# Patient Record
Sex: MALE | Race: WHITE | Employment: OTHER | URBAN - METROPOLITAN AREA
[De-identification: names, ages, dates, MRNs, and addresses within clinical notes are randomized per-mention and may not be internally consistent; named-entity substitution may affect disease eponyms.]

---

## 2022-11-09 ENCOUNTER — HOSPITAL ENCOUNTER (EMERGENCY)
Age: 67
Discharge: HOME | End: 2022-11-09
Attending: EMERGENCY MEDICINE
Payer: MEDICARE

## 2022-11-09 VITALS
OXYGEN SATURATION: 95 % | TEMPERATURE: 97.2 F | HEART RATE: 87 BPM | DIASTOLIC BLOOD PRESSURE: 81 MMHG | RESPIRATION RATE: 16 BRPM | SYSTOLIC BLOOD PRESSURE: 149 MMHG

## 2022-11-09 DIAGNOSIS — L08.9 WOUND INFECTION: Primary | ICD-10-CM

## 2022-11-09 DIAGNOSIS — T14.8XXA WOUND INFECTION: Primary | ICD-10-CM

## 2022-11-09 PROCEDURE — 87186 SC STD MICRODIL/AGAR DIL: CPT

## 2022-11-09 PROCEDURE — 87077 CULTURE AEROBIC IDENTIFY: CPT

## 2022-11-09 PROCEDURE — 87205 SMEAR GRAM STAIN: CPT

## 2022-11-09 PROCEDURE — 99283 EMERGENCY DEPT VISIT LOW MDM: CPT

## 2022-11-09 RX ORDER — DOXYCYCLINE HYCLATE 100 MG
100 TABLET ORAL 2 TIMES DAILY
Qty: 20 TABLET | Refills: 0 | Status: SHIPPED | OUTPATIENT
Start: 2022-11-09 | End: 2022-11-19

## 2022-11-09 RX ORDER — MUPIROCIN 20 MG/G
OINTMENT TOPICAL 3 TIMES DAILY
Qty: 22 G | Refills: 0 | Status: ON HOLD | OUTPATIENT
Start: 2022-11-09

## 2022-11-09 NOTE — LETTER
11/15/2022      [address] [place] [address]      Dear Mr. Nolberto Conley were recently seen in the Emergency Department and had several tests performed as part of your evaluation. There are additional results, not available during your visit, that require your immediate attention. It is important that we discuss these results with you. Please call [phone] to speak with one of our providers as soon as possible. If you reach a voice mail, please leave your name, date of birth, phone number, and the best time to return your call.       Sincerely,    Alfa Hart MD  Chief Medical Officer  [address] [place] [address] [facility], [address]  [phone]

## 2022-11-09 NOTE — ED PROVIDER NOTES
26-year-old male with a past medical history significant for diabetes, hypertension, hypercholesterolemia, skin cancer and oropharyngeal cancer with s/p biopsy to the area with resulting wound infection about 3 months he was in [place] who presents to the ER for evaluation for persistent drainage with swelling and tenderness at the site of the incision. Patient denies any further discomfort at this time. Past Medical History:   Diagnosis Date    Diabetes ([address]     HTN (hypertension)     Hypercholesteremia     Skin cancer     L sided neck skin cancer cells       No past surgical history on file. No family history on file. Social History     Socioeconomic History    Marital status:      Spouse name: Not on file    Number of children: Not on file    Years of education: Not on file    Highest education level: Not on file   Occupational History    Not on file   Tobacco Use    Smoking status: Every Day     Packs/day: 0.50     Types: Cigarettes    Smokeless tobacco: Never   Substance and Sexual Activity    Alcohol use: Yes     Comment: 2 drinks/day    Drug use: Never    Sexual activity: Not on file   Other Topics Concern    Not on file   Social History Narrative    Not on file     Social Determinants of Health     Financial Resource Strain: Not on file   Food Insecurity: Not on file   Transportation Needs: Not on file   Physical Activity: Not on file   Stress: Not on file   Social Connections: Not on file   Intimate Partner Violence: Not on file   Housing Stability: Not on file         ALLERGIES: Patient has no known allergies. Review of Systems   All other systems reviewed and are negative. Vitals:    11/09/22 1347 11/09/22 1646 11/09/22 1646   BP: (!) 106/52 (!) 149/81    Pulse: 89 87    Resp: 16 16    Temp: 97.2 °F (36.2 °C)     SpO2: 100% 95% 95%            Physical Exam  Vitals and nursing note reviewed. Exam conducted with a chaperone present.       CONSTITUTIONAL: Well-appearing; well-nourished; in no apparent distress  HEAD: Normocephalic; atraumatic  EYES: PERRL; EOM intact; conjunctiva and sclera are clear bilaterally. ENT: No rhinorrhea; normal pharynx with no tonsillar hypertrophy; mucous membranes pink/moist, no erythema, no exudate. NECK: Supple; non-tender; no cervical lymphadenopathy  CARD: Normal S1, S2; no murmurs, rubs, or gallops. Regular rate and rhythm. RESP: Normal respiratory effort; breath sounds clear and equal bilaterally; no wheezes, rhonchi, or rales. ABD: Normal bowel sounds; non-distended; non-tender; no palpable organomegaly, no masses, no bruits. Back Exam: Normal inspection; no vertebral point tenderness, no CVA tenderness. Normal range of motion. EXT: Normal ROM in all four extremities; non-tender to palpation; no swelling or deformity; distal pulses are normal, no edema. SKIN: Warm; dry; 2 cm circumferential wound mildly edematous with purulent drainage but no abscess identified consistent with a wound infection. Sherryle Moder NEURO:Alert and oriented x 3, coherent, GRETCHEN-XII grossly intact, sensory and motor are non-focal.      MDM  Number of Diagnoses or Management Options  Wound infection  Diagnosis management comments: Assessment: Surgical wound infection    Plan: Wound care/education, reassurance, symptomatic treatment/discharge with outpatient referral as needed/ Monitor and Reevaluate.          Amount and/or Complexity of Data Reviewed  Clinical lab tests: ordered and reviewed  Tests in the radiology section of CPT®: ordered and reviewed  Tests in the medicine section of CPT®: reviewed and ordered  Discussion of test results with the performing providers: yes  Decide to obtain previous medical records or to obtain history from someone other than the patient: yes  Obtain history from someone other than the patient: yes  Review and summarize past medical records: yes  Discuss the patient with other providers: yes  Independent visualization of images, tracings, or specimens: yes    Risk of Complications, Morbidity, and/or Mortality  Presenting problems: low  Diagnostic procedures: low  Management options: low    Patient Progress  Patient progress: stable         Procedures      Progress Note:   Pt has been reexamined by Alfa Hart MD. Pt is feeling much better. Symptoms have improved. All available results have been reviewed with pt and any available family. Pt understands sx, dx, and tx in ED. Care plan has been outlined and questions have been answered. Pt is ready to go home. Will send home on wound infection instruction. Prescription of doxycycline and Bactroban ointment. Outpatient referral with PCP/ENT for reevaluation and further treatment as needed. Written by Alfa Hart MD,6:05 PM    .   .

## 2022-11-12 LAB
BACTERIA SPEC CULT: ABNORMAL
BACTERIA SPEC CULT: ABNORMAL
GRAM STN SPEC: ABNORMAL
GRAM STN SPEC: ABNORMAL
SERVICE CMNT-IMP: ABNORMAL

## 2022-11-14 NOTE — PROGRESS NOTES
Attempted to reach patient. 2nd Message left for call back concerning culture result and need for treatment change.

## 2022-11-16 RX ORDER — CIPROFLOXACIN 500 MG/1
500 TABLET ORAL 2 TIMES DAILY
Qty: 14 TABLET | Refills: 0 | Status: SHIPPED | OUTPATIENT
Start: 2022-11-16 | End: 2022-11-23

## 2022-11-16 NOTE — ED NOTES
I was able to speak with patient concerning wound culture. Pt reports improvement but still drainage.   [address] for cipro 500 mg bid x 7 d to [employer]

## 2022-11-16 NOTE — ED NOTES
Pt called and left message.   I returned call and left voicemail for call back concerning lab result

## 2022-11-30 ENCOUNTER — HOSPITAL ENCOUNTER (EMERGENCY)
Age: 67
Discharge: OTHER INSTITUTION | End: 2022-12-01
Attending: EMERGENCY MEDICINE
Payer: MEDICARE

## 2022-11-30 ENCOUNTER — APPOINTMENT (OUTPATIENT)
Dept: CT IMAGING | Age: 67
End: 2022-11-30
Attending: EMERGENCY MEDICINE
Payer: MEDICARE

## 2022-11-30 DIAGNOSIS — L02.11 NECK ABSCESS: ICD-10-CM

## 2022-11-30 DIAGNOSIS — R22.1 NECK MASS: Primary | ICD-10-CM

## 2022-11-30 DIAGNOSIS — R13.10 DYSPHAGIA, UNSPECIFIED TYPE: ICD-10-CM

## 2022-11-30 LAB
ALBUMIN SERPL-MCNC: 2.9 G/DL (ref 3.5–5)
ALBUMIN/GLOB SERPL: 0.5 {RATIO} (ref 1.1–2.2)
ALP SERPL-CCNC: 151 U/L (ref 45–117)
ALT SERPL-CCNC: 44 U/L (ref 12–78)
ANION GAP SERPL CALC-SCNC: 14 MMOL/L (ref 5–15)
AST SERPL-CCNC: 73 U/L (ref 15–37)
BASOPHILS # BLD: 0 K/UL (ref 0–0.1)
BASOPHILS NFR BLD: 1 % (ref 0–1)
BILIRUB SERPL-MCNC: 0.4 MG/DL (ref 0.2–1)
BUN SERPL-MCNC: 16 MG/DL (ref 6–20)
BUN/CREAT SERPL: 20 (ref 12–20)
CALCIUM SERPL-MCNC: 8.1 MG/DL (ref 8.5–10.1)
CHLORIDE SERPL-SCNC: 103 MMOL/L (ref 97–108)
CO2 SERPL-SCNC: 24 MMOL/L (ref 21–32)
CREAT SERPL-MCNC: 0.82 MG/DL (ref 0.7–1.3)
DIFFERENTIAL METHOD BLD: ABNORMAL
EOSINOPHIL # BLD: 0 K/UL (ref 0–0.4)
EOSINOPHIL NFR BLD: 0 % (ref 0–7)
ERYTHROCYTE [DISTWIDTH] IN BLOOD BY AUTOMATED COUNT: 13.4 % (ref 11.5–14.5)
GLOBULIN SER CALC-MCNC: 5.7 G/DL (ref 2–4)
GLUCOSE SERPL-MCNC: 85 MG/DL (ref 65–100)
HCT VFR BLD AUTO: 45.2 % (ref 36.6–50.3)
HGB BLD-MCNC: 15.1 G/DL (ref 12.1–17)
IMM GRANULOCYTES # BLD AUTO: 0 K/UL (ref 0–0.04)
IMM GRANULOCYTES NFR BLD AUTO: 0 % (ref 0–0.5)
LYMPHOCYTES # BLD: 2.2 K/UL (ref 0.8–3.5)
LYMPHOCYTES NFR BLD: 36 % (ref 12–49)
MAGNESIUM SERPL-MCNC: 1.9 MG/DL (ref 1.6–2.4)
MCH RBC QN AUTO: 34.3 PG (ref 26–34)
MCHC RBC AUTO-ENTMCNC: 33.4 G/DL (ref 30–36.5)
MCV RBC AUTO: 102.7 FL (ref 80–99)
MONOCYTES # BLD: 0.5 K/UL (ref 0–1)
MONOCYTES NFR BLD: 9 % (ref 5–13)
NEUTS SEG # BLD: 3.4 K/UL (ref 1.8–8)
NEUTS SEG NFR BLD: 54 % (ref 32–75)
NRBC # BLD: 0 K/UL (ref 0–0.01)
NRBC BLD-RTO: 0 PER 100 WBC
PLATELET # BLD AUTO: 246 K/UL (ref 150–400)
PMV BLD AUTO: 9.5 FL (ref 8.9–12.9)
POTASSIUM SERPL-SCNC: 4.1 MMOL/L (ref 3.5–5.1)
PROT SERPL-MCNC: 8.6 G/DL (ref 6.4–8.2)
RBC # BLD AUTO: 4.4 M/UL (ref 4.1–5.7)
SODIUM SERPL-SCNC: 141 MMOL/L (ref 136–145)
WBC # BLD AUTO: 6.2 K/UL (ref 4.1–11.1)

## 2022-11-30 PROCEDURE — 80053 COMPREHEN METABOLIC PANEL: CPT

## 2022-11-30 PROCEDURE — 96375 TX/PRO/DX INJ NEW DRUG ADDON: CPT

## 2022-11-30 PROCEDURE — 85025 COMPLETE CBC W/AUTO DIFF WBC: CPT

## 2022-11-30 PROCEDURE — 36415 COLL VENOUS BLD VENIPUNCTURE: CPT

## 2022-11-30 PROCEDURE — 74011000636 HC RX REV CODE- 636: Performed by: EMERGENCY MEDICINE

## 2022-11-30 PROCEDURE — 96365 THER/PROPH/DIAG IV INF INIT: CPT

## 2022-11-30 PROCEDURE — 74011250636 HC RX REV CODE- 250/636: Performed by: EMERGENCY MEDICINE

## 2022-11-30 PROCEDURE — 99285 EMERGENCY DEPT VISIT HI MDM: CPT

## 2022-11-30 PROCEDURE — 83735 ASSAY OF MAGNESIUM: CPT

## 2022-11-30 PROCEDURE — 70491 CT SOFT TISSUE NECK W/DYE: CPT

## 2022-11-30 RX ORDER — KETOROLAC TROMETHAMINE 30 MG/ML
15 INJECTION, SOLUTION INTRAMUSCULAR; INTRAVENOUS
Status: COMPLETED | OUTPATIENT
Start: 2022-11-30 | End: 2022-11-30

## 2022-11-30 RX ORDER — METOPROLOL TARTRATE 50 MG/1
TABLET ORAL 2 TIMES DAILY
COMMUNITY
End: 2022-12-11

## 2022-11-30 RX ORDER — TRAMADOL HYDROCHLORIDE 50 MG/1
50 TABLET ORAL
COMMUNITY
End: 2022-12-11

## 2022-11-30 RX ORDER — GABAPENTIN 400 MG/1
400 CAPSULE ORAL 3 TIMES DAILY
COMMUNITY

## 2022-11-30 RX ORDER — TAMSULOSIN HYDROCHLORIDE 0.4 MG/1
0.4 CAPSULE ORAL DAILY
COMMUNITY

## 2022-11-30 RX ORDER — MIRTAZAPINE 30 MG/1
TABLET, FILM COATED ORAL
COMMUNITY

## 2022-11-30 RX ORDER — PANTOPRAZOLE SODIUM 40 MG/1
40 TABLET, DELAYED RELEASE ORAL DAILY
COMMUNITY

## 2022-11-30 RX ORDER — TRAZODONE HYDROCHLORIDE 100 MG/1
100 TABLET ORAL
COMMUNITY

## 2022-11-30 RX ADMIN — VANCOMYCIN HYDROCHLORIDE 1000 MG: 1 INJECTION, POWDER, LYOPHILIZED, FOR SOLUTION INTRAVENOUS at 23:16

## 2022-11-30 RX ADMIN — VANCOMYCIN HYDROCHLORIDE 1000 MG: 1 INJECTION, POWDER, LYOPHILIZED, FOR SOLUTION INTRAVENOUS at 22:51

## 2022-11-30 RX ADMIN — KETOROLAC TROMETHAMINE 15 MG: 30 INJECTION, SOLUTION INTRAMUSCULAR at 23:26

## 2022-11-30 RX ADMIN — SODIUM CHLORIDE 1000 ML: 9 INJECTION, SOLUTION INTRAVENOUS at 20:07

## 2022-11-30 RX ADMIN — IOPAMIDOL 100 ML: 612 INJECTION, SOLUTION INTRAVENOUS at 20:33

## 2022-12-01 ENCOUNTER — APPOINTMENT (OUTPATIENT)
Dept: CT IMAGING | Age: 67
DRG: 982 | End: 2022-12-01
Attending: NURSE PRACTITIONER
Payer: MEDICARE

## 2022-12-01 ENCOUNTER — HOSPITAL ENCOUNTER (INPATIENT)
Age: 67
LOS: 10 days | Discharge: HOME | DRG: 982 | End: 2022-12-11
Attending: INTERNAL MEDICINE | Admitting: INTERNAL MEDICINE
Payer: MEDICARE

## 2022-12-01 VITALS
OXYGEN SATURATION: 91 % | SYSTOLIC BLOOD PRESSURE: 147 MMHG | BODY MASS INDEX: 32.67 KG/M2 | DIASTOLIC BLOOD PRESSURE: 67 MMHG | WEIGHT: 191.36 LBS | TEMPERATURE: 98.7 F | HEIGHT: 64 IN | HEART RATE: 82 BPM | RESPIRATION RATE: 20 BRPM

## 2022-12-01 DIAGNOSIS — R22.1 MASS OF LEFT SIDE OF NECK: ICD-10-CM

## 2022-12-01 DIAGNOSIS — C76.0 SQUAMOUS CELL CARCINOMA OF HEAD AND NECK (HCC): ICD-10-CM

## 2022-12-01 DIAGNOSIS — R22.1 NECK MASS: Primary | ICD-10-CM

## 2022-12-01 DIAGNOSIS — I44.30 AV BLOCK: ICD-10-CM

## 2022-12-01 DIAGNOSIS — I42.9 CARDIOMYOPATHY, UNSPECIFIED TYPE (HCC): ICD-10-CM

## 2022-12-01 PROBLEM — L03.221 CELLULITIS AND ABSCESS OF NECK: Status: ACTIVE | Noted: 2022-12-01

## 2022-12-01 PROBLEM — L02.11 CELLULITIS AND ABSCESS OF NECK: Status: ACTIVE | Noted: 2022-12-01

## 2022-12-01 PROBLEM — L02.11 CELLULITIS AND ABSCESS OF NECK: Status: ACTIVE | Noted: 2022-01-01

## 2022-12-01 LAB
ALBUMIN SERPL-MCNC: 2.5 G/DL (ref 3.5–5)
ALBUMIN/GLOB SERPL: 0.5 {RATIO} (ref 1.1–2.2)
ALP SERPL-CCNC: 125 U/L (ref 45–117)
ALT SERPL-CCNC: 35 U/L (ref 12–78)
AMPHET UR QL SCN: NEGATIVE
ANION GAP SERPL CALC-SCNC: 5 MMOL/L (ref 5–15)
APPEARANCE UR: CLEAR
AST SERPL-CCNC: 48 U/L (ref 15–37)
BACTERIA URNS QL MICRO: NEGATIVE /HPF
BARBITURATES UR QL SCN: NEGATIVE
BASOPHILS # BLD: 0 K/UL (ref 0–0.1)
BASOPHILS NFR BLD: 0 % (ref 0–1)
BENZODIAZ UR QL: NEGATIVE
BILIRUB SERPL-MCNC: 0.6 MG/DL (ref 0.2–1)
BILIRUB UR QL: NEGATIVE
BUN SERPL-MCNC: 13 MG/DL (ref 6–20)
BUN/CREAT SERPL: 20 (ref 12–20)
CALCIUM SERPL-MCNC: 7.9 MG/DL (ref 8.5–10.1)
CANNABINOIDS UR QL SCN: POSITIVE
CHLORIDE SERPL-SCNC: 109 MMOL/L (ref 97–108)
CO2 SERPL-SCNC: 26 MMOL/L (ref 21–32)
COCAINE UR QL SCN: NEGATIVE
COLOR UR: ABNORMAL
COMMENT, HOLDF: NORMAL
COMMENT, HOLDF: NORMAL
CREAT SERPL-MCNC: 0.66 MG/DL (ref 0.7–1.3)
DIFFERENTIAL METHOD BLD: ABNORMAL
DRUG SCRN COMMENT,DRGCM: ABNORMAL
EOSINOPHIL # BLD: 0 K/UL (ref 0–0.4)
EOSINOPHIL NFR BLD: 0 % (ref 0–7)
EPITH CASTS URNS QL MICRO: ABNORMAL /LPF
ERYTHROCYTE [DISTWIDTH] IN BLOOD BY AUTOMATED COUNT: 13.3 % (ref 11.5–14.5)
EST. AVERAGE GLUCOSE BLD GHB EST-MCNC: 143 MG/DL
GLOBULIN SER CALC-MCNC: 4.9 G/DL (ref 2–4)
GLUCOSE BLD STRIP.AUTO-MCNC: 75 MG/DL (ref 65–117)
GLUCOSE BLD STRIP.AUTO-MCNC: 75 MG/DL (ref 65–117)
GLUCOSE BLD STRIP.AUTO-MCNC: 76 MG/DL (ref 65–117)
GLUCOSE SERPL-MCNC: 78 MG/DL (ref 65–100)
GLUCOSE UR STRIP.AUTO-MCNC: NEGATIVE MG/DL
HBA1C MFR BLD: 6.6 % (ref 4–5.6)
HCT VFR BLD AUTO: 39.2 % (ref 36.6–50.3)
HGB BLD-MCNC: 13.2 G/DL (ref 12.1–17)
HGB UR QL STRIP: NEGATIVE
HYALINE CASTS URNS QL MICRO: ABNORMAL /LPF (ref 0–5)
IMM GRANULOCYTES # BLD AUTO: 0 K/UL
IMM GRANULOCYTES NFR BLD AUTO: 0 %
KETONES UR QL STRIP.AUTO: 15 MG/DL
LEUKOCYTE ESTERASE UR QL STRIP.AUTO: NEGATIVE
LYMPHOCYTES # BLD: 1.8 K/UL (ref 0.8–3.5)
LYMPHOCYTES NFR BLD: 40 % (ref 12–49)
MAGNESIUM SERPL-MCNC: 1.9 MG/DL (ref 1.6–2.4)
MCH RBC QN AUTO: 34.6 PG (ref 26–34)
MCHC RBC AUTO-ENTMCNC: 33.7 G/DL (ref 30–36.5)
MCV RBC AUTO: 102.9 FL (ref 80–99)
METHADONE UR QL: NEGATIVE
MONOCYTES # BLD: 0.4 K/UL (ref 0–1)
MONOCYTES NFR BLD: 8 % (ref 5–13)
NEUTS SEG # BLD: 2.3 K/UL (ref 1.8–8)
NEUTS SEG NFR BLD: 52 % (ref 32–75)
NITRITE UR QL STRIP.AUTO: NEGATIVE
NRBC # BLD: 0 K/UL (ref 0–0.01)
NRBC BLD-RTO: 0 PER 100 WBC
OPIATES UR QL: POSITIVE
PCP UR QL: NEGATIVE
PH UR STRIP: 5.5 [PH] (ref 5–8)
PHOSPHATE SERPL-MCNC: 2.4 MG/DL (ref 2.6–4.7)
PLATELET # BLD AUTO: 198 K/UL (ref 150–400)
PMV BLD AUTO: 9.7 FL (ref 8.9–12.9)
POTASSIUM SERPL-SCNC: 4.1 MMOL/L (ref 3.5–5.1)
PROT SERPL-MCNC: 7.4 G/DL (ref 6.4–8.2)
PROT UR STRIP-MCNC: ABNORMAL MG/DL
RBC # BLD AUTO: 3.81 M/UL (ref 4.1–5.7)
RBC #/AREA URNS HPF: ABNORMAL /HPF (ref 0–5)
RBC MORPH BLD: ABNORMAL
SAMPLES BEING HELD,HOLD: NORMAL
SAMPLES BEING HELD,HOLD: NORMAL
SERVICE CMNT-IMP: NORMAL
SODIUM SERPL-SCNC: 140 MMOL/L (ref 136–145)
SP GR UR REFRACTOMETRY: 1.02 (ref 1–1.03)
UROBILINOGEN UR QL STRIP.AUTO: 0.2 EU/DL (ref 0.2–1)
WBC # BLD AUTO: 4.5 K/UL (ref 4.1–11.1)
WBC URNS QL MICRO: ABNORMAL /HPF (ref 0–4)

## 2022-12-01 PROCEDURE — 96366 THER/PROPH/DIAG IV INF ADDON: CPT

## 2022-12-01 PROCEDURE — 84100 ASSAY OF PHOSPHORUS: CPT

## 2022-12-01 PROCEDURE — 83036 HEMOGLOBIN GLYCOSYLATED A1C: CPT

## 2022-12-01 PROCEDURE — 65270000046 HC RM TELEMETRY

## 2022-12-01 PROCEDURE — 36415 COLL VENOUS BLD VENIPUNCTURE: CPT

## 2022-12-01 PROCEDURE — 74011000636 HC RX REV CODE- 636: Performed by: RADIOLOGY

## 2022-12-01 PROCEDURE — 74011250637 HC RX REV CODE- 250/637: Performed by: INTERNAL MEDICINE

## 2022-12-01 PROCEDURE — 80307 DRUG TEST PRSMV CHEM ANLYZR: CPT

## 2022-12-01 PROCEDURE — 80053 COMPREHEN METABOLIC PANEL: CPT

## 2022-12-01 PROCEDURE — 74011250636 HC RX REV CODE- 250/636: Performed by: INTERNAL MEDICINE

## 2022-12-01 PROCEDURE — 74177 CT ABD & PELVIS W/CONTRAST: CPT

## 2022-12-01 PROCEDURE — 82962 GLUCOSE BLOOD TEST: CPT

## 2022-12-01 PROCEDURE — 99285 EMERGENCY DEPT VISIT HI MDM: CPT

## 2022-12-01 PROCEDURE — 85025 COMPLETE CBC W/AUTO DIFF WBC: CPT

## 2022-12-01 PROCEDURE — 74011000250 HC RX REV CODE- 250: Performed by: INTERNAL MEDICINE

## 2022-12-01 PROCEDURE — 71260 CT THORAX DX C+: CPT

## 2022-12-01 PROCEDURE — 74011000258 HC RX REV CODE- 258: Performed by: INTERNAL MEDICINE

## 2022-12-01 PROCEDURE — 81001 URINALYSIS AUTO W/SCOPE: CPT

## 2022-12-01 PROCEDURE — 83735 ASSAY OF MAGNESIUM: CPT

## 2022-12-01 RX ORDER — SODIUM CHLORIDE 0.9 % (FLUSH) 0.9 %
5-40 SYRINGE (ML) INJECTION AS NEEDED
Status: DISCONTINUED | OUTPATIENT
Start: 2022-12-01 | End: 2022-12-11 | Stop reason: HOSPADM

## 2022-12-01 RX ORDER — MAGNESIUM SULFATE 100 %
4 CRYSTALS MISCELLANEOUS AS NEEDED
Status: DISCONTINUED | OUTPATIENT
Start: 2022-12-01 | End: 2022-12-11 | Stop reason: HOSPADM

## 2022-12-01 RX ORDER — ONDANSETRON 4 MG/1
4 TABLET, ORALLY DISINTEGRATING ORAL
Status: DISCONTINUED | OUTPATIENT
Start: 2022-12-01 | End: 2022-12-11 | Stop reason: HOSPADM

## 2022-12-01 RX ORDER — ACETAMINOPHEN 325 MG/1
650 TABLET ORAL
Status: DISCONTINUED | OUTPATIENT
Start: 2022-12-01 | End: 2022-12-11 | Stop reason: HOSPADM

## 2022-12-01 RX ORDER — LISINOPRIL 20 MG/1
10 TABLET ORAL
Status: DISCONTINUED | OUTPATIENT
Start: 2022-12-02 | End: 2022-12-03

## 2022-12-01 RX ORDER — VANCOMYCIN/0.9 % SOD CHLORIDE 1.5G/250ML
1500 PLASTIC BAG, INJECTION (ML) INTRAVENOUS
Status: DISCONTINUED | OUTPATIENT
Start: 2022-12-01 | End: 2022-12-10

## 2022-12-01 RX ORDER — TRAZODONE HYDROCHLORIDE 100 MG/1
100 TABLET ORAL
Status: DISCONTINUED | OUTPATIENT
Start: 2022-12-01 | End: 2022-12-11 | Stop reason: HOSPADM

## 2022-12-01 RX ORDER — METOPROLOL TARTRATE 50 MG/1
100 TABLET ORAL 2 TIMES DAILY
Status: DISCONTINUED | OUTPATIENT
Start: 2022-12-01 | End: 2022-12-02

## 2022-12-01 RX ORDER — ONDANSETRON 2 MG/ML
4 INJECTION INTRAMUSCULAR; INTRAVENOUS
Status: DISCONTINUED | OUTPATIENT
Start: 2022-12-01 | End: 2022-12-11 | Stop reason: HOSPADM

## 2022-12-01 RX ORDER — POLYETHYLENE GLYCOL 3350 17 G/17G
17 POWDER, FOR SOLUTION ORAL DAILY PRN
Status: DISCONTINUED | OUTPATIENT
Start: 2022-12-01 | End: 2022-12-11 | Stop reason: HOSPADM

## 2022-12-01 RX ORDER — CYCLOBENZAPRINE HCL 10 MG
10 TABLET ORAL
COMMUNITY

## 2022-12-01 RX ORDER — MIRTAZAPINE 15 MG/1
30 TABLET, FILM COATED ORAL
Status: DISCONTINUED | OUTPATIENT
Start: 2022-12-01 | End: 2022-12-11 | Stop reason: HOSPADM

## 2022-12-01 RX ORDER — ACETAMINOPHEN 650 MG/1
650 SUPPOSITORY RECTAL
Status: DISCONTINUED | OUTPATIENT
Start: 2022-12-01 | End: 2022-12-11 | Stop reason: HOSPADM

## 2022-12-01 RX ORDER — MORPHINE SULFATE 2 MG/ML
2 INJECTION, SOLUTION INTRAMUSCULAR; INTRAVENOUS
Status: DISCONTINUED | OUTPATIENT
Start: 2022-12-01 | End: 2022-12-09

## 2022-12-01 RX ORDER — TAMSULOSIN HYDROCHLORIDE 0.4 MG/1
0.4 CAPSULE ORAL DAILY
Status: DISCONTINUED | OUTPATIENT
Start: 2022-12-01 | End: 2022-12-11 | Stop reason: HOSPADM

## 2022-12-01 RX ORDER — COLCHICINE 0.6 MG/1
0.6 TABLET ORAL AS NEEDED
Status: DISCONTINUED | OUTPATIENT
Start: 2022-12-01 | End: 2022-12-01

## 2022-12-01 RX ORDER — PANTOPRAZOLE SODIUM 40 MG/1
40 TABLET, DELAYED RELEASE ORAL DAILY
Status: DISCONTINUED | OUTPATIENT
Start: 2022-12-01 | End: 2022-12-11 | Stop reason: HOSPADM

## 2022-12-01 RX ORDER — COLCHICINE 0.6 MG/1
0.6 TABLET ORAL AS NEEDED
COMMUNITY

## 2022-12-01 RX ORDER — OXYCODONE AND ACETAMINOPHEN 5; 325 MG/1; MG/1
1 TABLET ORAL
Status: DISCONTINUED | OUTPATIENT
Start: 2022-12-01 | End: 2022-12-10

## 2022-12-01 RX ORDER — INSULIN LISPRO 100 [IU]/ML
INJECTION, SOLUTION INTRAVENOUS; SUBCUTANEOUS
Status: DISCONTINUED | OUTPATIENT
Start: 2022-12-01 | End: 2022-12-11 | Stop reason: HOSPADM

## 2022-12-01 RX ORDER — SODIUM CHLORIDE 0.9 % (FLUSH) 0.9 %
5-40 SYRINGE (ML) INJECTION EVERY 8 HOURS
Status: DISCONTINUED | OUTPATIENT
Start: 2022-12-01 | End: 2022-12-11 | Stop reason: HOSPADM

## 2022-12-01 RX ORDER — CYCLOBENZAPRINE HCL 10 MG
10 TABLET ORAL
Status: DISCONTINUED | OUTPATIENT
Start: 2022-12-01 | End: 2022-12-10

## 2022-12-01 RX ORDER — GABAPENTIN 400 MG/1
400 CAPSULE ORAL 3 TIMES DAILY
Status: DISCONTINUED | OUTPATIENT
Start: 2022-12-01 | End: 2022-12-11 | Stop reason: HOSPADM

## 2022-12-01 RX ADMIN — PANTOPRAZOLE SODIUM 40 MG: 40 TABLET, DELAYED RELEASE ORAL at 09:59

## 2022-12-01 RX ADMIN — MORPHINE SULFATE 2 MG: 2 INJECTION, SOLUTION INTRAMUSCULAR; INTRAVENOUS at 13:19

## 2022-12-01 RX ADMIN — OXYCODONE AND ACETAMINOPHEN 1 TABLET: 5; 325 TABLET ORAL at 22:07

## 2022-12-01 RX ADMIN — MORPHINE SULFATE 2 MG: 2 INJECTION, SOLUTION INTRAMUSCULAR; INTRAVENOUS at 08:05

## 2022-12-01 RX ADMIN — TAMSULOSIN HYDROCHLORIDE 0.4 MG: 0.4 CAPSULE ORAL at 09:59

## 2022-12-01 RX ADMIN — IOPAMIDOL 100 ML: 755 INJECTION, SOLUTION INTRAVENOUS at 15:48

## 2022-12-01 RX ADMIN — SODIUM CHLORIDE, PRESERVATIVE FREE 10 ML: 5 INJECTION INTRAVENOUS at 17:33

## 2022-12-01 RX ADMIN — METOPROLOL TARTRATE 100 MG: 50 TABLET ORAL at 17:12

## 2022-12-01 RX ADMIN — GABAPENTIN 400 MG: 400 CAPSULE ORAL at 17:12

## 2022-12-01 RX ADMIN — MIRTAZAPINE 30 MG: 15 TABLET, FILM COATED ORAL at 22:07

## 2022-12-01 RX ADMIN — GABAPENTIN 400 MG: 400 CAPSULE ORAL at 09:59

## 2022-12-01 RX ADMIN — PIPERACILLIN AND TAZOBACTAM 3.38 G: 3; .375 INJECTION, POWDER, LYOPHILIZED, FOR SOLUTION INTRAVENOUS at 22:07

## 2022-12-01 RX ADMIN — VANCOMYCIN HYDROCHLORIDE 1500 MG: 10 INJECTION, POWDER, LYOPHILIZED, FOR SOLUTION INTRAVENOUS at 17:14

## 2022-12-01 RX ADMIN — GABAPENTIN 400 MG: 400 CAPSULE ORAL at 22:07

## 2022-12-01 RX ADMIN — PIPERACILLIN AND TAZOBACTAM 3.38 G: 3; .375 INJECTION, POWDER, LYOPHILIZED, FOR SOLUTION INTRAVENOUS at 13:19

## 2022-12-01 RX ADMIN — PIPERACILLIN AND TAZOBACTAM 4.5 G: 4; .5 INJECTION, POWDER, FOR SOLUTION INTRAVENOUS at 08:06

## 2022-12-01 RX ADMIN — METOPROLOL TARTRATE 100 MG: 50 TABLET ORAL at 11:07

## 2022-12-01 RX ADMIN — SODIUM CHLORIDE, PRESERVATIVE FREE 10 ML: 5 INJECTION INTRAVENOUS at 22:07

## 2022-12-01 RX ADMIN — MORPHINE SULFATE 2 MG: 2 INJECTION, SOLUTION INTRAMUSCULAR; INTRAVENOUS at 17:29

## 2022-12-01 NOTE — PROGRESS NOTES
Pharmacist Note - Vancomycin Dosing    Consult provided for this 75 y.o. male for indication of non-healing wound from biopsy of neck mass ~1 month ago in [place]  Antibiotic regimen(s): Vancomycin and Zosyn  Patient on vancomycin PTA? NO     Recent Labs     22   WBC 6.2   CREA 0.82   BUN 16     Frequency of BMP: Daily  Height: 162.6 cm  Weight: 86.8 kg  Est CrCl: 85.6 ml/min; UO: -- ml/kg/hr  Temp (24hrs), Av.1 °F (36.7 °C), Min:97.6 °F (36.4 °C), Max:98.7 °F (37.1 °C)    Cultures:   Wound, neck - Heavy Enterobacter cloacae, heavy mixed miles - Final     MRSA Swab ordered (if applicable)? N/A    The plan below is expected to result in a target range of AUC/HAKAN 400-500    Therapy was initiated with a loading dose of 2000 mg IV x 1 to be followed by a maintenance dose of 1500 mg IV every 18 hours. Pharmacy to follow patient daily and order levels / make dose adjustments as appropriate. Prabha Jason, PharmD  Clinical Pharmacist, Orthopedics and [facility] ()      ------------  *Vancomycin has been dosed used Bayesian kinetics software to target an AUC/HAKAN of 400-600, which provides adequate exposure for an assumed infection due to MRSA with an HAKAN of 1 or less while reducing the risk of nephrotoxicity as seen with traditional trough based dosing goals.

## 2022-12-01 NOTE — PROGRESS NOTES
1219- called to facilitate consult for Otolaryngology, on call Rosa Elena is [facility] Otoloaryngology office. [place] spoke to Lennox who was speaking in the office with the coordinator Olivia Boggs RN who told the  they require all medical records  to be forwarded in order for the DR to see the patient. The  informed them that we do not have his medical records that we have requested them from the office in [place]. NP notified. 1304- NP aware.

## 2022-12-01 NOTE — ED TRIAGE NOTES
Patient arrives to ED via EMS with a CC of L neck mass that developed 3 months ago. Patient stated he has had multiple biopsies of the sight. After his last one, he was started on a topical antibiotic. CA cells were found in the mass. Patient stated he did not follow up d/t death of a loved one. Patient noticed bleeding and pain from the sight.

## 2022-12-01 NOTE — PROGRESS NOTES
Afternoon update: For full H & P from today, please see Dr. Ming Garcia note from this morning. This 77-year-old man with past medical history significant for hypertension and diabetes presented at the emergency room with left neck mass. This has been present for about 3 months. The patient recently relocated to [facility] from [place]. With the initial swelling, the patient was evaluated in [place] including biopsy of the mass. The patient was told that the mass is cancer after biopsy done by ENT Surgeon ( Dr. Anthony Gutierrez [facility]). He was referred to a cancer institute in [facility] but had not yet begun workup/treatment because the patient's wife passed away on October 4th. The patient came to [place] to live with his daughter. He was treated with oral antibiotics in the [place] ER in early November. The swelling is progressive and associated with pain and also with purulent drainage. Because of that, the patient came to [facility] emergency room. When the patient arrived at the emergency room, CT scan of the soft tissue of the neck was obtained. This shows complex fluid collection in the left lateral neck. The mass is also causing the patient difficulty with swallowing ( choking) and change in vocal quality. Patient was seen and examined today by myself. He has large purulent necrotic appearing mass to left neck. Vocal quality appears to be impacted. Pain with swallowing and choking noted. 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 Ruthie Jo , medical record release form filled out with patient and faxed to their office.  Number is [phone]   Called consult to Dr. Janette Amaya with [facility] , spoke to him over the phone, patient will likely need radiation/chemo for malignant neck mass, he will still come tonight to evaluate airway and make sure not compromised. Will likely need Heme/Onc consult non urgent , will place for tomorrow for Dr. Getachew Pritchard. Speech consult placed for dysphagia , continue NPO until this time   Wound care consult placed for heavy purulent drainage. Continue with Vanc + Zosyn   Pain control with Percocet and Morphine   BP continues to be elevated despite adjusted home dose of 100 mg Metoprolol daily. SBP consistently in the 160's-170's. Will start Lisinopril 10 mg daily. Continue to titrate    [employer]

## 2022-12-01 NOTE — H&P
[facility]  HISTORY AND PHYSICAL    Name:  Jovanni Almonte  MR#:  [id number]  :  1955  ACCOUNT #:  [de-identified]  ADMIT DATE:  2022      The patient was seen, evaluated, and admitted by me on 2022. PRIMARY CARE PHYSICIAN:  None. SOURCE OF INFORMATION:  The patient and review of ED electronic medical record. CHIEF COMPLAINT:  Left neck mass and swelling. HISTORY OF PRESENT ILLNESS:  This 23-year-old man with past medical history significant for hypertension and diabetes presented at the emergency room with left neck mass. This has been present for about 3 months. The patient recently relocated to [facility] from [place]. With the initial swelling, the patient was evaluated in [place] including biopsy of the mass. The patient was told that the mass is cancer and was referred to ENT surgeon. Because the patient's wife passed away, the patient has not been able to follow up with the ENT surgeon. The patient came to [place] to live with his daughter. The swelling is progressive and associated with pain and also with drainage. Because of that, the patient came to [facility] emergency room. When the patient arrived at the emergency room, CT scan of the soft tissue of the neck was obtained. This shows complex fluid collection in the left lateral neck. The patient was subsequently referred to the hospitalist service for admission. No record of prior admission to this hospital.  The patient denies fever, rigors and chills. PAST MEDICAL HISTORY:  Hypertension and type 2 diabetes. ALLERGIES:  NO KNOWN DRUG ALLERGIES. MEDICATIONS:  1. Colchicine 0.6 mg daily as needed for gout. 2.  Flexeril 10 mg three times daily. 3.  Neurontin 400 mg three times daily. 4.  Lopressor 50 mg twice daily. 5.  Remeron 30 mg daily at bedtime. 6.  Protonix 40 mg daily. 7.  Januvia 100 mg daily. 8.  Flomax 0.4 mg daily.   9.  Trazodone 100 mg daily at bedtime. FAMILY HISTORY:  This was reviewed. His father had hypertension. PAST SURGICAL HISTORY:  This is significant for biopsy of left neck mass. SOCIAL HISTORY:  The patient smokes half a pack of cigarettes daily. Denies alcohol abuse. REVIEW OF SYSTEMS:  HEAD, EYES, EARS, NOSE AND THROAT:  No headache, no dizziness, no blurring of vision, and no photophobia. RESPIRATORY SYSTEM:  No cough, no shortness of breath, and no hemoptysis. CARDIOVASCULAR SYSTEM:  No chest pain, no orthopnea, and no palpitation. GASTROINTESTINAL SYSTEM:  No nausea or vomiting. No diarrhea and no constipation. GENITOURINARY SYSTEM:  No dysuria, no urgency, and no frequency. All other systems are reviewed and they are negative. PHYSICAL EXAMINATION:  GENERAL APPEARANCE:  The patient appeared ill and in moderate distress. VITAL SIGNS:  On arrival at the emergency room; temperature 97.7, pulse 93, respiratory rate 16, blood pressure 139/94, and oxygen saturation 98%. HEAD:  Normocephalic, atraumatic. EYES:  Normal eye movement. No redness, no drainage, and no discharge. EARS:  Normal external ears with no obvious drainage. NOSE:  No deformity and no drainage. MOUTH AND THROAT:  No visible oral lesion. NECK:  Swelling, redness, and tenderness. Left neck mass noted. CHEST:  Clear breath sounds. No wheezing and no crackles. HEART:  Normal S1 and S2, regular. No clinically appreciable murmur. ABDOMEN:  Soft and nontender. Normal bowel sounds. CNS:  Alert and oriented x3. No gross focal neurological deficit. EXTREMITIES:  No edema. Pulses 2+ bilaterally. MUSCULOSKELETAL SYSTEM:  No obvious joint deformity and swelling. SKIN:  Swelling, tenderness, and redness on left side of the neck noted and present on admission. PSYCHIATRY:  Normal mood and affect. LYMPHATIC SYSTEM:  Bilateral cervical lymphadenopathy noted.     DIAGNOSTIC DATA:  The CT scan of the soft tissue of the neck shows large 4.8 x 3.1 x 4.2 cm rim enhancing complex collection in the left lateral neck. LABORATORY DATA:  Hematology; WBC 6.2, hemoglobin 15.3, hematocrit 45.2, and platelets 247. Magnesium level 1.9. Chemistry; sodium 141, potassium 4.1, chloride 103, CO2 is 24, glucose 85, BUN 16, creatinine 0.82, calcium 8.1, total bilirubin 0.4, ALT 44, AST 73, alkaline phosphatase 151, total protein 8.6, albumin level 2.9, and globulin 5.7. ASSESSMENT:  1. Left neck abscess and cellulitis. 2.  Hypertension. 3.  Type 2 diabetes. 4.  Tobacco abuse. PLAN:  1. Left neck abscess and cellulitis. We will admit the patient for further evaluation and treatment. We will start the patient on vancomycin and Zosyn. ENT consult will be requested to assist in further evaluation and treatment. The biopsy of the mass in the past revealed cancer according to the patient. We will obtain CTA of the chest as well as CT of the abdomen and pelvis to evaluate for metastatic disease. The patient will be n.p.o. in anticipation of intervention by the ENT surgeon. 2.  Hypertension. We will resume preadmission medication. We will monitor the patient's blood pressure closely. 3.  Type 2 diabetes. The patient will be placed on sliding scale with insulin coverage. We will hold oral agents till the time of discharge from the hospital.  We will check A1c level. 4.  Tobacco abuse. The patient advised to quit smoking. He does not want to be placed on Nicoderm patch at this time. 5.  Other issues. Code status, the patient is a full code. We will request SCD for DVT prophylaxis. The patient will require DVT prophylaxis with Lovenox or heparin after surgical intervention or if the patient is not going to undergo any surgical intervention. FUNCTIONAL STATUS PRIOR TO ADMISSION:  The patient came from home. The patient is ambulatory with assistive device. COVID PRECAUTION:  The patient was wearing a face mask.   I was wearing a face mask and gloves for this patient's encounter.       MD GEOFF [email or web address]  D:  12/01/2022 6:59  T:  12/01/2022 8:45  JOB #:  [id number]

## 2022-12-01 NOTE — PROGRESS NOTES
Transition of Care: hopefully home with followup with specialist/new pcp    Transport plan: likely in car    RUR: 13%    Main contact is Debi Blackman- [phone] and Emmanuel Lemus- [phone]    Discharge pending:  -pending Win Varela consult  -patient continues on IV antibiotics  -pending medical progress and care recommendations    1230: this CM met with pleasant patient at bedside; he is alert and oriented x 4; patient lives at stated address in [place]; his son and daughter both live nearby; he moved to the [facility] area from [facility] a couple of months ago after his wife passed away; patient uses a cane, has 2 hearing aids (both at [facility] now); a shower chair; does not use a cpap; he verified his insurance which he states has 12 roundtrip transportation rides as a benefit; he states he does not drive but his children help out with rides; he states he has a new pcp appt set up with Berenice Tavares NP at [facility] for 12/8/22    Reason for Admission:  mass of left side of neck                     RUR Score:       13%              Plan for utilizing home health:    likely none will be needed      PCP: First and Last name:  None-has appt set up with Shaunna Chapa NP     Name of Practice: [facility]   Are you a current patient: Yes/No:    Approximate date of last visit: has NEW PATIENT appt set for 12/8/22   Can you participate in a virtual visit with your PCP: unknown                    Current Advanced Directive/Advance Care Plan: Full Code      Healthcare Decision Maker:   Click here to complete [address] including selection of the Healthcare Decision Maker Relationship (ie \"Primary\")                             Transition of Care Plan:     likely home with followup with specialists/NEW pcp    Care Management Interventions  PCP Verified by CM:  Yes (patient has NEW pcp appt set up with Shaunna Chapa NP at [facility] for 12/8/22)  Mode of Transport at Discharge:  Other (see comment) (likely in car)  Discharge Durable Medical Equipment: No  Physical Therapy Consult: No  Occupational Therapy Consult: No  Speech Therapy Consult: No  Support Systems: Child(catarina)  Discharge Location  Patient Expects to be Discharged to[de-identified] Other: (TBD; likely home with followupw ith specialists/new pcp)     CM following  Antonio Winter RN

## 2022-12-01 NOTE — PROGRESS NOTES
Clinical Pharmacy Note - Extended Infusion Zosyn Dosing    This patient has been ordered Zosyn at 3.375 g IV every 8 hours. P&T protocol allows automatic substitution to extended-infusion Zosyn and dose adjustment based on indication and renal function. Indication: Head and Neck infection    CrCl: 85.9 mL/min    Per P&T protocol, the Zosyn will initiated with a 4.5 gram IV load dose (given over 30 minutes) which will be followed by a maintenance regimen of 3.375 gram(s) IV Q 8 hours (each dose will be infused over 4 hours). Pharmacy will continue to monitor this patient daily for changes in clinical status and renal function. Please call pharmacy with any questions.     Nicole ChildsD  Clinical Pharmacist, Orthopedics and Med/Surg  [id number] [employer] [facility] ()

## 2022-12-01 NOTE — ED NOTES
TRANSFER - OUT REPORT:    Verbal report given to Paige Randle RN(name) on Vivaty.  being transferred to [facility] Emergency Department(unit) for routine progression of care       Report consisted of patients Situation, Background, Assessment and   Recommendations(SBAR). Information from the following report(s) ED Summary was reviewed with the receiving nurse. Lines:   Peripheral IV 11/30/22 Right Antecubital (Active)       Peripheral IV 11/30/22 Left Antecubital (Active)        Opportunity for questions and clarification was provided.

## 2022-12-01 NOTE — PROGRESS NOTES
Comprehensive Nutrition Assessment    Type and Reason for Visit: Initial, Positive nutrition screen    Nutrition Recommendations/Plan:   Advance diet order to 4 Carb Choice as pt able to tolerate  Order chocolate Ensure High Protein TID when diet advances       Malnutrition Assessment:  Malnutrition Status: At risk for malnutrition (specify) (Poor PO 2/2 difficulty swallowing; no confirmed weight loss) (12/01/22 1122)         Nutrition Assessment:    76 yo male admitted for mass of left side of neck. Pmhx: DM, HTN, hypercholesterolemia, skin CA. MST received for weight loss and poor PO. Spoke with pt at bedside. He is currently NPO in anticipation of intervention by ENT surgeon. States the last meal he had was Thanksgiving day. Since then all he has had is broth. C/o difficulty swallowing because of this mass. He is able to get down water/liquids. Took PO meds this morning but states that was very difficult. Discussed ONS options once diet advances, agreeable to try chocolate Ensure High Protein. States his UBW in October (last time he weighed) was 82 kg. He is not sure if he has lost any weight. Current weight is charted as 86.8 kg indicating no weight loss, unsure of source of current weight. Will continue to monitor. Labs reviewed. Nutritionally Significant Medications:  Humalog, Remeron, protonix      Estimated Daily Nutrient Needs:  Energy Requirements Based On: Formula  Weight Used for Energy Requirements: Current  Energy (kcal/day): 2020 (MSJ x AF 1.3)  Weight Used for Protein Requirements: Current  Protein (g/day): 104 (1.1 gm/kg (20%))  Method Used for Fluid Requirements: 1 ml/kcal  Fluid (ml/day): 2020    Nutrition Related Findings:   Edema: none  Last BM: 11/26/22,      Wounds: None      Current Nutrition Therapies:  Diet: NPO  Supplements: none  Meal intake: No data found. Supplement intake: No data found.   Nutrition Support: none      Anthropometric Measures:  Height: 5' 4\" (162.6 cm)  Ideal Body Weight (IBW): 130 lbs (59 kg)     Current Body Wt:  86.8 kg (191 lb 5.8 oz), 147.2 % IBW. Not specified  Current BMI (kg/m2): 32.8        Weight Adjustment: No adjustment                 BMI Category: Obese class 1 (BMI 30.0-34. 9)    Wt Readings from Last 10 Encounters:   12/01/22 86.8 kg (191 lb 5.8 oz)   11/30/22 86.8 kg (191 lb 5.8 oz)           Nutrition Diagnosis:   Inadequate oral intake related to inadequate protein-energy intake, swallowing difficulty as evidenced by NPO or clear liquid status due to medical condition    Nutrition Interventions:   Food and/or Nutrient Delivery: Start oral diet, Start oral nutrition supplement  Nutrition Education/Counseling: No recommendations at this time  Coordination of Nutrition Care: Continue to monitor while inpatient       Goals:     Goals: other (specify)  Specify Other Goals: Advance PO diet order with intakes > 75% meals over next 5-7 days    Nutrition Monitoring and Evaluation:   Behavioral-Environmental Outcomes: None identified  Food/Nutrient Intake Outcomes: Food and nutrient intake, Diet advancement/tolerance, Supplement intake  Physical Signs/Symptoms Outcomes: Biochemical data, GI status, Weight    Discharge Planning:     Too soon to determine    Fabby Pappas RD  Available via [employer]

## 2022-12-01 NOTE — ED NOTES
Daughter in law left for the night.   Asked if nurse could call in the AM to give the family an update

## 2022-12-01 NOTE — ED NOTES
Has a final transfer review been performed?  Yes    Reason for Admission: Neck mass  Patient comes from: Home/transfer from [facility] ED  Mental Status: Alert and oriented  ADL:partial assistance  Ambulation:mild difficulty  Pertinent Info/Safety Concerns: Fall risk, uses cane to walk  COVID Status: Not Indicated  MEWS Score: 1  Sinus Rhythm  Vitals:    12/01/22 0324 12/01/22 0329 12/01/22 0330 12/01/22 0437   BP: (!) 139/94   (!) 160/74   Pulse: 93   87   Resp: 16   18   Temp:    97.7 °F (36.5 °C)   SpO2: 98% 98%  91%   Weight:   86.8 kg (191 lb 5.8 oz)    Height:   5' 4\" (1.626 m)      Lines:   Peripheral IV 11/30/22 Right Antecubital (Active)       Peripheral IV 11/30/22 Left Antecubital (Active)      Transport mode:ED stretcher    [facility].  being transferred to (unit) for routine progression of care     \"Notification of etransfer note given to (Name) Tristian Kearns (Title) RN

## 2022-12-01 NOTE — ED PROVIDER NOTES
54-year-old male presents from home accompanied by his daughter-in-law with a complaint of neck mass and difficulty swallowing. Patient is from [place]. He has had a neck mass developing in his left neck since September. He was seen by ENT and oncology in [place] and thought that this was a cancerous mass. His wife subsequently  after which he was brought to [place] by family. He has not had any follow-up with ENT or oncology since arriving in [place]. He reports that the size of the mass in the left side of his neck is getting larger. He has been unable to eat anything for a week because it will not go down. He reports coughing but no difficulty breathing. He states there is a wound to the left side of his neck that is not healing that was a result of the biopsy done a month ago in [place]. He has an appointment with a primary care doctor and ENT but not coming up for 2 months. Denies any fever, vomiting, diarrhea. Past medical history significant for diabetes and hypertension. Past Medical History:   Diagnosis Date    Diabetes ([address]     HTN (hypertension)     Hypercholesteremia     Skin cancer     L sided neck skin cancer cells       No past surgical history on file. No family history on file.     Social History     Socioeconomic History    Marital status:      Spouse name: Not on file    Number of children: Not on file    Years of education: Not on file    Highest education level: Not on file   Occupational History    Not on file   Tobacco Use    Smoking status: Every Day     Packs/day: 0.50     Types: Cigarettes    Smokeless tobacco: Never   Substance and Sexual Activity    Alcohol use: Yes     Comment: 2 drinks/day    Drug use: Never    Sexual activity: Not on file   Other Topics Concern    Not on file   Social History Narrative    Not on file     Social Determinants of Health     Financial Resource Strain: Not on file   Food Insecurity: Not on file   Transportation Needs: Not on file   Physical Activity: Not on file   Stress: Not on file   Social Connections: Not on file   Intimate Partner Violence: Not on file   Housing Stability: Not on file         ALLERGIES: Patient has no known allergies. Review of Systems   Constitutional:  Negative for diaphoresis and fever. HENT:  Negative for facial swelling. Eyes:  Negative for visual disturbance. Respiratory:  Negative for cough. Cardiovascular:  Negative for chest pain. Gastrointestinal:  Negative for abdominal pain. Genitourinary:  Negative for dysuria. Musculoskeletal:  Negative for joint swelling. Skin:  Negative for rash. Neurological:  Negative for headaches. Hematological:  Negative for adenopathy. Psychiatric/Behavioral:  Negative for suicidal ideas. Vitals:    11/30/22 1944   BP: 133/76   Pulse: (!) 104   Resp: 20   Temp: 98.7 °F (37.1 °C)   SpO2: 97%   Weight: 86.8 kg (191 lb 5.8 oz)   Height: 5' 4\" (1.626 m)            Physical Exam  Vitals and nursing note reviewed. Constitutional:       General: He is not in acute distress. Appearance: He is well-developed. He is not ill-appearing. HENT:      Head: Normocephalic and atraumatic. Eyes:      Pupils: Pupils are equal, round, and reactive to light. Neck:      Comments: Swelling and erythema to L neck with open wound. Cardiovascular:      Rate and Rhythm: Normal rate. Pulmonary:      Effort: Pulmonary effort is normal. No respiratory distress. Abdominal:      General: There is no distension. Musculoskeletal:         General: Normal range of motion. Cervical back: Normal range of motion and neck supple. Skin:     General: Skin is warm and dry. Neurological:      Mental Status: He is alert and oriented to person, place, and time.         MDM     Amount and/or Complexity of Data Reviewed  Clinical lab tests: reviewed  Tests in the radiology section of CPT®: reviewed         Perfect Serve Consult for Admission  9:54 PM    ED Room Number: APP54/83  Patient Name and age:  Jaz Blanco. 75 y.o.  male  Working Diagnosis:   1. Neck mass    2. Neck abscess    3. Dysphagia, unspecified type        COVID-19 Suspicion:  no  Sepsis present:  no  Reassessment needed: no  Code Status:  Full Code  Readmission: no  Isolation Requirements:  no  Recommended Level of Care:  med/surg  Department: [facility] ED - [phone]  Admitting Provider:     Other:  H/o L sided neck mass found when he lived in [place] in September. Was being worked up for cancer when wife  and he was lost to follow up. Now in [place] with family. Came in tonight because of difficulty swallowing. VS normal. Not in any distress. CT concerning for abscess vs neoplasm. I discussed with DR. Constantino Carson (ENT) via [facility] and he agreed with plan to admit to hospitalist.    Total critical care time spent exclusive of procedures:  0 minutes.     Procedures

## 2022-12-01 NOTE — ED TRIAGE NOTES
Patient ambulatory to treatment area with cane.  was dx with cancer cells in his throat several months ago in [facility]. Never followed up due to death of loved one. Seen [facility] 2 weeks ago given antibiotics  was not given follow up.

## 2022-12-02 LAB
ANION GAP SERPL CALC-SCNC: 8 MMOL/L (ref 5–15)
ATRIAL RATE: 68 BPM
BUN SERPL-MCNC: 8 MG/DL (ref 6–20)
BUN/CREAT SERPL: 11 (ref 12–20)
CALCIUM SERPL-MCNC: 8.1 MG/DL (ref 8.5–10.1)
CALCULATED P AXIS, ECG09: 26 DEGREES
CALCULATED R AXIS, ECG10: 23 DEGREES
CALCULATED T AXIS, ECG11: 34 DEGREES
CHLORIDE SERPL-SCNC: 105 MMOL/L (ref 97–108)
CO2 SERPL-SCNC: 23 MMOL/L (ref 21–32)
CREAT SERPL-MCNC: 0.7 MG/DL (ref 0.7–1.3)
DIAGNOSIS, 93000: NORMAL
GLUCOSE BLD STRIP.AUTO-MCNC: 119 MG/DL (ref 65–117)
GLUCOSE BLD STRIP.AUTO-MCNC: 138 MG/DL (ref 65–117)
GLUCOSE BLD STRIP.AUTO-MCNC: 73 MG/DL (ref 65–117)
GLUCOSE BLD STRIP.AUTO-MCNC: 88 MG/DL (ref 65–117)
GLUCOSE SERPL-MCNC: 73 MG/DL (ref 65–100)
P-R INTERVAL, ECG05: 172 MS
POTASSIUM SERPL-SCNC: 4 MMOL/L (ref 3.5–5.1)
Q-T INTERVAL, ECG07: 400 MS
QRS DURATION, ECG06: 66 MS
QTC CALCULATION (BEZET), ECG08: 425 MS
SERVICE CMNT-IMP: ABNORMAL
SERVICE CMNT-IMP: ABNORMAL
SERVICE CMNT-IMP: NORMAL
SERVICE CMNT-IMP: NORMAL
SODIUM SERPL-SCNC: 136 MMOL/L (ref 136–145)
VENTRICULAR RATE, ECG03: 68 BPM

## 2022-12-02 PROCEDURE — 74011000258 HC RX REV CODE- 258: Performed by: INTERNAL MEDICINE

## 2022-12-02 PROCEDURE — 65270000046 HC RM TELEMETRY

## 2022-12-02 PROCEDURE — 93005 ELECTROCARDIOGRAM TRACING: CPT

## 2022-12-02 PROCEDURE — 74011250636 HC RX REV CODE- 250/636: Performed by: INTERNAL MEDICINE

## 2022-12-02 PROCEDURE — 74011250637 HC RX REV CODE- 250/637: Performed by: INTERNAL MEDICINE

## 2022-12-02 PROCEDURE — 74011000250 HC RX REV CODE- 250: Performed by: INTERNAL MEDICINE

## 2022-12-02 PROCEDURE — 82962 GLUCOSE BLOOD TEST: CPT

## 2022-12-02 PROCEDURE — 74011250637 HC RX REV CODE- 250/637: Performed by: NURSE PRACTITIONER

## 2022-12-02 PROCEDURE — 80048 BASIC METABOLIC PNL TOTAL CA: CPT

## 2022-12-02 PROCEDURE — 92610 EVALUATE SWALLOWING FUNCTION: CPT

## 2022-12-02 PROCEDURE — 36415 COLL VENOUS BLD VENIPUNCTURE: CPT

## 2022-12-02 RX ORDER — AMLODIPINE BESYLATE 5 MG/1
10 TABLET ORAL DAILY
Status: DISCONTINUED | OUTPATIENT
Start: 2022-12-03 | End: 2022-12-11 | Stop reason: HOSPADM

## 2022-12-02 RX ADMIN — VANCOMYCIN HYDROCHLORIDE 1500 MG: 10 INJECTION, POWDER, LYOPHILIZED, FOR SOLUTION INTRAVENOUS at 12:15

## 2022-12-02 RX ADMIN — PIPERACILLIN AND TAZOBACTAM 3.38 G: 3; .375 INJECTION, POWDER, LYOPHILIZED, FOR SOLUTION INTRAVENOUS at 14:08

## 2022-12-02 RX ADMIN — LISINOPRIL 10 MG: 20 TABLET ORAL at 10:29

## 2022-12-02 RX ADMIN — SODIUM CHLORIDE, PRESERVATIVE FREE 10 ML: 5 INJECTION INTRAVENOUS at 06:38

## 2022-12-02 RX ADMIN — PANTOPRAZOLE SODIUM 40 MG: 40 TABLET, DELAYED RELEASE ORAL at 10:29

## 2022-12-02 RX ADMIN — TAMSULOSIN HYDROCHLORIDE 0.4 MG: 0.4 CAPSULE ORAL at 10:29

## 2022-12-02 RX ADMIN — GABAPENTIN 400 MG: 400 CAPSULE ORAL at 10:29

## 2022-12-02 RX ADMIN — PIPERACILLIN AND TAZOBACTAM 3.38 G: 3; .375 INJECTION, POWDER, LYOPHILIZED, FOR SOLUTION INTRAVENOUS at 06:37

## 2022-12-02 RX ADMIN — Medication 2 CAPSULE: at 17:18

## 2022-12-02 RX ADMIN — OXYCODONE AND ACETAMINOPHEN 1 TABLET: 5; 325 TABLET ORAL at 06:37

## 2022-12-02 RX ADMIN — MIRTAZAPINE 30 MG: 15 TABLET, FILM COATED ORAL at 21:19

## 2022-12-02 RX ADMIN — METOPROLOL TARTRATE 100 MG: 50 TABLET ORAL at 10:29

## 2022-12-02 RX ADMIN — OXYCODONE AND ACETAMINOPHEN 1 TABLET: 5; 325 TABLET ORAL at 20:32

## 2022-12-02 RX ADMIN — OXYCODONE AND ACETAMINOPHEN 1 TABLET: 5; 325 TABLET ORAL at 14:11

## 2022-12-02 RX ADMIN — LISINOPRIL 10 MG: 20 TABLET ORAL at 00:36

## 2022-12-02 RX ADMIN — GABAPENTIN 400 MG: 400 CAPSULE ORAL at 21:19

## 2022-12-02 RX ADMIN — GABAPENTIN 400 MG: 400 CAPSULE ORAL at 17:17

## 2022-12-02 RX ADMIN — SODIUM CHLORIDE, PRESERVATIVE FREE 10 ML: 5 INJECTION INTRAVENOUS at 21:20

## 2022-12-02 RX ADMIN — PIPERACILLIN AND TAZOBACTAM 3.38 G: 3; .375 INJECTION, POWDER, LYOPHILIZED, FOR SOLUTION INTRAVENOUS at 21:19

## 2022-12-02 NOTE — PROGRESS NOTES
1325 Patient had a couple of seconds pause per CMU- strip on chart, notified NP. Minor NP gave order for EKG, orders in TRBO. 1510 Patient had 3 second pause per CMU, strip on chart.  Will notify NP.

## 2022-12-02 NOTE — CONSULTS
[facility]                       Cardiology Care Note     [x]Initial Consult     []Progress note    Patient Name: Harjinder Mcnally GSU:[id number] - NDX:[id number]  Primary Cardiologist: [facility] [facility] Physicians: NONE  Consulting Cardiologist: [employer] [facility] Physicians: Kathy Sauceda MD     Reason for consult: heart block. HPI:       Maria Fernanda Storm is a 75 y.o. male with PMH significant for history of tobacco use, HTN, ETOH, and PE in 2017 with long term use of eliquis,  who was initially diagnosed with cancer of his neck back in 2022. He first noted a neck mass over the summer. He was seen at [facility], where he lived,at that time. He decribes what sounds like a fine needle aspiration biopsy which was positive for cancer. The patient does not recall the exact type of cancer. He was referred to a cancer treatment center, but did not follow through due to personal circumstances (his wife  and then he relocated to [place]). He has had no treatment for this cancer. He had increased swelling. He was seen at [facility] [address] several weeks ago, where he was treated with antibiotics and now he comes today complaining of increased swelling, dysphagia, odynophagia, and drainage from neck. There has been no fever or chills. SUBJECTIVE:      Maria Fernanda Storm denies chest pain/ shortness of breath, orthopnea, PND, LE edema, palpitations,  presyncope or fatigue. He reports syncope only \"self induced\" confessing to consumption of alcohol. Assessment and Plan     1. Transient high degree AV block- most likely carotid sinus mediated reflex bradycardia since his left neck mass is likely in close proximity to left carotid artery. He has normal MD and QRS complex duration on his resting EKG suggesting against underlying conduction disease.   For now reasonable to hold beta-blocker and start amlodipine for HTN. Continue to monitor tele over the weekend. No need for PPM at this time. 2. Left neck abscess and cellulitis. We will admit the patient for further evaluation and treatment. We will start the patient on vancomycin and Zosyn. ENT consult will be requested to assist in further evaluation and treatment. The biopsy of the mass in the past revealed cancer according to the patient. We will obtain CTA of the chest as well as CT of the abdomen and pelvis to evaluate for metastatic disease. The patient will be n.p.o. in anticipation of intervention by the ENT surgeon. 3.  Hypertension. stop bb due to conduction issue and start amlodipine 10mg. 4.   Type 2 diabetes. on sliding scale with insulin coverage. 5  Tobacco abuse. The patient advised to quit smoking. He does not want to be placed on Nicoderm patch at this time. ____________________________________________________________    Cardiac testing    NONE:     Other:  CT NECK  INDICATION: L neck mass     COMPARISON: None     TECHNIQUE:  Axial neck CT was performed after the uneventful administration of  IV contrast. Sagittal and coronal reconstructions were generated. CT dose reduction was achieved through use of a standardized protocol tailored  for this examination and automatic exposure control for dose modulation. FINDINGS:     There is a large, rim-enhancing complex fluid collection in the left lateral  neck at the level of the hyoid bone, abutting the lateral surface of the left  common carotid artery and extending to the skin surface which measures 4.8 x 3.1  x 4.2 cm. There is associated subcutaneous edema, soft tissue swelling and skin  thickening in the left lateral neck. The collection also is inseparable from the  ventral margin of the left sternocleidomastoid muscle, and displaces the left  submandibular gland ventrally.  The left internal jugular vein is patent below  the mass/collection, though inseparable from the posterior margin of the  collection where there may be thrombus. Above the collection the vein is  markedly small in size. Right internal jugular vein is patent. Several prominent  left-sided lymph nodes in the neck. The nasopharynx, oropharynx, hypopharynx,  and larynx are otherwise unremarkable lobe minimally displaced toward the right  at the level of the hyoid bone. The parotid glands, right submandibular gland,  and thyroid gland are unremarkable. Atherosclerotic vascular disease of the  aorta, common carotid and internal carotid arteries. Lung apices are clear. No  destructive bone lesion. Visualized paranasal sinuses and mastoid air cells are  clear. IMPRESSION  Large 4.8 x 3.1 x 4.2 cm rim-enhancing complex collection in the left lateral  neck, extending from the carotid space to the skin surface with associated skin  thickening, subcutaneous edema, and prominent left neck lymph nodes. Findings  most likely represent an abscess rather than necrotic/superinfected neoplasm  though correlation with clinical findings and fluid culture/aspiration  suggested. Follow-up to resolution recommended. Most recent HS troponins:  No results for input(s): TROPHS in the last 72 hours. No lab exists for component:  CKMB    ECG: normal EKG, normal sinus rhythm, normal sinus rhythm    Review of Systems:    [x]All other systems reviewed and all negative except as written in HPI    [] Patient unable to provide secondary to condition    Past Medical History:   Diagnosis Date    Diabetes ([facility] [address]     HTN (hypertension)     Hypercholesteremia     Skin cancer     L sided neck skin cancer cells     No past surgical history on file. Social Hx:  reports that he has been smoking cigarettes. He has been smoking an average of .5 packs per day. He has never used smokeless tobacco. He reports current alcohol use. He reports that he does not use drugs. Family Hx: family history is not on file.   No Known Allergies       OBJECTIVE:  Wt Readings from Last 3 Encounters:   12/01/22 191 lb 5.8 oz (86.8 kg)   11/30/22 191 lb 5.8 oz (86.8 kg)     No intake or output data in the 24 hours ending 12/03/22 0030    Physical Exam:    Vitals:   Vitals:    12/02/22 1800 12/02/22 2041 12/02/22 2115 12/02/22 2200   BP:   128/65    Pulse: 78 84 80 94   Resp:   20    Temp:   98.1 °F (36.7 °C)    SpO2:   92%    Weight:       Height:         Telemetry: normal sinus rhythm, heart blockthere is evidence of transient high degree AV block    /65   Pulse 94   Temp 98.1 °F (36.7 °C)   Resp 20   Ht 5' 4\" (1.626 m)   Wt 191 lb 5.8 oz (86.8 kg)   SpO2 92%   BMI 32.85 kg/m²   General:    Alert, cooperative, no distress. Large size left neck mass   Psychiatric:    Normal Mood and affect    Eye/ENT:      Pupils equal, No asymmetry, Conjunctival pink. Able to hear voice at normal amplitude   Lungs:      Visibly symmetric chest expansion, No palpable tenderness. Clear to auscultation bilaterally. Heart[de-identified]    Regular rate and rhythm, S1, S2 normal, no murmur, click, rub or gallop. No JVD, Normal palpable peripheral pulses. No cyanosis   Abdomen:     Soft, non-tender. Bowel sounds normal. No masses,  No      organomegaly. Extremities:   Extremities normal, atraumatic, no edema. Neurologic:   CN II-XII grossly intact. No gross focal deficits           Data Review:     Radiology:   XR Results (most recent):  No results found for this or any previous visit. Recent Labs     12/02/22  0045 12/01/22  1051    140   K 4.0 4.1    109*   CO2 23 26   BUN 8 13   CREA 0.70 0.66*   GLU 73 78   PHOS  --  2.4*   CA 8.1* 7.9*     Recent Labs     12/01/22  1051 11/30/22 2008   WBC 4.5 6.2   HGB 13.2 15.1   HCT 39.2 45.2    246     Recent Labs     12/01/22  1051 11/30/22 2008   * 151*     No results for input(s): CHOL, LDLC in the last 72 hours.     No lab exists for component: TGL, HDLC,  HBA1C      Current meds:    Current Facility-Administered Medications:     amLODIPine (NORVASC) tablet 10 mg, 10 mg, Oral, DAILY, Carolyn Rivera ANP    cyclobenzaprine (FLEXERIL) tablet 10 mg, 10 mg, Oral, TID PRN, Rachel Ortiz MD    gabapentin (NEURONTIN) capsule 400 mg, 400 mg, Oral, TID, Rachel Ortiz MD, 400 mg at 12/02/22 2119    mirtazapine (REMERON) tablet 30 mg, 30 mg, Oral, QHS, Rachel Ortiz MD, 30 mg at 12/02/22 2119    pantoprazole (PROTONIX) tablet 40 mg, 40 mg, Oral, DAILY, Rachel Ortiz MD, 40 mg at 12/02/22 1029    tamsulosin (FLOMAX) capsule 0.4 mg, 0.4 mg, Oral, DAILY, Rachel Ortiz MD, 0.4 mg at 12/02/22 1029    traZODone (DESYREL) tablet 100 mg, 100 mg, Oral, QHS PRN, Rachel Ortiz MD    sodium chloride (NS) flush 5-40 mL, 5-40 mL, IntraVENous, Q8H, Rachel Ortiz MD, 10 mL at 12/02/22 2120    sodium chloride (NS) flush 5-40 mL, 5-40 mL, IntraVENous, PRN, Rachel Ortiz MD    acetaminophen (TYLENOL) tablet 650 mg, 650 mg, Oral, Q6H PRN **OR** acetaminophen (TYLENOL) suppository 650 mg, 650 mg, Rectal, Q6H PRN, Rachel Ortiz MD    polyethylene glycol (MIRALAX) packet 17 g, 17 g, Oral, DAILY PRN, Rachel Ortiz MD    ondansetron (ZOFRAN ODT) tablet 4 mg, 4 mg, Oral, Q8H PRN **OR** ondansetron (ZOFRAN) injection 4 mg, 4 mg, IntraVENous, Q6H PRN, Rachel Ortiz MD    insulin lispro (HUMALOG) injection, , SubCUTAneous, AC&HS, Rachel Ortiz MD    glucose chewable tablet 16 g, 4 Tablet, Oral, PRN, Rachel rOtiz MD    glucagon (GLUCAGEN) injection 1 mg, 1 mg, IntraMUSCular, PRN, Rachel Ortiz MD    dextrose 10 % infusion 0-250 mL, 0-250 mL, IntraVENous, PRN, Rachel Ortiz MD    oxyCODONE-acetaminophen (PERCOCET) 5-325 mg per tablet 1 Tablet, 1 Tablet, Oral, Q4H PRN, Rachel Ortiz MD, 1 Tablet at 12/02/22 2032    morphine injection 2 mg, 2 mg, IntraVENous, Q4H PRN, Rachel Ortiz MD, 2 mg at 12/01/22 1729    Vancomycin - Pharmacy to Dose, , Other, Rx Dosing/Monitoring, Rachel Ortiz MD    [COMPLETED] piperacillin-tazobactam (ZOSYN) 4.5 g in 0.9% sodium chloride (MBP/ADV) 100 mL MBP, 4.5 g, IntraVENous, NOW, IV Completed at 12/02/22 1035 **FOLLOWED BY** piperacillin-tazobactam (ZOSYN) 3.375 g in 0.9% sodium chloride (MBP/ADV) 100 mL MBP, 3.375 g, IntraVENous, Q8H, Rachel Ortiz MD, Last Rate: 25 mL/hr at 12/02/22 2119, 3.375 g at 12/02/22 2119    vancomycin (VANCOCIN) 1500 mg in  ml infusion, 1,500 mg, IntraVENous, Q18H, Rachel Ortiz MD, Last Rate: 250 mL/hr at 12/02/22 1215, 1,500 mg at 12/02/22 1215    lipase-protease-amylase (ZENPEP 10,000) capsule 2 Capsule, 2 Capsule, Oral, TID WITH MEALS, Renay Cordova NP, 2 Capsule at 12/02/22 1716    lisinopriL (PRINIVIL, ZESTRIL) tablet 10 mg, 10 mg, Oral, DAILY AFTER BREAKFAST, Mansoor Gore NP, 10 mg at 12/02/22 [address] [place] “” Mullin, MD    [facility] Cardiology  Call center: W) [phone]      CC:None

## 2022-12-02 NOTE — PROGRESS NOTES
Problem: Dysphagia (Adult)  Goal: *Acute Goals and Plan of Care (Insert Text)  Description: Speech Therapy Goals:  Initiated 12/2/22  1. Patient will tolerate minced/moist diet with thin liquids without clinical s/s of aspiration within seven days. Outcome: Progressing Towards Goal     SPEECH LANGUAGE PATHOLOGY BEDSIDE SWALLOW EVALUATION  Patient: Byron Ballard (76 y.o. male)  Date: 12/2/2022  Primary Diagnosis: Mass of left side of neck [R22.1]       Precautions: Aspiration       ASSESSMENT :  Patient seen for initial bedside swallow evaluation. Patient endorsed voice and swallow changes from his baseline. Vocal quality moderately rough. Oral-motor evaluation revealed edentulism; patient has dentures but they are not presently at hospital and he does not always place them to eat. PO trials given with thin liquids, purees, and solids. Oral phase of swallow appeared grossly functional. Pharyngeal phase of swallow significant for patient endorsing need to double swallow and delayed cough following solids. No overt s/s of aspiration with thin liquids or purees. Discussed option of instrumental swallow study vs. starting modified PO diet and monitoring diet tolerance. Patient agreeable to either option. Recommend FEES to further assess swallow safety. Patient educated on importance of continuing to eat/drink as clinically appropriate in order to maintain swallow physiology prior to and during XRT. SLP will follow closely. ENT flexible fiberoptic nasopharyngoscopic exam on 12/1/22: \"The nasopharynx is normal. The oral cavity and oropharynx were normal. The larynx is well visualized. Vocal cords are mobile. There is no mucosal lesion present in the larynx or hypopharynx. There is no pooling of secretions noted.  There is mild mucosal edema on the left side of the hypopharynx extending to the left supraglottic larynx with prominance of the mucosa, however, there is no mucosal lesion and there is no airway obstruction. Palpation of the base of the tongue is soft. No tumors noted. \"    Patient will benefit from skilled intervention to address the above impairments. Patients rehabilitation potential is considered to be fair. PLAN :  Recommendations and Planned Interventions:  Complete FEES; NPO except ice chips/water/necessary medication pending results/recommendations  Oral care via standard toothbrush 2-3x/daily  Independent for feeding  SLP will follow for dysphagia management    Frequency/Duration: Patient will be followed by speech-language pathology 3 times a week to address goals. Discharge Recommendations: To Be Determined     SUBJECTIVE:   Patient stated I have had one chicken broth and one chicken soup since Thanksgiving.     OBJECTIVE:     Past Medical History:   Diagnosis Date    Diabetes ([address]     HTN (hypertension)     Hypercholesteremia     Skin cancer     L sided neck skin cancer cells   No past surgical history on file. Diet prior to admission: Regular with thin liquids, although appetite/intake have been reduced  Current Diet: NPO     Cognitive and Communication Status:  Neurologic State: Alert  Orientation Level: Oriented X4  Cognition: Appropriate for age attention/concentration, Appropriate decision making  Perception: Appears intact  Perseveration: No perseveration noted  Safety/Judgement: Awareness of environment, Insight into deficits    Oral Assessment:  Oral Assessment  Labial: No impairment  Dentition: Edentulous; Has dentures but are not present in hospital  Oral Hygiene: Moist oral mucosa  Lingual: No impairment  Velum: No impairment  Mandible: No impairment    P.O. Trials:  Patient Position: Upright in bed  Vocal quality prior to P.O.: Moderately rough; Changed from baseline per patient  Consistency Presented: Solid;Puree; Ice chips; Thin liquid  How Presented: Straw;Spoon;Cup/sip; Self-fed/presented; Successive swallows  Bolus Acceptance: No impairment  Bolus Formation/Control: No impairment  Propulsion: No impairment  Oral Residue: None  Initiation of Swallow: Palpable  Laryngeal Elevation: Present  Aspiration Signs/Symptoms: Delayed cough with solids, unclear if pharyngeal swallow related  Pharyngeal Phase Characteristics: Double swallow    NOMS:   The NOMS functional outcome measure was used to quantify this patient's level of swallowing impairment. Based on the NOMS, the patient was determined to be at level 5 for swallow function     NOMS Swallowing Levels:  Level 1 (CN): NPO  Level 2 (CM): NPO but takes consistency in therapy  Level 3 (CL): Takes less than 50% of nutrition p.o. and continues with nonoral feedings; and/or safe with mod cues; and/or max diet restriction  Level 4 (CK): Safe swallow but needs mod cues; and/or mod diet restriction; and/or still requires some nonoral feeding/supplements  Level 5 (CJ): Safe swallow with min diet restriction; and/or needs min cues  Level 6 (CI): Independent with p.o.; rare cues; usually self cues; may need to avoid some foods or needs extra time  Level 7 ([address] [place] [address]): Independent for all p.o.  LARISSA. (2003). National Outcomes Measurement System (NOMS): Adult Speech-Language Pathology User's Guide. After treatment:   Patient left in no apparent distress in bed, Call bell within reach, and Nursing notified    COMMUNICATION/EDUCATION:     The patient's plan of care including recommendations, planned interventions, and recommended diet were discussed with: Registered nurse and Patient. Patient/family have participated as able in goal setting and plan of care. Patient/family agree to work toward stated goals and plan of care.     Thank you for this referral.  WILLIS Zelaya  Time Calculation: 35 mins

## 2022-12-02 NOTE — PROGRESS NOTES
Bedside and Verbal shift change report given to Lula (oncoming nurse) by Brittney Felix  (offgoing nurse). Report included the following information SBAR.

## 2022-12-02 NOTE — PROGRESS NOTES
Occupational Therapy Screening:  Services are not indicated at this time. [facility] for [facility] therapy based on results obtained during the nursing admission assessment. The patients chart was reviewed and the patient is not appropriate for a skilled therapy evaluation at this time. Please consult occupational therapy if any therapy needs arise. Thank you.     Elena Coronel OT

## 2022-12-02 NOTE — PROGRESS NOTES
Transition of Care: hopefully home with followup with specialist/new pcp     Transport plan: likely in car     RUR: 12%     Main contact is Danitza Campos- [phone] and Kimberly Kirk- [phone]     Discharge pending:  -pending hemo/onc, Joao Rodriguez; speech, PT,OT final recommendations  -pending wound care  -patient continues on IV antibiotics  -pending medical progress and care recommendations    CM following  Wesley Taylor RN     NOTE:  pleasant patient at bedside; he is alert and oriented x 4; patient lives at stated address in [place]; his son and daughter both live nearby; he moved to the [facility] from [facility] a couple of months ago after his wife passed away; patient uses a cane, has 2 hearing aids (both at [facility] now); a shower chair; does not use a cpap; he verified his insurance which he states has 12 roundtrip transportation rides as a benefit; he states he does not drive but his children help out with rides; he states he has a new pcp appt set up with Albert Hinojosa NP at [facility] for 12/8/22

## 2022-12-02 NOTE — WOUND CARE
WOCN Note:     New consult for neck wound. Chart shows:  Admitted on 12/1/22. Admitted for cellulitis and abscess of neck - seen by Dr. Tennille Hatch, Otolaryngology. Waiting for results of previous fine needle aspiration if available. Assessment:   Appropriately conversational and reports no pain. Mobile and continent. Asking to shave and I assisted him. 1. 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  Cm; did not probe  Pink with some yellow   Clear, serous drainage  Induration noted circumferentially  Tx: cleaned away dried exudate with saline, applied Mepitel One and then ABD held in place with Spandage      Wound Recommendations:    Neck: change dry dressing as often as needed for drainage and comfort; may use Mepitel One as contact layer to gauze does not stick    Transition of Care: Plan to follow weekly and as needed while admitted to hospital.      MARIELOS MittalN, RN, [facility]  Certified Wound, Ostomy, Continence Nurse  office [phone]  Available via [address] [place] [address]

## 2022-12-02 NOTE — PROGRESS NOTES
[address] [facility] Adult  Hospitalist Group                                                                                          Hospitalist Progress Note  Mulu Chou NP  Answering service: [phone] from in house phone        Date of Service:  2022  NAME:  Spencer Hines. :  1955  MRN:  [id number]      Admission Summary: This 59-year-old man with PMHx significant HTN, DM, tobacco abuse and presented to the ED with left neck mass which has been present since 2022. The patient recently relocated to [place], [place] from [place]. With the initial swelling, the patient was evaluated in [place] including biopsy of the mass. The patient was told that the mass is cancer after biopsy done by ENT Surgeon ( Dr. Naif Mann [facility]). He was referred to a cancer institute in [facility] but had not yet begun workup/treatment because the patient's wife passed away on . The patient came to [place] to live with his daughter. He was treated with oral antibiotics in the [place] ER in early November. The swelling is progressive and associated with pain and also with purulent drainage. Because of that, the patient came to [facility] emergency room. When the patient arrived at the emergency room, CT scan of the soft tissue of the neck was obtained. This shows complex fluid collection in the left lateral neck. The mass is also causing the patient difficulty with swallowing (choking) and change in vocal quality.         Interval history / Subjective:      Spoke with Dr. Hartman Linear office, will refax biospy results and medical records  Pending heme/onc consult today  Drinking liquids only since Thanksgiving  Has hard time trying to cough up phlegm, that's the only time when he feels sob    ADDENDUM: 1530 notified by nursing staff of cardiac pauses, tele reviewed, longest pause about 3 seconds around 1230, consult cardiology Assessment & Plan:     Left neck abscess and cellulitis  - Left neck abscess and cellulitis  - per pt, previous bx consistent with cancer  - 12/1 CT chest/abdomen/pelvis w/contrast: no identified metastatic disease in thorax, abdomen or pelvis  - Attempting to obtain records including biopsy report from his ENT office, Dr. Berto Candelario , medical record release form filled out with patient and faxed to their office. Number is [phone]   - 12/1 Called consult to Dr. Sharene Soulier with [facility] , spoke to him over the phone, patient will likely need radiation/chemo for malignant neck mass, he will still come tonight to evaluate airway and make sure not compromised. - per ENT consult, not surgically resectable  - will need to consider PEG placement for feeding difficulties  - 12/2 Pending Heme/Onc consult   - speech consult, wound consult  - cw vanc, zosyn  - pain management    Hypertension  - cw home metoprolol 100 mg BID  - started on lisinopril 10 mg every day on 12/1, as BP was in the 170's    Type 2 diabetes  - A1c 6.6  - hold home oral agents, SSI    Tobacco abuse  - advised to quit smoking. He does not want to be placed on Nicoderm patch at this time. Code status: Full  Prophylaxis: SCD's  Care Plan discussed with: patient, nurse  Anticipated Disposition:      Hospital Problems  Date Reviewed: 12/1/2022            Codes Class Noted POA    Mass of left side of neck ICD-10-CM: R22.1  ICD-9-CM: 784.2  12/1/2022 Unknown        * (Principal) Cellulitis and abscess of neck ICD-10-CM: L03.221, L02.11  ICD-9-CM: 682.1  12/1/2022 Yes             Review of Systems:   Cardiac negative  Pulmonary only sob when coughing & has difficulty bringing up phlegm  GI some diarrhea previously, only drinking liquids, no solids  Neck + left sided mass w/ drainage  Throat painful to swallow intermittently      Vital Signs:    Last 24hrs VS reviewed since prior progress note.  Most recent are:  Visit Vitals  BP (!) 156/77   Pulse 79   Temp 98.8 °F (37.1 °C)   Resp 20   Ht 5' 4\" (1.626 m)   Wt 86.8 kg (191 lb 5.8 oz)   SpO2 90%   BMI 32.85 kg/m²       No intake or output data in the 24 hours ending 12/02/22 0741     Physical Examination:     I had a face to face encounter with this patient and independently examined them on 12/2/2022 as outlined below:          Constitutional:  No acute distress, cooperative, pleasant    ENT:  Oral mucosa moist, oropharynx benign. NECK: + Left sided swelling, redness, drainage and mass noted   Resp:  CTA bilaterally. No wheezing/rhonchi/rales. No accessory muscle use. CV:  Regular rhythm, normal rate, no murmurs, gallops, rubs    GI:  Soft, non distended, non tender. normoactive bowel sounds, no hepatosplenomegaly     Musculoskeletal:    No edema, warm, 2+ pulses throughout    Neurologic:  Moves all extremities. AAOx3, CN II-XII reviewed            Data Review:    Review and/or order of clinical lab test  Review and/or order of tests in the radiology section of CPT  Review and/or order of tests in the medicine section of CPT      Labs:     Recent Labs     12/01/22  1051 11/30/22 2008   WBC 4.5 6.2   HGB 13.2 15.1   HCT 39.2 45.2    246     Recent Labs     12/02/22  0045 12/01/22  1051 11/30/22 2008    140 141   K 4.0 4.1 4.1    109* 103   CO2 23 26 24   BUN 8 13 16   CREA 0.70 0.66* 0.82   GLU 73 78 85   CA 8.1* 7.9* 8.1*   MG  --  1.9 1.9   PHOS  --  2.4*  --      Recent Labs     12/01/22  1051 11/30/22 2008   ALT 35 44   * 151*   TBILI 0.6 0.4   TP 7.4 8.6*   ALB 2.5* 2.9*   GLOB 4.9* 5.7*     No results for input(s): INR, PTP, APTT, INREXT in the last 72 hours. No results for input(s): FE, TIBC, PSAT, FERR in the last 72 hours. No results found for: FOL, RBCF   No results for input(s): PH, PCO2, PO2 in the last 72 hours. No results for input(s): CPK, CKNDX, TROIQ in the last 72 hours.     No lab exists for component: CPKMB  No results found for: CHOL, CHOLX, CHLST, CHOLV, HDL, HDLP, LDL, LDLC, DLDLP, TGLX, TRIGL, TRIGP, CHHD, CHHDX  Lab Results   Component Value Date/Time    Glucose (POC) 73 12/02/2022 06:31 AM    Glucose (POC) 75 12/01/2022 10:00 PM    Glucose (POC) 76 12/01/2022 05:11 PM    Glucose (POC) 75 12/01/2022 11:14 AM     Lab Results   Component Value Date/Time    Color YELLOW/STRAW 12/01/2022 05:08 PM    Appearance CLEAR 12/01/2022 05:08 PM    Specific gravity 1.025 12/01/2022 05:08 PM    pH (UA) 5.5 12/01/2022 05:08 PM    Protein TRACE (A) 12/01/2022 05:08 PM    Glucose Negative 12/01/2022 05:08 PM    Ketone 15 (A) 12/01/2022 05:08 PM    Bilirubin Negative 12/01/2022 05:08 PM    Urobilinogen 0.2 12/01/2022 05:08 PM    Nitrites Negative 12/01/2022 05:08 PM    Leukocyte Esterase Negative 12/01/2022 05:08 PM    Epithelial cells FEW 12/01/2022 05:08 PM    Bacteria Negative 12/01/2022 05:08 PM    WBC 0-4 12/01/2022 05:08 PM    RBC 0-5 12/01/2022 05:08 PM         Medications Reviewed:     Current Facility-Administered Medications   Medication Dose Route Frequency    cyclobenzaprine (FLEXERIL) tablet 10 mg  10 mg Oral TID PRN    gabapentin (NEURONTIN) capsule 400 mg  400 mg Oral TID    metoprolol tartrate (LOPRESSOR) tablet 100 mg  100 mg Oral BID    mirtazapine (REMERON) tablet 30 mg  30 mg Oral QHS    pantoprazole (PROTONIX) tablet 40 mg  40 mg Oral DAILY    tamsulosin (FLOMAX) capsule 0.4 mg  0.4 mg Oral DAILY    traZODone (DESYREL) tablet 100 mg  100 mg Oral QHS PRN    sodium chloride (NS) flush 5-40 mL  5-40 mL IntraVENous Q8H    sodium chloride (NS) flush 5-40 mL  5-40 mL IntraVENous PRN    acetaminophen (TYLENOL) tablet 650 mg  650 mg Oral Q6H PRN    Or    acetaminophen (TYLENOL) suppository 650 mg  650 mg Rectal Q6H PRN    polyethylene glycol (MIRALAX) packet 17 g  17 g Oral DAILY PRN    ondansetron (ZOFRAN ODT) tablet 4 mg  4 mg Oral Q8H PRN    Or    ondansetron (ZOFRAN) injection 4 mg  4 mg IntraVENous Q6H PRN    insulin lispro (HUMALOG) injection   SubCUTAneous AC&HS    glucose chewable tablet 16 g  4 Tablet Oral PRN    glucagon (GLUCAGEN) injection 1 mg  1 mg IntraMUSCular PRN    dextrose 10 % infusion 0-250 mL  0-250 mL IntraVENous PRN    oxyCODONE-acetaminophen (PERCOCET) 5-325 mg per tablet 1 Tablet  1 Tablet Oral Q4H PRN    morphine injection 2 mg  2 mg IntraVENous Q4H PRN    Vancomycin - Pharmacy to Dose   Other Rx Dosing/Monitoring    piperacillin-tazobactam (ZOSYN) 3.375 g in 0.9% sodium chloride (MBP/ADV) 100 mL MBP  3.375 g IntraVENous Q8H    vancomycin (VANCOCIN) 1500 mg in  ml infusion  1,500 mg IntraVENous Q18H    lipase-protease-amylase (ZENPEP 10,000) capsule 2 Capsule  2 Capsule Oral TID WITH MEALS    lisinopriL (PRINIVIL, ZESTRIL) tablet 10 mg  10 mg Oral DAILY AFTER BREAKFAST     ______________________________________________________________________  EXPECTED LENGTH OF STAY: - - -  ACTUAL LENGTH OF STAY:          1                 Sheree Bowers V NP

## 2022-12-02 NOTE — PROGRESS NOTES
Problem: Dysphagia (Adult)  Goal: *Acute Goals and Plan of Care (Insert Text)  Description: Speech Therapy Goals:  Initiated 12/2/22    1. Patient will tolerate minced/moist diet with thin liquids without clinical s/s of aspiration within seven days. Outcome: Progressing Towards Goal   Speech Language Pathology  Flexible Endoscopic Evaluation of Swallowing-FEES  Patient: Sam Villasenor (76 y.o. male)  Date: 12/2/2022  Primary Diagnosis: Mass of left side of neck [R22.1]       Precautions: fall       ASSESSMENT :  Based on the objective data described below, the patient presents with subjectively functional oral swallow and mild pharyngeal dysphagia. He demonstrates adequate bolus extraction from spoon and straw, adequate manipulation of a solid. There is decreased pharyngeal squeeze and likely laryngeal closure resulting in penetration of thin liquids which did eventually clear with repeated swallows, as well as residue of solids on the posterior pharyngeal wall. The patient was sensate to this and able to clear independently with a liquid wash that he requested. At this juncture, it is reasonable to initiate a minced and moist diet with thin liquids. Given patients intact sensation and independent management of residue, he can be advanced clinically as tolerated. He will benefit from continued SLP treatment, especially in light of plans for chemotherapy and radiation in the future. Patient will benefit from skilled intervention to address the above impairments. Patient's rehabilitation potential is considered to be Good     Images taken from FEES:      Pre swallow      Residue of wilbur coyle       PLAN :  Recommendations and Planned Interventions:  Minced and moist diet with thin liquids  SLP following to advance as tolerated. Frequency/Duration: Patient will be followed by speech-language pathology 3 times a week to address goals. Discharge Recommendations:  To Be Determined     SUBJECTIVE: Patient stated Tian Tomas do you think I should do? . OBJECTIVE:     Past Medical History:   Diagnosis Date    Diabetes ([address]     HTN (hypertension)     Hypercholesteremia     Skin cancer     L sided neck skin cancer cells   No past surgical history on file.   Prior Level of Function/Home Situation: minced and moist with thin liquids  Home Situation  Support Systems: Child(catarina)  Patient Expects to be Discharged to[de-identified] Home  Diet prior to admission: regular with thins  Current Diet:  NPO     Cognitive and Communication Status:  Neurologic State: Alert  Orientation Level: Oriented X4  Cognition: Appropriate for age attention/concentration, Appropriate decision making  Perception: Appears intact  Perseveration: No perseveration noted  Safety/Judgement: Awareness of environment, Insight into deficits    History/indication for procedure: mass to neck  Lidocaine used: No  Nostril used: right  Scope Used: Ambu disposable scope  Feeding Tube Present in Nare: No  Adverse Reaction: No  Respiratory status: room air        Part I:  Anatomy:  Oral Assessment  Labial: No impairment  Dentition: Edentulous, Other (comment) (Has dentures but are not present in hospital)  Oral Hygiene: Moist oral mucosa  Lingual: No impairment  Velum: No impairment  Mandible: No impairment    General Comments: fullness noted to laryngeal tissues     Palate:   WFL   Base of tongue:   WFL   Valleculae:   Impaired: fullness to tissues   Epiglottis:   Impaired: full, curved asymmetrically   Arytenoids:   Impaired: fullness to tisses   False vocal folds:   Impaired: full   Vocal folds:   WFL Pyriform sinus:   WFL         Part II:  Laryngeal Function:    Adduction:   Full   Abduction:   Full   Arytenoid movement:   Symmetric Vocal quality:   WFL         Part III:  Secretions:    [place] Secretion Rating (0-7): 0     Location:  0 - Nil significant pooled secretions in pyriform fossae or laryngeal vestibule Amount:   0 - Nil significant pooled secretions in pyriform fossae (0-20%) Response*:  0 - Secretions in pyriform fossae or laryngeal vestibule effectively cleared   Comments (e.g., quality/texture/color): clear, moist tissues     *Normal airway responses in the pharynx or laryngeal vestibule may include spontaneous coughing, throat clearing, and/or swallowing        Part IV:  Swallow Trials:    Subjective comments regarding oral phase of swallow: adequate bolus extraction from spoon and straw, adequate manipulation of cracker with no oral residue    Comments regarding esophageal phase of swallow: no proximal escape from below UES      Consistency: Ice chips and Thin liquid  Position of Bolus Pre-Swallow: Over epiglottic rim  [place] Pharyngeal Residue Severity Rating Scale: Valleculae residue: 1 - none; 0%, no residue and Pyriform sinus residue: 3 - mild; 5-25%, up wall to quarter full  Spontaneously Cleared: Yes  Penetration: Yes  Aspiration: No  Response: swallow elicited  [place] Penetration-Aspiration Scale: 4 - Material enters the airway, contacts the vocal folds, and is ejected from the airway    Consistency: Puree  Position of Bolus Pre-Swallow: Valleculae  Irena Pharyngeal Residue Severity Rating Scale: Valleculae residue: 1 - none; 0%, no residue and Pyriform sinus residue: 1 - none; 0%, no residue  Spontaneously Cleared: n/a  Penetration: No  Aspiration: No  Response: n/a  Rosenbek Penetration-Aspiration Scale: 1 - Material does not enter the airway    Consistency: Solid  Position of Bolus Pre-Swallow: Presumed base of tongue  [place] Pharyngeal Residue Severity Rating Scale: Valleculae residue: 1 - none; 0%, no residue and Pyriform sinus residue: 1 - none; 0%, no residue residue present on posterior pharyngeal wall  Spontaneously Cleared: Yes  Penetration: No  Aspiration: No  Response: n/a        Dysphagia Severity Rating:   Oral phase: WFL  Pharyngeal phase: Mild                COMMUNICATION/EDUCATION:   Patient was educated regarding FEES results and diet modifications. He and his son verbalized understanding. The patient's plan of care including findings from FEES, recommendations, planned interventions, and recommended diet changes were discussed with: Registered nurse. Patient/family have participated as able in goal setting and plan of care.     Thank you for this referral.  Carol Elizabeth SLP  Time Calculation: 35 mins

## 2022-12-02 NOTE — PROGRESS NOTES
[facility] at [address]  W: [phone]  F: [phone]    Reason for Evaluation:   Hero Roberson is a 75 y.o. male who is seen in consultation at the request of Dr. Neda Ahumada for evaluation of suspected head and neck cancer. Hematology/Oncology Treatment History:     Presented June 2022 with enlarging next mass, failed to improve with antibiotics  Evaluated by ENT in [place], [place] in 7/2022  FNA 7/26/22 with atypical squamous cells, c/f malignancy  Core biopsy with skeletal muscle, fibrous tissue, and granulation with acute and chronic inflammation. Cytokeratin negative, p40 negative. History of Present Illness:   Hero Roberson is a pleasant 75 y.o. male with PMHx of HTN, DM, tobacco abuse who presents today for evaluation of suspected head and neck cancer. Patient recently relocated from [place] to [place], [place]. Patient initially presented in 6/2022 with 6 week history of enlarging neck mass, not improved with antibiotics. He had ultrasound that showed irrecgular enhancing mass involving left submandibuar gland, suspicious for cancer. He was seen by Dr. Radha White ( ENT in [place]) in July. Follow up CT neck demonstrated soft tissue mass, measuring 5 cm with small annular opening with drainage. Mass noted to be compressing left internal jugular vein. Cytology from FNA on 7/26/22 showed atypical squamous cells concerning for malignancy. He then underwent core biopsy at [facility] in [place], which showed, \"Skeletal muscle, fibrous tissue, and granulation tissue with acute and chronic inflammation. \"  Per pathology comments, the sections contain skeletal muscle with distortion by fibrosis and inflammation with scattered atrophic and degeneraing muscle fibers. Rare epitheliod cells are present; however, pancytokeratin immunostain is geative, and p40 immunostain negative.   The previous cytology specimen reviewed and showed isolated atypical squamous cells an clusters of atypical squamous cells. \"  He was told he had cancer and was referred to oncologist in [place] but never initiated workup/treatment because his wife passed away on Oct. 4.  He then relocated to [place] to live with his three children. He was treated with oral antibiotics in early November for neck cellulitis. Pain and swelling progressed, prompting evaluation to ED.      11/30/22 CT neck: large 4.8 x 3.1 x 4.2 cm rim-enhancing complex collection in left lateral neck, extending from carotid space to the skin surface with associated skin thickening, edema, and prominent left neck lymph nodes. Findings most likely represent an abscess rather than necrotic/superinfected neoplasm. 12/1/22 CT CAP: left neck mass partially visualized. 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 Patient is currently on vanc/zosyn. SLP has evaluated and notes functional oral swallow, mild pharyngeal dysphagia. He was evaluated by Dr. Chelsea Geller ED who recommended Med Onc and Rad Onc evaluation. Social History:  Recently moved from [place] to [place] after the passing of his late wife. Living with each of his three children. Review of systems was obtained and pertinent findings reviewed above. Past medical history, social history, family history, medications, and allergies are located in the electronic medical record.     Physical Exam:   Visit Vitals  /64   Pulse 73   Temp 98.9 °F (37.2 °C)   Resp 20   Ht 5' 4\" (1.626 m)   Wt 191 lb 5.8 oz (86.8 kg)   SpO2 94%   BMI 32.85 kg/m²     ECOG PS: 2  General: no distress  Eyes: anicteric sclerae  HENT: oropharynx clear  Neck: large, ~5 cm firm mass in left submandibular region with ~ 1 cm area of purulent drainage and mild surrounding erythema  Lymphatic: no cervical, supraclavicular, or inguinal adenopathy  Respiratory: normal respiratory effort  CV: no peripheral edema  Ext: warm, well perfused, no edema  GI: soft, nontender, nondistended, no masses  Skin: no rashes, no ecchymoses, no petechiae  Neuro: grossly intact    Results:     Lab Results   Component Value Date/Time    WBC 4.5 12/01/2022 10:51 AM    HGB 13.2 12/01/2022 10:51 AM    HCT 39.2 12/01/2022 10:51 AM    PLATELET 532 66/32/3294 10:51 AM    .9 (H) 12/01/2022 10:51 AM    ABS. NEUTROPHILS 2.3 12/01/2022 10:51 AM     Lab Results   Component Value Date/Time    Sodium 136 12/02/2022 12:45 AM    Potassium 4.0 12/02/2022 12:45 AM    Chloride 105 12/02/2022 12:45 AM    CO2 23 12/02/2022 12:45 AM    Glucose 73 12/02/2022 12:45 AM    BUN 8 12/02/2022 12:45 AM    Creatinine 0.70 12/02/2022 12:45 AM    Calcium 8.1 (L) 12/02/2022 12:45 AM    Glucose (POC) 88 12/02/2022 01:33 PM     Lab Results   Component Value Date/Time    Bilirubin, total 0.6 12/01/2022 10:51 AM    ALT (SGPT) 35 12/01/2022 10:51 AM    Alk. phosphatase 125 (H) 12/01/2022 10:51 AM    Protein, total 7.4 12/01/2022 10:51 AM    Albumin 2.5 (L) 12/01/2022 10:51 AM    Globulin 4.9 (H) 12/01/2022 10:51 AM     Records from [facility] and  [facility] and [facility] summarized above. Test results above have been reviewed. 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 Pathology records reviewed. Cytology from FNA 7/26/22 with \"atypical squamous cells. \"  Follow-up core needle biopsy with \"rare epitheliod cells are present; however, pancytokeratin immunostain is negative, and p40 immunostain negative. \"   Although core needle biopsy not definitive, clinical picture (i.e. atypical squamous cells on FNA, enlarging neck mass with failure to improve to antibiotics in a chronic smoker) most consistent with squamous cell carcinoma of head and neck  Unclear primary - unclear if patient had pan-endoscopy in [place] prior to transfer to [address] [place] [address] negative for distant metastatic disease     #) Left neck abscess and cellulitis: management per medicine     #) Heat block: Cardiology following, likely due to reflex from mass sitting over his right carotid artery.   Monitoring on telemetry    #) Tobacco abuse:     #) HTN: management per medicine    #) Type 2 DM: management per medicine     #) Steatosis/Cirrhosis: incidentally noted on CT A/P    #) Macrocytosis: check B12 level      Plan:     Patient would benefit from definitive chemoradiation  Please consult Radiation Oncology non-urgently for treatment planning  Recommend dietitian consult   Oncology will continue to follow along    Signed By: Valentin Lara MD

## 2022-12-02 NOTE — CONSULTS
[address]    Name:  Chris Pichardo  MR#:  [id number]  :  1955  ACCOUNT #:  [de-identified]  DATE OF SERVICE:  2022    HOSPITAL CONSULTATION    REASON FOR CONSULTATION:  Neck swelling and drainage. HISTORY OF PRESENT ILLNESS:  The patient is a 25-year-old gentleman with history of tobacco use who was initially diagnosed with cancer of his neck back in 2022. He first noted a neck mass over the summer. He was seen at [facility], where he lived,at that time. He decribes what sounds like a fine needle aspiration biopsy which was positive for cancer. The patient does not recall the exact type of cancer. He was referred to a cancer treatment center, but did not follow through due to personal circumstances (his wife  and then he relocated to [facility]). He has had no treatment for this cancer. He had increased swelling. He was seen at [facility] [address] several weeks ago, where he was treated with antibiotics and now he comes today complaining of increased swelling, dysphagia, odynophagia, and drainage from neck. There has been no fever or chills. He denies any stridor or dyspnea. PAST MEDICAL HISTORY:  Significant for hypertension and diabetes. MEDICATIONS:  1. Colchicine. 2.  Flexeril. 3.  Neurontin. 4.  Lopressor. 5.  Remeron  6. Protonix. 7.  Januvia. 8.  Flomax. 9.  Trazodone. SURGICAL HISTORY:  Other than his fine needle aspiration of his neck, he does not report any surgeries. SOCIAL HISTORY:  He has been smoking for approximately 30 years. PHYSICAL EXAMINATION:  GENERAL:  Examination today demonstrates a gentleman who is awake and alert. He answers questions appropriately. He breathing comfortably on room air. His voice is normal.  There is no stridor or dyspnea. There is no drooling. VITAL SIGNS:  Demonstrates that he is afebrile.   HEENT:  Oral cavity and oropharynx demonstrates that he is edentulous with no mucosal lesions. Tongue moves normally. Floor of the mouth is normal.  Pharynx and tonsillar fossa are normal.  NECK:  Palpation of the neck demonstrates a 4 to 5 cm fixed mass on the left side of the neck, which is adherent to the skin with erythema . The skin is open and  draining  necrotic tumor material through the opening. Larynx is midline. The nose iwas topically decongested and anesthetized, and a flexible fiberoptic nasopharyngoscope is passed. The nasopharynx is normal.  The oral cavity and oropharynx were normal.  The larynx is well visualized. Vocal cords are mobile. There is no mucosal lesion present in the larynx or hypopharynx. There is no pooling of secretions noted. There is mild mucosal edema on the left side of the hypopharynx extending to the left supraglottic larynx with prominance of the mucosa, however, there is no mucosal lesion and there is no airway obstruction. Palpation of the base of the tongue is soft. No tumors noted. DIAGNOSTIC DATA:  I reviewed the patient's CT scan of neck. This demonstrates a large necrotic mass extending to the skin. There is no obvious primary tumor noted. LABORATORY DATA:  His hemoglobin is 15.3 and his white count is 6.2. 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 I spoke with the covering physician earlier and they are attempting to obtain records from the facility where he was initially diagnosed in [address] [place] [address]. Assuming it can be confirmed that this is a squamous cell carcinoma, then I will get Radiation Oncology and Medical Oncology consulted to start a treatment plan for this gentleman. This is fixed to the neck and it is not surgically resectable at this time. If you are unable to obtain his records in a timely fashion, then certainly final needle aspiration of one of the enlarged under ultrasound guidance  will be a way to confirm the diagnosis.   One of the considerations besides the antibiotics the patient is on, we may consider giving him some steroids. The inflammation due to the tumor and the compression of the jugular vein may be contributing to his pharyngeal edema and difficulty swallowing and steroids  this may help him swallow somewhat. There is no indication for surgery at this point as this does not appear to be an infectious process. I would recommend putting ABD  dressing over the neck to catch drainage. If not able to swallow, then certainly consideration should be given to a PEG tube since this may become necessary during the course of his treatment anyway.         MD MADELEINE [email or web address]  D:  12/01/2022 20:42  T:  12/02/2022 2:16  JOB #:  [id number]

## 2022-12-03 LAB
ANION GAP SERPL CALC-SCNC: 3 MMOL/L (ref 5–15)
BASOPHILS # BLD: 0.1 K/UL (ref 0–0.1)
BASOPHILS NFR BLD: 2 % (ref 0–1)
BUN SERPL-MCNC: 8 MG/DL (ref 6–20)
BUN/CREAT SERPL: 11 (ref 12–20)
CALCIUM SERPL-MCNC: 8.3 MG/DL (ref 8.5–10.1)
CHLORIDE SERPL-SCNC: 106 MMOL/L (ref 97–108)
CO2 SERPL-SCNC: 26 MMOL/L (ref 21–32)
CREAT SERPL-MCNC: 0.76 MG/DL (ref 0.7–1.3)
DIFFERENTIAL METHOD BLD: ABNORMAL
EOSINOPHIL # BLD: 0.1 K/UL (ref 0–0.4)
EOSINOPHIL NFR BLD: 2 % (ref 0–7)
ERYTHROCYTE [DISTWIDTH] IN BLOOD BY AUTOMATED COUNT: 13.9 % (ref 11.5–14.5)
GLUCOSE BLD STRIP.AUTO-MCNC: 100 MG/DL (ref 65–117)
GLUCOSE BLD STRIP.AUTO-MCNC: 107 MG/DL (ref 65–117)
GLUCOSE BLD STRIP.AUTO-MCNC: 127 MG/DL (ref 65–117)
GLUCOSE BLD STRIP.AUTO-MCNC: 140 MG/DL (ref 65–117)
GLUCOSE SERPL-MCNC: 115 MG/DL (ref 65–100)
HCT VFR BLD AUTO: 44.7 % (ref 36.6–50.3)
HGB BLD-MCNC: 14.2 G/DL (ref 12.1–17)
IMM GRANULOCYTES # BLD AUTO: 0 K/UL (ref 0–0.04)
IMM GRANULOCYTES NFR BLD AUTO: 1 % (ref 0–0.5)
LYMPHOCYTES # BLD: 1.7 K/UL (ref 0.8–3.5)
LYMPHOCYTES NFR BLD: 36 % (ref 12–49)
MCH RBC QN AUTO: 35.1 PG (ref 26–34)
MCHC RBC AUTO-ENTMCNC: 31.8 G/DL (ref 30–36.5)
MCV RBC AUTO: 110.4 FL (ref 80–99)
MONOCYTES # BLD: 0.4 K/UL (ref 0–1)
MONOCYTES NFR BLD: 9 % (ref 5–13)
NEUTS SEG # BLD: 2.5 K/UL (ref 1.8–8)
NEUTS SEG NFR BLD: 50 % (ref 32–75)
NRBC # BLD: 0 K/UL (ref 0–0.01)
NRBC BLD-RTO: 0 PER 100 WBC
PLATELET # BLD AUTO: 174 K/UL (ref 150–400)
PMV BLD AUTO: 9.6 FL (ref 8.9–12.9)
POTASSIUM SERPL-SCNC: 3.7 MMOL/L (ref 3.5–5.1)
RBC # BLD AUTO: 4.05 M/UL (ref 4.1–5.7)
RBC MORPH BLD: ABNORMAL
SERVICE CMNT-IMP: ABNORMAL
SERVICE CMNT-IMP: ABNORMAL
SERVICE CMNT-IMP: NORMAL
SERVICE CMNT-IMP: NORMAL
SODIUM SERPL-SCNC: 135 MMOL/L (ref 136–145)
WBC # BLD AUTO: 4.8 K/UL (ref 4.1–11.1)

## 2022-12-03 PROCEDURE — 74011250637 HC RX REV CODE- 250/637: Performed by: NURSE PRACTITIONER

## 2022-12-03 PROCEDURE — 36415 COLL VENOUS BLD VENIPUNCTURE: CPT

## 2022-12-03 PROCEDURE — 99233 SBSQ HOSP IP/OBS HIGH 50: CPT | Performed by: INTERNAL MEDICINE

## 2022-12-03 PROCEDURE — 74011000250 HC RX REV CODE- 250: Performed by: INTERNAL MEDICINE

## 2022-12-03 PROCEDURE — 85025 COMPLETE CBC W/AUTO DIFF WBC: CPT

## 2022-12-03 PROCEDURE — 74011250636 HC RX REV CODE- 250/636: Performed by: INTERNAL MEDICINE

## 2022-12-03 PROCEDURE — 74011000258 HC RX REV CODE- 258: Performed by: INTERNAL MEDICINE

## 2022-12-03 PROCEDURE — 74011250637 HC RX REV CODE- 250/637: Performed by: INTERNAL MEDICINE

## 2022-12-03 PROCEDURE — 65270000046 HC RM TELEMETRY

## 2022-12-03 PROCEDURE — 99223 1ST HOSP IP/OBS HIGH 75: CPT | Performed by: INTERNAL MEDICINE

## 2022-12-03 PROCEDURE — 82962 GLUCOSE BLOOD TEST: CPT

## 2022-12-03 PROCEDURE — 80048 BASIC METABOLIC PNL TOTAL CA: CPT

## 2022-12-03 RX ORDER — LISINOPRIL 20 MG/1
10 TABLET ORAL ONCE
Status: COMPLETED | OUTPATIENT
Start: 2022-12-03 | End: 2022-12-03

## 2022-12-03 RX ORDER — LISINOPRIL 20 MG/1
20 TABLET ORAL
Status: DISCONTINUED | OUTPATIENT
Start: 2022-12-04 | End: 2022-12-11 | Stop reason: HOSPADM

## 2022-12-03 RX ADMIN — SODIUM CHLORIDE, PRESERVATIVE FREE 10 ML: 5 INJECTION INTRAVENOUS at 21:25

## 2022-12-03 RX ADMIN — Medication 2 CAPSULE: at 12:09

## 2022-12-03 RX ADMIN — OXYCODONE AND ACETAMINOPHEN 1 TABLET: 5; 325 TABLET ORAL at 09:30

## 2022-12-03 RX ADMIN — Medication 2 CAPSULE: at 09:29

## 2022-12-03 RX ADMIN — Medication 2 CAPSULE: at 17:40

## 2022-12-03 RX ADMIN — OXYCODONE AND ACETAMINOPHEN 1 TABLET: 5; 325 TABLET ORAL at 13:48

## 2022-12-03 RX ADMIN — VANCOMYCIN HYDROCHLORIDE 1500 MG: 10 INJECTION, POWDER, LYOPHILIZED, FOR SOLUTION INTRAVENOUS at 06:52

## 2022-12-03 RX ADMIN — OXYCODONE AND ACETAMINOPHEN 1 TABLET: 5; 325 TABLET ORAL at 21:25

## 2022-12-03 RX ADMIN — PIPERACILLIN AND TAZOBACTAM 3.38 G: 3; .375 INJECTION, POWDER, LYOPHILIZED, FOR SOLUTION INTRAVENOUS at 06:53

## 2022-12-03 RX ADMIN — GABAPENTIN 400 MG: 400 CAPSULE ORAL at 09:30

## 2022-12-03 RX ADMIN — SODIUM CHLORIDE, PRESERVATIVE FREE 10 ML: 5 INJECTION INTRAVENOUS at 13:48

## 2022-12-03 RX ADMIN — AMLODIPINE BESYLATE 10 MG: 5 TABLET ORAL at 09:29

## 2022-12-03 RX ADMIN — PIPERACILLIN AND TAZOBACTAM 3.38 G: 3; .375 INJECTION, POWDER, LYOPHILIZED, FOR SOLUTION INTRAVENOUS at 21:25

## 2022-12-03 RX ADMIN — OXYCODONE AND ACETAMINOPHEN 1 TABLET: 5; 325 TABLET ORAL at 03:52

## 2022-12-03 RX ADMIN — TAMSULOSIN HYDROCHLORIDE 0.4 MG: 0.4 CAPSULE ORAL at 09:30

## 2022-12-03 RX ADMIN — LISINOPRIL 10 MG: 20 TABLET ORAL at 17:41

## 2022-12-03 RX ADMIN — GABAPENTIN 400 MG: 400 CAPSULE ORAL at 21:25

## 2022-12-03 RX ADMIN — ACETAMINOPHEN 650 MG: 325 TABLET ORAL at 23:57

## 2022-12-03 RX ADMIN — ONDANSETRON 4 MG: 2 INJECTION INTRAMUSCULAR; INTRAVENOUS at 13:47

## 2022-12-03 RX ADMIN — PANTOPRAZOLE SODIUM 40 MG: 40 TABLET, DELAYED RELEASE ORAL at 09:30

## 2022-12-03 RX ADMIN — PIPERACILLIN AND TAZOBACTAM 3.38 G: 3; .375 INJECTION, POWDER, LYOPHILIZED, FOR SOLUTION INTRAVENOUS at 13:48

## 2022-12-03 RX ADMIN — MIRTAZAPINE 30 MG: 15 TABLET, FILM COATED ORAL at 21:25

## 2022-12-03 RX ADMIN — GABAPENTIN 400 MG: 400 CAPSULE ORAL at 17:39

## 2022-12-03 RX ADMIN — LISINOPRIL 10 MG: 20 TABLET ORAL at 09:30

## 2022-12-03 NOTE — PROGRESS NOTES
[facility] [facility]  Hospitalist Group                                                                                          Hospitalist Progress Note  Elizabeth Champion NP  Answering service: [phone] from in house phone        Date of Service:  12/3/2022  NAME:  Jeremiah Ayon. :  1955  MRN:  [id number]      Admission Summary: This 59-year-old man with PMHx significant HTN, DM, tobacco abuse and presented to the ED with left neck mass which has been present since 2022. The patient recently relocated to [place], [place] from [place]. With the initial swelling, the patient was evaluated in [place] including biopsy of the mass. The patient was told that the mass is cancer after biopsy done by ENT Surgeon ( Dr. Dasha Moss with [facility]). He was referred to a cancer institute in [facility] but had not yet begun workup/treatment because the patient's wife passed away on . The patient came to [place] to live with his daughter. He was treated with oral antibiotics in the [facility] [place] ER in early November. The swelling is progressive and associated with pain and also with purulent drainage. Because of that, the patient came to [facility] emergency room. When the patient arrived at the emergency room, CT scan of the soft tissue of the neck was obtained. This shows complex fluid collection in the left lateral neck. The mass is also causing the patient difficulty with swallowing (choking) and change in vocal quality. Interval history / Subjective: Attempted to eat lunch, chocked and vomited. Diet changed to full liquids. SOB only when coughing. Metoprolol stopped 2/2 AVB. Evaluated by cardiology. Started on Norvasc, BP uncontrolled. Will increase lisinopril. Radiation oncology has been consulted. Poor nutrition since  might need TPN vs TF via PEG soon.   ADDENDUM [id number] attempted to call pamela Cadena [phone] twice    Assessment & Plan:     Left neck abscess and cellulitis  - Left neck abscess and cellulitis  - per pt, previous bx consistent with cancer  - 12/1 CT chest/abdomen/pelvis w/contrast: no identified metastatic disease in thorax, abdomen or pelvis  - Attempting to obtain records including biopsy report from his ENT office, Dr. Aminta Myers , medical record release form filled out with patient and faxed to their office. Number is [phone]   - 12/1 Called consult to Dr. Cesar Esquivel with [facility] , spoke to him over the phone, patient will likely need radiation/chemo for malignant neck mass, he will still come tonight to evaluate airway and make sure not compromised. - per ENT consult, not surgically resectable  - will need to consider PEG placement for feeding difficulties  - 12/2 Pending Heme/Onc consult   - speech consult, wound consult  - cw vanc, zosyn  - pain management    Transient AVB  - evaluated by cardiology  - no BB  - if this persists, PM in near future      Hypertension  -  home metoprolol 100 mg BID, d/damon on 12/2 d/t AVB  - started on lisinopril 10 mg every day on 12/1, as BP was in the 170's  - 12/3 increase lisinopril to 20 mg QD      Type 2 diabetes  - A1c 6.6  - hold home oral agents, SSI    Tobacco abuse  - advised to quit smoking. He does not want to be placed on Nicoderm patch at this time.          Code status: Full  Prophylaxis: SCD's  Care Plan discussed with: patient, nurse  Anticipated Disposition:      Hospital Problems  Date Reviewed: 12/1/2022            Codes Class Noted POA    Mass of left side of neck ICD-10-CM: R22.1  ICD-9-CM: 784.2  12/1/2022 Unknown        * (Principal) Cellulitis and abscess of neck ICD-10-CM: L03.221, L02.11  ICD-9-CM: 682.1  12/1/2022 Yes           Review of Systems:   Cardiac negative  Pulmonary only sob when coughing & has difficulty bringing up phlegm  GI some diarrhea previously, only drinking liquids, no solids  Neck + left sided mass w/ drainage  Throat painful to swallow intermittently      Vital Signs:    Last 24hrs VS reviewed since prior progress note. Most recent are:  Visit Vitals  BP (!) 174/77   Pulse 91   Temp 99.1 °F (37.3 °C)   Resp 18   Ht 5' 4\" (1.626 m)   Wt 86.8 kg (191 lb 5.8 oz)   SpO2 94%   BMI 32.85 kg/m²       No intake or output data in the 24 hours ending 12/03/22 1403     Physical Examination:     I had a face to face encounter with this patient and independently examined them on 12/3/2022 as outlined below:          Constitutional:  No acute distress, cooperative, pleasant    ENT:  Oral mucosa moist, oropharynx benign. NECK: + Left sided swelling, redness, drainage and mass noted   Resp:  CTA bilaterally. No wheezing/rhonchi/rales. No accessory muscle use. CV:  Regular rhythm, normal rate, no murmurs, gallops, rubs    GI:  Soft, non distended, non tender. normoactive bowel sounds, no hepatosplenomegaly     Musculoskeletal:    No edema, warm, 2+ pulses throughout    Neurologic:  Moves all extremities. AAOx3, CN II-XII reviewed            Data Review:    Review and/or order of clinical lab test  Review and/or order of tests in the radiology section of CPT  Review and/or order of tests in the medicine section of CPT      Labs:     Recent Labs     12/03/22  0347 12/01/22  1051   WBC 4.8 4.5   HGB 14.2 13.2   HCT 44.7 39.2    198       Recent Labs     12/03/22  0347 12/02/22  0045 12/01/22  1051 11/30/22 2008   * 136 140 141   K 3.7 4.0 4.1 4.1    105 109* 103   CO2 26 23 26 24   BUN 8 8 13 16   CREA 0.76 0.70 0.66* 0.82   * 73 78 85   CA 8.3* 8.1* 7.9* 8.1*   MG  --   --  1.9 1.9   PHOS  --   --  2.4*  --        Recent Labs     12/01/22  1051 11/30/22 2008   ALT 35 44   * 151*   TBILI 0.6 0.4   TP 7.4 8.6*   ALB 2.5* 2.9*   GLOB 4.9* 5.7*       No results for input(s): INR, PTP, APTT, INREXT, INREXT in the last 72 hours.    No results for input(s): FE, TIBC, PSAT, FERR in the last 72 hours. No results found for: FOL, RBCF   No results for input(s): PH, PCO2, PO2 in the last 72 hours. No results for input(s): CPK, CKNDX, TROIQ in the last 72 hours.     No lab exists for component: CPKMB  No results found for: CHOL, CHOLX, CHLST, CHOLV, HDL, HDLP, LDL, LDLC, DLDLP, TGLX, TRIGL, TRIGP, CHHD, CHHDX  Lab Results   Component Value Date/Time    Glucose (POC) 127 (H) 12/03/2022 11:51 AM    Glucose (POC) 107 12/03/2022 06:58 AM    Glucose (POC) 138 (H) 12/02/2022 09:18 PM    Glucose (POC) 119 (H) 12/02/2022 05:23 PM    Glucose (POC) 88 12/02/2022 01:33 PM     Lab Results   Component Value Date/Time    Color YELLOW/STRAW 12/01/2022 05:08 PM    Appearance CLEAR 12/01/2022 05:08 PM    Specific gravity 1.025 12/01/2022 05:08 PM    pH (UA) 5.5 12/01/2022 05:08 PM    Protein TRACE (A) 12/01/2022 05:08 PM    Glucose Negative 12/01/2022 05:08 PM    Ketone 15 (A) 12/01/2022 05:08 PM    Bilirubin Negative 12/01/2022 05:08 PM    Urobilinogen 0.2 12/01/2022 05:08 PM    Nitrites Negative 12/01/2022 05:08 PM    Leukocyte Esterase Negative 12/01/2022 05:08 PM    Epithelial cells FEW 12/01/2022 05:08 PM    Bacteria Negative 12/01/2022 05:08 PM    WBC 0-4 12/01/2022 05:08 PM    RBC 0-5 12/01/2022 05:08 PM         Medications Reviewed:     Current Facility-Administered Medications   Medication Dose Route Frequency    amLODIPine (NORVASC) tablet 10 mg  10 mg Oral DAILY    cyclobenzaprine (FLEXERIL) tablet 10 mg  10 mg Oral TID PRN    gabapentin (NEURONTIN) capsule 400 mg  400 mg Oral TID    mirtazapine (REMERON) tablet 30 mg  30 mg Oral QHS    pantoprazole (PROTONIX) tablet 40 mg  40 mg Oral DAILY    tamsulosin (FLOMAX) capsule 0.4 mg  0.4 mg Oral DAILY    traZODone (DESYREL) tablet 100 mg  100 mg Oral QHS PRN    sodium chloride (NS) flush 5-40 mL  5-40 mL IntraVENous Q8H    sodium chloride (NS) flush 5-40 mL  5-40 mL IntraVENous PRN    acetaminophen (TYLENOL) tablet 650 mg  650 mg Oral Q6H PRN    Or acetaminophen (TYLENOL) suppository 650 mg  650 mg Rectal Q6H PRN    polyethylene glycol (MIRALAX) packet 17 g  17 g Oral DAILY PRN    ondansetron (ZOFRAN ODT) tablet 4 mg  4 mg Oral Q8H PRN    Or    ondansetron (ZOFRAN) injection 4 mg  4 mg IntraVENous Q6H PRN    insulin lispro (HUMALOG) injection   SubCUTAneous AC&HS    glucose chewable tablet 16 g  4 Tablet Oral PRN    glucagon (GLUCAGEN) injection 1 mg  1 mg IntraMUSCular PRN    dextrose 10 % infusion 0-250 mL  0-250 mL IntraVENous PRN    oxyCODONE-acetaminophen (PERCOCET) 5-325 mg per tablet 1 Tablet  1 Tablet Oral Q4H PRN    morphine injection 2 mg  2 mg IntraVENous Q4H PRN    Vancomycin - Pharmacy to Dose   Other Rx Dosing/Monitoring    piperacillin-tazobactam (ZOSYN) 3.375 g in 0.9% sodium chloride (MBP/ADV) 100 mL MBP  3.375 g IntraVENous Q8H    vancomycin (VANCOCIN) 1500 mg in  ml infusion  1,500 mg IntraVENous Q18H    lipase-protease-amylase (ZENPEP 10,000) capsule 2 Capsule  2 Capsule Oral TID WITH MEALS    lisinopriL (PRINIVIL, ZESTRIL) tablet 10 mg  10 mg Oral DAILY AFTER BREAKFAST     ______________________________________________________________________  EXPECTED LENGTH OF STAY: 3d 9h  ACTUAL LENGTH OF STAY:          2                 Sheree Bowers V, NP

## 2022-12-03 NOTE — PROGRESS NOTES
[facility]                       Cardiology Care Note     []Initial Consult     [x]Progress note    Patient Name: Elif Archibald GBP:[id number] - DPE:[id number]  Primary Cardiologist: [facility] Physicians: NONE  Consulting Cardiologist: [facility] Physicians: Norma Paul MD     Reason for consult: av heart block. HPI:       Lucas Shell is a 75 y.o. male with PMH significant for history of tobacco use, HTN, ETOH, and PE in 2017 with long term use of eliquis,  who was initially diagnosed with cancer of his neck back in 2022 in [place]. He first noted a neck mass over the summer. He was seen at [facility] [place], where he lived,at that time. He decribes what sounds like a fine needle aspiration biopsy which was positive for cancer. The patient does not recall the exact type of cancer but Dr Nhan Mejía here confirmed squamous cell cancer. He was referred to a cancer treatment center, but did not follow through due to personal circumstances (his wife  and then he relocated to [place]). He has had no treatment for this cancer. He had increased swelling. He was seen at [facility] [address] several weeks ago, where he was treated with antibiotics and now he comes today complaining of increased swelling, dysphagia, odynophagia, and drainage from neck. There has been no fever or chills. SUBJECTIVE:      Lucas Shell denies chest pain/ shortness of breath, orthopnea, PND, LE edema, palpitations,  + presyncope when moving his head. Assessment and Plan     1.   Transient  AV block with multiple non conducted P waves for 3-6 seconds on 2022 at 2 40 pm  He was not sleeping  most likely carotid sinus pressure from his left neck squamous cell cancer  Metoprolol has been stopped  I have not seen long pause again but he said if he moves his neck he felt pre syncopal  If this is persistent into next week, will discuss and recommend leadless av pacemaker   His left neck squamous cell cancer is 4 to 5 cm fixed mass on the left side of the neck, which is adherent to the skin with erythema. The skin is open and draining necrotic tumor material through the opening. He will need radiation and chemotherapy per Dr Yamila Wheeler of ENT so transvenous pacemaker is not a choice    2. Left neck cancer- oncologist and ENT have seen him. 3.  Hypertension. stop metoprolol due to above problem with pause from av block and start amlodipine 10mg. On lisinopril, may increase in dose   4. Type 2 diabetes. on sliding scale with insulin coverage. 5  Tobacco abuse. The patient advised to quit smoking, jeremi with neck cancer            ____________________________________________________________    Cardiac testing    NONE:     Other:  CT NECK  INDICATION: L neck mass     COMPARISON: None     TECHNIQUE:  Axial neck CT was performed after the uneventful administration of  IV contrast. Sagittal and coronal reconstructions were generated. CT dose reduction was achieved through use of a standardized protocol tailored  for this examination and automatic exposure control for dose modulation. FINDINGS:     There is a large, rim-enhancing complex fluid collection in the left lateral  neck at the level of the hyoid bone, abutting the lateral surface of the left  common carotid artery and extending to the skin surface which measures 4.8 x 3.1  x 4.2 cm. There is associated subcutaneous edema, soft tissue swelling and skin  thickening in the left lateral neck. The collection also is inseparable from the  ventral margin of the left sternocleidomastoid muscle, and displaces the left  submandibular gland ventrally. The left internal jugular vein is patent below  the mass/collection, though inseparable from the posterior margin of the  collection where there may be thrombus.  Above the collection the vein is  markedly small in size. Right internal jugular vein is patent. Several prominent  left-sided lymph nodes in the neck. The nasopharynx, oropharynx, hypopharynx,  and larynx are otherwise unremarkable lobe minimally displaced toward the right  at the level of the hyoid bone. The parotid glands, right submandibular gland,  and thyroid gland are unremarkable. Atherosclerotic vascular disease of the  aorta, common carotid and internal carotid arteries. Lung apices are clear. No  destructive bone lesion. Visualized paranasal sinuses and mastoid air cells are  clear. IMPRESSION  Large 4.8 x 3.1 x 4.2 cm rim-enhancing complex collection in the left lateral  neck, extending from the carotid space to the skin surface with associated skin  thickening, subcutaneous edema, and prominent left neck lymph nodes. Findings  most likely represent an abscess rather than necrotic/superinfected neoplasm  though correlation with clinical findings and fluid culture/aspiration  suggested. Follow-up to resolution recommended. ECG: normal EKG, normal sinus rhythm, normal sinus rhythm    Review of Systems:    [x]All other systems reviewed and all negative except as written in HPI    [] Patient unable to provide secondary to condition    Past Medical History:   Diagnosis Date    Diabetes ([facility] [address]     HTN (hypertension)     Hypercholesteremia     Skin cancer     L sided neck skin cancer cells     No past surgical history on file. Social Hx:  reports that he has been smoking cigarettes. He has been smoking an average of .5 packs per day. He has never used smokeless tobacco. He reports current alcohol use. He reports that he does not use drugs. Family Hx: family history is not on file.   No Known Allergies       OBJECTIVE:  Wt Readings from Last 3 Encounters:   12/01/22 191 lb 5.8 oz (86.8 kg)   11/30/22 191 lb 5.8 oz (86.8 kg)     No intake or output data in the 24 hours ending 12/03/22 1331    Physical Exam:    Vitals:   Vitals:    12/03/22 8534 12/03/22 1000 12/03/22 1200 12/03/22 1309   BP: (!) 188/87   (!) 174/77   Pulse: 76 88 82 91   Resp: 18   18   Temp: 97.9 °F (36.6 °C)   99.1 °F (37.3 °C)   SpO2: 92%   94%   Weight:       Height:         Telemetry: normal sinus rhythm, heart blockthere is evidence of transient high degree AV block    BP (!) 174/77   Pulse 91   Temp 99.1 °F (37.3 °C)   Resp 18   Ht 5' 4\" (1.626 m)   Wt 191 lb 5.8 oz (86.8 kg)   SpO2 94%   BMI 32.85 kg/m²   General:    Alert, cooperative, no distress. Large size left neck mass, dressing on   Psychiatric:    Normal Mood and affect    Eye/ENT:      Pupils equal    Lungs:     Clear to auscultation bilaterally. Heart[de-identified]    Regular rhythm, no murmur, click, rub or gallop     Abdomen:     Soft, non-tender. Extremities:    no edema. Neurologic:    No gross focal deficits           Data Review:     Radiology:   XR Results (most recent):  No results found for this or any previous visit.       Recent Labs     12/03/22  0347 12/02/22  0045 12/01/22  1051   * 136 140   K 3.7 4.0 4.1    105 109*   CO2 26 23 26   BUN 8 8 13   CREA 0.76 0.70 0.66*   * 73 78   PHOS  --   --  2.4*   CA 8.3* 8.1* 7.9*       Recent Labs     12/03/22  0347 12/01/22  1051   WBC 4.8 4.5   HGB 14.2 13.2   HCT 44.7 39.2    198       Recent Labs     12/01/22  1051 11/30/22 2008   * 151*          Current meds:    Current Facility-Administered Medications:     amLODIPine (NORVASC) tablet 10 mg, 10 mg, Oral, DAILY, Carolyn Rivera ANP, 10 mg at 12/03/22 0608    cyclobenzaprine (FLEXERIL) tablet 10 mg, 10 mg, Oral, TID PRN, Rachel Ortiz MD    gabapentin (NEURONTIN) capsule 400 mg, 400 mg, Oral, TID, Rachel Ortiz MD, 400 mg at 12/03/22 0930    mirtazapine (REMERON) tablet 30 mg, 30 mg, Oral, QHS, Rachel Ortiz MD, 30 mg at 12/02/22 2119    pantoprazole (PROTONIX) tablet 40 mg, 40 mg, Oral, DAILY, Rachel Ortiz MD, 40 mg at 12/03/22 0930    tamsulosin (FLOMAX) capsule 0.4 mg, 0.4 mg, Oral, DAILY, Rachel Ortiz MD, 0.4 mg at 12/03/22 0930    traZODone (DESYREL) tablet 100 mg, 100 mg, Oral, QHS PRN, Rachel Ortiz MD    sodium chloride (NS) flush 5-40 mL, 5-40 mL, IntraVENous, Q8H, Rachel Ortiz MD, 10 mL at 12/02/22 2120    sodium chloride (NS) flush 5-40 mL, 5-40 mL, IntraVENous, PRN, Rachel Ortiz MD    acetaminophen (TYLENOL) tablet 650 mg, 650 mg, Oral, Q6H PRN **OR** acetaminophen (TYLENOL) suppository 650 mg, 650 mg, Rectal, Q6H PRN, Rachel Ortiz MD    polyethylene glycol (MIRALAX) packet 17 g, 17 g, Oral, DAILY PRN, Rachel Ortiz MD    ondansetron (ZOFRAN ODT) tablet 4 mg, 4 mg, Oral, Q8H PRN **OR** ondansetron (ZOFRAN) injection 4 mg, 4 mg, IntraVENous, Q6H PRN, Rachel Ortiz MD    insulin lispro (HUMALOG) injection, , SubCUTAneous, AC&HS, Rachel Ortiz MD    glucose chewable tablet 16 g, 4 Tablet, Oral, PRN, Rachel Ortiz MD    glucagon (GLUCAGEN) injection 1 mg, 1 mg, IntraMUSCular, PRN, Rachel Ortiz MD    dextrose 10 % infusion 0-250 mL, 0-250 mL, IntraVENous, PRN, Rachel Ortiz MD    oxyCODONE-acetaminophen (PERCOCET) 5-325 mg per tablet 1 Tablet, 1 Tablet, Oral, Q4H PRN, Rachel Ortiz MD, 1 Tablet at 12/03/22 0930    morphine injection 2 mg, 2 mg, IntraVENous, Q4H PRN, Rachel Ortiz MD, 2 mg at 12/01/22 1729    Vancomycin - Pharmacy to Dose, , Other, Rx Dosing/Monitoring, Rachel Ortiz MD    [COMPLETED] piperacillin-tazobactam (ZOSYN) 4.5 g in 0.9% sodium chloride (MBP/ADV) 100 mL MBP, 4.5 g, IntraVENous, NOW, IV Completed at 12/02/22 1035 **FOLLOWED BY** piperacillin-tazobactam (ZOSYN) 3.375 g in 0.9% sodium chloride (MBP/ADV) 100 mL MBP, 3.375 g, IntraVENous, Q8H, Rachel Ortiz MD, Last Rate: 25 mL/hr at 12/03/22 0653, 3.375 g at 12/03/22 0653    vancomycin (VANCOCIN) 1500 mg in  ml infusion, 1,500 mg, IntraVENous, Q18H, Rachel Ortiz MD, Last Rate: 250 mL/hr at 12/03/22 0652, 1,500 mg at 12/03/22 0652    lipase-protease-amylase (ZENPEP 10,000) capsule 2 Capsule, 2 Capsule, Oral, TID WITH MEALS, Renay Cordova NP, 2 Capsule at 12/03/22 1209    lisinopriL (PRINIVIL, ZESTRIL) tablet 10 mg, 10 mg, Oral, DAILY AFTER BREAKFAST, Luis Guzman NP, 10 mg at 12/03/22 0930    Roman Lott MD    [facility]  Call center: [phone]      CC:None

## 2022-12-03 NOTE — PROGRESS NOTES
Day #3 of Vancomycin  Indication:  Left neck abscess and cellulitis  Current regimen:  1500 mg IV every 18 hours  Abx regimen:  Vancomycin + piperacillin-tazobactam  ID Following ?: NO  Concomitant nephrotoxic drugs (requires more frequent monitoring): None  Frequency of BMP?: Daily (through )    Recent Labs     22  0347 22  0045 22  1051 22   WBC 4.8  --  4.5 6.2   CREA 0.76 0.70 0.66* 0.82   BUN 8 8 13 16     Est CrCl: 92.4 mL/min; UO: N/A  Temp (24hrs), Av.1 °F (36.7 °C), Min:97.6 °F (36.4 °C), Max:99.1 °F (37.3 °C)    Cultures:    Wound - Heavy Enterobacter cloacae complex (resistant to cefazolin and cefoxitin), heavy mixed skin miles - final    MRSA Swab ordered (if applicable)? N/A    Goal target range AUC/HAKAN 400-500    Recent level history:  Date/Time Dose & Interval Measured Level (mcg/mL) Associated AUC/HAKAN Dose Adjustment                                                   Plan: Continue current regimen. Obtain a random level tomorrow morning with daily labs.

## 2022-12-04 LAB
ALBUMIN SERPL-MCNC: 2.3 G/DL (ref 3.5–5)
ALBUMIN/GLOB SERPL: 0.5 {RATIO} (ref 1.1–2.2)
ALP SERPL-CCNC: 127 U/L (ref 45–117)
ALT SERPL-CCNC: 26 U/L (ref 12–78)
ANION GAP SERPL CALC-SCNC: 3 MMOL/L (ref 5–15)
AST SERPL-CCNC: 32 U/L (ref 15–37)
BILIRUB SERPL-MCNC: 0.5 MG/DL (ref 0.2–1)
BUN SERPL-MCNC: 5 MG/DL (ref 6–20)
BUN/CREAT SERPL: 7 (ref 12–20)
CALCIUM SERPL-MCNC: 8 MG/DL (ref 8.5–10.1)
CHLORIDE SERPL-SCNC: 108 MMOL/L (ref 97–108)
CO2 SERPL-SCNC: 29 MMOL/L (ref 21–32)
COMMENT, HOLDF: NORMAL
CREAT SERPL-MCNC: 0.76 MG/DL (ref 0.7–1.3)
GLOBULIN SER CALC-MCNC: 4.9 G/DL (ref 2–4)
GLUCOSE BLD STRIP.AUTO-MCNC: 105 MG/DL (ref 65–117)
GLUCOSE BLD STRIP.AUTO-MCNC: 122 MG/DL (ref 65–117)
GLUCOSE BLD STRIP.AUTO-MCNC: 190 MG/DL (ref 65–117)
GLUCOSE BLD STRIP.AUTO-MCNC: 97 MG/DL (ref 65–117)
GLUCOSE SERPL-MCNC: 94 MG/DL (ref 65–100)
POTASSIUM SERPL-SCNC: 3.9 MMOL/L (ref 3.5–5.1)
PROT SERPL-MCNC: 7.2 G/DL (ref 6.4–8.2)
SAMPLES BEING HELD,HOLD: NORMAL
SERVICE CMNT-IMP: ABNORMAL
SERVICE CMNT-IMP: ABNORMAL
SERVICE CMNT-IMP: NORMAL
SERVICE CMNT-IMP: NORMAL
SODIUM SERPL-SCNC: 140 MMOL/L (ref 136–145)
VANCOMYCIN SERPL-MCNC: 27.2 UG/ML
VIT B12 SERPL-MCNC: >2000 PG/ML (ref 193–986)

## 2022-12-04 PROCEDURE — 80053 COMPREHEN METABOLIC PANEL: CPT

## 2022-12-04 PROCEDURE — 74011000250 HC RX REV CODE- 250: Performed by: INTERNAL MEDICINE

## 2022-12-04 PROCEDURE — 82962 GLUCOSE BLOOD TEST: CPT

## 2022-12-04 PROCEDURE — 99233 SBSQ HOSP IP/OBS HIGH 50: CPT | Performed by: INTERNAL MEDICINE

## 2022-12-04 PROCEDURE — 74011250636 HC RX REV CODE- 250/636: Performed by: INTERNAL MEDICINE

## 2022-12-04 PROCEDURE — 80202 ASSAY OF VANCOMYCIN: CPT

## 2022-12-04 PROCEDURE — 94760 N-INVAS EAR/PLS OXIMETRY 1: CPT

## 2022-12-04 PROCEDURE — 74011000258 HC RX REV CODE- 258: Performed by: INTERNAL MEDICINE

## 2022-12-04 PROCEDURE — 74011250637 HC RX REV CODE- 250/637: Performed by: INTERNAL MEDICINE

## 2022-12-04 PROCEDURE — 36415 COLL VENOUS BLD VENIPUNCTURE: CPT

## 2022-12-04 PROCEDURE — 74011250637 HC RX REV CODE- 250/637: Performed by: NURSE PRACTITIONER

## 2022-12-04 PROCEDURE — 82607 VITAMIN B-12: CPT

## 2022-12-04 PROCEDURE — 74011636637 HC RX REV CODE- 636/637: Performed by: INTERNAL MEDICINE

## 2022-12-04 PROCEDURE — 65270000046 HC RM TELEMETRY

## 2022-12-04 RX ADMIN — OXYCODONE AND ACETAMINOPHEN 1 TABLET: 5; 325 TABLET ORAL at 20:33

## 2022-12-04 RX ADMIN — GABAPENTIN 400 MG: 400 CAPSULE ORAL at 21:13

## 2022-12-04 RX ADMIN — AMLODIPINE BESYLATE 10 MG: 5 TABLET ORAL at 09:52

## 2022-12-04 RX ADMIN — VANCOMYCIN HYDROCHLORIDE 1500 MG: 10 INJECTION, POWDER, LYOPHILIZED, FOR SOLUTION INTRAVENOUS at 21:13

## 2022-12-04 RX ADMIN — GABAPENTIN 400 MG: 400 CAPSULE ORAL at 09:52

## 2022-12-04 RX ADMIN — TAMSULOSIN HYDROCHLORIDE 0.4 MG: 0.4 CAPSULE ORAL at 09:52

## 2022-12-04 RX ADMIN — Medication 2 UNITS: at 11:56

## 2022-12-04 RX ADMIN — Medication 2 CAPSULE: at 07:09

## 2022-12-04 RX ADMIN — VANCOMYCIN HYDROCHLORIDE 1500 MG: 10 INJECTION, POWDER, LYOPHILIZED, FOR SOLUTION INTRAVENOUS at 01:36

## 2022-12-04 RX ADMIN — PIPERACILLIN AND TAZOBACTAM 3.38 G: 3; .375 INJECTION, POWDER, LYOPHILIZED, FOR SOLUTION INTRAVENOUS at 07:09

## 2022-12-04 RX ADMIN — ACETAMINOPHEN 650 MG: 325 TABLET ORAL at 21:19

## 2022-12-04 RX ADMIN — Medication 2 CAPSULE: at 11:56

## 2022-12-04 RX ADMIN — MIRTAZAPINE 30 MG: 15 TABLET, FILM COATED ORAL at 21:13

## 2022-12-04 RX ADMIN — LISINOPRIL 20 MG: 20 TABLET ORAL at 09:52

## 2022-12-04 RX ADMIN — Medication 2 CAPSULE: at 16:00

## 2022-12-04 RX ADMIN — PANTOPRAZOLE SODIUM 40 MG: 40 TABLET, DELAYED RELEASE ORAL at 09:52

## 2022-12-04 RX ADMIN — PIPERACILLIN AND TAZOBACTAM 3.38 G: 3; .375 INJECTION, POWDER, LYOPHILIZED, FOR SOLUTION INTRAVENOUS at 21:13

## 2022-12-04 RX ADMIN — SODIUM CHLORIDE, PRESERVATIVE FREE 10 ML: 5 INJECTION INTRAVENOUS at 07:09

## 2022-12-04 RX ADMIN — ACETAMINOPHEN 650 MG: 325 TABLET ORAL at 09:55

## 2022-12-04 RX ADMIN — PIPERACILLIN AND TAZOBACTAM 3.38 G: 3; .375 INJECTION, POWDER, LYOPHILIZED, FOR SOLUTION INTRAVENOUS at 14:26

## 2022-12-04 RX ADMIN — OXYCODONE AND ACETAMINOPHEN 1 TABLET: 5; 325 TABLET ORAL at 15:58

## 2022-12-04 RX ADMIN — GABAPENTIN 400 MG: 400 CAPSULE ORAL at 15:58

## 2022-12-04 RX ADMIN — OXYCODONE AND ACETAMINOPHEN 1 TABLET: 5; 325 TABLET ORAL at 07:08

## 2022-12-04 NOTE — PROGRESS NOTES
Pharmacist Note - Vancomycin Dosing  Therapy day 4  Indication: Left neck abscess and cellulitis  Current regimen: 1500 mg IV every 18 hours    Recent Labs     12/04/22  0436 12/03/22  0347 12/02/22  0045   WBC  --  4.8  --    CREA 0.76 0.76 0.70   BUN 5* 8 8       A random vancomycin level of 27.2 mcg/mL was obtained and from this level, the patient's AUC24 is calculated to be 467 with the current regimen. Goal target range AUC/HAKAN 400-500      Plan: Continue current regimen. Pharmacy will continue to monitor this patient daily for changes in clinical status and renal function. *Random vancomycin levels are used to calculate AUC/HAKAN, this level should not be interpreted as a trough. Vancomycin has been dosed using Bayesian kinetics software to target an AUC24:HAKAN of 400-600, which provides adequate exposure for as assumed infection due to MRSA with an HAKAN of 1 or less while reducing the risk of nephrotoxicity as seen with traditional trough based dosing goals.

## 2022-12-04 NOTE — PROGRESS NOTES
[facility] Adult  Hospitalist Group                                                                                          Hospitalist Progress Note  Carlos Gallegos  Answering service: [phone] from in house phone        Date of Service:  2022  NAME:  Clementine Bergman :  1955  MRN:  [id number]      Admission Summary: This 42-year-old man with PMHx significant HTN, DM, tobacco abuse and presented to the ED with left neck mass which has been present since 2022. The patient recently relocated to [address] from [place]. With the initial swelling, the patient was evaluated in [place] including biopsy of the mass. The patient was told that the mass is cancer after biopsy done by ENT Surgeon ( Dr. Sally Parsons with [facility] service). He was referred to a cancer institute in [facility] but had not yet begun workup/treatment because the patient's wife passed away on . The patient came to [place] to live with his daughter. He was treated with oral antibiotics in the [place] ER in early November. The swelling is progressive and associated with pain and also with purulent drainage. Because of that, the patient came to [facility] emergency room. When the patient arrived at the emergency room, CT scan of the soft tissue of the neck was obtained. This shows complex fluid collection in the left lateral neck. The mass is also causing the patient difficulty with swallowing (choking) and change in vocal quality. Interval history / Subjective:      Saw the patient on rounds, he was resting comfortably in the bed. He denies any nausea or vomiting and states he has been tolerating his liquid diet. I discussed with him the possible need for a PEG for feeding, but he would like to avoid that if at all possible.      Discussed the plan with him including continuing empiric ABX as well as being seen by rad onc, who will likely commence therapy as an outpatient. Assessment & Plan:     Left neck abscess and cellulitis  - Left neck abscess and cellulitis  - per pt, previous bx consistent with cancer  - 12/1 CT chest/abdomen/pelvis w/contrast: no identified metastatic disease in thorax, abdomen or pelvis  - Attempting to obtain records including biopsy report from his ENT office, Dr. Latasha Felix , medical record release form filled out with patient and faxed to their office. Number is [phone]   - 12/1 Called consult to Dr. Saskia Crowe with [facility] ENT , spoke to him over the phone, patient will likely need radiation/chemo for malignant neck mass, he will still come tonight to evaluate airway and make sure not compromised. - per ENT consult, not surgically resectable  - will need to consider PEG placement for feeding difficulties  - 12/2 Pending Heme/Onc consult   - 12/4: Heme/ onc saw patient, reccomending consult to [facility], they were consulted 12/3  - Awaiting rad onc recs, will likely see him as an outpatient  - speech consult, wound consult  - cw vanc, zosyn  - pain management    Transient AVB  - evaluated by cardiology  - no BB  - if this persists, PM in near future    Hypertension  - cw home metoprolol 100 mg BID, d/damon on 12/2 d/t AVB  - started on lisinopril 10 mg every day on 12/1, as BP was in the 170's  - 12/3 increase lisinopril to 20 mg QD    Type 2 diabetes  - A1c 6.6  - hold home oral agents  - SSI, accuchecks    Tobacco abuse  - advised to quit smoking. He does not want to be placed on Nicoderm patch at this time.     Code status: Full  Prophylaxis: SCD's  Care Plan discussed with: patient, nurse  Anticipated Disposition: TBD, likely home     Hospital Problems  Date Reviewed: 12/1/2022            Codes Class Noted POA    Mass of left side of neck ICD-10-CM: R22.1  ICD-9-CM: 784.2  12/1/2022 Unknown        * (Principal) Cellulitis and abscess of neck ICD-10-CM: L03.221, L02.11  ICD-9-CM: 682.1 12/1/2022 Yes         Review of Systems:   Negative except as noted in the HPI    Vital Signs:    Last 24hrs VS reviewed since prior progress note. Most recent are:  Visit Vitals  BP (!) 175/94   Pulse 88   Temp 98 °F (36.7 °C)   Resp 18   Ht 5' 4\" (1.626 m)   Wt 86.8 kg (191 lb 5.8 oz)   SpO2 93%   BMI 32.85 kg/m²       No intake or output data in the 24 hours ending 12/04/22 1230     Physical Examination:     I had a face to face encounter with this patient and independently examined them on 12/4/2022 as outlined below:          Constitutional:  No acute distress, cooperative, pleasant    ENT:  Oral mucosa moist, oropharynx benign. NECK: + Left sided swelling, redness, drainage and mass noted, bandage in place, C/D/I   Resp:  CTA bilaterally. No wheezing/rhonchi/rales. No accessory muscle use. CV:  Regular rhythm, normal rate, no murmurs, gallops, rubs    GI:  Soft, non distended, non tender. bowel sounds present    Musculoskeletal:  No edema, warm, 2+ pulses throughout    Neurologic:  Moves all extremities. AAOx3, CN II-XII reviewed            Data Review:    Review and/or order of clinical lab test  Review and/or order of tests in the radiology section of CPT  Review and/or order of tests in the medicine section of CPT      Labs:     Recent Labs     12/03/22  0347   WBC 4.8   HGB 14.2   HCT 44.7          Recent Labs     12/04/22  0436 12/03/22  0347 12/02/22  0045    135* 136   K 3.9 3.7 4.0    106 105   CO2 29 26 23   BUN 5* 8 8   CREA 0.76 0.76 0.70   GLU 94 115* 73   CA 8.0* 8.3* 8.1*       Recent Labs     12/04/22  0436   ALT 26   *   TBILI 0.5   TP 7.2   ALB 2.3*   GLOB 4.9*       No results for input(s): INR, PTP, APTT, INREXT, INREXT in the last 72 hours. No results for input(s): FE, TIBC, PSAT, FERR in the last 72 hours. No results found for: FOL, RBCF   No results for input(s): PH, PCO2, PO2 in the last 72 hours.   No results for input(s): CPK, CKNDX, TROIQ in the last 72 hours.     No lab exists for component: CPKMB  No results found for: CHOL, CHOLX, CHLST, CHOLV, HDL, HDLP, LDL, LDLC, DLDLP, TGLX, TRIGL, TRIGP, CHHD, CHHDX  Lab Results   Component Value Date/Time    Glucose (POC) 190 (H) 12/04/2022 11:35 AM    Glucose (POC) 122 (H) 12/04/2022 07:07 AM    Glucose (POC) 140 (H) 12/03/2022 09:16 PM    Glucose (POC) 100 12/03/2022 04:42 PM    Glucose (POC) 127 (H) 12/03/2022 11:51 AM     Lab Results   Component Value Date/Time    Color YELLOW/STRAW 12/01/2022 05:08 PM    Appearance CLEAR 12/01/2022 05:08 PM    Specific gravity 1.025 12/01/2022 05:08 PM    pH (UA) 5.5 12/01/2022 05:08 PM    Protein TRACE (A) 12/01/2022 05:08 PM    Glucose Negative 12/01/2022 05:08 PM    Ketone 15 (A) 12/01/2022 05:08 PM    Bilirubin Negative 12/01/2022 05:08 PM    Urobilinogen 0.2 12/01/2022 05:08 PM    Nitrites Negative 12/01/2022 05:08 PM    Leukocyte Esterase Negative 12/01/2022 05:08 PM    Epithelial cells FEW 12/01/2022 05:08 PM    Bacteria Negative 12/01/2022 05:08 PM    WBC 0-4 12/01/2022 05:08 PM    RBC 0-5 12/01/2022 05:08 PM         Medications Reviewed:     Current Facility-Administered Medications   Medication Dose Route Frequency    lisinopriL (PRINIVIL, ZESTRIL) tablet 20 mg  20 mg Oral DAILY AFTER BREAKFAST    amLODIPine (NORVASC) tablet 10 mg  10 mg Oral DAILY    cyclobenzaprine (FLEXERIL) tablet 10 mg  10 mg Oral TID PRN    gabapentin (NEURONTIN) capsule 400 mg  400 mg Oral TID    mirtazapine (REMERON) tablet 30 mg  30 mg Oral QHS    pantoprazole (PROTONIX) tablet 40 mg  40 mg Oral DAILY    tamsulosin (FLOMAX) capsule 0.4 mg  0.4 mg Oral DAILY    traZODone (DESYREL) tablet 100 mg  100 mg Oral QHS PRN    sodium chloride (NS) flush 5-40 mL  5-40 mL IntraVENous Q8H    sodium chloride (NS) flush 5-40 mL  5-40 mL IntraVENous PRN    acetaminophen (TYLENOL) tablet 650 mg  650 mg Oral Q6H PRN    Or    acetaminophen (TYLENOL) suppository 650 mg  650 mg Rectal Q6H PRN    polyethylene glycol (MIRALAX) packet 17 g  17 g Oral DAILY PRN    ondansetron (ZOFRAN ODT) tablet 4 mg  4 mg Oral Q8H PRN    Or    ondansetron (ZOFRAN) injection 4 mg  4 mg IntraVENous Q6H PRN    insulin lispro (HUMALOG) injection   SubCUTAneous AC&HS    glucose chewable tablet 16 g  4 Tablet Oral PRN    glucagon (GLUCAGEN) injection 1 mg  1 mg IntraMUSCular PRN    dextrose 10 % infusion 0-250 mL  0-250 mL IntraVENous PRN    oxyCODONE-acetaminophen (PERCOCET) 5-325 mg per tablet 1 Tablet  1 Tablet Oral Q4H PRN    morphine injection 2 mg  2 mg IntraVENous Q4H PRN    Vancomycin - Pharmacy to Dose   Other Rx Dosing/Monitoring    piperacillin-tazobactam (ZOSYN) 3.375 g in 0.9% sodium chloride (MBP/ADV) 100 mL MBP  3.375 g IntraVENous Q8H    vancomycin (VANCOCIN) 1500 mg in  ml infusion  1,500 mg IntraVENous Q18H    lipase-protease-amylase (ZENPEP 10,000) capsule 2 Capsule  2 Capsule Oral TID WITH MEALS     ______________________________________________________________________  EXPECTED LENGTH OF STAY: 3d 9h  ACTUAL LENGTH OF STAY:          3                 Farhat OBRIEN MilligramSIMTA

## 2022-12-04 NOTE — PROGRESS NOTES
[facility]                       Cardiology Care Note     []Initial Consult     [x]Progress note    Patient Name: Tawanna Keating SON:[id number] - LTL:[id number]  Primary Cardiologist: [facility] Cardiology Physicians: NONE  Consulting Cardiologist: [facility] Cardiology Physicians: Dav Shah MD     Reason for consult: av block. HPI:       Yeni Khanna is a 75 y.o. male with PMH significant for history of tobacco use, HTN, ETOH, and PE in 2017 with long term use of eliquis,  who was initially diagnosed with cancer of his neck back in 2022 in [place]. He first noted a neck mass over the summer. He was seen at [facility], where he lived,at that time. He decribes what sounds like a fine needle aspiration biopsy which was positive for cancer. The patient does not recall the exact type of cancer but Dr Curtistine Nissen here confirmed squamous cell cancer. He was referred to a cancer treatment center, but did not follow through due to personal circumstances (his wife  and then he relocated to [facility]). He has had no treatment for this cancer. He had increased swelling. He was seen [facility] [address] several weeks ago, where he was treated with antibiotics and now he comes today complaining of increased swelling, dysphagia, odynophagia, and drainage from neck. There has been no fever or chills. SUBJECTIVE:      Yeni Khanna denies chest pain/ shortness of breath, orthopnea, PND, LE edema, palpitations,  + presyncope when moving his head, jeremi stretching the left neck. Assessment and Plan     1.   Transient  AV block with multiple non conducted P waves for 3-6 seconds on 2022 at 2 40 pm  He was not sleeping  most likely carotid sinus pressure from his left neck squamous cell cancer  Metoprolol has been stopped  I have not seen long pause again but he said if he moves his neck he felt pre syncopal  If this is persistent and confirmed with recurrent av block > 3 second pause (I have not seen this on weekend round), I will discuss and recommend leadless av pacemaker   His left neck squamous cell cancer is 4 to 5 cm fixed mass on the left side of the neck, which is adherent to the skin with erythema. The skin is open and draining necrotic tumor material through the opening. He will need radiation and chemotherapy per Dr Kar Hernández of ENT so transvenous pacemaker is not a choice    2. Left neck cancer- oncologist and ENT have seen him. Should have radiation and chemotherapy and need PEG    Need echo for baseline LVEF    3. Hypertension. stopped metoprolol due to above problem with pause from av block and start amlodipine 10mg. On lisinopril, may increase in dose to 40 mg qd    4. Type 2 diabetes. on sliding scale with insulin coverage. 5  Tobacco abuse. The patient advised to quit smoking, jeremi with neck cancer            ____________________________________________________________    Cardiac testing    NONE:     Other:  CT NECK  INDICATION: L neck mass     COMPARISON: None     TECHNIQUE:  Axial neck CT was performed after the uneventful administration of  IV contrast. Sagittal and coronal reconstructions were generated. CT dose reduction was achieved through use of a standardized protocol tailored  for this examination and automatic exposure control for dose modulation. FINDINGS:     There is a large, rim-enhancing complex fluid collection in the left lateral  neck at the level of the hyoid bone, abutting the lateral surface of the left  common carotid artery and extending to the skin surface which measures 4.8 x 3.1  x 4.2 cm. There is associated subcutaneous edema, soft tissue swelling and skin  thickening in the left lateral neck. The collection also is inseparable from the  ventral margin of the left sternocleidomastoid muscle, and displaces the left  submandibular gland ventrally. The left internal jugular vein is patent below  the mass/collection, though inseparable from the posterior margin of the  collection where there may be thrombus. Above the collection the vein is  markedly small in size. Right internal jugular vein is patent. Several prominent  left-sided lymph nodes in the neck. The nasopharynx, oropharynx, hypopharynx,  and larynx are otherwise unremarkable lobe minimally displaced toward the right  at the level of the hyoid bone. The parotid glands, right submandibular gland,  and thyroid gland are unremarkable. Atherosclerotic vascular disease of the  aorta, common carotid and internal carotid arteries. Lung apices are clear. No  destructive bone lesion. Visualized paranasal sinuses and mastoid air cells are  clear. IMPRESSION  Large 4.8 x 3.1 x 4.2 cm rim-enhancing complex collection in the left lateral  neck, extending from the carotid space to the skin surface with associated skin  thickening, subcutaneous edema, and prominent left neck lymph nodes. Findings  most likely represent an abscess rather than necrotic/superinfected neoplasm  though correlation with clinical findings and fluid culture/aspiration  suggested. Follow-up to resolution recommended. ECG: normal EKG, normal sinus rhythm, normal sinus rhythm    Review of Systems:    [x]All other systems reviewed and all negative except as written in HPI    [] Patient unable to provide secondary to condition    Past Medical History:   Diagnosis Date    Diabetes ([facility] [address]     HTN (hypertension)     Hypercholesteremia     Skin cancer     L sided neck skin cancer cells     No past surgical history on file. Social Hx:  reports that he has been smoking cigarettes. He has been smoking an average of .5 packs per day. He has never used smokeless tobacco. He reports current alcohol use. He reports that he does not use drugs. Family Hx: family history is not on file.   No Known Allergies       OBJECTIVE:  Wt Readings from Last 3 Encounters:   12/01/22 191 lb 5.8 oz (86.8 kg)   11/30/22 191 lb 5.8 oz (86.8 kg)     No intake or output data in the 24 hours ending 12/04/22 1501    Physical Exam:    Vitals:   Vitals:    12/04/22 0851 12/04/22 1000 12/04/22 1200 12/04/22 1253   BP: (!) 175/94   (!) 150/79   Pulse: 87 92 88 87   Resp: 18   16   Temp: 98 °F (36.7 °C)   97.7 °F (36.5 °C)   SpO2: 93%   92%   Weight:       Height:          Telemetry NSR sinus bradycardia but no more av block    BP (!) 150/79   Pulse 87   Temp 97.7 °F (36.5 °C)   Resp 16   Ht 5' 4\" (1.626 m)   Wt 191 lb 5.8 oz (86.8 kg)   SpO2 92%   BMI 32.85 kg/m²   General:    Alert, cooperative, no distress. Large size left neck mass, dressing on   Psychiatric:    Normal Mood and affect    Eye/ENT:      Pupils equal    Lungs:     Clear to auscultation bilaterally. Heart[de-identified]    Regular rhythm, no murmur, click, rub or gallop     Abdomen:     Soft, non-tender. Extremities:    no edema. Neurologic:    No gross focal deficits           Data Review:     Radiology:   XR Results (most recent):  No results found for this or any previous visit.       Recent Labs     12/04/22  0436 12/03/22  0347    135*   K 3.9 3.7    106   CO2 29 26   BUN 5* 8   CREA 0.76 0.76   GLU 94 115*   CA 8.0* 8.3*       Recent Labs     12/03/22  0347   WBC 4.8   HGB 14.2   HCT 44.7          Recent Labs     12/04/22  0436   *          Current meds:    Current Facility-Administered Medications:     lisinopriL (PRINIVIL, ZESTRIL) tablet 20 mg, 20 mg, Oral, DAILY AFTER BREAKFAST, Minor, Sheree V, NP, 20 mg at 12/04/22 0952    amLODIPine (NORVASC) tablet 10 mg, 10 mg, Oral, DAILY, Carolyn Rivera, ANP, 10 mg at 12/04/22 9702    cyclobenzaprine (FLEXERIL) tablet 10 mg, 10 mg, Oral, TID PRN, Rachel Ortiz MD    gabapentin (NEURONTIN) capsule 400 mg, 400 mg, Oral, TID, Rachel Ortiz MD, 400 mg at 12/04/22 0952    mirtazapine (REMERON) tablet 30 mg, 30 mg, Oral, QHS, Rachel Ortiz MD MANDY, 30 mg at 12/03/22 2125    pantoprazole (PROTONIX) tablet 40 mg, 40 mg, Oral, DAILY, Rachel Ortiz MD, 40 mg at 12/04/22 0952    tamsulosin (FLOMAX) capsule 0.4 mg, 0.4 mg, Oral, DAILY, Rachel Ortiz MD, 0.4 mg at 12/04/22 0952    traZODone (DESYREL) tablet 100 mg, 100 mg, Oral, QHS PRN, Rachel Ortiz MD    sodium chloride (NS) flush 5-40 mL, 5-40 mL, IntraVENous, Q8H, Rachel Ortiz MD, 10 mL at 12/04/22 0709    sodium chloride (NS) flush 5-40 mL, 5-40 mL, IntraVENous, PRN, Rachel Ortiz MD    acetaminophen (TYLENOL) tablet 650 mg, 650 mg, Oral, Q6H PRN, 650 mg at 12/04/22 0955 **OR** acetaminophen (TYLENOL) suppository 650 mg, 650 mg, Rectal, Q6H PRN, Rachel Ortiz MD    polyethylene glycol (MIRALAX) packet 17 g, 17 g, Oral, DAILY PRN, Rachel Ortiz MD    ondansetron (ZOFRAN ODT) tablet 4 mg, 4 mg, Oral, Q8H PRN **OR** ondansetron (ZOFRAN) injection 4 mg, 4 mg, IntraVENous, Q6H PRN, Rachel Ortiz MD, 4 mg at 12/03/22 1347    insulin lispro (HUMALOG) injection, , SubCUTAneous, AC&HS, Rachel Ortiz MD, 2 Units at 12/04/22 1156    glucose chewable tablet 16 g, 4 Tablet, Oral, PRN, Rachel Ortiz MD    glucagon (GLUCAGEN) injection 1 mg, 1 mg, IntraMUSCular, PRN, Rachel Ortiz MD    dextrose 10 % infusion 0-250 mL, 0-250 mL, IntraVENous, PRN, Rachel Ortiz MD    oxyCODONE-acetaminophen (PERCOCET) 5-325 mg per tablet 1 Tablet, 1 Tablet, Oral, Q4H PRN, Rachel Ortiz MD, 1 Tablet at 12/04/22 0708    morphine injection 2 mg, 2 mg, IntraVENous, Q4H PRN, Rachel Ortiz MD, 2 mg at 12/01/22 1729    Vancomycin - Pharmacy to Dose, , Other, Rx Dosing/Monitoring, Rachel Ortiz MD    [COMPLETED] piperacillin-tazobactam (ZOSYN) 4.5 g in 0.9% sodium chloride (MBP/ADV) 100 mL MBP, 4.5 g, IntraVENous, NOW, IV Completed at 12/02/22 1035 **FOLLOWED BY** piperacillin-tazobactam (ZOSYN) 3.375 g in 0.9% sodium chloride (MBP/ADV) 100 mL MBP, 3.375 g, IntraVENous, Q8H, Diana Rachel GALVAN MD, Last Rate: 25 mL/hr at 12/04/22 1426, 3.375 g at 12/04/22 1426    vancomycin (VANCOCIN) 1500 mg in  ml infusion, 1,500 mg, IntraVENous, Q18H, Rachel Ortiz MD, Last Rate: 250 mL/hr at 12/04/22 0136, 1,500 mg at 12/04/22 0136    lipase-protease-amylase (ZENPEP 10,000) capsule 2 Capsule, 2 Capsule, Oral, TID WITH MEALS, Sofia Cordova NP, 2 Capsule at 12/04/22 Melany Zarate MD    [facility] Cardiology  Call center: (P) [phone]  (S) [phone]      CC:None

## 2022-12-05 ENCOUNTER — HOSPITAL ENCOUNTER (INPATIENT)
Dept: RADIATION THERAPY | Age: 67
Discharge: HOME | DRG: 982 | End: 2022-12-05
Payer: MEDICARE

## 2022-12-05 ENCOUNTER — APPOINTMENT (OUTPATIENT)
Dept: NON INVASIVE DIAGNOSTICS | Age: 67
DRG: 982 | End: 2022-12-05
Attending: INTERNAL MEDICINE
Payer: MEDICARE

## 2022-12-05 LAB
ANION GAP SERPL CALC-SCNC: 1 MMOL/L (ref 5–15)
BUN SERPL-MCNC: 3 MG/DL (ref 6–20)
BUN/CREAT SERPL: 4 (ref 12–20)
CALCIUM SERPL-MCNC: 8.6 MG/DL (ref 8.5–10.1)
CHLORIDE SERPL-SCNC: 108 MMOL/L (ref 97–108)
CO2 SERPL-SCNC: 34 MMOL/L (ref 21–32)
CREAT SERPL-MCNC: 0.7 MG/DL (ref 0.7–1.3)
ECHO AO ROOT DIAM: 3.4 CM
ECHO AO ROOT INDEX: 1.77 CM/M2
ECHO AV AREA PEAK VELOCITY: 1.8 CM2
ECHO AV AREA/BSA PEAK VELOCITY: 0.9 CM2/M2
ECHO AV PEAK GRADIENT: 6 MMHG
ECHO AV PEAK VELOCITY: 1.3 M/S
ECHO AV VELOCITY RATIO: 0.62
ECHO LA DIAMETER INDEX: 2.34 CM/M2
ECHO LA DIAMETER: 4.5 CM
ECHO LA TO AORTIC ROOT RATIO: 1.32
ECHO LV E' LATERAL VELOCITY: 7 CM/S
ECHO LV E' SEPTAL VELOCITY: 7 CM/S
ECHO LV FRACTIONAL SHORTENING: 35 % (ref 28–44)
ECHO LV INTERNAL DIMENSION DIASTOLE INDEX: 2.66 CM/M2
ECHO LV INTERNAL DIMENSION DIASTOLIC: 5.1 CM (ref 4.2–5.9)
ECHO LV INTERNAL DIMENSION SYSTOLIC INDEX: 1.72 CM/M2
ECHO LV INTERNAL DIMENSION SYSTOLIC: 3.3 CM
ECHO LV IVSD: 0.9 CM (ref 0.6–1)
ECHO LV MASS 2D: 175.6 G (ref 88–224)
ECHO LV MASS INDEX 2D: 91.5 G/M2 (ref 49–115)
ECHO LV POSTERIOR WALL DIASTOLIC: 1 CM (ref 0.6–1)
ECHO LV RELATIVE WALL THICKNESS RATIO: 0.39
ECHO LVOT AREA: 2.8 CM2
ECHO LVOT DIAM: 1.9 CM
ECHO LVOT PEAK GRADIENT: 3 MMHG
ECHO LVOT PEAK VELOCITY: 0.8 M/S
ECHO MV A VELOCITY: 0.81 M/S
ECHO MV E DECELERATION TIME (DT): 219.4 MS
ECHO MV E VELOCITY: 0.77 M/S
ECHO MV E/A RATIO: 0.95
ECHO MV E/E' LATERAL: 11
ECHO MV E/E' RATIO (AVERAGED): 11
ECHO MV E/E' SEPTAL: 11
ECHO PV MAX VELOCITY: 0.6 M/S
ECHO PV PEAK GRADIENT: 2 MMHG
ECHO RV FREE WALL PEAK S': 9 CM/S
ECHO RV TAPSE: 2.2 CM (ref 1.7–?)
GLUCOSE BLD STRIP.AUTO-MCNC: 107 MG/DL (ref 65–117)
GLUCOSE BLD STRIP.AUTO-MCNC: 160 MG/DL (ref 65–117)
GLUCOSE BLD STRIP.AUTO-MCNC: 76 MG/DL (ref 65–117)
GLUCOSE BLD STRIP.AUTO-MCNC: 98 MG/DL (ref 65–117)
GLUCOSE SERPL-MCNC: 141 MG/DL (ref 65–100)
MAGNESIUM SERPL-MCNC: 1.7 MG/DL (ref 1.6–2.4)
POTASSIUM SERPL-SCNC: 3.6 MMOL/L (ref 3.5–5.1)
POTASSIUM SERPL-SCNC: 3.8 MMOL/L (ref 3.5–5.1)
SERVICE CMNT-IMP: ABNORMAL
SERVICE CMNT-IMP: NORMAL
SODIUM SERPL-SCNC: 143 MMOL/L (ref 136–145)

## 2022-12-05 PROCEDURE — 74011000258 HC RX REV CODE- 258: Performed by: INTERNAL MEDICINE

## 2022-12-05 PROCEDURE — 65270000046 HC RM TELEMETRY

## 2022-12-05 PROCEDURE — 74011250637 HC RX REV CODE- 250/637: Performed by: INTERNAL MEDICINE

## 2022-12-05 PROCEDURE — 74011000250 HC RX REV CODE- 250: Performed by: FAMILY MEDICINE

## 2022-12-05 PROCEDURE — 74011250637 HC RX REV CODE- 250/637: Performed by: NURSE PRACTITIONER

## 2022-12-05 PROCEDURE — 93005 ELECTROCARDIOGRAM TRACING: CPT

## 2022-12-05 PROCEDURE — 80048 BASIC METABOLIC PNL TOTAL CA: CPT

## 2022-12-05 PROCEDURE — 74011636637 HC RX REV CODE- 636/637: Performed by: INTERNAL MEDICINE

## 2022-12-05 PROCEDURE — 74011000250 HC RX REV CODE- 250: Performed by: INTERNAL MEDICINE

## 2022-12-05 PROCEDURE — 36415 COLL VENOUS BLD VENIPUNCTURE: CPT

## 2022-12-05 PROCEDURE — 74011250636 HC RX REV CODE- 250/636: Performed by: FAMILY MEDICINE

## 2022-12-05 PROCEDURE — 99233 SBSQ HOSP IP/OBS HIGH 50: CPT | Performed by: INTERNAL MEDICINE

## 2022-12-05 PROCEDURE — 92526 ORAL FUNCTION THERAPY: CPT

## 2022-12-05 PROCEDURE — C8929 TTE W OR WO FOL WCON,DOPPLER: HCPCS

## 2022-12-05 PROCEDURE — 74011250636 HC RX REV CODE- 250/636: Performed by: INTERNAL MEDICINE

## 2022-12-05 PROCEDURE — 93306 TTE W/DOPPLER COMPLETE: CPT | Performed by: INTERNAL MEDICINE

## 2022-12-05 PROCEDURE — 84132 ASSAY OF SERUM POTASSIUM: CPT

## 2022-12-05 PROCEDURE — 83735 ASSAY OF MAGNESIUM: CPT

## 2022-12-05 PROCEDURE — 82962 GLUCOSE BLOOD TEST: CPT

## 2022-12-05 RX ORDER — ALPRAZOLAM 0.25 MG/1
0.5 TABLET ORAL
Status: DISCONTINUED | OUTPATIENT
Start: 2022-12-05 | End: 2022-12-11 | Stop reason: HOSPADM

## 2022-12-05 RX ADMIN — OXYCODONE AND ACETAMINOPHEN 1 TABLET: 5; 325 TABLET ORAL at 17:43

## 2022-12-05 RX ADMIN — MIRTAZAPINE 30 MG: 15 TABLET, FILM COATED ORAL at 22:18

## 2022-12-05 RX ADMIN — Medication 2 CAPSULE: at 17:43

## 2022-12-05 RX ADMIN — VANCOMYCIN HYDROCHLORIDE 1500 MG: 10 INJECTION, POWDER, LYOPHILIZED, FOR SOLUTION INTRAVENOUS at 14:08

## 2022-12-05 RX ADMIN — LISINOPRIL 20 MG: 20 TABLET ORAL at 09:35

## 2022-12-05 RX ADMIN — Medication 2 UNITS: at 11:38

## 2022-12-05 RX ADMIN — GABAPENTIN 400 MG: 400 CAPSULE ORAL at 17:43

## 2022-12-05 RX ADMIN — PIPERACILLIN AND TAZOBACTAM 3.38 G: 3; .375 INJECTION, POWDER, LYOPHILIZED, FOR SOLUTION INTRAVENOUS at 22:18

## 2022-12-05 RX ADMIN — ACETAMINOPHEN 650 MG: 325 TABLET ORAL at 07:05

## 2022-12-05 RX ADMIN — Medication 2 CAPSULE: at 07:07

## 2022-12-05 RX ADMIN — Medication 2 CAPSULE: at 14:09

## 2022-12-05 RX ADMIN — PANTOPRAZOLE SODIUM 40 MG: 40 TABLET, DELAYED RELEASE ORAL at 09:35

## 2022-12-05 RX ADMIN — PERFLUTREN 1.5 ML: 6.52 INJECTION, SUSPENSION INTRAVENOUS at 11:20

## 2022-12-05 RX ADMIN — GABAPENTIN 400 MG: 400 CAPSULE ORAL at 09:35

## 2022-12-05 RX ADMIN — TAMSULOSIN HYDROCHLORIDE 0.4 MG: 0.4 CAPSULE ORAL at 09:35

## 2022-12-05 RX ADMIN — PIPERACILLIN AND TAZOBACTAM 3.38 G: 3; .375 INJECTION, POWDER, LYOPHILIZED, FOR SOLUTION INTRAVENOUS at 14:08

## 2022-12-05 RX ADMIN — OXYCODONE AND ACETAMINOPHEN 1 TABLET: 5; 325 TABLET ORAL at 00:39

## 2022-12-05 RX ADMIN — PIPERACILLIN AND TAZOBACTAM 3.38 G: 3; .375 INJECTION, POWDER, LYOPHILIZED, FOR SOLUTION INTRAVENOUS at 07:05

## 2022-12-05 RX ADMIN — SODIUM CHLORIDE, PRESERVATIVE FREE 10 ML: 5 INJECTION INTRAVENOUS at 07:05

## 2022-12-05 RX ADMIN — AMLODIPINE BESYLATE 10 MG: 5 TABLET ORAL at 09:35

## 2022-12-05 RX ADMIN — OXYCODONE AND ACETAMINOPHEN 1 TABLET: 5; 325 TABLET ORAL at 07:05

## 2022-12-05 RX ADMIN — OXYCODONE AND ACETAMINOPHEN 1 TABLET: 5; 325 TABLET ORAL at 11:51

## 2022-12-05 RX ADMIN — GABAPENTIN 400 MG: 400 CAPSULE ORAL at 22:17

## 2022-12-05 RX ADMIN — OXYCODONE AND ACETAMINOPHEN 1 TABLET: 5; 325 TABLET ORAL at 22:25

## 2022-12-05 NOTE — CONSULTS
Comprehensive Nutrition Assessment    Type and Reason for Visit: Initial, Positive nutrition screen    Nutrition Recommendations/Plan:     Advance diet per pt preference    Will order Glucerna TID, Magic Cup TID, and Ensure Pudding TID  (If pt consumed all of these ONS, it will provide 2250 kcals and 93 gm protein/day which is 100% kcal needs and 89% protein needs)         Malnutrition Assessment:  Malnutrition Status: At risk for malnutrition (specify) (Poor PO 2/2 difficulty swallowing; no confirmed weight loss) (12/01/22 1122)         Nutrition Assessment:    74 yo male admitted for mass of left side of neck. Pmhx: DM, HTN, hypercholesterolemia, skin CA.     12/5:  MD consult received for ONS. Diet has been upgraded to Full liquids. SLP evaluated pt today and recommended pt remain on full liquids per his preference. Spoke with pt regarding PO intakes. He says he is drinking 100% of meals but they consist of soup (which is likely broth), tea, juice, and pudding. This is providing ~ 150 kcals and 2 gm protein. He will not be able to meet his kcal and protein needs on this. Discussed this with pt and he agrees, is open to trying multiple ONS to provide additional kcals. Ensure High Protein was ordered today but he received vanilla and he would like chocolate flavor so will change order to Glucerna TID (due to no chocolate ensure high protein in house), and add Magic Cup and Ensure Pudding TID. Combined, this should provide a total of 2250 kcals (100% kcal needs) and 93 gm protein (89% protein needs) each day from ONS alone. Discussed possible need for PEG placement until the swallow issue resolves, he states MD's have mentioned that already so he is thinking about it. Will see how he does with PO ONS first.      12/1: MST received for weight loss and poor PO. Spoke with pt at bedside. He is currently NPO in anticipation of intervention by ENT surgeon. States the last meal he had was Thanksgiving day. Since then all he has had is broth. C/o difficulty swallowing because of this mass. He is able to get down water/liquids. Took PO meds this morning but states that was very difficult. Discussed ONS options once diet advances, agreeable to try chocolate Ensure High Protein. States his UBW in October (last time he weighed) was 82 kg. He is not sure if he has lost any weight. Current weight is charted as 86.8 kg indicating no weight loss, unsure of source of current weight. Will continue to monitor. Labs reviewed. Nutritionally Significant Medications:  Humalog, Remeron, protonix      Estimated Daily Nutrient Needs:  Energy Requirements Based On: Formula  Weight Used for Energy Requirements: Current  Energy (kcal/day): 2020 (MSJ x AF 1.3)  Weight Used for Protein Requirements: Current  Protein (g/day): 104 (1.1 gm/kg (20%))  Method Used for Fluid Requirements: 1 ml/kcal  Fluid (ml/day): 2020    Nutrition Related Findings:   Edema: none  Last BM: 12/02/22,      Wounds: None      Current Nutrition Therapies:  Diet: NPO  Supplements: none  Meal intake: No data found. Supplement intake: No data found. Nutrition Support: none      Anthropometric Measures:  Height: 5' 4\" (162.6 cm)  Ideal Body Weight (IBW): 130 lbs (59 kg)     Current Body Wt:  86.8 kg (191 lb 5.8 oz), 147.2 % IBW. Not specified  Current BMI (kg/m2): 32.8        Weight Adjustment: No adjustment                 BMI Category: Obese class 1 (BMI 30.0-34. 9)    Wt Readings from Last 10 Encounters:   12/05/22 86.6 kg (191 lb)   11/30/22 86.8 kg (191 lb 5.8 oz)           Nutrition Diagnosis:   Inadequate oral intake related to inadequate protein-energy intake, swallowing difficulty as evidenced by NPO or clear liquid status due to medical condition    Nutrition Interventions:   Food and/or Nutrient Delivery: Start oral diet, Start oral nutrition supplement  Nutrition Education/Counseling: No recommendations at this time  Coordination of Nutrition Care: Continue to monitor while inpatient       Goals:     Goals: other (specify)  Specify Other Goals: Advance PO diet order with intakes > 75% meals over next 5-7 days    Nutrition Monitoring and Evaluation:   Behavioral-Environmental Outcomes: None identified  Food/Nutrient Intake Outcomes: Food and nutrient intake, Diet advancement/tolerance, Supplement intake  Physical Signs/Symptoms Outcomes: Biochemical data, GI status, Weight    Discharge Planning:     Too soon to determine    Cindy Quintanilla RD  Available via FAST FELT

## 2022-12-05 NOTE — PROGRESS NOTES
[address] [facility] at [address]  W: [phone]  F: [phone]    Reason for Evaluation:   Tyler Bowers is a 75 y.o. male who is seen in consultation at the request of Dr. Malathi Kang for evaluation of suspected head and neck cancer. Hematology/Oncology Treatment History:     Presented June 2022 with enlarging next mass, failed to improve with antibiotics  Evaluated by ENT in [place], [place] in 7/2022  FNA 7/26/22 with atypical squamous cells, c/f malignancy  Core biopsy with skeletal muscle, fibrous tissue, and granulation with acute and chronic inflammation. Cytokeratin negative, p40 negative. History of Present Illness:   Tyler Bowers is a pleasant 75 y.o. male with PMHx of HTN, DM, tobacco abuse who presents today for evaluation of suspected head and neck cancer. Patient recently relocated from [place] to [place], [place]. Patient initially presented in 6/2022 with 6 week history of enlarging neck mass, not improved with antibiotics. He had ultrasound that showed irrecgular enhancing mass involving left submandibuar gland, suspicious for cancer. He was seen by Dr. Clement Fletcher ( ENT in [place]) in July. Follow up CT neck demonstrated soft tissue mass, measuring 5 cm with small annular opening with drainage. Mass noted to be compressing left internal jugular vein. Cytology from FNA on 7/26/22 showed atypical squamous cells concerning for malignancy. He then underwent core biopsy at [facility] in [place], which showed, \"Skeletal muscle, fibrous tissue, and granulation tissue with acute and chronic inflammation. \"  Per pathology comments, the sections contain skeletal muscle with distortion by fibrosis and inflammation with scattered atrophic and degeneraing muscle fibers. Rare epitheliod cells are present; however, pancytokeratin immunostain is geative, and p40 immunostain negative.   The previous cytology specimen reviewed and showed isolated atypical squamous cells an clusters of atypical squamous cells. \"  He was told he had cancer and was referred to oncologist in [place] but never initiated workup/treatment because his wife passed away on Oct. 4.  He then relocated to [place] to live with his three children. He was treated with oral antibiotics in early November for neck cellulitis. Pain and swelling progressed, prompting evaluation to ED.      11/30/22 CT neck: large 4.8 x 3.1 x 4.2 cm rim-enhancing complex collection in left lateral neck, extending from carotid space to the skin surface with associated skin thickening, edema, and prominent left neck lymph nodes. Findings most likely represent an abscess rather than necrotic/superinfected neoplasm. 12/1/22 CT CAP: left neck mass partially visualized. 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 Patient is currently on vanc/zosyn. SLP has evaluated and notes functional oral swallow, mild pharyngeal dysphagia. He was evaluated by Dr. Lidia Shin ED who recommended Med Onc and Rad Onc evaluation. Social History:  Recently moved from [place] to [place] after the passing of his late wife. Living with each of his three children. Review of systems was obtained and pertinent findings reviewed above. Past medical history, social history, family history, medications, and allergies are located in the electronic medical record.     Physical Exam:   Visit Vitals  BP (!) 172/87   Pulse 89   Temp 97.8 °F (36.6 °C)   Resp 16   Ht 5' 4\" (1.626 m)   Wt 191 lb (86.6 kg)   SpO2 93%   BMI 32.79 kg/m²     ECOG PS: 2  General: no distress  Eyes: anicteric sclerae  HENT: oropharynx clear  Neck: large, ~5 cm firm mass in left submandibular region with ~ 1 cm area of purulent drainage and mild surrounding erythema  Lymphatic: no cervical, supraclavicular, or inguinal adenopathy  Respiratory: normal respiratory effort  CV: no peripheral edema  Ext: warm, well perfused, no edema  GI: soft, nontender, nondistended, no masses  Skin: no rashes, no ecchymoses, no petechiae  Neuro: grossly intact    Results:     Lab Results   Component Value Date/Time    WBC 4.8 12/03/2022 03:47 AM    HGB 14.2 12/03/2022 03:47 AM    HCT 44.7 12/03/2022 03:47 AM    PLATELET 634 64/46/5896 03:47 AM    .4 (H) 12/03/2022 03:47 AM    ABS. NEUTROPHILS 2.5 12/03/2022 03:47 AM     Lab Results   Component Value Date/Time    Sodium 143 12/05/2022 12:39 AM    Potassium 3.6 12/05/2022 12:39 AM    Chloride 108 12/05/2022 12:39 AM    CO2 34 (H) 12/05/2022 12:39 AM    Glucose 141 (H) 12/05/2022 12:39 AM    BUN 3 (L) 12/05/2022 12:39 AM    Creatinine 0.70 12/05/2022 12:39 AM    Calcium 8.6 12/05/2022 12:39 AM    Glucose (POC) 76 12/05/2022 04:57 PM     Lab Results   Component Value Date/Time    Bilirubin, total 0.5 12/04/2022 04:36 AM    ALT (SGPT) 26 12/04/2022 04:36 AM    Alk. phosphatase 127 (H) 12/04/2022 04:36 AM    Protein, total 7.2 12/04/2022 04:36 AM    Albumin 2.3 (L) 12/04/2022 04:36 AM    Globulin 4.9 (H) 12/04/2022 04:36 AM     Records from [facility] and  [facility] summarized above. Test results above have been reviewed. 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 Pathology records reviewed. Cytology from FNA 7/26/22 with \"atypical squamous cells. \"  Follow-up core needle biopsy with \"rare epitheliod cells are present; however, pancytokeratin immunostain is negative, and p40 immunostain negative. \"   Although core needle biopsy not definitive, clinical picture (i.e. atypical squamous cells on FNA, enlarging neck mass with failure to improve to antibiotics in a chronic smoker) most consistent with squamous cell carcinoma of head and neck  Unclear primary - unclear if patient had pan-endoscopy in [place] prior to transfer to [address] [place] [address] negative for distant metastatic disease     Case discussed with Dr. Joy Yousif. As patient is having ongoing complications from locally advanced head and neck cancer, would not recommend delaying treatment to allow for outpatient PET-CT or pan-endoscopy. Therefore, will plan for concurrent chemoradiation with weekly cisplatin. Discussed risks/benefits of chemotherapy, including but not limited to cytopenias, fatigue, increased risk of infection, ototoxicity, renal toxicity, neuropathy, among others. We discussed that chemotherapy works as a radio-sensitizer to enhance effects of radiation and that many side effects of treatment are local toxicities, including odynophagia, esophagitis, erosion, among others. Patient has consented to chemotherapy. #) Left neck abscess and cellulitis: management per medicine     #) Heat block: Cardiology following, likely due to reflex from mass sitting over his right carotid artery. Monitoring on telemetry    #) Tobacco abuse:     #) HTN: management per medicine    #) Type 2 DM: management per medicine     #) Steatosis/Cirrhosis: incidentally noted on CT A/P    #) Macrocytosis: check B12 level      Plan:     Recommend concurrent chemoradiation with weekly cisplatin  Case discussed with Dr. Joy Yousif - plan is for CT simulation tomorrow.   Once planning complete, will proceed with initiation of chemoRT in-house  Oncology will continue to follow along    Signed By: Vimal Mcgarry MD

## 2022-12-05 NOTE — PROGRESS NOTES
Problem: Dysphagia (Adult)  Goal: *Acute Goals and Plan of Care (Insert Text)  Description: Speech Therapy Goals:  Initiated 12/2/22  1. Patient will tolerate minced/moist diet with thin liquids without clinical s/s of aspiration within seven days. Modify to full liquid diet  Outcome: Progressing Towards Goal     SPEECH LANGUAGE PATHOLOGY DYSPHAGIA TREATMENT  Patient: Renata Raza (76 y.o. male)  Date: 12/5/2022  Diagnosis: Mass of left side of neck [R22.1] Cellulitis and abscess of neck      Precautions:       ASSESSMENT:  Note patient choked with minced and moist solid over the weekend, and diet was changed to full liquid. Offered bite of wilbur cracker, however patient with effortful swallow with extraneous head movement and reported that it was difficulty to swallow. Upon questioning, patient reported that he prefers the full liquid diet as solids are too difficult to chew. Provided education that swallow function is unlikely to improve until medical treatment for the mass begins, and swallow could worsen with treatment with xerostomia, mucositis, and increased dysphagia. Encouraged patient to follow up with OP SLP during treatment. Patient verbalized understanding to education provided, however suspect reinforcement will be needed. PLAN:  Recommendations and Planned Interventions:  -Full liquid diet per patient preference  -SLP to decrease frequency to 2x/week as diet unlikely to be liberalized until medical treatment of mass  -OP SLP upon discharge  Patient continues to benefit from skilled intervention to address the above impairments. Continue treatment per established plan of care. Discharge Recommendations:  Outpatient     SUBJECTIVE:   Patient stated That makes sense after education.     OBJECTIVE:   Cognitive and Communication Status:  Neurologic State: Alert  Orientation Level: Oriented X4  Cognition: Follows commands  Perception: Appears intact  Perseveration: No perseveration noted  Safety/Judgement: Awareness of environment, Insight into deficits  Dysphagia Treatment:     P.O. Trials:  Patient Position: up in bed  Vocal quality prior to P.O.: No impairment  Consistency Presented: Thin liquid; Solid  How Presented: Self-fed/presented;Straw;Successive swallows     Bolus Acceptance: No impairment  Bolus Formation/Control: No impairment     Propulsion: No impairment  Oral Residue: None        Aspiration Signs/Symptoms: None          Pain:  Pain Scale 1: Numeric (0 - 10)  Pain Intensity 1: 3  Pain Location 1: Neck    After treatment:   Patient left in no apparent distress in bed, Call bell within reach, and Nursing notified    COMMUNICATION/EDUCATION:   Patient was educated regarding his deficit(s) of dysphagia as this relates to his diagnosis. He demonstrated Good understanding as evidenced by verbalizing understanding. The patient's plan of care including recommendations, planned interventions, and recommended diet changes were discussed with: Registered nurse.      Charo Franklin SLP  Time Calculation: 10 mins

## 2022-12-05 NOTE — PROGRESS NOTES
[facility]                       Cardiology Care Note     []Initial Consult     [x]Progress note    Patient Name: Walter Cheung [facility]:5363 - WYR:[id number]  Primary Cardiologist: [facility] [employer] [facility] Physicians: NONE  Consulting Cardiologist: [facility] Phys[facility]: Hermila Archuleta MD     Reason for consult: transient av block. HPI:       Opal Turcios is a 75 y.o. male with PMH significant for history of tobacco use, HTN, ETOH, and PE in 2017 with long term use of eliquis,  who was initially diagnosed with cancer of his neck back in 2022 in [place]. He first noted a neck mass over the summer. He was seen at [facility], where he lived,at that time. He decribes what sounds like a fine needle aspiration biopsy which was positive for cancer. The patient does not recall the exact type of cancer but Dr Nikita Velasquez here confirmed squamous cell cancer. He was referred to a cancer treatment center, but did not follow through due to personal circumstances (his wife  and then he relocated to [place]). He has had no treatment for this cancer. He had increased swelling. He was seen at [facility] [address] several weeks ago, where he was treated with antibiotics and now he comes today complaining of increased swelling, dysphagia, odynophagia, and drainage from neck. There has been no fever or chills. SUBJECTIVE:      Opal Waite. denies chest pain/ shortness of breath, orthopnea, PND, LE edema, palpitations,  + presyncope when moving his head, jeremi stretching the left neck. Many of his episodes of dizziness on neck movement are not associated with any bradycardia. Plan is to initiate him on radiation therapy along with chemotherapy for his neck mass. Assessment and Plan     1.   Transient  AV block with multiple non conducted P waves for 3-6 seconds on 2022 at 12:52pm  He was not sleeping  Cause most likely carotid sinus pressure from his left neck squamous cell cancer  Metoprolol has been stopped  It appears to me that his response from carotid sinus stimulation is predominantly vasodepressive rather than an appropriate suppressive. I am uncertain if he would definitively benefit from pacemaker placement at this point of time in the absence of previously documented syncope. However we will closely monitor for any pauses greater than initiation of chemotherapy and radiation. 2. Left neck cancer- oncologist and ENT have seen him. Should have radiation and chemotherapy and need PEG    Need echo for baseline LVEF    3. Hypertension. stopped metoprolol due to above problem with pause from av block and start amlodipine 10mg. On lisinopril, may increase in dose to 40 mg qd    4. Type 2 diabetes. on sliding scale with insulin coverage. 5  Tobacco abuse. The patient advised to quit smoking, jeremi with neck cancer         Echo was reviewed and shows normal LV function and no other structural abnormalities. We will follow from distance. Pls call if pt has 5 sec or > pauses going forward.        ____________________________________________________________    Cardiac testing    Echo PENDING. Other:  CT NECK  INDICATION: L neck mass     COMPARISON: None     TECHNIQUE:  Axial neck CT was performed after the uneventful administration of  IV contrast. Sagittal and coronal reconstructions were generated. CT dose reduction was achieved through use of a standardized protocol tailored  for this examination and automatic exposure control for dose modulation. FINDINGS:     There is a large, rim-enhancing complex fluid collection in the left lateral  neck at the level of the hyoid bone, abutting the lateral surface of the left  common carotid artery and extending to the skin surface which measures 4.8 x 3.1  x 4.2 cm.  There is associated subcutaneous edema, soft tissue swelling and skin  thickening in the left lateral neck. The collection also is inseparable from the  ventral margin of the left sternocleidomastoid muscle, and displaces the left  submandibular gland ventrally. The left internal jugular vein is patent below  the mass/collection, though inseparable from the posterior margin of the  collection where there may be thrombus. Above the collection the vein is  markedly small in size. Right internal jugular vein is patent. Several prominent  left-sided lymph nodes in the neck. The nasopharynx, oropharynx, hypopharynx,  and larynx are otherwise unremarkable lobe minimally displaced toward the right  at the level of the hyoid bone. The parotid glands, right submandibular gland,  and thyroid gland are unremarkable. Atherosclerotic vascular disease of the  aorta, common carotid and internal carotid arteries. Lung apices are clear. No  destructive bone lesion. Visualized paranasal sinuses and mastoid air cells are  clear. IMPRESSION  Large 4.8 x 3.1 x 4.2 cm rim-enhancing complex collection in the left lateral  neck, extending from the carotid space to the skin surface with associated skin  thickening, subcutaneous edema, and prominent left neck lymph nodes. Findings  most likely represent an abscess rather than necrotic/superinfected neoplasm  though correlation with clinical findings and fluid culture/aspiration  suggested. Follow-up to resolution recommended. ECG: normal EKG, normal sinus rhythm, normal sinus rhythm    Review of Systems:    [x]All other systems reviewed and all negative except as written in HPI    [] Patient unable to provide secondary to condition    Past Medical History:   Diagnosis Date    Diabetes ([facility] [address]     HTN (hypertension)     Hypercholesteremia     Skin cancer     L sided neck skin cancer cells     No past surgical history on file. Social Hx:  reports that he has been smoking cigarettes. He has been smoking an average of .5 packs per day.  He has never used smokeless tobacco. He reports current alcohol use. He reports that he does not use drugs. Family Hx: family history is not on file. No Known Allergies       OBJECTIVE:  Wt Readings from Last 3 Encounters:   12/05/22 191 lb (86.6 kg)   11/30/22 191 lb 5.8 oz (86.8 kg)       Intake/Output Summary (Last 24 hours) at 12/5/2022 1426  Last data filed at 12/5/2022 1134  Gross per 24 hour   Intake --   Output 1550 ml   Net -1550 ml       Physical Exam:    Vitals:   Vitals:    12/05/22 1000 12/05/22 1102 12/05/22 1135 12/05/22 1200   BP:  (!) 169/91 (!) 152/78    Pulse: 74  82 94   Resp:   16    Temp:   97.4 °F (36.3 °C)    SpO2:   93%    Weight:  191 lb (86.6 kg)     Height:  5' 4\" (1.626 m)        Telemetry NSR sinus bradycardia but no more av block    BP (!) 152/78   Pulse 94   Temp 97.4 °F (36.3 °C)   Resp 16   Ht 5' 4\" (1.626 m)   Wt 191 lb (86.6 kg)   SpO2 93%   BMI 32.79 kg/m²   General:    Alert, cooperative, no distress. Left neck mass'   Psychiatric:    Normal Mood and affect    Eye/ENT:      Pupils equal, No asymmetry, Conjunctival pink. Able to hear voice at normal amplitude   Lungs:      Visibly symmetric chest expansion, No palpable tenderness. Clear to auscultation bilaterally. Heart[de-identified]    Regular rate and rhythm, S1, S2 normal, no murmur, click, rub or gallop. No JVD, Normal palpable peripheral pulses. No cyanosis   Abdomen:     Soft, non-tender. Bowel sounds normal. No masses,  No      organomegaly. Extremities:   Extremities normal, atraumatic, no edema. Neurologic:   CN II-XII grossly intact. No gross focal deficits               Data Review:     Radiology:   XR Results (most recent):  No results found for this or any previous visit.       Recent Labs     12/05/22  0039 12/04/22  0436    140   K 3.6 3.9    108   CO2 34* 29   BUN 3* 5*   CREA 0.70 0.76   * 94   CA 8.6 8.0*     Recent Labs     12/03/22  0347   WBC 4.8   HGB 14.2   HCT 44.7        Recent Labs 12/04/22  0436   *        Current meds:    Current Facility-Administered Medications:     lisinopriL (PRINIVIL, ZESTRIL) tablet 20 mg, 20 mg, Oral, DAILY AFTER BREAKFAST, Minor, Tacos Ortiz, MALIK, 20 mg at 12/05/22 0935    amLODIPine (NORVASC) tablet 10 mg, 10 mg, Oral, DAILY, Carolyn Rivera, ANP, 10 mg at 12/05/22 0935    cyclobenzaprine (FLEXERIL) tablet 10 mg, 10 mg, Oral, TID PRN, Rachel Ortiz MD    gabapentin (NEURONTIN) capsule 400 mg, 400 mg, Oral, TID, Rachel Ortiz MD, 400 mg at 12/05/22 0935    mirtazapine (REMERON) tablet 30 mg, 30 mg, Oral, QHS, Rachel Ortiz MD, 30 mg at 12/04/22 2113    pantoprazole (PROTONIX) tablet 40 mg, 40 mg, Oral, DAILY, Rachel Ortiz MD, 40 mg at 12/05/22 0935    tamsulosin (FLOMAX) capsule 0.4 mg, 0.4 mg, Oral, DAILY, Rachel Ortiz MD, 0.4 mg at 12/05/22 0935    traZODone (DESYREL) tablet 100 mg, 100 mg, Oral, QHS PRN, Rachel Ortiz MD    sodium chloride (NS) flush 5-40 mL, 5-40 mL, IntraVENous, Q8H, Rachel Ortiz MD, 10 mL at 12/05/22 0705    sodium chloride (NS) flush 5-40 mL, 5-40 mL, IntraVENous, PRN, Rachel Ortiz MD    acetaminophen (TYLENOL) tablet 650 mg, 650 mg, Oral, Q6H PRN, 650 mg at 12/05/22 0705 **OR** acetaminophen (TYLENOL) suppository 650 mg, 650 mg, Rectal, Q6H PRN, Rachel Ortiz MD    polyethylene glycol (MIRALAX) packet 17 g, 17 g, Oral, DAILY PRN, Rachel Ortiz MD    ondansetron (ZOFRAN ODT) tablet 4 mg, 4 mg, Oral, Q8H PRN **OR** ondansetron (ZOFRAN) injection 4 mg, 4 mg, IntraVENous, Q6H PRN, Rachel Ortiz MD, 4 mg at 12/03/22 1347    insulin lispro (HUMALOG) injection, , SubCUTAneous, AC&HS, Rachel Ortiz MD, 2 Units at 12/05/22 1138    glucose chewable tablet 16 g, 4 Tablet, Oral, PRN, Rachel Otriz MD    glucagon (GLUCAGEN) injection 1 mg, 1 mg, IntraMUSCular, PRN, Rachel Ortiz MD    dextrose 10 % infusion 0-250 mL, 0-250 mL, IntraVENous, PRN, Rachel Noonan MD    oxyCODONE-acetaminophen (PERCOCET) 5-325 mg per tablet 1 Tablet, 1 Tablet, Oral, Q4H PRN, Rachel Noonan MD, 1 Tablet at 12/05/22 1151    morphine injection 2 mg, 2 mg, IntraVENous, Q4H PRN, Rachel Ortiz MD, 2 mg at 12/01/22 1729    Vancomycin - Pharmacy to Dose, , Other, Rx Dosing/Monitoring, Rachel Ortiz MD    [COMPLETED] piperacillin-tazobactam (ZOSYN) 4.5 g in 0.9% sodium chloride (MBP/ADV) 100 mL MBP, 4.5 g, IntraVENous, NOW, IV Completed at 12/02/22 1035 **FOLLOWED BY** piperacillin-tazobactam (ZOSYN) 3.375 g in 0.9% sodium chloride (MBP/ADV) 100 mL MBP, 3.375 g, IntraVENous, Q8H, Rachel Ortiz MD, Last Rate: 25 mL/hr at 12/05/22 1408, 3.375 g at 12/05/22 1408    vancomycin (VANCOCIN) 1500 mg in  ml infusion, 1,500 mg, IntraVENous, Q18H, Rachel Ortiz MD, Last Rate: 250 mL/hr at 12/05/22 1408, 1,500 mg at 12/05/22 1408    lipase-protease-amylase (ZENPEP 10,000) capsule 2 Capsule, 2 Capsule, Oral, TID WITH MEALS, Tammy Cordova, NP, 2 Capsule at 12/05/22 1114 W Zulay Pugh MD    [facility] Cardiology  Call center: ([phone]      CC:None

## 2022-12-05 NOTE — PROGRESS NOTES
[facility] Adult  Hospitalist Group                                                                                          Hospitalist Progress Note  Rose Irby NP  Answering service: [phone] from in house phone        Date of Service:  2022  NAME:  Jamee Castleman. :  1955  MRN:  [id number]      Admission Summary: This 57-year-old man with a PMH of HTN, T2DM, Tobacco Dependence and Obesity who presented to the ED with left neck mass- present since 2022. The patient recently relocated to [address] [place] [address] from [place], the initial swelling was evaluated in [place] including biopsy of the mass. The patient was told that the mass is cancerous after biopsy done by ENT Surgeon ( Dr. Myrla Mcburney [facility]). He was referred to a cancer institute in [facility] but had not yet begun workup/treatment because the patient's wife passed away on . The patient came to [address] [place] [address] to live with his daughter. He was treated with oral antibiotics in the [facility] in early November, swelling progressed and was associated with pain and also with purulent drainage bringing the patient came to [facility]. Work up in ED included a CT scan neck revealed complex fluid collection in the left lateral neck, mass causing the patient difficulty with swallowing and change in vocal quality. Interval history / Subjective: Follow-up for issues listed below.   -Patient seen and examined, no c/o's.       Assessment & Plan:     Left Neck Abscess/Cellulitis:   - per pt, previous bx consistent with cancer  -  CT chest/abdomen/pelvis w/contrast: no identified metastatic disease in thorax, abdomen or pelvis  - obtain records including biopsy report from his ENT office, Dr. Aba Leal , medical record release form filled out with patient and faxed to their office [phone]   - Dr. Beckie Marrero, [place] [facility]: appreciate expertise, not surgically resectable  - consider PEG, SLP following: dysphagia, monitor caloric intake  - Heme/Onc   - Rad Onc  - wound care  - IV ABT: Alexa, Maceysyn  - pain management  -ECHO  -Nutrition- eval calorie intake vs needs     Transient AVB:  - Cardiology following: appreciate expertise  - avoid BB     HTN:  - home metoprolol 100 mg BID, d/damon on 12/2 d/t AVB  - started on lisinopril 10 mg every day on 12/1, as BP was in the 170's  - 12/3 increase lisinopril to 20 mg QD     T2DM: ISS ac&hs, hypoglycemic protocol, Hga1c 6.6. Tobacco Dependence: cessation advised, declined nicotine patch. DVTppx: SCDs  GIppx: Pepcid  Code Status: Full Code  Diet: Cardiac  Activity: OOB to chair TID and PRN  Discharge: TBD  Ambulates: independently     Hospital Problems  Date Reviewed: 12/1/2022            Codes Class Noted POA    Mass of left side of neck ICD-10-CM: R22.1  ICD-9-CM: 784.2  12/1/2022 Unknown        * (Principal) Cellulitis and abscess of neck ICD-10-CM: L03.221, L02.11  ICD-9-CM: 682.1  12/1/2022 Yes             Review of Systems:   A comprehensive review of systems was negative except for that written in the HPI. Vital Signs:    Last 24hrs VS reviewed since prior progress note. Most recent are:  Visit Vitals  BP (!) 152/78   Pulse 94   Temp 97.4 °F (36.3 °C)   Resp 16   Ht 5' 4\" (1.626 m)   Wt 86.6 kg (191 lb)   SpO2 93%   BMI 32.79 kg/m²         Intake/Output Summary (Last 24 hours) at 12/5/2022 1304  Last data filed at 12/5/2022 1134  Gross per 24 hour   Intake --   Output 1550 ml   Net -1550 ml        Physical Examination:     I had a face to face encounter with this patient and independently examined them on 12/5/2022 as outlined below:          Constitutional:  No acute distress, cooperative, pleasant    ENT:  Oral mucosa moist.    Resp:  CTA bilaterally. No wheezing/rhonchi/rales. No accessory muscle use.    CV:  Regular rhythm, normal rate, S1,S2.    GI/:  Soft, non distended, non tender, no guarding, BS present. Voids Freely. Musculoskeletal:  No edema, warm. Neurologic:  Moves all extremities. AAOx3. Skin:  left neck mass, drainage noted to dressing  Psych:  Good insight, Not anxious nor agitated. Data Review:    Review and/or order of clinical lab test      Labs:     Recent Labs     12/03/22 0347   WBC 4.8   HGB 14.2   HCT 44.7        Recent Labs     12/05/22  0039 12/04/22  0436 12/03/22 0347    140 135*   K 3.6 3.9 3.7    108 106   CO2 34* 29 26   BUN 3* 5* 8   CREA 0.70 0.76 0.76   * 94 115*   CA 8.6 8.0* 8.3*     Recent Labs     12/04/22 0436   ALT 26   *   TBILI 0.5   TP 7.2   ALB 2.3*   GLOB 4.9*     No results for input(s): INR, PTP, APTT, INREXT in the last 72 hours. No results for input(s): FE, TIBC, PSAT, FERR in the last 72 hours. No results found for: FOL, RBCF   No results for input(s): PH, PCO2, PO2 in the last 72 hours. No results for input(s): CPK, CKNDX, TROIQ in the last 72 hours.     No lab exists for component: CPKMB  No results found for: CHOL, CHOLX, CHLST, CHOLV, HDL, HDLP, LDL, LDLC, DLDLP, TGLX, TRIGL, TRIGP, CHHD, CHHDX  Lab Results   Component Value Date/Time    Glucose (POC) 160 (H) 12/05/2022 11:30 AM    Glucose (POC) 107 12/05/2022 06:41 AM    Glucose (POC) 97 12/04/2022 09:12 PM    Glucose (POC) 105 12/04/2022 05:33 PM    Glucose (POC) 190 (H) 12/04/2022 11:35 AM     Lab Results   Component Value Date/Time    Color YELLOW/STRAW 12/01/2022 05:08 PM    Appearance CLEAR 12/01/2022 05:08 PM    Specific gravity 1.025 12/01/2022 05:08 PM    pH (UA) 5.5 12/01/2022 05:08 PM    Protein TRACE (A) 12/01/2022 05:08 PM    Glucose Negative 12/01/2022 05:08 PM    Ketone 15 (A) 12/01/2022 05:08 PM    Bilirubin Negative 12/01/2022 05:08 PM    Urobilinogen 0.2 12/01/2022 05:08 PM    Nitrites Negative 12/01/2022 05:08 PM    Leukocyte Esterase Negative 12/01/2022 05:08 PM    Epithelial cells FEW 12/01/2022 05:08 PM    Bacteria Negative 12/01/2022 05:08 PM    WBC 0-4 12/01/2022 05:08 PM    RBC 0-5 12/01/2022 05:08 PM         Medications Reviewed:     Current Facility-Administered Medications   Medication Dose Route Frequency    lisinopriL (PRINIVIL, ZESTRIL) tablet 20 mg  20 mg Oral DAILY AFTER BREAKFAST    amLODIPine (NORVASC) tablet 10 mg  10 mg Oral DAILY    cyclobenzaprine (FLEXERIL) tablet 10 mg  10 mg Oral TID PRN    gabapentin (NEURONTIN) capsule 400 mg  400 mg Oral TID    mirtazapine (REMERON) tablet 30 mg  30 mg Oral QHS    pantoprazole (PROTONIX) tablet 40 mg  40 mg Oral DAILY    tamsulosin (FLOMAX) capsule 0.4 mg  0.4 mg Oral DAILY    traZODone (DESYREL) tablet 100 mg  100 mg Oral QHS PRN    sodium chloride (NS) flush 5-40 mL  5-40 mL IntraVENous Q8H    sodium chloride (NS) flush 5-40 mL  5-40 mL IntraVENous PRN    acetaminophen (TYLENOL) tablet 650 mg  650 mg Oral Q6H PRN    Or    acetaminophen (TYLENOL) suppository 650 mg  650 mg Rectal Q6H PRN    polyethylene glycol (MIRALAX) packet 17 g  17 g Oral DAILY PRN    ondansetron (ZOFRAN ODT) tablet 4 mg  4 mg Oral Q8H PRN    Or    ondansetron (ZOFRAN) injection 4 mg  4 mg IntraVENous Q6H PRN    insulin lispro (HUMALOG) injection   SubCUTAneous AC&HS    glucose chewable tablet 16 g  4 Tablet Oral PRN    glucagon (GLUCAGEN) injection 1 mg  1 mg IntraMUSCular PRN    dextrose 10 % infusion 0-250 mL  0-250 mL IntraVENous PRN    oxyCODONE-acetaminophen (PERCOCET) 5-325 mg per tablet 1 Tablet  1 Tablet Oral Q4H PRN    morphine injection 2 mg  2 mg IntraVENous Q4H PRN    Vancomycin - Pharmacy to Dose   Other Rx Dosing/Monitoring    piperacillin-tazobactam (ZOSYN) 3.375 g in 0.9% sodium chloride (MBP/ADV) 100 mL MBP  3.375 g IntraVENous Q8H    vancomycin (VANCOCIN) 1500 mg in  ml infusion  1,500 mg IntraVENous Q18H    lipase-protease-amylase (ZENPEP 10,000) capsule 2 Capsule  2 Capsule Oral TID WITH MEALS     ______________________________________________________________________  EXPECTED LENGTH OF STAY: 3d 9h  ACTUAL LENGTH OF STAY:          99 Jayne Vela, NP

## 2022-12-05 NOTE — PROGRESS NOTES
Full note to follow. Recommendation is for concurrent radiation therapy with chemotherapy. I had a full discussion with the patient and his son/power of  Michael Burch. We will try to bring him down for CT simulation for radiation planning tomorrow, 12/6. Tentative plan will be to start treatments as an inpatient if he is still here. I discussed that we typically like to obtain a PET CT and endoscopy prior to radiation treatments for a head and neck cancer of unknown primary, however given the symptoms he is currently having from this mass and the delay to care, the recommending proceeding with treatment. The patient and his family agree with this plan.

## 2022-12-06 ENCOUNTER — APPOINTMENT (OUTPATIENT)
Dept: CT IMAGING | Age: 67
DRG: 982 | End: 2022-12-06
Attending: RADIOLOGY
Payer: MEDICARE

## 2022-12-06 ENCOUNTER — HOSPITAL ENCOUNTER (INPATIENT)
Dept: RADIATION THERAPY | Age: 67
Discharge: HOME | DRG: 982 | End: 2022-12-06
Attending: INTERNAL MEDICINE | Admitting: INTERNAL MEDICINE
Payer: MEDICARE

## 2022-12-06 ENCOUNTER — APPOINTMENT (OUTPATIENT)
Dept: CT IMAGING | Age: 67
DRG: 982 | End: 2022-12-06
Attending: HOSPITALIST
Payer: MEDICARE

## 2022-12-06 LAB
ANION GAP SERPL CALC-SCNC: 4 MMOL/L (ref 5–15)
BUN SERPL-MCNC: 3 MG/DL (ref 6–20)
BUN/CREAT SERPL: 4 (ref 12–20)
CALCIUM SERPL-MCNC: 8.8 MG/DL (ref 8.5–10.1)
CHLORIDE SERPL-SCNC: 104 MMOL/L (ref 97–108)
CO2 SERPL-SCNC: 30 MMOL/L (ref 21–32)
COMMENT, HOLDF: NORMAL
CREAT SERPL-MCNC: 0.73 MG/DL (ref 0.7–1.3)
GLUCOSE BLD STRIP.AUTO-MCNC: 135 MG/DL (ref 65–117)
GLUCOSE BLD STRIP.AUTO-MCNC: 138 MG/DL (ref 65–117)
GLUCOSE BLD STRIP.AUTO-MCNC: 148 MG/DL (ref 65–117)
GLUCOSE BLD STRIP.AUTO-MCNC: 151 MG/DL (ref 65–117)
GLUCOSE SERPL-MCNC: 189 MG/DL (ref 65–100)
POTASSIUM SERPL-SCNC: 3.5 MMOL/L (ref 3.5–5.1)
SAMPLES BEING HELD,HOLD: NORMAL
SERVICE CMNT-IMP: ABNORMAL
SODIUM SERPL-SCNC: 138 MMOL/L (ref 136–145)

## 2022-12-06 PROCEDURE — 77290 THER RAD SIMULAJ FIELD CPLX: CPT

## 2022-12-06 PROCEDURE — 74011250637 HC RX REV CODE- 250/637: Performed by: NURSE PRACTITIONER

## 2022-12-06 PROCEDURE — 70450 CT HEAD/BRAIN W/O DYE: CPT

## 2022-12-06 PROCEDURE — 74011636637 HC RX REV CODE- 636/637: Performed by: INTERNAL MEDICINE

## 2022-12-06 PROCEDURE — 74011250637 HC RX REV CODE- 250/637: Performed by: INTERNAL MEDICINE

## 2022-12-06 PROCEDURE — 74011000258 HC RX REV CODE- 258: Performed by: INTERNAL MEDICINE

## 2022-12-06 PROCEDURE — 80048 BASIC METABOLIC PNL TOTAL CA: CPT

## 2022-12-06 PROCEDURE — 74011250636 HC RX REV CODE- 250/636: Performed by: INTERNAL MEDICINE

## 2022-12-06 PROCEDURE — 77014 CT GUIDED THERAPY PLAN/PLACEMENT: CPT

## 2022-12-06 PROCEDURE — 65270000046 HC RM TELEMETRY

## 2022-12-06 PROCEDURE — 77334 RADIATION TREATMENT AID(S): CPT

## 2022-12-06 PROCEDURE — 74011250636 HC RX REV CODE- 250/636: Performed by: HOSPITALIST

## 2022-12-06 PROCEDURE — 36415 COLL VENOUS BLD VENIPUNCTURE: CPT

## 2022-12-06 PROCEDURE — 82962 GLUCOSE BLOOD TEST: CPT

## 2022-12-06 RX ORDER — HYDROMORPHONE HYDROCHLORIDE 1 MG/ML
0.2 INJECTION, SOLUTION INTRAMUSCULAR; INTRAVENOUS; SUBCUTANEOUS
Status: DISCONTINUED | OUTPATIENT
Start: 2022-12-06 | End: 2022-12-09

## 2022-12-06 RX ADMIN — AMLODIPINE BESYLATE 10 MG: 5 TABLET ORAL at 12:20

## 2022-12-06 RX ADMIN — TAMSULOSIN HYDROCHLORIDE 0.4 MG: 0.4 CAPSULE ORAL at 12:21

## 2022-12-06 RX ADMIN — Medication 2 CAPSULE: at 12:21

## 2022-12-06 RX ADMIN — LISINOPRIL 20 MG: 20 TABLET ORAL at 12:21

## 2022-12-06 RX ADMIN — ACETAMINOPHEN 650 MG: 325 TABLET ORAL at 14:17

## 2022-12-06 RX ADMIN — GABAPENTIN 400 MG: 400 CAPSULE ORAL at 12:21

## 2022-12-06 RX ADMIN — Medication 2 CAPSULE: at 07:08

## 2022-12-06 RX ADMIN — ACETAMINOPHEN 650 MG: 325 TABLET ORAL at 00:27

## 2022-12-06 RX ADMIN — OXYCODONE AND ACETAMINOPHEN 1 TABLET: 5; 325 TABLET ORAL at 04:26

## 2022-12-06 RX ADMIN — ALPRAZOLAM 0.5 MG: 0.25 TABLET ORAL at 09:28

## 2022-12-06 RX ADMIN — VANCOMYCIN HYDROCHLORIDE 1500 MG: 10 INJECTION, POWDER, LYOPHILIZED, FOR SOLUTION INTRAVENOUS at 06:28

## 2022-12-06 RX ADMIN — HYDROMORPHONE HYDROCHLORIDE 0.2 MG: 1 INJECTION, SOLUTION INTRAMUSCULAR; INTRAVENOUS; SUBCUTANEOUS at 18:37

## 2022-12-06 RX ADMIN — PANTOPRAZOLE SODIUM 40 MG: 40 TABLET, DELAYED RELEASE ORAL at 12:21

## 2022-12-06 RX ADMIN — GABAPENTIN 400 MG: 400 CAPSULE ORAL at 21:24

## 2022-12-06 RX ADMIN — MIRTAZAPINE 30 MG: 15 TABLET, FILM COATED ORAL at 21:24

## 2022-12-06 RX ADMIN — PIPERACILLIN AND TAZOBACTAM 3.38 G: 3; .375 INJECTION, POWDER, LYOPHILIZED, FOR SOLUTION INTRAVENOUS at 06:28

## 2022-12-06 RX ADMIN — MORPHINE SULFATE 2 MG: 2 INJECTION, SOLUTION INTRAMUSCULAR; INTRAVENOUS at 22:35

## 2022-12-06 RX ADMIN — GABAPENTIN 400 MG: 400 CAPSULE ORAL at 17:45

## 2022-12-06 RX ADMIN — Medication 2 UNITS: at 12:20

## 2022-12-06 RX ADMIN — OXYCODONE AND ACETAMINOPHEN 1 TABLET: 5; 325 TABLET ORAL at 21:24

## 2022-12-06 RX ADMIN — OXYCODONE AND ACETAMINOPHEN 1 TABLET: 5; 325 TABLET ORAL at 12:20

## 2022-12-06 RX ADMIN — PIPERACILLIN AND TAZOBACTAM 3.38 G: 3; .375 INJECTION, POWDER, LYOPHILIZED, FOR SOLUTION INTRAVENOUS at 17:45

## 2022-12-06 RX ADMIN — Medication 2 CAPSULE: at 17:45

## 2022-12-06 NOTE — PROGRESS NOTES
Transition of Care: hopefully home with followup with specialist/new pcp     Transport plan: likely in car     RUR: 14%     Main contact is Reggie Brandon- [phone] and daughter- Nikos Jimenez- [phone]     Discharge pending:  -pending possible pacemaker placement  -pending radiation treatments (will start on 12/6 as in patient)   -pending hemo/onc, otolaryn; speech, PT,OT final recommendations  -pending wound care  -patient continues on IV antibiotics  -pending medical progress and care recommendations    CM noted from chart     CM following  Minerva Rangel RN     NOTE:  pleasant patient at bedside; he is alert and oriented x 4; patient lives at stated address in [place]; his son and daughter both live nearby; he moved to the [facility] area from [facility] a couple of months ago after his wife passed away; patient uses a cane, has 2 hearing aids (both at [facility] now); a shower chair; does not use a cpap; he verified his insurance which he states has 12 roundtrip transportation rides as a benefit; he states he does not drive but his children help out with rides; he states he has a new pcp appt set up with Yan Loza NP at [facility] for 12/8/22

## 2022-12-06 NOTE — PROGRESS NOTES
[facility] Adult  Hospitalist Group                                                                                          Hospitalist Progress Note  Armen Marvin NP  Answering service: [phone] from in house phone        Date of Service:  2022  NAME:  Oral Colin. :  1955  MRN:  [id number]      Admission Summary: This 41-year-old man with a PMH of HTN, T2DM, Tobacco Dependence and Obesity who presented to the ED with left neck mass- present since 2022. The patient recently relocated to [place], [place] from [place], the initial swelling was evaluated in [place] including biopsy of the mass. The patient was told that the mass is cancerous after biopsy done by ENT Surgeon ( Dr. Rona [facility]). He was referred to a cancer institute in [facility] but had not yet begun workup/treatment because the patient's wife passed away on . The patient came to [facility] to live with his daughter. He was treated with oral antibiotics in the [facility] in early November, swelling progressed and was associated with pain and also with purulent drainage bringing the patient came to [facility]. Work up in ED included a CT scan neck revealed complex fluid collection in the left lateral neck, mass causing the patient difficulty with swallowing and change in vocal quality. Interval history / Subjective: Follow-up for issues listed below.   -Patient seen and examined, no c/o's.       Assessment & Plan:     Left Neck Abscess/Cellulitis:   - per pt, previous bx consistent with cancer  -  CT chest/abdomen/pelvis w/contrast: no identified metastatic disease in thorax, abdomen or pelvis  - obtain records including biopsy report from his ENT office, Dr. Vikas Eldridge , medical record release form filled out with patient and faxed to their office [phone]   - Dr. Toya Tran, [place] [facility]: appreciate expertise, not surgically resectable  - consider PEG, SLP following: dysphagia, monitor caloric intake  - Heme/Onc   - Rad Onc  - wound care  - IV ABT: Vanc, Zosyn  - pain management  -ECHO 12/5: Normal left ventricular systolic function with a visually estimated EF of 60 - 65%. Left ventricle size is normal. Mildly increased wall thickness. Findings consistent with mild concentric hypertrophy. Normal wall motion. Normal diastolic function.  -Nutrition- eval calorie intake vs needs  - PEG deferred, tolerating po intake     Transient AVB:  - Cardiology following: appreciate expertise, pacer planned  - avoid BB  - tele     HTN:  - HELD metoprolol 100 mg BID, d/damon on 12/2 d/t AVB  - started on lisinopril 10 mg every day on 12/1, as BP was in the 170's  - 12/3 increase lisinopril to 20 mg QD     T2DM: ISS ac&hs, hypoglycemic protocol, Hga1c 6.6. Tobacco Dependence: cessation advised, declined nicotine patch. DVTppx: SCDs  GIppx: Protonix  Code Status: Full Code  Diet: Cardiac  Activity: OOB to chair TID and PRN  Discharge: TBD  Ambulates: independently     Hospital Problems  Date Reviewed: 12/1/2022            Codes Class Noted POA    Mass of left side of neck ICD-10-CM: R22.1  ICD-9-CM: 784.2  12/1/2022 Unknown        * (Principal) Cellulitis and abscess of neck ICD-10-CM: L03.221, L02.11  ICD-9-CM: 682.1  12/1/2022 Yes             Review of Systems:   A comprehensive review of systems was negative except for that written in the HPI. Vital Signs:    Last 24hrs VS reviewed since prior progress note.  Most recent are:  Visit Vitals  BP (!) 164/84   Pulse 100   Temp 98.1 °F (36.7 °C)   Resp 16   Ht 5' 4\" (1.626 m)   Wt 86.6 kg (191 lb)   SpO2 94%   BMI 32.79 kg/m²         Intake/Output Summary (Last 24 hours) at 12/6/2022 1023  Last data filed at 12/6/2022 0427  Gross per 24 hour   Intake --   Output 3400 ml   Net -3400 ml        Physical Examination:     I had a face to face encounter with this patient and independently examined them on 12/6/2022 as outlined below:          Constitutional:  No acute distress, cooperative, pleasant    ENT:  Oral mucosa moist.    Resp:  CTA bilaterally. No wheezing/rhonchi/rales. No accessory muscle use. CV:  Regular rhythm, normal rate, S1,S2.    GI/:  Soft, non distended, non tender, no guarding, BS present. Voids Freely. Musculoskeletal:  No edema, warm. Neurologic:  Moves all extremities. AAOx3. Skin:  left neck dressing, small amount yellow drainage  Psych:  Good insight, Not anxious nor agitated. Data Review:    Review and/or order of clinical lab test      Labs:   No results for input(s): WBC, HGB, HCT, PLT, HGBEXT, HCTEXT, PLTEXT in the last 72 hours. Recent Labs     12/06/22  0024 12/05/22  1745 12/05/22  0039 12/04/22  0436     --  143 140   K 3.5 3.8 3.6 3.9     --  108 108   CO2 30  --  34* 29   BUN 3*  --  3* 5*   CREA 0.73  --  0.70 0.76   *  --  141* 94   CA 8.8  --  8.6 8.0*   MG  --  1.7  --   --      Recent Labs     12/04/22 0436   ALT 26   *   TBILI 0.5   TP 7.2   ALB 2.3*   GLOB 4.9*     No results for input(s): INR, PTP, APTT, INREXT in the last 72 hours. No results for input(s): FE, TIBC, PSAT, FERR in the last 72 hours. No results found for: FOL, RBCF   No results for input(s): PH, PCO2, PO2 in the last 72 hours. No results for input(s): CPK, CKNDX, TROIQ in the last 72 hours.     No lab exists for component: CPKMB  No results found for: CHOL, CHOLX, CHLST, CHOLV, HDL, HDLP, LDL, LDLC, DLDLP, TGLX, TRIGL, TRIGP, CHHD, CHHDX  Lab Results   Component Value Date/Time    Glucose (POC) 138 (H) 12/06/2022 06:48 AM    Glucose (POC) 98 12/05/2022 10:22 PM    Glucose (POC) 76 12/05/2022 04:57 PM    Glucose (POC) 160 (H) 12/05/2022 11:30 AM    Glucose (POC) 107 12/05/2022 06:41 AM     Lab Results   Component Value Date/Time    Color YELLOW/STRAW 12/01/2022 05:08 PM    Appearance CLEAR 12/01/2022 05:08 PM    Specific gravity 1.025 12/01/2022 05:08 PM    pH (UA) 5.5 12/01/2022 05:08 PM    Protein TRACE (A) 12/01/2022 05:08 PM    Glucose Negative 12/01/2022 05:08 PM    Ketone 15 (A) 12/01/2022 05:08 PM    Bilirubin Negative 12/01/2022 05:08 PM    Urobilinogen 0.2 12/01/2022 05:08 PM    Nitrites Negative 12/01/2022 05:08 PM    Leukocyte Esterase Negative 12/01/2022 05:08 PM    Epithelial cells FEW 12/01/2022 05:08 PM    Bacteria Negative 12/01/2022 05:08 PM    WBC 0-4 12/01/2022 05:08 PM    RBC 0-5 12/01/2022 05:08 PM         Medications Reviewed:     Current Facility-Administered Medications   Medication Dose Route Frequency    ALPRAZolam (XANAX) tablet 0.5 mg  0.5 mg Oral BID PRN    lisinopriL (PRINIVIL, ZESTRIL) tablet 20 mg  20 mg Oral DAILY AFTER BREAKFAST    amLODIPine (NORVASC) tablet 10 mg  10 mg Oral DAILY    cyclobenzaprine (FLEXERIL) tablet 10 mg  10 mg Oral TID PRN    gabapentin (NEURONTIN) capsule 400 mg  400 mg Oral TID    mirtazapine (REMERON) tablet 30 mg  30 mg Oral QHS    pantoprazole (PROTONIX) tablet 40 mg  40 mg Oral DAILY    tamsulosin (FLOMAX) capsule 0.4 mg  0.4 mg Oral DAILY    traZODone (DESYREL) tablet 100 mg  100 mg Oral QHS PRN    sodium chloride (NS) flush 5-40 mL  5-40 mL IntraVENous Q8H    sodium chloride (NS) flush 5-40 mL  5-40 mL IntraVENous PRN    acetaminophen (TYLENOL) tablet 650 mg  650 mg Oral Q6H PRN    Or    acetaminophen (TYLENOL) suppository 650 mg  650 mg Rectal Q6H PRN    polyethylene glycol (MIRALAX) packet 17 g  17 g Oral DAILY PRN    ondansetron (ZOFRAN ODT) tablet 4 mg  4 mg Oral Q8H PRN    Or    ondansetron (ZOFRAN) injection 4 mg  4 mg IntraVENous Q6H PRN    insulin lispro (HUMALOG) injection   SubCUTAneous AC&HS    glucose chewable tablet 16 g  4 Tablet Oral PRN    glucagon (GLUCAGEN) injection 1 mg  1 mg IntraMUSCular PRN    dextrose 10 % infusion 0-250 mL  0-250 mL IntraVENous PRN    oxyCODONE-acetaminophen (PERCOCET) 5-325 mg per tablet 1 Tablet  1 Tablet Oral Q4H PRN    morphine injection 2 mg  2 mg IntraVENous Q4H PRN    Vancomycin - Pharmacy to Dose   Other Rx Dosing/Monitoring    piperacillin-tazobactam (ZOSYN) 3.375 g in 0.9% sodium chloride (MBP/ADV) 100 mL MBP  3.375 g IntraVENous Q8H    vancomycin (VANCOCIN) 1500 mg in  ml infusion  1,500 mg IntraVENous Q18H    lipase-protease-amylase (ZENPEP 10,000) capsule 2 Capsule  2 Capsule Oral TID WITH MEALS     ______________________________________________________________________  EXPECTED LENGTH OF STAY: 3d 9h  ACTUAL LENGTH OF STAY:          5                 Armaan Falcon NP

## 2022-12-06 NOTE — PROGRESS NOTES
[facility] CARDIOLOGY                       Cardiac Electrophysiology Care Note     []Initial Consult     [x]Progress note    Patient Name: Anne Sanchez  Dr Sybil Blackman asked me to reevaluate av block tonight  I have seen him over the weekend round for Dr Scarlett Dodd     Reason for consult: av block. HPI:       Shilpa Tamayo is a 75 y.o. male with PMH significant for history of tobacco use, HTN, ETOH, and PE in 2017 with long term use of eliquis,  who was initially diagnosed with cancer of his neck back in 2022 in [place]. He first noted a neck mass over the summer. 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 He was referred to a cancer treatment center, but did not follow through due to personal circumstances (his wife  and then he relocated to [place]). He has had no treatment for this cancer. He had increased swelling. He was [facility] [place] [address] several weeks ago, where he was treated with antibiotics      SUBJECTIVE:      Shilpa Tamayo has + presyncope when moving his head, jeremi stretching the left neck. Metoprolol had been stopped due to av block with pauses 3-6 seconds  Over the weekend he did not have recurrent pause but today he has 2 more with av block 4.7 second   The second pause was > 6 seconds but he had PVCs escaped beats       Assessment and Plan     1.   Transient  AV block with multiple non conducted P waves for 3-6 seconds on 2022 at 2 40 pm  most likely carotid sinus pressure from his left neck squamous cell cancer  Metoprolol has been stopped  2022 he has 2 more pauses >4.7 seconds  He did not have syncope but had dizziness whenever he turned his head  He will have radiation and chemotherapy starting tomorrow  Av block will continue to recur and I think he will need back up pacemaker to prevent syncope  I discussed leadless pacemaker and he agrees for Wednesday       ECHO ADULT COMPLETE 12/05/2022 12/5/2022    Interpretation Summary    Left Ventricle: Normal left ventricular systolic function with a visually estimated EF of 60 - 65%. Left ventricle size is normal. Mildly increased wall thickness. Findings consistent with mild concentric hypertrophy. Normal wall motion. Normal diastolic function. Contrast used: Definity. 2. Left neck cancer- radiation and chemotherapy tomorrow and will need PEG per ENT           ____________________________________________________________    Cardiac testing    NONE:     Other:  CT NECK  INDICATION: L neck mass     COMPARISON: None     TECHNIQUE:  Axial neck CT was performed after the uneventful administration of  IV contrast. Sagittal and coronal reconstructions were generated. CT dose reduction was achieved through use of a standardized protocol tailored  for this examination and automatic exposure control for dose modulation. FINDINGS:     There is a large, rim-enhancing complex fluid collection in the left lateral  neck at the level of the hyoid bone, abutting the lateral surface of the left  common carotid artery and extending to the skin surface which measures 4.8 x 3.1  x 4.2 cm. There is associated subcutaneous edema, soft tissue swelling and skin  thickening in the left lateral neck. The collection also is inseparable from the  ventral margin of the left sternocleidomastoid muscle, and displaces the left  submandibular gland ventrally. The left internal jugular vein is patent below  the mass/collection, though inseparable from the posterior margin of the  collection where there may be thrombus. Above the collection the vein is  markedly small in size. Right internal jugular vein is patent. Several prominent  left-sided lymph nodes in the neck.  The nasopharynx, oropharynx, hypopharynx,  and larynx are otherwise unremarkable lobe minimally displaced toward the right  at the level of the hyoid bone. The parotid glands, right submandibular gland,  and thyroid gland are unremarkable. Atherosclerotic vascular disease of the  aorta, common carotid and internal carotid arteries. Lung apices are clear. No  destructive bone lesion. Visualized paranasal sinuses and mastoid air cells are  clear. IMPRESSION  Large 4.8 x 3.1 x 4.2 cm rim-enhancing complex collection in the left lateral  neck, extending from the carotid space to the skin surface with associated skin  thickening, subcutaneous edema, and prominent left neck lymph nodes. Findings  most likely represent an abscess rather than necrotic/superinfected neoplasm  though correlation with clinical findings and fluid culture/aspiration  suggested. Follow-up to resolution recommended. Review of Systems:    [x]All other systems reviewed and all negative except as written in HPI    [] Patient unable to provide secondary to condition    Past Medical History:   Diagnosis Date    Diabetes ([address]     HTN (hypertension)     Hypercholesteremia     Skin cancer     L sided neck skin cancer cells     No past surgical history on file. Social Hx:  reports that he has been smoking cigarettes. He has been smoking an average of .5 packs per day. He has never used smokeless tobacco. He reports current alcohol use. He reports that he does not use drugs. Family Hx: family history is not on file.   No Known Allergies       OBJECTIVE:  Wt Readings from Last 3 Encounters:   12/05/22 191 lb (86.6 kg)   11/30/22 191 lb 5.8 oz (86.8 kg)       Intake/Output Summary (Last 24 hours) at 12/5/2022 2221  Last data filed at 12/5/2022 1532  Gross per 24 hour   Intake --   Output 1600 ml   Net -1600 ml       Physical Exam:    Vitals:   Vitals:    12/05/22 1523 12/05/22 1800 12/05/22 1957 12/05/22 2150   BP: (!) 172/87      Pulse: 89 93 93 96   Resp: 16      Temp: 97.8 °F (36.6 °C)      SpO2: 93%      Weight:       Height:             BP (!) 172/87   Pulse 96 Temp 97.8 °F (36.6 °C)   Resp 16   Ht 5' 4\" (1.626 m)   Wt 191 lb (86.6 kg)   SpO2 93%   BMI 32.79 kg/m²   General:    Alert, cooperative, no distress. Large size left neck mass, dressing on   Psychiatric:    Normal Mood and affect    Eye/ENT:      Pupils equal    Lungs:     Clear to auscultation bilaterally. Heart[de-identified]    Regular rhythm, no murmur, click, rub or gallop     Abdomen:     Soft, non-tender. Extremities:    no edema. Neurologic:    No gross focal deficits           Data Review:     Radiology:   XR Results (most recent):  No results found for this or any previous visit.       Recent Labs     12/05/22  1745 12/05/22  0039 12/04/22  0436   NA  --  143 140   K 3.8 3.6 3.9   CL  --  108 108   CO2  --  34* 29   BUN  --  3* 5*   CREA  --  0.70 0.76   GLU  --  141* 94   CA  --  8.6 8.0*       Recent Labs     12/03/22  0347   WBC 4.8   HGB 14.2   HCT 44.7          Recent Labs     12/04/22  0436   *          Current meds:    Current Facility-Administered Medications:     ALPRAZolam (XANAX) tablet 0.5 mg, 0.5 mg, Oral, BID PRN, Armaan Falcon, NP    lisinopriL (PRINIVIL, ZESTRIL) tablet 20 mg, 20 mg, Oral, DAILY AFTER BREAKFAST, Minor, Sheree V, NP, 20 mg at 12/05/22 0935    amLODIPine (NORVASC) tablet 10 mg, 10 mg, Oral, DAILY, Carolyn Rivera H, ANP, 10 mg at 12/05/22 0935    cyclobenzaprine (FLEXERIL) tablet 10 mg, 10 mg, Oral, TID PRN, Rachel Ortiz MD    gabapentin (NEURONTIN) capsule 400 mg, 400 mg, Oral, TID, Rachel Ortiz MD, 400 mg at 12/05/22 2217    mirtazapine (REMERON) tablet 30 mg, 30 mg, Oral, QHS, Rachel Ortiz MD, 30 mg at 12/05/22 2218    pantoprazole (PROTONIX) tablet 40 mg, 40 mg, Oral, DAILY, Rachel Ortiz MD, 40 mg at 12/05/22 0935    tamsulosin (FLOMAX) capsule 0.4 mg, 0.4 mg, Oral, DAILY, Rachel Ortiz MD, 0.4 mg at 12/05/22 0935    traZODone (DESYREL) tablet 100 mg, 100 mg, Oral, QHS PRN, Rachel Ortiz MD    sodium chloride (NS) flush 5-40 mL, 5-40 mL, IntraVENous, Q8H, Rachel Ortiz MD, 10 mL at 12/05/22 0705    sodium chloride (NS) flush 5-40 mL, 5-40 mL, IntraVENous, PRN, Rachle Ortiz MD    acetaminophen (TYLENOL) tablet 650 mg, 650 mg, Oral, Q6H PRN, 650 mg at 12/05/22 0705 **OR** acetaminophen (TYLENOL) suppository 650 mg, 650 mg, Rectal, Q6H PRN, Rachel Ortiz MD    polyethylene glycol (MIRALAX) packet 17 g, 17 g, Oral, DAILY PRN, Rachel Ortiz MD    ondansetron (ZOFRAN ODT) tablet 4 mg, 4 mg, Oral, Q8H PRN **OR** ondansetron (ZOFRAN) injection 4 mg, 4 mg, IntraVENous, Q6H PRN, Rachel Ortiz MD, 4 mg at 12/03/22 1347    insulin lispro (HUMALOG) injection, , SubCUTAneous, AC&HS, Rachel Ortiz MD, 2 Units at 12/05/22 1138    glucose chewable tablet 16 g, 4 Tablet, Oral, PRN, Rachel Ortiz MD    glucagon (GLUCAGEN) injection 1 mg, 1 mg, IntraMUSCular, PRN, Rachel Ortiz MD    dextrose 10 % infusion 0-250 mL, 0-250 mL, IntraVENous, PRN, Rachel Ortiz MD    oxyCODONE-acetaminophen (PERCOCET) 5-325 mg per tablet 1 Tablet, 1 Tablet, Oral, Q4H PRN, Rachel Hancock MD, 1 Tablet at 12/05/22 1743    morphine injection 2 mg, 2 mg, IntraVENous, Q4H PRN, Rachel Ortiz MD, 2 mg at 12/01/22 1729    Vancomycin - Pharmacy to Dose, , Other, Rx Dosing/Monitoring, Rachel Ortiz MD    [COMPLETED] piperacillin-tazobactam (ZOSYN) 4.5 g in 0.9% sodium chloride (MBP/ADV) 100 mL MBP, 4.5 g, IntraVENous, NOW, IV Completed at 12/02/22 1035 **FOLLOWED BY** piperacillin-tazobactam (ZOSYN) 3.375 g in 0.9% sodium chloride (MBP/ADV) 100 mL MBP, 3.375 g, IntraVENous, Q8H, Rachel Ortiz MD, Last Rate: 25 mL/hr at 12/05/22 2218, 3.375 g at 12/05/22 2218    vancomycin (VANCOCIN) 1500 mg in  ml infusion, 1,500 mg, IntraVENous, Q18H, Rachel Ortiz MD, Last Rate: 250 mL/hr at 12/05/22 1408, 1,500 mg at 12/05/22 1408    lipase-protease-amylase (ZENPEP 10,000) capsule 2 Capsule, 2 Capsule, Oral, TID WITH MEALS, Marvel Cordova, NP, 2 Capsule at 12/05/22 [facility] [facility], MD Y[facility] Card[facility]  Call center: (h) [phone]      CC:None

## 2022-12-07 ENCOUNTER — APPOINTMENT (OUTPATIENT)
Dept: CT IMAGING | Age: 67
DRG: 982 | End: 2022-12-07
Attending: RADIOLOGY
Payer: MEDICARE

## 2022-12-07 ENCOUNTER — HOSPITAL ENCOUNTER (INPATIENT)
Dept: RADIATION THERAPY | Age: 67
Discharge: HOME | DRG: 982 | End: 2022-12-07
Attending: INTERNAL MEDICINE | Admitting: INTERNAL MEDICINE
Payer: MEDICARE

## 2022-12-07 PROBLEM — Z95.0 PACEMAKER: Status: ACTIVE | Noted: 2022-12-07

## 2022-12-07 PROBLEM — I44.30 AV BLOCK: Status: ACTIVE | Noted: 2022-01-01

## 2022-12-07 PROBLEM — I44.30 AV BLOCK: Status: ACTIVE | Noted: 2022-12-07

## 2022-12-07 PROBLEM — R00.1 BRADYCARDIA: Status: ACTIVE | Noted: 2022-01-01

## 2022-12-07 PROBLEM — R00.1 BRADYCARDIA: Status: ACTIVE | Noted: 2022-12-07

## 2022-12-07 PROBLEM — Z95.0 PACEMAKER: Status: ACTIVE | Noted: 2022-01-01

## 2022-12-07 LAB
ANION GAP SERPL CALC-SCNC: 6 MMOL/L (ref 5–15)
ATRIAL RATE: 86 BPM
BUN SERPL-MCNC: 5 MG/DL (ref 6–20)
BUN/CREAT SERPL: 7 (ref 12–20)
CALCIUM SERPL-MCNC: 8.9 MG/DL (ref 8.5–10.1)
CALCULATED P AXIS, ECG09: 27 DEGREES
CALCULATED R AXIS, ECG10: 43 DEGREES
CALCULATED T AXIS, ECG11: 42 DEGREES
CHLORIDE SERPL-SCNC: 105 MMOL/L (ref 97–108)
CO2 SERPL-SCNC: 29 MMOL/L (ref 21–32)
CREAT SERPL-MCNC: 0.68 MG/DL (ref 0.7–1.3)
DIAGNOSIS, 93000: NORMAL
ERYTHROCYTE [DISTWIDTH] IN BLOOD BY AUTOMATED COUNT: 13.2 % (ref 11.5–14.5)
GLUCOSE BLD STRIP.AUTO-MCNC: 135 MG/DL (ref 65–117)
GLUCOSE BLD STRIP.AUTO-MCNC: 178 MG/DL (ref 65–117)
GLUCOSE BLD STRIP.AUTO-MCNC: 181 MG/DL (ref 65–117)
GLUCOSE BLD STRIP.AUTO-MCNC: 220 MG/DL (ref 65–117)
GLUCOSE SERPL-MCNC: 118 MG/DL (ref 65–100)
HCT VFR BLD AUTO: 39.8 % (ref 36.6–50.3)
HGB BLD-MCNC: 13.4 G/DL (ref 12.1–17)
MCH RBC QN AUTO: 34.5 PG (ref 26–34)
MCHC RBC AUTO-ENTMCNC: 33.7 G/DL (ref 30–36.5)
MCV RBC AUTO: 102.6 FL (ref 80–99)
NRBC # BLD: 0 K/UL (ref 0–0.01)
NRBC BLD-RTO: 0 PER 100 WBC
P-R INTERVAL, ECG05: 150 MS
PLATELET # BLD AUTO: 331 K/UL (ref 150–400)
PMV BLD AUTO: 9.3 FL (ref 8.9–12.9)
POTASSIUM SERPL-SCNC: 3.6 MMOL/L (ref 3.5–5.1)
Q-T INTERVAL, ECG07: 362 MS
QRS DURATION, ECG06: 64 MS
QTC CALCULATION (BEZET), ECG08: 433 MS
RBC # BLD AUTO: 3.88 M/UL (ref 4.1–5.7)
SERVICE CMNT-IMP: ABNORMAL
SODIUM SERPL-SCNC: 140 MMOL/L (ref 136–145)
VENTRICULAR RATE, ECG03: 86 BPM
WBC # BLD AUTO: 7.3 K/UL (ref 4.1–11.1)

## 2022-12-07 PROCEDURE — 74011000258 HC RX REV CODE- 258: Performed by: INTERNAL MEDICINE

## 2022-12-07 PROCEDURE — 80048 BASIC METABOLIC PNL TOTAL CA: CPT

## 2022-12-07 PROCEDURE — C1894 INTRO/SHEATH, NON-LASER: HCPCS | Performed by: INTERNAL MEDICINE

## 2022-12-07 PROCEDURE — 74011250637 HC RX REV CODE- 250/637: Performed by: INTERNAL MEDICINE

## 2022-12-07 PROCEDURE — 74011000250 HC RX REV CODE- 250: Performed by: INTERNAL MEDICINE

## 2022-12-07 PROCEDURE — 77014 CT GUIDED THERAPY PLAN/PLACEMENT: CPT

## 2022-12-07 PROCEDURE — 85027 COMPLETE CBC AUTOMATED: CPT

## 2022-12-07 PROCEDURE — 74011636637 HC RX REV CODE- 636/637: Performed by: INTERNAL MEDICINE

## 2022-12-07 PROCEDURE — 77030002986 HC SUT PROL J&J -A: Performed by: INTERNAL MEDICINE

## 2022-12-07 PROCEDURE — 74011250636 HC RX REV CODE- 250/636: Performed by: INTERNAL MEDICINE

## 2022-12-07 PROCEDURE — C1769 GUIDE WIRE: HCPCS | Performed by: INTERNAL MEDICINE

## 2022-12-07 PROCEDURE — 74011000250 HC RX REV CODE- 250: Performed by: NURSE PRACTITIONER

## 2022-12-07 PROCEDURE — 74011000636 HC RX REV CODE- 636: Performed by: HOSPITALIST

## 2022-12-07 PROCEDURE — 77030013797 HC KT TRNSDUC PRSSR EDWD -A: Performed by: INTERNAL MEDICINE

## 2022-12-07 PROCEDURE — 65270000046 HC RM TELEMETRY

## 2022-12-07 PROCEDURE — 2709999900 HC NON-CHARGEABLE SUPPLY: Performed by: INTERNAL MEDICINE

## 2022-12-07 PROCEDURE — 82962 GLUCOSE BLOOD TEST: CPT

## 2022-12-07 PROCEDURE — 77030012929 HC DIL URET COOK -C: Performed by: INTERNAL MEDICINE

## 2022-12-07 PROCEDURE — C1786 PMKR, SINGLE, RATE-RESP: HCPCS | Performed by: INTERNAL MEDICINE

## 2022-12-07 PROCEDURE — 74011250637 HC RX REV CODE- 250/637: Performed by: NURSE PRACTITIONER

## 2022-12-07 PROCEDURE — 99152 MOD SED SAME PHYS/QHP 5/>YRS: CPT | Performed by: INTERNAL MEDICINE

## 2022-12-07 PROCEDURE — 33274 TCAT INSJ/RPL PERM LDLS PM: CPT | Performed by: INTERNAL MEDICINE

## 2022-12-07 PROCEDURE — 74011250636 HC RX REV CODE- 250/636: Performed by: NURSE PRACTITIONER

## 2022-12-07 PROCEDURE — 02HK3NZ INSERTION OF INTRACARDIAC PACEMAKER INTO RIGHT VENTRICLE, PERCUTANEOUS APPROACH: ICD-10-PCS | Performed by: INTERNAL MEDICINE

## 2022-12-07 PROCEDURE — 99233 SBSQ HOSP IP/OBS HIGH 50: CPT | Performed by: INTERNAL MEDICINE

## 2022-12-07 PROCEDURE — 77030018729 HC ELECTRD DEFIB PAD CARD -B: Performed by: INTERNAL MEDICINE

## 2022-12-07 PROCEDURE — 99153 MOD SED SAME PHYS/QHP EA: CPT | Performed by: INTERNAL MEDICINE

## 2022-12-07 DEVICE — SYS PACING TRANSCATHETER -- BRADY- MICRA AV: Type: IMPLANTABLE DEVICE | Status: FUNCTIONAL

## 2022-12-07 RX ORDER — SODIUM CHLORIDE 0.9 % (FLUSH) 0.9 %
5-40 SYRINGE (ML) INJECTION AS NEEDED
Status: DISCONTINUED | OUTPATIENT
Start: 2022-12-07 | End: 2022-12-07 | Stop reason: HOSPADM

## 2022-12-07 RX ORDER — ONDANSETRON 2 MG/ML
4 INJECTION INTRAMUSCULAR; INTRAVENOUS
Status: DISCONTINUED | OUTPATIENT
Start: 2022-12-07 | End: 2022-12-07 | Stop reason: HOSPADM

## 2022-12-07 RX ORDER — ACETAMINOPHEN 325 MG/1
650 TABLET ORAL
Status: DISCONTINUED | OUTPATIENT
Start: 2022-12-07 | End: 2022-12-07 | Stop reason: HOSPADM

## 2022-12-07 RX ORDER — DIPHENHYDRAMINE HYDROCHLORIDE 50 MG/ML
INJECTION, SOLUTION INTRAMUSCULAR; INTRAVENOUS AS NEEDED
Status: DISCONTINUED | OUTPATIENT
Start: 2022-12-07 | End: 2022-12-07 | Stop reason: HOSPADM

## 2022-12-07 RX ORDER — HYDROCODONE BITARTRATE AND ACETAMINOPHEN 5; 325 MG/1; MG/1
1 TABLET ORAL
Status: DISCONTINUED | OUTPATIENT
Start: 2022-12-07 | End: 2022-12-07 | Stop reason: HOSPADM

## 2022-12-07 RX ORDER — METOPROLOL SUCCINATE 50 MG/1
50 TABLET, EXTENDED RELEASE ORAL DAILY
Status: DISCONTINUED | OUTPATIENT
Start: 2022-12-07 | End: 2022-12-10

## 2022-12-07 RX ORDER — HEPARIN SODIUM 1000 [USP'U]/ML
INJECTION, SOLUTION INTRAVENOUS; SUBCUTANEOUS AS NEEDED
Status: DISCONTINUED | OUTPATIENT
Start: 2022-12-07 | End: 2022-12-07 | Stop reason: HOSPADM

## 2022-12-07 RX ORDER — BUTALBITAL, ACETAMINOPHEN AND CAFFEINE 50; 325; 40 MG/1; MG/1; MG/1
1 TABLET ORAL
Status: DISCONTINUED | OUTPATIENT
Start: 2022-12-07 | End: 2022-12-10

## 2022-12-07 RX ORDER — LIDOCAINE HYDROCHLORIDE 10 MG/ML
INJECTION INFILTRATION; PERINEURAL AS NEEDED
Status: DISCONTINUED | OUTPATIENT
Start: 2022-12-07 | End: 2022-12-07 | Stop reason: HOSPADM

## 2022-12-07 RX ORDER — SODIUM CHLORIDE 0.9 % (FLUSH) 0.9 %
5-40 SYRINGE (ML) INJECTION AS NEEDED
Status: DISCONTINUED | OUTPATIENT
Start: 2022-12-07 | End: 2022-12-11 | Stop reason: HOSPADM

## 2022-12-07 RX ORDER — MIDAZOLAM HYDROCHLORIDE 1 MG/ML
INJECTION, SOLUTION INTRAMUSCULAR; INTRAVENOUS AS NEEDED
Status: DISCONTINUED | OUTPATIENT
Start: 2022-12-07 | End: 2022-12-07 | Stop reason: HOSPADM

## 2022-12-07 RX ORDER — FENTANYL CITRATE 50 UG/ML
INJECTION, SOLUTION INTRAMUSCULAR; INTRAVENOUS AS NEEDED
Status: DISCONTINUED | OUTPATIENT
Start: 2022-12-07 | End: 2022-12-07 | Stop reason: HOSPADM

## 2022-12-07 RX ORDER — SODIUM CHLORIDE 0.9 % (FLUSH) 0.9 %
5-40 SYRINGE (ML) INJECTION EVERY 8 HOURS
Status: DISCONTINUED | OUTPATIENT
Start: 2022-12-07 | End: 2022-12-07 | Stop reason: HOSPADM

## 2022-12-07 RX ORDER — SODIUM CHLORIDE 0.9 % (FLUSH) 0.9 %
5-40 SYRINGE (ML) INJECTION EVERY 8 HOURS
Status: DISCONTINUED | OUTPATIENT
Start: 2022-12-07 | End: 2022-12-11 | Stop reason: HOSPADM

## 2022-12-07 RX ADMIN — PIPERACILLIN AND TAZOBACTAM 3.38 G: 3; .375 INJECTION, POWDER, LYOPHILIZED, FOR SOLUTION INTRAVENOUS at 00:37

## 2022-12-07 RX ADMIN — ACETAMINOPHEN 650 MG: 325 TABLET ORAL at 05:54

## 2022-12-07 RX ADMIN — PIPERACILLIN AND TAZOBACTAM 3.38 G: 3; .375 INJECTION, POWDER, LYOPHILIZED, FOR SOLUTION INTRAVENOUS at 17:56

## 2022-12-07 RX ADMIN — MIRTAZAPINE 30 MG: 15 TABLET, FILM COATED ORAL at 21:38

## 2022-12-07 RX ADMIN — GABAPENTIN 400 MG: 400 CAPSULE ORAL at 17:56

## 2022-12-07 RX ADMIN — SODIUM CHLORIDE, PRESERVATIVE FREE 10 ML: 5 INJECTION INTRAVENOUS at 18:10

## 2022-12-07 RX ADMIN — ACETAMINOPHEN 650 MG: 325 TABLET ORAL at 14:56

## 2022-12-07 RX ADMIN — VANCOMYCIN HYDROCHLORIDE 1500 MG: 10 INJECTION, POWDER, LYOPHILIZED, FOR SOLUTION INTRAVENOUS at 21:38

## 2022-12-07 RX ADMIN — METOPROLOL SUCCINATE 50 MG: 50 TABLET, EXTENDED RELEASE ORAL at 15:50

## 2022-12-07 RX ADMIN — HYDROCODONE BITARTRATE AND ACETAMINOPHEN 1 TABLET: 5; 325 TABLET ORAL at 16:23

## 2022-12-07 RX ADMIN — VANCOMYCIN HYDROCHLORIDE 1500 MG: 10 INJECTION, POWDER, LYOPHILIZED, FOR SOLUTION INTRAVENOUS at 00:37

## 2022-12-07 RX ADMIN — IOPAMIDOL 100 ML: 612 INJECTION, SOLUTION INTRAVENOUS at 10:09

## 2022-12-07 RX ADMIN — MORPHINE SULFATE 2 MG: 2 INJECTION, SOLUTION INTRAMUSCULAR; INTRAVENOUS at 17:57

## 2022-12-07 RX ADMIN — Medication 2 UNITS: at 21:52

## 2022-12-07 RX ADMIN — OXYCODONE AND ACETAMINOPHEN 1 TABLET: 5; 325 TABLET ORAL at 21:44

## 2022-12-07 RX ADMIN — GABAPENTIN 400 MG: 400 CAPSULE ORAL at 21:38

## 2022-12-07 NOTE — DISCHARGE INSTRUCTIONS
You are being prescribed antibiotics, blood pressure medications, and pain medications. Follow-up for radiation and oncology listed below. PATIENT INSTRUCTIONS POST-PACEMAKER IMPLANT    1. No heavy lifting or strenuous exercise x 1 week. 2.  Your groin site suture should be removed prior to hospital discharge. If not, please call [phone].    3.  Do not drive for 3 days. 4.  Call Dr. Delores Christianson at [phone] if you experience any of the following symptoms:  1. Redness at the pacemaker site  2. Swelling at or around the groin site  3. Pain around the groin  4. Dizziness, lightheadedness, fainting spells  5. Lack of energy  6. Shortness of breath  7. Rapid heart rate  8. Chest or muscle twitches    5. Follow-up with Dr. Fermin Henriquez office as scheduled below. Future Appointments   Date Time Provider Tad Sibley   12/13/2022  7:30 AM B2 MADELYN LONG [address] [place] [address]   12/13/2022  8:00 AM London Juárez  [address] BS AMB   12/20/2022  7:30 AM A2 MADELYN LONG [address] [place] [address]   12/27/2022  7:30 AM A2 MADELYN LONG [place] [facility]   3/15/2023  9:00 AM PACEMAKER, STFRANCES [facility]AV[facility] BS AMB   3/15/2023  9:20 AM John Lincoln MD CAV[facility] BS AMB     6. You may use over the counter pain medication (Tylenol) and ice pack for pain relief as needed. Carson Vicente M.D.  [address] [facility]  Electrophysiology/Cardiology  [facility]  [address] , [address]  [place] [address]                             [address] [place] [address]  [phone]

## 2022-12-07 NOTE — PROCEDURES
Cardiac Procedure Note   Patient: Bobby Mcmahon. MRN: [id number]  SSN: [id number]   YOB: 1955 Age: 75 y.o.   Sex: male    Date of Procedure: 12/7/2022   Pre-procedure Diagnosis: av block with 6 second pauses, dizziness  Post-procedure Diagnosis: same  Procedure: Permanent leadless Pacemaker Insertion  :  Dr. Caden Petty MD    Assistant(s):  None  Anesthesia: Moderate Sedation   Estimated Blood Loss: Less than 10 mL   Specimens Removed: None  Findings: right femoral vein access  Medtronic leadless av micra basal RV  Complications: None   Implants:  Medtronic AV leadless pacemaker  Signed by:  Caden Petty MD  12/7/2022  12:31 PM

## 2022-12-07 NOTE — PROGRESS NOTES
TRANSFER - OUT REPORT:    Verbal report given to LA Ross(name) on Solis Mcadams Jr.  being transferred to (unit) for routine progression of care       Report consisted of patients Situation, Background, Assessment and   Recommendations(SBAR). Information from the following report(s) Procedure Summary, Intake/Output, MAR, and Recent Results was reviewed with the receiving nurse. Lines:   Peripheral IV 12/01/22 Right Antecubital (Active)   Site Assessment Clean, dry, & intact 12/07/22 1220   Phlebitis Assessment 0 12/07/22 1220   Infiltration Assessment 0 12/07/22 1220   Dressing Status Clean, dry, & intact 12/07/22 1220   Dressing Type Transparent 12/07/22 1220   Hub Color/Line Status Pink 12/07/22 1220   Action Taken Open ports on tubing capped 12/07/22 1220   Alcohol Cap Used Yes 12/07/22 1220        Opportunity for questions and clarification was provided.       Patient transported with:   Monitor  Registered Nurse

## 2022-12-07 NOTE — PROGRESS NOTES
Physician Progress Note      PATIENT:               Christian Mccollum  CSN #:                  [id number]  :                       1955  ADMIT DATE:       2022 2:54 AM  DISCH DATE:  RESPONDING  PROVIDER #:        [id number] [facility] [facility] NP          QUERY TEXT:      Dear attending,    Pt has diabetes and has documented Left neck cellulitis. The patient is on IVABX. If possible, please document in progress notes and discharge summary the relationship, if any, between cellulitis and DM. The medical record reflects the following:  Risk Factors: Hx. DM  Clinical Indicators: Per H&P- PLAN:  1. Left neck abscess and cellulitis. We will admit the patient for further evaluation and treatment. We will start the patient on vancomycin and Zosyn. ENT consult will be requested to assist in further evaluation and treatment. The biopsy of the mass in the past revealed cancer according to the patient. We will obtain CTA of the chest as well as CT of the abdomen and pelvis to evaluate for metastatic disease. The patient will be n.p.o. in anticipation of intervention by the ENT surgeon. 3.  Type 2 diabetes. The patient will be placed on sliding scale with insulin coverage. We will hold oral agents till the time of discharge from the hospital.  We will check A1c level. -Per PN- T2DM: ISS ac&hs, hypoglycemic protocol, Hga1c 6.6.   Treatment: IV Zosyn 3.375 g q8 hours, IV Vancocin per pharmacy, Monitor vital signs, labwork, imaging      Thank you,    Carolyn Hanna RN, BSN, CRCR, CCDS  Clinical Documentation Improvement  [phone] or via Perfect Serve  Options provided:  -- Left neck cellulitis associated with Diabetes  -- Left neck cellulitis unrelated to Diabetes  -- Other - I will add my own diagnosis  -- Disagree - Not applicable / Not valid  -- Disagree - Clinically unable to determine / Unknown  -- Refer to Clinical Documentation Reviewer    PROVIDER RESPONSE TEXT:    *** (site/location) cellulitis unrelated to Diabetes.     Query created by: Araceli Clayton on 12/5/2022 1:48 PM      Electronically signed by:  Rhys Crow NP 12/7/2022 7:24 AM

## 2022-12-07 NOTE — PROGRESS NOTES
1044:  Dr. Candido Chen is aware of the patient \"pounding headache\" but doesn't want the patient treated with medications before going into the procedure.

## 2022-12-07 NOTE — PROGRESS NOTES
TRANSFER - OUT REPORT:    Verbal report given to Suresh on Lucas Riggins. being transferred to  for routine progression of care       Report consisted of patients Situation, Background, Assessment and   Recommendations(SBAR). Information from the following report(s) SBAR, Procedure Summary, and MAR was reviewed with the receiving nurse. Opportunity for questions and clarification was provided.

## 2022-12-07 NOTE — PROGRESS NOTES
[facility] Adult  Hospitalist Group                                                                                          Hospitalist Progress Note  Bhumi Mcclendon NP  Answering service: [phone] from in house phone        Date of Service:  2022  NAME:  Sam Weaver. :  1955  MRN:  [id number]      Admission Summary: This 68-year-old man with a PMH of HTN, T2DM, Tobacco Dependence and Obesity who presented to the ED with left neck mass- present since 2022. The patient recently relocated to [facility],  [facility] from [place], the initial swelling was evaluated in [place] including biopsy of the mass. The patient was told that the mass is cancerous after biopsy done by ENT Surgeon ( Dr. Nirali Galvan [facility]). He was referred to a cancer institute in [facility] but had not yet begun workup/treatment because the patient's wife passed away on . The patient came to [facility] to live with his daughter. He was treated with oral antibiotics in the [facility] in early November, swelling progressed and was associated with pain and also with purulent drainage bringing the patient came to [facility]. Work up in ED included a CT scan neck revealed complex fluid collection in the left lateral neck, mass causing the patient difficulty with swallowing and change in vocal quality. Interval history / Subjective: Follow-up for issues listed below.   -Patient seen and examined, no c/o's. Pacemaker to be placed today.       Assessment & Plan:     Left Neck Abscess/Cellulitis:   - per pt, previous bx consistent with cancer  -  CT chest/abdomen/pelvis w/contrast: no identified metastatic disease in thorax, abdomen or pelvis  - obtain records including biopsy report from his ENT office, Dr. Rafael He , medical record release form filled out with patient and faxed to their office [phone]   - Dr. Becki Javier, [place] [facility]: appreciate expertise, not surgically resectable  - consider PEG, SLP following: dysphagia, monitor caloric intake  - Heme/Onc   - Rad Onc  - wound care  - IV ABT: Macey Liusyn  - pain management  -ECHO 12/5: Normal left ventricular systolic function with a visually estimated EF of 60 - 65%. Left ventricle size is normal. Mildly increased wall thickness. Findings consistent with mild concentric hypertrophy. Normal wall motion. Normal diastolic function.  -Nutrition- eval calorie intake vs needs  - PEG deferred, tolerating po intake     Transient AVB:  - Cardiology following: appreciate expertise, pacer planned  - avoid BB  - tele     HTN:  - HELD metoprolol 100 mg BID, d/damon on 12/2 d/t AVB  - started on lisinopril 10 mg every day on 12/1, as BP was in the 170's  - 12/3 increase lisinopril to 20 mg QD     T2DM: ISS ac&hs, hypoglycemic protocol, Hga1c 6.6. Tobacco Dependence: cessation advised, declined nicotine patch. DVTppx: SCDs  GIppx: Protonix  Code Status: Full Code  Diet: Cardiac  Activity: OOB to chair TID and PRN  Discharge: TBD  Ambulates: independently     Hospital Problems  Date Reviewed: 12/1/2022            Codes Class Noted POA    Mass of left side of neck ICD-10-CM: R22.1  ICD-9-CM: 784.2  12/1/2022 Unknown        * (Principal) Cellulitis and abscess of neck ICD-10-CM: L03.221, L02.11  ICD-9-CM: 682.1  12/1/2022 Yes             Review of Systems:   A comprehensive review of systems was negative except for that written in the HPI. Vital Signs:    Last 24hrs VS reviewed since prior progress note.  Most recent are:  Visit Vitals  BP (!) 181/91 (BP 1 Location: Left upper arm, BP Patient Position: At rest)   Pulse (!) 103   Temp 97.4 °F (36.3 °C)   Resp 27   Ht 5' 4\" (1.626 m)   Wt 86.6 kg (191 lb)   SpO2 90%   BMI 32.79 kg/m²         Intake/Output Summary (Last 24 hours) at 12/7/2022 1105  Last data filed at 12/7/2022 0110  Gross per 24 hour   Intake --   Output 2050 ml   Net -2050 ml        Physical Examination:     I had a face to face encounter with this patient and independently examined them on 12/7/2022 as outlined below:          Constitutional:  No acute distress, cooperative, pleasant    ENT:  Oral mucosa moist.    Resp:  CTA bilaterally. No wheezing/rhonchi/rales. No accessory muscle use. CV:  Regular rhythm, normal rate, S1,S2.    GI/:  Soft, non distended, non tender, no guarding, BS present. Voids Freely. Musculoskeletal:  No edema, warm. Neurologic:  Moves all extremities. AAOx3. Skin:  left neck dressing, small amount yellow drainage  Psych:  Good insight, Not anxious nor agitated. Data Review:    Review and/or order of clinical lab test      Labs:     Recent Labs     12/07/22 0043   WBC 7.3   HGB 13.4   HCT 39.8        Recent Labs     12/07/22  0043 12/06/22  0024 12/05/22  1745 12/05/22  0039    138  --  143   K 3.6 3.5 3.8 3.6    104  --  108   CO2 29 30  --  34*   BUN 5* 3*  --  3*   CREA 0.68* 0.73  --  0.70   * 189*  --  141*   CA 8.9 8.8  --  8.6   MG  --   --  1.7  --      No results for input(s): ALT, AP, TBIL, TBILI, TP, ALB, GLOB, GGT, AML, LPSE in the last 72 hours. No lab exists for component: SGOT, GPT, AMYP, HLPSE  No results for input(s): INR, PTP, APTT, INREXT in the last 72 hours. No results for input(s): FE, TIBC, PSAT, FERR in the last 72 hours. No results found for: FOL, RBCF   No results for input(s): PH, PCO2, PO2 in the last 72 hours. No results for input(s): CPK, CKNDX, TROIQ in the last 72 hours.     No lab exists for component: CPKMB  No results found for: CHOL, CHOLX, CHLST, CHOLV, HDL, HDLP, LDL, LDLC, DLDLP, TGLX, TRIGL, TRIGP, CHHD, CHHDX  Lab Results   Component Value Date/Time    Glucose (POC) 181 (H) 12/07/2022 05:54 AM    Glucose (POC) 151 (H) 12/06/2022 09:11 PM    Glucose (POC) 135 (H) 12/06/2022 04:49 PM    Glucose (POC) 148 (H) 12/06/2022 12:10 PM    Glucose (POC) 138 (H) 12/06/2022 06:48 AM     Lab Results   Component Value Date/Time    Color YELLOW/STRAW 12/01/2022 05:08 PM    Appearance CLEAR 12/01/2022 05:08 PM    Specific gravity 1.025 12/01/2022 05:08 PM    pH (UA) 5.5 12/01/2022 05:08 PM    Protein TRACE (A) 12/01/2022 05:08 PM    Glucose Negative 12/01/2022 05:08 PM    Ketone 15 (A) 12/01/2022 05:08 PM    Bilirubin Negative 12/01/2022 05:08 PM    Urobilinogen 0.2 12/01/2022 05:08 PM    Nitrites Negative 12/01/2022 05:08 PM    Leukocyte Esterase Negative 12/01/2022 05:08 PM    Epithelial cells FEW 12/01/2022 05:08 PM    Bacteria Negative 12/01/2022 05:08 PM    WBC 0-4 12/01/2022 05:08 PM    RBC 0-5 12/01/2022 05:08 PM         Medications Reviewed:     Current Facility-Administered Medications   Medication Dose Route Frequency    sodium chloride (NS) flush 5-40 mL  5-40 mL IntraVENous Q8H    sodium chloride (NS) flush 5-40 mL  5-40 mL IntraVENous PRN    acetaminophen (TYLENOL) tablet 650 mg  650 mg Oral Q4H PRN    HYDROcodone-acetaminophen (NORCO) 5-325 mg per tablet 1 Tablet  1 Tablet Oral Q4H PRN    ceFAZolin (ANCEF) 2 g in sterile water (preservative free) 20 mL IV syringe  2 g IntraVENous ONCE    ondansetron (ZOFRAN) injection 4 mg  4 mg IntraVENous Q4H PRN    butalbital-acetaminophen-caffeine (FIORICET, ESGIC) -40 mg per tablet 1 Tablet  1 Tablet Oral Q6H PRN    iopamidoL (ISOVUE-370) 370 mg iodine /mL (76 %) injection 100 mL  100 mL IntraVENous RAD ONCE    HYDROmorphone (DILAUDID) injection 0.2 mg  0.2 mg IntraVENous Q6H PRN    ALPRAZolam (XANAX) tablet 0.5 mg  0.5 mg Oral BID PRN    lisinopriL (PRINIVIL, ZESTRIL) tablet 20 mg  20 mg Oral DAILY AFTER BREAKFAST    amLODIPine (NORVASC) tablet 10 mg  10 mg Oral DAILY    cyclobenzaprine (FLEXERIL) tablet 10 mg  10 mg Oral TID PRN    gabapentin (NEURONTIN) capsule 400 mg  400 mg Oral TID    mirtazapine (REMERON) tablet 30 mg  30 mg Oral QHS    pantoprazole (PROTONIX) tablet 40 mg  40 mg Oral DAILY    tamsulosin (FLOMAX) capsule 0.4 mg  0.4 mg Oral DAILY    traZODone (DESYREL) tablet 100 mg  100 mg Oral QHS PRN    sodium chloride (NS) flush 5-40 mL  5-40 mL IntraVENous Q8H    sodium chloride (NS) flush 5-40 mL  5-40 mL IntraVENous PRN    acetaminophen (TYLENOL) tablet 650 mg  650 mg Oral Q6H PRN    Or    acetaminophen (TYLENOL) suppository 650 mg  650 mg Rectal Q6H PRN    polyethylene glycol (MIRALAX) packet 17 g  17 g Oral DAILY PRN    ondansetron (ZOFRAN ODT) tablet 4 mg  4 mg Oral Q8H PRN    Or    ondansetron (ZOFRAN) injection 4 mg  4 mg IntraVENous Q6H PRN    insulin lispro (HUMALOG) injection   SubCUTAneous AC&HS    glucose chewable tablet 16 g  4 Tablet Oral PRN    glucagon (GLUCAGEN) injection 1 mg  1 mg IntraMUSCular PRN    dextrose 10 % infusion 0-250 mL  0-250 mL IntraVENous PRN    oxyCODONE-acetaminophen (PERCOCET) 5-325 mg per tablet 1 Tablet  1 Tablet Oral Q4H PRN    morphine injection 2 mg  2 mg IntraVENous Q4H PRN    Vancomycin - Pharmacy to Dose   Other Rx Dosing/Monitoring    piperacillin-tazobactam (ZOSYN) 3.375 g in 0.9% sodium chloride (MBP/ADV) 100 mL MBP  3.375 g IntraVENous Q8H    vancomycin (VANCOCIN) 1500 mg in  ml infusion  1,500 mg IntraVENous Q18H    lipase-protease-amylase (ZENPEP 10,000) capsule 2 Capsule  2 Capsule Oral TID WITH MEALS     ______________________________________________________________________  EXPECTED LENGTH OF STAY: 3d 9h  ACTUAL LENGTH OF STAY:          6                 Armaan Falcon NP

## 2022-12-07 NOTE — PROGRESS NOTES
[facility] CARDIOLOGY                       Cardiac Electrophysiology Care Note     []Initial Consult     [x]Progress note    Patient Name: Moris Brentjaylen  Dr iMchelle Mcneill asked me to reevaluate av block tonight  I have seen him over the weekend round for Dr Ariane Lopez     Reason for consult: av block. HPI:       Mack Zhang is a 75 y.o. male with PMH significant for history of tobacco use, HTN, ETOH, and PE in 2017 with long term use of eliquis,  who was initially diagnosed with cancer of his neck back in 2022 in [place]. He first noted a neck mass over the summer. 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 He was referred to a cancer treatment center, but did not follow through due to personal circumstances (his wife  and then he relocated to [facility]). He has had no treatment for this cancer. He had increased swelling. He was [facility] [id number] several weeks ago, where he was treated with antibiotics      SUBJECTIVE:      Mack Zhang has + presyncope when moving his head, jeremi stretching the left neck. Metoprolol had been stopped due to av block with pauses 3-6 seconds  Over the weekend he did not have recurrent pause but today he has 2 more with av block 4.7 second first one  The second pause was > 6 seconds with PVCs escaped beats    He did not have more av block  Still complain about dizziness with head turning     Assessment and Plan     1.   Transient  AV block with multiple non conducted P waves for 3-6 seconds on 2022 at 2 40 pm  most likely carotid sinus pressure from his left neck squamous cell cancer  Metoprolol has been stopped since then  2022 he has 2 more pauses >4.7 seconds  He did not have syncope but had dizziness whenever he turned his head  He will have radiation and chemotherapy starting during this admission   Not resectable cancer so no surgery  The mass will take time to shrink and he is at risk of syncope; however it has not happened yet  Av block will continue to recur and I think he will need back up pacemaker to prevent syncope  I discussed leadless pacemaker and he agrees for Wednesday today  I called and talked to his son, Karlis Prader about risks and benefits too  Consent signed       ECHO ADULT COMPLETE 12/05/2022 12/5/2022    Interpretation Summary    Left Ventricle: Normal left ventricular systolic function with a visually estimated EF of 60 - 65%. Left ventricle size is normal. Mildly increased wall thickness. Findings consistent with mild concentric hypertrophy. Normal wall motion. Normal diastolic function. Contrast used: Definity. 2. Left neck cancer- radiation and chemotherapy tomorrow and will need PEG per ENT           ____________________________________________________________    Cardiac testing    NONE:     Other:  CT NECK  INDICATION: L neck mass     COMPARISON: None     TECHNIQUE:  Axial neck CT was performed after the uneventful administration of  IV contrast. Sagittal and coronal reconstructions were generated. CT dose reduction was achieved through use of a standardized protocol tailored  for this examination and automatic exposure control for dose modulation. FINDINGS:     There is a large, rim-enhancing complex fluid collection in the left lateral  neck at the level of the hyoid bone, abutting the lateral surface of the left  common carotid artery and extending to the skin surface which measures 4.8 x 3.1  x 4.2 cm. There is associated subcutaneous edema, soft tissue swelling and skin  thickening in the left lateral neck. The collection also is inseparable from the  ventral margin of the left sternocleidomastoid muscle, and displaces the left  submandibular gland ventrally.  The left internal jugular vein is patent below  the mass/collection, though inseparable from the posterior margin of the  collection where there may be thrombus. Above the collection the vein is  markedly small in size. Right internal jugular vein is patent. Several prominent  left-sided lymph nodes in the neck. The nasopharynx, oropharynx, hypopharynx,  and larynx are otherwise unremarkable lobe minimally displaced toward the right  at the level of the hyoid bone. The parotid glands, right submandibular gland,  and thyroid gland are unremarkable. Atherosclerotic vascular disease of the  aorta, common carotid and internal carotid arteries. Lung apices are clear. No  destructive bone lesion. Visualized paranasal sinuses and mastoid air cells are  clear. IMPRESSION  Large 4.8 x 3.1 x 4.2 cm rim-enhancing complex collection in the left lateral  neck, extending from the carotid space to the skin surface with associated skin  thickening, subcutaneous edema, and prominent left neck lymph nodes. Findings  most likely represent an abscess rather than necrotic/superinfected neoplasm  though correlation with clinical findings and fluid culture/aspiration  suggested. Follow-up to resolution recommended. Review of Systems:    [x]All other systems reviewed and all negative except as written in HPI    [] Patient unable to provide secondary to condition    Past Medical History:   Diagnosis Date    Diabetes ([facility] [address]     HTN (hypertension)     Hypercholesteremia     Skin cancer     L sided neck skin cancer cells     No past surgical history on file. Social Hx:  reports that he has been smoking cigarettes. He has been smoking an average of .5 packs per day. He has never used smokeless tobacco. He reports current alcohol use. He reports that he does not use drugs. Family Hx: family history is not on file.   No Known Allergies       OBJECTIVE:  Wt Readings from Last 3 Encounters:   12/05/22 191 lb (86.6 kg)   11/30/22 191 lb 5.8 oz (86.8 kg)       Intake/Output Summary (Last 24 hours) at 12/7/2022 0818  Last data filed at 12/7/2022 0110  Gross per 24 hour   Intake --   Output 2050 ml   Net -2050 ml         Physical Exam:    Vitals:   Vitals:    12/07/22 0200 12/07/22 0346 12/07/22 0546 12/07/22 0741   BP:    (!) 174/85   Pulse: 91 90 97 87   Resp:    18   Temp:    97.4 °F (36.3 °C)   SpO2:    95%   Weight:       Height:             BP (!) 174/85   Pulse 87   Temp 97.4 °F (36.3 °C)   Resp 18   Ht 5' 4\" (1.626 m)   Wt 191 lb (86.6 kg)   SpO2 95%   BMI 32.79 kg/m²   General:    Alert, cooperative, no distress. Large size left neck mass   Psychiatric:    Normal Mood and affect    Eye/ENT:      Pupils equal    Lungs:     Clear to auscultation bilaterally. Heart[de-identified]    Regular rhythm, no murmur, click, rub or gallop     Abdomen:     Soft, non-tender. Extremities:    no edema. Neurologic:    No gross focal deficits           Data Review:     Radiology:   XR Results (most recent):  No results found for this or any previous visit. Recent Labs     12/07/22  0043 12/06/22  0024    138   K 3.6 3.5    104   CO2 29 30   BUN 5* 3*   CREA 0.68* 0.73   * 189*   CA 8.9 8.8       Recent Labs     12/07/22  0043   WBC 7.3   HGB 13.4   HCT 39.8          No results for input(s): PTP, INR, AP, INREXT, INREXT in the last 72 hours.     No lab exists for component: PTTP, GPT, SGOT       Current meds:    Current Facility-Administered Medications:     sodium chloride (NS) flush 5-40 mL, 5-40 mL, IntraVENous, Q8H, Tiera Guzmán, NP    sodium chloride (NS) flush 5-40 mL, 5-40 mL, IntraVENous, PRN, Celia Bence B, NP    acetaminophen (TYLENOL) tablet 650 mg, 650 mg, Oral, Q4H PRN, Celia Bence B, NP    HYDROcodone-acetaminophen (NORCO) 5-325 mg per tablet 1 Tablet, 1 Tablet, Oral, Q4H PRN, Celia Bence B, NP    ceFAZolin (ANCEF) 2 g in sterile water (preservative free) 20 mL IV syringe, 2 g, IntraVENous, ONCE, Celiaycia Bence B, NP    ondansetron (ZOFRAN) injection 4 mg, 4 mg, IntraVENous, Q4H PRN, [facility], Denia Norman, MALIK    HYDROmorphone (DILAUDID) injection 0.2 mg, 0.2 mg, IntraVENous, Q6H PRN, Trenton Vernon MD, 0.2 mg at 12/06/22 1837    ALPRAZolam (XANAX) tablet 0.5 mg, 0.5 mg, Oral, BID PRN, RoyalArmaan, NP, 0.5 mg at 12/06/22 2940    lisinopriL (PRINIVIL, ZESTRIL) tablet 20 mg, 20 mg, Oral, DAILY AFTER BREAKFAST, Minor, Jono Rueda, NP, 20 mg at 12/06/22 1221    amLODIPine (NORVASC) tablet 10 mg, 10 mg, Oral, DAILY, Carolyn Rivera, DEZ, 10 mg at 12/06/22 1220    cyclobenzaprine (FLEXERIL) tablet 10 mg, 10 mg, Oral, TID PRN, Rachel Ortiz MD    gabapentin (NEURONTIN) capsule 400 mg, 400 mg, Oral, TID, Rachel Ortiz MD, 400 mg at 12/06/22 2124    mirtazapine (REMERON) tablet 30 mg, 30 mg, Oral, QHS, Rachel Ortiz MD, 30 mg at 12/06/22 2124    pantoprazole (PROTONIX) tablet 40 mg, 40 mg, Oral, DAILY, Rachel Ortiz MD, 40 mg at 12/06/22 1221    tamsulosin (FLOMAX) capsule 0.4 mg, 0.4 mg, Oral, DAILY, Rachel Ortiz MD, 0.4 mg at 12/06/22 1221    traZODone (DESYREL) tablet 100 mg, 100 mg, Oral, QHS PRN, Rachel Ortiz MD    sodium chloride (NS) flush 5-40 mL, 5-40 mL, IntraVENous, Q8H, Rachel Ortiz MD, 10 mL at 12/05/22 0705    sodium chloride (NS) flush 5-40 mL, 5-40 mL, IntraVENous, PRN, Rachel Ortiz MD    acetaminophen (TYLENOL) tablet 650 mg, 650 mg, Oral, Q6H PRN, 650 mg at 12/07/22 0554 **OR** acetaminophen (TYLENOL) suppository 650 mg, 650 mg, Rectal, Q6H PRN, Rachel Ortiz MD    polyethylene glycol (MIRALAX) packet 17 g, 17 g, Oral, DAILY PRN, Rachel Ortiz MD    ondansetron (ZOFRAN ODT) tablet 4 mg, 4 mg, Oral, Q8H PRN **OR** ondansetron (ZOFRAN) injection 4 mg, 4 mg, IntraVENous, Q6H PRN, Rachel Ortiz MD, 4 mg at 12/03/22 1347    insulin lispro (HUMALOG) injection, , SubCUTAneous, AC&HS, Rachel Ortiz MD, 2 Units at 12/06/22 1220    glucose chewable tablet 16 g, 4 Tablet, Oral, PRN, Rachel Ortiz MD    glucagon (GLUCAGEN) injection 1 mg, 1 mg, IntraMUSCular, PRN, Rachel Ortiz MD    dextrose 10 % infusion 0-250 mL, 0-250 mL, IntraVENous, PRN, Rachel Ortiz MD    oxyCODONE-acetaminophen (PERCOCET) 5-325 mg per tablet 1 Tablet, 1 Tablet, Oral, Q4H PRN, Rachel Grigsby MD, 1 Tablet at 12/06/22 2124    morphine injection 2 mg, 2 mg, IntraVENous, Q4H PRN, Rachel Ortiz MD, 2 mg at 12/06/22 2235    Vancomycin - Pharmacy to Dose, , Other, Rx Dosing/Monitoring, Rachel Ortiz MD    [COMPLETED] piperacillin-tazobactam (ZOSYN) 4.5 g in 0.9% sodium chloride (MBP/ADV) 100 mL MBP, 4.5 g, IntraVENous, NOW, IV Completed at 12/02/22 1035 **FOLLOWED BY** piperacillin-tazobactam (ZOSYN) 3.375 g in 0.9% sodium chloride (MBP/ADV) 100 mL MBP, 3.375 g, IntraVENous, Q8H, Rachel Ortiz MD, Last Rate: 25 mL/hr at 12/07/22 0037, 3.375 g at 12/07/22 0037    vancomycin (VANCOCIN) 1500 mg in  ml infusion, 1,500 mg, IntraVENous, Q18H, Rachel Ortiz MD, Last Rate: 250 mL/hr at 12/07/22 0037, 1,500 mg at 12/07/22 0037    lipase-protease-amylase (ZENPEP 10,000) capsule 2 Capsule, 2 Capsule, Oral, TID WITH MEALS, Mare Cordova NP, 2 Capsule at 12/06/22 1024 [facility], MD    [facility] Card[facility]  Call center: (i) [phone] ([phone]      CC:None

## 2022-12-07 NOTE — PROGRESS NOTES
Cardiac Cath Lab Procedure Area Arrival Note:    Reji Ortiz. arrived to Cardiac Cath Lab, Procedure Area. Patient identifiers verified with NAME and DATE OF BIRTH. Procedure verified with patient. Consent forms verified. Allergies verified. Patient informed of procedure and plan of care. Questions answered with review. Patient voiced understanding of procedure and plan of care. Patient on cardiac monitor, non-invasive blood pressure, SPO2 monitor. On ra, placed on O2 @ 2 lpm via nc. IV of ns on pump at 25 ml/hr. Patient status doing well without problems. Patient is A&Ox 4. Patient reports head ache #6. Patient medicated during procedure with orders obtained and verified by Dr. Suzette Mejía. Refer to patients Cardiac Cath Lab PROCEDURE REPORT for vital signs, assessment, status, and response during procedure, printed at end of case. Printed report on chart or scanned into chart.

## 2022-12-07 NOTE — PROGRESS NOTES
1220  TRANSFER - IN REPORT:    Verbal report received from Magdalena Ramirez on Byron Carter.  being received from [address] [place] [address] for routine progression of care. Report consisted of patients Situation, Background, Assessment and Recommendations(SBAR). Information from the following report(s) SBAR, Procedure Summary, Intake/Output, MAR, Recent Results, and Cardiac Rhythm Sinus Tachycardia  was reviewed with the receiving clinician. Opportunity for questions and clarification was provided. Assessment completed upon patients arrival to [address] [place] [address] and care assumed. Cardiac Cath Lab Recovery Arrival Note:    Byron Carter. arrived to [facility] recovery area. Patient procedure= Leadless Pacer. Patient on cardiac monitor, non-invasive blood pressure, SPO2 monitor. On O2 @ 4 lpm via NC.  IV  Saline locked. Patient status doing well without problems. Patient is A&Ox 4. Patient reports no complaints. PROCEDURE SITE CHECK:    Procedure site:without any bleeding and no hematoma, no pain/discomfort reported at procedure site. No change in patient status. Continue to monitor patient and status.

## 2022-12-08 LAB
ANION GAP SERPL CALC-SCNC: 3 MMOL/L (ref 5–15)
BUN SERPL-MCNC: 8 MG/DL (ref 6–20)
BUN/CREAT SERPL: 11 (ref 12–20)
CALCIUM SERPL-MCNC: 8.7 MG/DL (ref 8.5–10.1)
CHLORIDE SERPL-SCNC: 108 MMOL/L (ref 97–108)
CO2 SERPL-SCNC: 31 MMOL/L (ref 21–32)
CREAT SERPL-MCNC: 0.74 MG/DL (ref 0.7–1.3)
GLUCOSE BLD STRIP.AUTO-MCNC: 117 MG/DL (ref 65–117)
GLUCOSE BLD STRIP.AUTO-MCNC: 125 MG/DL (ref 65–117)
GLUCOSE BLD STRIP.AUTO-MCNC: 170 MG/DL (ref 65–117)
GLUCOSE BLD STRIP.AUTO-MCNC: 184 MG/DL (ref 65–117)
GLUCOSE SERPL-MCNC: 129 MG/DL (ref 65–100)
POTASSIUM SERPL-SCNC: 4.1 MMOL/L (ref 3.5–5.1)
SERVICE CMNT-IMP: ABNORMAL
SERVICE CMNT-IMP: NORMAL
SODIUM SERPL-SCNC: 142 MMOL/L (ref 136–145)

## 2022-12-08 PROCEDURE — 82962 GLUCOSE BLOOD TEST: CPT

## 2022-12-08 PROCEDURE — 65270000029 HC RM PRIVATE

## 2022-12-08 PROCEDURE — 74011000250 HC RX REV CODE- 250: Performed by: INTERNAL MEDICINE

## 2022-12-08 PROCEDURE — 77010033678 HC OXYGEN DAILY

## 2022-12-08 PROCEDURE — 74011250636 HC RX REV CODE- 250/636: Performed by: HOSPITALIST

## 2022-12-08 PROCEDURE — 97165 OT EVAL LOW COMPLEX 30 MIN: CPT

## 2022-12-08 PROCEDURE — 74011250636 HC RX REV CODE- 250/636: Performed by: INTERNAL MEDICINE

## 2022-12-08 PROCEDURE — 74011250637 HC RX REV CODE- 250/637: Performed by: INTERNAL MEDICINE

## 2022-12-08 PROCEDURE — 74011250637 HC RX REV CODE- 250/637: Performed by: NURSE PRACTITIONER

## 2022-12-08 PROCEDURE — 74011000258 HC RX REV CODE- 258: Performed by: INTERNAL MEDICINE

## 2022-12-08 PROCEDURE — 92526 ORAL FUNCTION THERAPY: CPT | Performed by: SPEECH-LANGUAGE PATHOLOGIST

## 2022-12-08 PROCEDURE — 97535 SELF CARE MNGMENT TRAINING: CPT

## 2022-12-08 PROCEDURE — 74011636637 HC RX REV CODE- 636/637: Performed by: INTERNAL MEDICINE

## 2022-12-08 PROCEDURE — 80048 BASIC METABOLIC PNL TOTAL CA: CPT

## 2022-12-08 PROCEDURE — 74011000250 HC RX REV CODE- 250: Performed by: NURSE PRACTITIONER

## 2022-12-08 PROCEDURE — 36415 COLL VENOUS BLD VENIPUNCTURE: CPT

## 2022-12-08 RX ORDER — ENOXAPARIN SODIUM 100 MG/ML
40 INJECTION SUBCUTANEOUS EVERY 24 HOURS
Status: DISCONTINUED | OUTPATIENT
Start: 2022-12-08 | End: 2022-12-11 | Stop reason: HOSPADM

## 2022-12-08 RX ADMIN — MORPHINE SULFATE 2 MG: 2 INJECTION, SOLUTION INTRAMUSCULAR; INTRAVENOUS at 18:58

## 2022-12-08 RX ADMIN — GABAPENTIN 400 MG: 400 CAPSULE ORAL at 08:40

## 2022-12-08 RX ADMIN — SODIUM CHLORIDE, PRESERVATIVE FREE 10 ML: 5 INJECTION INTRAVENOUS at 06:46

## 2022-12-08 RX ADMIN — GABAPENTIN 400 MG: 400 CAPSULE ORAL at 16:02

## 2022-12-08 RX ADMIN — PIPERACILLIN AND TAZOBACTAM 3.38 G: 3; .375 INJECTION, POWDER, LYOPHILIZED, FOR SOLUTION INTRAVENOUS at 08:41

## 2022-12-08 RX ADMIN — BUTALBITAL, ACETAMINOPHEN, AND CAFFEINE 1 TABLET: 50; 325; 40 TABLET ORAL at 16:02

## 2022-12-08 RX ADMIN — GABAPENTIN 400 MG: 400 CAPSULE ORAL at 21:13

## 2022-12-08 RX ADMIN — AMLODIPINE BESYLATE 10 MG: 5 TABLET ORAL at 08:41

## 2022-12-08 RX ADMIN — MIRTAZAPINE 30 MG: 15 TABLET, FILM COATED ORAL at 21:13

## 2022-12-08 RX ADMIN — PIPERACILLIN AND TAZOBACTAM 3.38 G: 3; .375 INJECTION, POWDER, LYOPHILIZED, FOR SOLUTION INTRAVENOUS at 01:18

## 2022-12-08 RX ADMIN — PANTOPRAZOLE SODIUM 40 MG: 40 TABLET, DELAYED RELEASE ORAL at 08:41

## 2022-12-08 RX ADMIN — MORPHINE SULFATE 2 MG: 2 INJECTION, SOLUTION INTRAMUSCULAR; INTRAVENOUS at 10:07

## 2022-12-08 RX ADMIN — Medication 2 UNITS: at 12:18

## 2022-12-08 RX ADMIN — SODIUM CHLORIDE, PRESERVATIVE FREE 10 ML: 5 INJECTION INTRAVENOUS at 01:19

## 2022-12-08 RX ADMIN — LISINOPRIL 20 MG: 20 TABLET ORAL at 08:41

## 2022-12-08 RX ADMIN — SODIUM CHLORIDE, PRESERVATIVE FREE 10 ML: 5 INJECTION INTRAVENOUS at 13:16

## 2022-12-08 RX ADMIN — BUTALBITAL, ACETAMINOPHEN, AND CAFFEINE 1 TABLET: 50; 325; 40 TABLET ORAL at 07:24

## 2022-12-08 RX ADMIN — OXYCODONE AND ACETAMINOPHEN 1 TABLET: 5; 325 TABLET ORAL at 03:39

## 2022-12-08 RX ADMIN — TAMSULOSIN HYDROCHLORIDE 0.4 MG: 0.4 CAPSULE ORAL at 08:41

## 2022-12-08 RX ADMIN — BUTALBITAL, ACETAMINOPHEN, AND CAFFEINE 1 TABLET: 50; 325; 40 TABLET ORAL at 23:23

## 2022-12-08 RX ADMIN — OXYCODONE AND ACETAMINOPHEN 1 TABLET: 5; 325 TABLET ORAL at 13:15

## 2022-12-08 RX ADMIN — Medication 2 CAPSULE: at 17:41

## 2022-12-08 RX ADMIN — PIPERACILLIN AND TAZOBACTAM 3.38 G: 3; .375 INJECTION, POWDER, LYOPHILIZED, FOR SOLUTION INTRAVENOUS at 17:41

## 2022-12-08 RX ADMIN — METOPROLOL SUCCINATE 50 MG: 50 TABLET, EXTENDED RELEASE ORAL at 08:41

## 2022-12-08 RX ADMIN — VANCOMYCIN HYDROCHLORIDE 1500 MG: 10 INJECTION, POWDER, LYOPHILIZED, FOR SOLUTION INTRAVENOUS at 18:47

## 2022-12-08 RX ADMIN — ENOXAPARIN SODIUM 40 MG: 100 INJECTION SUBCUTANEOUS at 12:18

## 2022-12-08 RX ADMIN — OXYCODONE AND ACETAMINOPHEN 1 TABLET: 5; 325 TABLET ORAL at 21:13

## 2022-12-08 RX ADMIN — Medication 2 CAPSULE: at 12:18

## 2022-12-08 NOTE — PROGRESS NOTES
Problem: Dysphagia (Adult)  Goal: *Acute Goals and Plan of Care (Insert Text)  Description: Speech Therapy Goals:  Initiated 12/2/22  1. Patient will tolerate minced/moist diet with thin liquids without clinical s/s of aspiration within seven days. Modify to full liquid diet  Outcome: Progressing Towards Goal         SPEECH LANGUAGE PATHOLOGY DYSPHAGIA TREATMENT  Patient: Hero Roberson (76 y.o. male)  Date: 12/8/2022  Diagnosis: Mass of left side of neck [R22.1] Cellulitis and abscess of neck  Procedure(s) (LRB):  INSERT OR REPLACE TRANSCATH PPM LEADLESS (N/A) 1 Day Post-Op  Precautions:       ASSESSMENT:  Patient seen in follow up. Since last seen, diet was upgraded by staff from full liquid to regular. He is happy with this, had been hesitant to try when last seen by SLP but now reports it is going well. Reports that episode of reported choking on solids earlier in the week was really more vomiting than choking, but that he cannot clear up mucous up through pharynx. Tolerating small amounts of solids well in this session, but reports little appetite. Does have to use visible effort to push through pharynx which is . PLAN:  Recommendations and Planned Interventions:  -Can continue regular diet- he has been having this for a few days now and is able to self select food items. This may help with intake as well.   -Stric oral care  -SLP tx at next level of care        Patient continues to benefit from skilled intervention to address the above impairments. Continue treatment per established plan of care. Discharge Recommendations: To Be Determined     SUBJECTIVE:   Patient stated I'm not really thinking about meat right now.     OBJECTIVE:   Cognitive and Communication Status:  Neurologic State: Alert  Orientation Level: Oriented X4  Cognition: Appropriate decision making  Perception: Appears intact  Perseveration: No perseveration noted  Safety/Judgement: Awareness of environment  Dysphagia Treatment:  Oral Assessment:  Oral Assessment  Labial: No impairment  Dentition: Edentulous; Other (comment)  Oral Hygiene: clean  Mandible: No impairment  P.O. Trials:  Patient Position: upright in bed  Vocal quality prior to P.O.: No impairment  Consistency Presented: Thin liquid; Solid  How Presented: Self-fed/presented     Bolus Acceptance: No impairment  Bolus Formation/Control: No impairment     Propulsion: No impairment  Oral Residue: None     Laryngeal Elevation: Functional  Aspiration Signs/Symptoms: None  Pharyngeal Phase Characteristics:  (grimace and effort with force of swallow.)  Effective Modifications:  (discussed smaller bites and thorough chewing, patinet could likley use repetition of this as these will really help him)                                Pain:  Pain Scale 1: Numeric (0 - 10)  Pain Intensity 1: 6  Pain Location 1: Head;Neck    After treatment:   Patient left in no apparent distress in bed and Call bell within reach    COMMUNICATION/EDUCATION:   Patient was educated regarding his deficit(s) of dysphagia as this relates to his diagnosis of CA. He demonstrated Good understanding as evidenced by his responses. The patient's plan of care including recommendations, planned interventions, and recommended diet changes were discussed with: Registered nurse.      Arabella Loaiza, SLP  Time Calculation: 20 mins

## 2022-12-08 NOTE — PROGRESS NOTES
[facility] Adult  Hospitalist Group                                                                                          Hospitalist Progress Note  Donnie Levin MD  Answering service: [phone] OR [phone] from in house phone        Date of Service:  2022  NAME:  Sylvie Gonzalez. :  1955  MRN:  [id number]      Admission Summary: This 76-year-old man with a PMH of HTN, T2DM, Tobacco Dependence and Obesity who presented to the ED with left neck mass- present since 2022. The patient recently relocated to [facility] from [place], the initial swelling was evaluated in [place] including biopsy of the mass. The patient was told that the mass is cancerous after biopsy done by ENT Surgeon ( Dr. Kamille De La Fuente [facility]). He was referred to a cancer institute in [facility] but had not yet begun workup/treatment because the patient's wife passed away on . The patient came to [facility] to live with his daughter. He was treated with oral antibiotics in the [facility] in early November, swelling progressed and was associated with pain and also with purulent drainage bringing the patient came to [facility]. Work up in ED included a CT scan neck revealed complex fluid collection in the left lateral neck, mass causing the patient difficulty with swallowing and change in vocal quality. Interval history / Subjective:   No acute events overnight, has some odynophagia after diet advanced to regular, no n/v/d, no dyspnea. Daughter at bedside feels like he is getting weaker from being in the bed for so long.      Assessment & Plan:     Left Neck neoplasm, with possible Abscess/Cellulitis:   - per pt, previous bx consistent with cancer  -  CT chest/abdomen/pelvis w/contrast: no identified metastatic disease in thorax, abdomen or pelvis  - obtain records including biopsy report from his ENT office, Dr. Justine Sethi , medical record release form filled out with patient and faxed to their office [phone]   - Dr. Curtistine Nissen, [place] [facility]: appreciate expertise, not surgically resectable  - SLP recs appreciated: dysphagia, monitor caloric intake  - Heme/Onc - plan to initiate chemo this admission  - Rad Onc - s/p CT sim 12/7  - wound care  - IV ABx: continue Vanc, Zosyn. It is draining spontaneously  - pain management  -ECHO 12/5: Normal left ventricular systolic function with a visually estimated EF of 60 - 65%. Left ventricle size is normal. Mildly increased wall thickness. Findings consistent with mild concentric hypertrophy. Normal wall motion. Normal diastolic function.  -Nutrition- eval calorie intake vs needs  - PEG deferred, tolerating po intake     Transient AVB: symptomatic with sinus pauses  - s/p leadless PPM 12/7, lower dose metoprolol resumed     HTN:  - continue metoprolol, lisinopril     T2DM: ISS ac&hs, hypoglycemic protocol, Hga1c 6.6. Tobacco Dependence: cessation advised, declined nicotine patch. Can transfer off tele now that cardiac issues stable     DVTppx: start enoxaparin  Code Status: Full Code  Discharge: TBD  Ambulates: independently - OOB and PT/OT today     Hospital Problems  Date Reviewed: 12/1/2022            Codes Class Noted POA    Pacemaker ICD-10-CM: Z95.0  ICD-9-CM: V45.01  12/7/2022 Unknown    Overview Signed 12/7/2022 12:30 PM by Joann Spiecr MD     Leadless pacemaker implant 12/7/2022             AV block ICD-10-CM: I44.30  ICD-9-CM: 426.10  12/7/2022 Unknown        Bradycardia ICD-10-CM: R00.1  ICD-9-CM: 427.89  12/7/2022 Unknown        Mass of left side of neck ICD-10-CM: R22.1  ICD-9-CM: 784.2  12/1/2022 Unknown        * (Principal) Cellulitis and abscess of neck ICD-10-CM: L03.221, L02.11  ICD-9-CM: 682.1  12/1/2022 Yes           Review of Systems:   A comprehensive review of systems was negative except for that written in the HPI. Vital Signs:    Last 24hrs VS reviewed since prior progress note.  Most recent are:  Visit Vitals  BP (!) 143/82   Pulse 91   Temp 98 °F (36.7 °C)   Resp 19   Ht 5' 4\" (1.626 m)   Wt 78.2 kg (172 lb 6.4 oz)   SpO2 96%   BMI 29.59 kg/m²         Intake/Output Summary (Last 24 hours) at 12/8/2022 1047  Last data filed at 12/8/2022 0725  Gross per 24 hour   Intake --   Output 2150 ml   Net -2150 ml          Physical Examination:     I had a face to face encounter with this patient and independently examined them on 12/8/2022 as outlined below:          Constitutional:  No acute distress, cooperative, pleasant    ENT:  Oral mucosa moist. Anicteric sclerae, normal conjunctiva  Neck: L mass w/ purulent drainage, no surrounding cellulitic changes   Resp:  CTA bilaterally. No wheezing/rhonchi/rales. No accessory muscle use. CV:  Regular rhythm, normal rate, S1,S2.    GI/:  Soft, non distended, non tender, no guarding, BS present. Musculoskeletal:  No edema, warm. Neurologic:  Moves all extremities. Grossly non-focal  Skin:  left neck dressing, small amount yellow drainage  Psych:  Good insight, Not anxious nor agitated. Data Review:    Review and/or order of clinical lab test      Labs:     Recent Labs     12/07/22  0043   WBC 7.3   HGB 13.4   HCT 39.8          Recent Labs     12/08/22  0345 12/07/22  0043 12/06/22  0024 12/05/22  1745    140 138  --    K 4.1 3.6 3.5 3.8    105 104  --    CO2 31 29 30  --    BUN 8 5* 3*  --    CREA 0.74 0.68* 0.73  --    * 118* 189*  --    CA 8.7 8.9 8.8  --    MG  --   --   --  1.7       No results for input(s): ALT, AP, TBIL, TBILI, TP, ALB, GLOB, GGT, AML, LPSE in the last 72 hours. No lab exists for component: SGOT, GPT, AMYP, HLPSE  No results for input(s): INR, PTP, APTT, INREXT, INREXT in the last 72 hours. No results for input(s): FE, TIBC, PSAT, FERR in the last 72 hours. No results found for: FOL, RBCF   No results for input(s): PH, PCO2, PO2 in the last 72 hours.   No results for input(s): CPK, CKNDX, TROIQ in the last 72 hours.     No lab exists for component: CPKMB  No results found for: CHOL, CHOLX, CHLST, CHOLV, HDL, HDLP, LDL, LDLC, DLDLP, TGLX, TRIGL, TRIGP, CHHD, CHHDX  Lab Results   Component Value Date/Time    Glucose (POC) 125 (H) 12/08/2022 07:55 AM    Glucose (POC) 220 (H) 12/07/2022 09:37 PM    Glucose (POC) 178 (H) 12/07/2022 05:27 PM    Glucose (POC) 135 (H) 12/07/2022 01:48 PM    Glucose (POC) 181 (H) 12/07/2022 05:54 AM     Lab Results   Component Value Date/Time    Color YELLOW/STRAW 12/01/2022 05:08 PM    Appearance CLEAR 12/01/2022 05:08 PM    Specific gravity 1.025 12/01/2022 05:08 PM    pH (UA) 5.5 12/01/2022 05:08 PM    Protein TRACE (A) 12/01/2022 05:08 PM    Glucose Negative 12/01/2022 05:08 PM    Ketone 15 (A) 12/01/2022 05:08 PM    Bilirubin Negative 12/01/2022 05:08 PM    Urobilinogen 0.2 12/01/2022 05:08 PM    Nitrites Negative 12/01/2022 05:08 PM    Leukocyte Esterase Negative 12/01/2022 05:08 PM    Epithelial cells FEW 12/01/2022 05:08 PM    Bacteria Negative 12/01/2022 05:08 PM    WBC 0-4 12/01/2022 05:08 PM    RBC 0-5 12/01/2022 05:08 PM         Medications Reviewed:     Current Facility-Administered Medications   Medication Dose Route Frequency    [START ON 12/9/2022] Vanc random level due 12/9 @ 04:00   Other ONCE    sodium chloride (NS) flush 5-40 mL  5-40 mL IntraVENous Q8H    sodium chloride (NS) flush 5-40 mL  5-40 mL IntraVENous PRN    butalbital-acetaminophen-caffeine (FIORICET, ESGIC) -40 mg per tablet 1 Tablet  1 Tablet Oral Q6H PRN    metoprolol succinate (TOPROL-XL) XL tablet 50 mg  50 mg Oral DAILY    HYDROmorphone (DILAUDID) injection 0.2 mg  0.2 mg IntraVENous Q6H PRN    ALPRAZolam (XANAX) tablet 0.5 mg  0.5 mg Oral BID PRN    lisinopriL (PRINIVIL, ZESTRIL) tablet 20 mg  20 mg Oral DAILY AFTER BREAKFAST    amLODIPine (NORVASC) tablet 10 mg  10 mg Oral DAILY    cyclobenzaprine (FLEXERIL) tablet 10 mg  10 mg Oral TID PRN    gabapentin (NEURONTIN) capsule 400 mg  400 mg Oral TID    mirtazapine (REMERON) tablet 30 mg  30 mg Oral QHS    pantoprazole (PROTONIX) tablet 40 mg  40 mg Oral DAILY    tamsulosin (FLOMAX) capsule 0.4 mg  0.4 mg Oral DAILY    traZODone (DESYREL) tablet 100 mg  100 mg Oral QHS PRN    sodium chloride (NS) flush 5-40 mL  5-40 mL IntraVENous Q8H    sodium chloride (NS) flush 5-40 mL  5-40 mL IntraVENous PRN    acetaminophen (TYLENOL) tablet 650 mg  650 mg Oral Q6H PRN    Or    acetaminophen (TYLENOL) suppository 650 mg  650 mg Rectal Q6H PRN    polyethylene glycol (MIRALAX) packet 17 g  17 g Oral DAILY PRN    ondansetron (ZOFRAN ODT) tablet 4 mg  4 mg Oral Q8H PRN    Or    ondansetron (ZOFRAN) injection 4 mg  4 mg IntraVENous Q6H PRN    insulin lispro (HUMALOG) injection   SubCUTAneous AC&HS    glucose chewable tablet 16 g  4 Tablet Oral PRN    glucagon (GLUCAGEN) injection 1 mg  1 mg IntraMUSCular PRN    dextrose 10 % infusion 0-250 mL  0-250 mL IntraVENous PRN    oxyCODONE-acetaminophen (PERCOCET) 5-325 mg per tablet 1 Tablet  1 Tablet Oral Q4H PRN    morphine injection 2 mg  2 mg IntraVENous Q4H PRN    Vancomycin - Pharmacy to Dose   Other Rx Dosing/Monitoring    piperacillin-tazobactam (ZOSYN) 3.375 g in 0.9% sodium chloride (MBP/ADV) 100 mL MBP  3.375 g IntraVENous Q8H    vancomycin (VANCOCIN) 1500 mg in  ml infusion  1,500 mg IntraVENous Q18H    lipase-protease-amylase (ZENPEP 10,000) capsule 2 Capsule  2 Capsule Oral TID WITH MEALS     ______________________________________________________________________  EXPECTED LENGTH OF STAY: 3d 9h  ACTUAL LENGTH OF STAY:          7                 Holden Pichardo MD

## 2022-12-08 NOTE — PROGRESS NOTES
OCCUPATIONAL THERAPY EVALUATION/DISCHARGE  Patient: Shilpa Tamayo (76 y.o. male)  Date: 12/8/2022  Primary Diagnosis: Mass of left side of neck [R22.1]  Procedure(s) (LRB):  INSERT OR REPLACE TRANSCATH PPM LEADLESS (N/A) 1 Day Post-Op   Precautions:        ASSESSMENT  Based on the objective data described below, the patient presents at his functional baseline of modified independent s/p admission for left neck mass/swelling. Pt now POD1 placement of leadless PPM. Pt received seated EOB with supportive family present and agreeable to participation in therapy session. NC found on floor when preparing environment for session and pt stating he had taken O2 off to eat lunch. SpO2 stable at rest >95% and >92% with activity on RA. All functional transfers, bathroom mobility, and ADL tasks performed with modified independence today. Initial drop in BP noted when standing from EOB, however BP improved and held stable with ADLs/functional mobility. As pt is at his functional baseline and plans to d/c home to son's house, acute OT will sign off. No follow up skilled OT needs indicated at this time. Please re-consult if pt has decline in functional status. Current Level of Function (ADLs/self-care): modified independent     Functional Outcome Measure: The patient scored 90/100 on the Barthel Index outcome measure which is indicative of independence in basic self care. Other factors to consider for discharge: PLOF, supportive family      PLAN :  Recommend with staff: Recommend with nursing, ADLs with supervision/setup, OOB to chair 3x/day via straight cane and toileting via functional mobility to and from bathroom. Thank you for completing as able in order to maintain patient strength, endurance and independence. Recommendation for discharge: (in order for the patient to meet his/her long term goals)  No skilled occupational therapy/ follow up rehabilitation needs identified at this time.     This discharge recommendation:  Has been made in collaboration with the attending provider and/or case management    IF patient discharges home will need the following DME: none       SUBJECTIVE:   Patient stated I usually use the cane in the house and when I go out.     OBJECTIVE DATA SUMMARY:   HISTORY:   Past Medical History:   Diagnosis Date    Diabetes ([address]     HTN (hypertension)     Hypercholesteremia     Skin cancer     L sided neck skin cancer cells   No past surgical history on file. Prior Level of Function/Environment/Context:   Expanded or extensive additional review of patient history:   Home Situation  Home Environment: Private residence  # Steps to Enter: 4  Rails to Enter: Yes  Hand Rails : Bilateral  One/Two Story Residence: Two story, live on 1st floor  Living Alone: No  Support Systems: Child(catarina)  Patient Expects to be Discharged to[de-identified] Home  Current DME Used/Available at Home: Shower chair, Cane, straight, Grab bars  Tub or Shower Type: Tub/Shower combination    Hand dominance: Right    EXAMINATION OF PERFORMANCE DEFICITS:  Cognitive/Behavioral Status:  Neurologic State: Alert  Orientation Level: Oriented X4  Cognition: Follows commands  Perception: Appears intact  Perseveration: No perseveration noted  Safety/Judgement: Awareness of environment    Skin: visually intact     Edema: none noted     Hearing: Auditory  Auditory Impairment: Hard of hearing, bilateral  Hearing Aids/Status:  At home    Vision/Perceptual:                           Acuity: Within Defined Limits;Able to read employee name badge without difficulty    Corrective Lenses: Glasses    Range of Motion:    AROM: Generally decreased, functional                         Strength:    Strength: Generally decreased, functional                Coordination:  Coordination: Generally decreased, functional            Tone & Sensation:    Tone: Normal  Sensation: Impaired (baseline neuropathy in B hands)                      Balance:  Sitting: Intact  Standing: Intact; With support    Functional Mobility and Transfers for ADLs:  Bed Mobility:  Supine to Sit: Modified independent  Sit to Supine: Modified independent  Scooting: Modified independent    Transfers:  Sit to Stand: Modified independent  Stand to Sit: Modified independent  Bathroom Mobility: Modified independent  Toilet Transfer : Modified independent    ADL Assessment:  Feeding: Independent    Oral Facial Hygiene/Grooming: Modified Independent    Bathing: Modified independent (inferred, seated)    Type of Bath: Chlorhexidine (CHG); Basin/Soap/Water;Full    Upper Body Dressing: Modified independent    Lower Body Dressing: Modified independent    Toileting: Modified independent                ADL Intervention and task modifications:  Feeding  Feeding Assistance: Independent  Food to Mouth: Independent  Drink to Mouth: Independent    Grooming  Grooming Assistance: Modified independent  Position Performed: Standing  Washing Hands: Modified independent           Upper Body [address] Gown: Modified independent    Lower Body Dressing Assistance  Socks: Modified independent  Position Performed: Seated edge of bed    Toileting  Toileting Assistance: Modified independent  Bladder Hygiene: Modified independent  Bowel Hygiene: Modified indpendent  Clothing Management: Modified independent    Cognitive Retraining  Safety/Judgement: Awareness of environment      Functional Measure:    Barthel Index:  Bathin  Bladder: 10  Bowels: 10  Groomin  Dressing: 10  Feeding: 10  Mobility: 10  Stairs: 5  Toilet Use: 10  Transfer (Bed to Chair and Back): 15  Total: 90/100      The Barthel ADL Index: Guidelines  1. The index should be used as a record of what a patient does, not as a record of what a patient could do. 2. The main aim is to establish degree of independence from any help, physical or verbal, however minor and for whatever reason.   3. The need for supervision renders the patient not independent. 4. A patient's performance should be established using the best available evidence. Asking the patient, friends/relatives and nurses are the usual sources, but direct observation and common sense are also important. However direct testing is not needed. 5. Usually the patient's performance over the preceding 24-48 hours is important, but occasionally longer periods will be relevant. 6. Middle categories imply that the patient supplies over 50 per cent of the effort. 7. Use of aids to be independent is allowed. Score Interpretation (from [address] [address])    Independent   60-79 Minimally independent   40-59 Partially dependent   20-39 Very dependent   <20 Totally dependent     -Lucila Cook., Barthel, D.W. (1965). Functional evaluation: the Barthel Index. [address] ([address] (1997). The Barthel activities of daily living index: self-reporting versus actual performance in the old (> or = 75 years). Journal of [address] [place] [address] 45(7), [facility], JRADHAF, Lexy Perdomo., Amey Lesch. (1999). Measuring the change in disability after inpatient rehabilitation; comparison of the responsiveness of the Barthel Index and Functional [place] Measure. Journal of Neurology, Neurosurgery, and Psychiatry, 66(4), 376-538. Sho Monge, N.J.A, CHRIS Morales, & Pauline Herron MChristianA. (2004) Assessment of post-stroke quality of life in cost-effectiveness studies: The usefulness of the Barthel Index and the EuroQoL-5D.  Quality of Life Research, 15, 307-03         Occupational Therapy Evaluation Charge Determination   History Examination Decision-Making   LOW Complexity : Brief history review  LOW Complexity : 1-3 performance deficits relating to physical, cognitive , or psychosocial skils that result in activity limitations and / or participation restrictions  MEDIUM Complexity : Patient may present with comorbidities that affect occupational performnce. Miniml to moderate modification of tasks or assistance (eg, physical or verbal ) with assesment(s) is necessary to enable patient to complete evaluation       Based on the above components, the patient evaluation is determined to be of the following complexity level: LOW   Pain Rating:  None     Activity Tolerance:   Good    After treatment patient left in no apparent distress:    Supine in bed, Call bell within reach, Caregiver / family present, and Side rails x 3    COMMUNICATION/EDUCATION:   The patients plan of care was discussed with: Registered nurse.      Thank you for this referral.  LETICIA Sharma, OTR/L  Time Calculation: 28 mins

## 2022-12-08 NOTE — PROGRESS NOTES
Comprehensive Nutrition Assessment    Type and Reason for Visit: Reassess    Nutrition Recommendations/Plan:   Continue with Regular diet order  Continue with Glucerna, Magic Cup, Ensure Pudding       Malnutrition Assessment:  Malnutrition Status: At risk for malnutrition (specify) (Poor PO 2/2 difficulty swallowing; no confirmed weight loss) (12/01/22 1122)         Nutrition Assessment:    74 yo male admitted for mass of left side of neck. Pmhx: DM, HTN, hypercholesterolemia, skin CA.      12/8: Follow up. S/P pacemaker placement 12/7. Diet has advanced to Regular as of 12/7. Spoke with pt and family members at bedside. Pt reports eating 100% of his breakfast today, family confirms that he did. He also drank 100% Glucerna. Lunch tray sitting at bedside. Pt states he is not hungry for lunch so he didn't eat anything. Encouraged pt to always drink Glucerna and try to eat Magic Cup and Ensure Pudding if he is not going to eat his meal, pt agreed. Planning to start chemo and radiation this admission. New bed scale weight shows weight is down 8.4 kg since admission. Admission weight was not specified so maybe unreliable. Pt was having poor PO intakes earlier this admission so he may have had some weight loss. Continue to monitor his weights. Labs reviewed. 12/5:  MD consult received for ONS. Diet has been upgraded to Full liquids. SLP evaluated pt today and recommended pt remain on full liquids per his preference. Spoke with pt regarding PO intakes. He says he is drinking 100% of meals but they consist of soup (which is likely broth), tea, juice, and pudding. This is providing ~ 150 kcals and 2 gm protein. He will not be able to meet his kcal and protein needs on this. Discussed this with pt and he agrees, is open to trying multiple ONS to provide additional kcals.   Ensure High Protein was ordered today but he received vanilla and he would like chocolate flavor so will change order to Glucerna TID (due to no chocolate ensure high protein in house), and add Magic Cup and Ensure Pudding TID. Combined, this should provide a total of 2250 kcals (100% kcal needs) and 93 gm protein (89% protein needs) each day from ONS alone. Discussed possible need for PEG placement until the swallow issue resolves, he states MD's have mentioned that already so he is thinking about it. Will see how he does with PO ONS first.       12/1: MST received for weight loss and poor PO. Spoke with pt at bedside. He is currently NPO in anticipation of intervention by ENT surgeon. States the last meal he had was Thanksgiving day. Since then all he has had is broth. C/o difficulty swallowing because of this mass. He is able to get down water/liquids. Took PO meds this morning but states that was very difficult. Discussed ONS options once diet advances, agreeable to try chocolate Ensure High Protein. States his UBW in October (last time he weighed) was 82 kg. He is not sure if he has lost any weight. Current weight is charted as 86.8 kg indicating no weight loss, unsure of source of current weight. Will continue to monitor. Labs reviewed. Nutritionally Significant Medications:  Humalog, Remeron, Protonix      Estimated Daily Nutrient Needs:  Energy Requirements Based On: Formula  Weight Used for Energy Requirements: Current  Energy (kcal/day): 2020 (MSJ x AF 1.3)  Weight Used for Protein Requirements: Current  Protein (g/day): 104 (1.1 gm/kg (20%))  Method Used for Fluid Requirements: 1 ml/kcal  Fluid (ml/day): 2020    Nutrition Related Findings:   Edema: none  Last BM: 12/02/22,      Wounds: None      Current Nutrition Therapies:  Diet: regular   Supplements: Glucerna TID, Ensure Pudding TID, Magic Cup TID  Meal intake: No data found. Supplement intake: No data found.   Nutrition Support: none      Anthropometric Measures:  Height: 5' 4\" (162.6 cm)  Ideal Body Weight (IBW): 130 lbs (59 kg)     Current Body Wt:  78.2 kg (172 lb 6.4 oz), 132.6 % IBW. Bed scale  Current BMI (kg/m2): 29.6        Weight Adjustment: No adjustment                 BMI Category: Overweight (BMI 25.0-29. 9)    Wt Readings from Last 10 Encounters:   12/08/22 78.2 kg (172 lb 6.4 oz)   11/30/22 86.8 kg (191 lb 5.8 oz)           Nutrition Diagnosis:   Inadequate oral intake related to inadequate protein-energy intake, swallowing difficulty as evidenced by intake 26-50%    Nutrition Interventions:   Food and/or Nutrient Delivery: Continue current diet, Continue oral nutrition supplement  Nutrition Education/Counseling: No recommendations at this time  Coordination of Nutrition Care: Continue to monitor while inpatient       Goals:  Previous Goal Met: Progressing toward goal(s)  Goals: other (specify)  Specify Other Goals: PO intakes > 75% meals + ONS over next 5-7 days    Nutrition Monitoring and Evaluation:   Behavioral-Environmental Outcomes: None identified  Food/Nutrient Intake Outcomes: Food and nutrient intake, Diet advancement/tolerance, Supplement intake  Physical Signs/Symptoms Outcomes: Biochemical data, GI status, Weight    Discharge Planning:    Continue oral nutrition supplement, Continue current diet    Khoa Roberts RD  Available via [employer]

## 2022-12-08 NOTE — PROGRESS NOTES
Leadless Pacemaker check this am showed proper function    20% RV pacing  VDD mode  Future Appointments   Date Time Provider Tad Sibley   12/8/2022  3:15 PM MALIK Grider BS AMB   3/15/2023  9:00 AM PACEMAKER, DOC RIVAS  AMB   3/15/2023  9:20 AM Scarlett Cowan MD CAVS BS AMB

## 2022-12-08 NOTE — PROGRESS NOTES
Consult received, chart reviewed, case discussed with pt's nurse who reports that pt is in the process of being transferred to another unit (report completed and transport expected very soon). PT plan is to follow up tomorrow.  Thank you, Dallin Corado

## 2022-12-08 NOTE — PROGRESS NOTES
Bedside and Verbal shift change report given to Mary Yusuf Rn (oncoming nurse) by Cece Pak RN (offgoing nurse). Report included the following information SBAR, Kardex, Intake/Output, MAR, Accordion, Recent Results, Cardiac Rhythm of NSR, and Alarm Parameters .

## 2022-12-08 NOTE — PROGRESS NOTES
Transition of Care  RUR 11% Low  Disposition Home  DME Cane and shower chair  Transportation Family   Follow Up PCP, Specialist    CM continuing to follow patient for transitional care planning needs. Patient lives at home alone but has family that live nearby. CM updated in rounds by attending, patient is planned to start chemo/radiation inpatient. He is also placing a consult to PT and OT. Anticipate DC next week. CM to monitor.      Yvette Cooper MS

## 2022-12-09 DIAGNOSIS — C76.0 SQUAMOUS CELL CARCINOMA OF HEAD AND NECK (HCC): Primary | ICD-10-CM

## 2022-12-09 PROBLEM — C44.42 SQUAMOUS CELL CARCINOMA OF HEAD AND NECK: Status: ACTIVE | Noted: 2022-01-01

## 2022-12-09 LAB
ANION GAP SERPL CALC-SCNC: 3 MMOL/L (ref 5–15)
BASOPHILS # BLD: 0.1 K/UL (ref 0–0.1)
BASOPHILS NFR BLD: 1 % (ref 0–1)
BUN SERPL-MCNC: 10 MG/DL (ref 6–20)
BUN/CREAT SERPL: 13 (ref 12–20)
CALCIUM SERPL-MCNC: 9 MG/DL (ref 8.5–10.1)
CHLORIDE SERPL-SCNC: 107 MMOL/L (ref 97–108)
CO2 SERPL-SCNC: 30 MMOL/L (ref 21–32)
CREAT SERPL-MCNC: 0.76 MG/DL (ref 0.7–1.3)
DIFFERENTIAL METHOD BLD: ABNORMAL
EOSINOPHIL # BLD: 0.1 K/UL (ref 0–0.4)
EOSINOPHIL NFR BLD: 2 % (ref 0–7)
ERYTHROCYTE [DISTWIDTH] IN BLOOD BY AUTOMATED COUNT: 13.5 % (ref 11.5–14.5)
GLUCOSE BLD STRIP.AUTO-MCNC: 134 MG/DL (ref 65–117)
GLUCOSE BLD STRIP.AUTO-MCNC: 142 MG/DL (ref 65–117)
GLUCOSE BLD STRIP.AUTO-MCNC: 157 MG/DL (ref 65–117)
GLUCOSE BLD STRIP.AUTO-MCNC: 163 MG/DL (ref 65–117)
GLUCOSE SERPL-MCNC: 154 MG/DL (ref 65–100)
HCT VFR BLD AUTO: 39.1 % (ref 36.6–50.3)
HGB BLD-MCNC: 12.9 G/DL (ref 12.1–17)
IMM GRANULOCYTES # BLD AUTO: 0 K/UL (ref 0–0.04)
IMM GRANULOCYTES NFR BLD AUTO: 1 % (ref 0–0.5)
LYMPHOCYTES # BLD: 2.5 K/UL (ref 0.8–3.5)
LYMPHOCYTES NFR BLD: 31 % (ref 12–49)
MAGNESIUM SERPL-MCNC: 1.9 MG/DL (ref 1.6–2.4)
MCH RBC QN AUTO: 34.3 PG (ref 26–34)
MCHC RBC AUTO-ENTMCNC: 33 G/DL (ref 30–36.5)
MCV RBC AUTO: 104 FL (ref 80–99)
MONOCYTES # BLD: 0.6 K/UL (ref 0–1)
MONOCYTES NFR BLD: 8 % (ref 5–13)
NEUTS SEG # BLD: 4.6 K/UL (ref 1.8–8)
NEUTS SEG NFR BLD: 57 % (ref 32–75)
NRBC # BLD: 0 K/UL (ref 0–0.01)
NRBC BLD-RTO: 0 PER 100 WBC
PHOSPHATE SERPL-MCNC: 3.4 MG/DL (ref 2.6–4.7)
PLATELET # BLD AUTO: 356 K/UL (ref 150–400)
PMV BLD AUTO: 9.6 FL (ref 8.9–12.9)
POTASSIUM SERPL-SCNC: 3.8 MMOL/L (ref 3.5–5.1)
RBC # BLD AUTO: 3.76 M/UL (ref 4.1–5.7)
SERVICE CMNT-IMP: ABNORMAL
SODIUM SERPL-SCNC: 140 MMOL/L (ref 136–145)
VANCOMYCIN SERPL-MCNC: 16.5 UG/ML
WBC # BLD AUTO: 8 K/UL (ref 4.1–11.1)

## 2022-12-09 PROCEDURE — 74011250637 HC RX REV CODE- 250/637: Performed by: NURSE PRACTITIONER

## 2022-12-09 PROCEDURE — 80048 BASIC METABOLIC PNL TOTAL CA: CPT

## 2022-12-09 PROCEDURE — 74011250636 HC RX REV CODE- 250/636: Performed by: HOSPITALIST

## 2022-12-09 PROCEDURE — 74011250637 HC RX REV CODE- 250/637: Performed by: INTERNAL MEDICINE

## 2022-12-09 PROCEDURE — 74011000250 HC RX REV CODE- 250: Performed by: NURSE PRACTITIONER

## 2022-12-09 PROCEDURE — 74011000250 HC RX REV CODE- 250: Performed by: INTERNAL MEDICINE

## 2022-12-09 PROCEDURE — 65270000029 HC RM PRIVATE

## 2022-12-09 PROCEDURE — 83735 ASSAY OF MAGNESIUM: CPT

## 2022-12-09 PROCEDURE — 80202 ASSAY OF VANCOMYCIN: CPT

## 2022-12-09 PROCEDURE — 85025 COMPLETE CBC W/AUTO DIFF WBC: CPT

## 2022-12-09 PROCEDURE — 82962 GLUCOSE BLOOD TEST: CPT

## 2022-12-09 PROCEDURE — 74011250637 HC RX REV CODE- 250/637: Performed by: HOSPITALIST

## 2022-12-09 PROCEDURE — 74011000258 HC RX REV CODE- 258: Performed by: INTERNAL MEDICINE

## 2022-12-09 PROCEDURE — 84100 ASSAY OF PHOSPHORUS: CPT

## 2022-12-09 PROCEDURE — 74011636637 HC RX REV CODE- 636/637: Performed by: INTERNAL MEDICINE

## 2022-12-09 PROCEDURE — 36415 COLL VENOUS BLD VENIPUNCTURE: CPT

## 2022-12-09 PROCEDURE — 74011250636 HC RX REV CODE- 250/636: Performed by: INTERNAL MEDICINE

## 2022-12-09 RX ORDER — PALONOSETRON 0.05 MG/ML
0.25 INJECTION, SOLUTION INTRAVENOUS ONCE
OUTPATIENT
Start: 2023-01-03 | End: 2023-01-03

## 2022-12-09 RX ORDER — SODIUM CHLORIDE 9 MG/ML
5-250 INJECTION, SOLUTION INTRAVENOUS AS NEEDED
OUTPATIENT
Start: 2023-01-17

## 2022-12-09 RX ORDER — SODIUM CHLORIDE 9 MG/ML
5-250 INJECTION, SOLUTION INTRAVENOUS AS NEEDED
OUTPATIENT
Start: 2022-12-20

## 2022-12-09 RX ORDER — ONDANSETRON 2 MG/ML
8 INJECTION INTRAMUSCULAR; INTRAVENOUS AS NEEDED
OUTPATIENT
Start: 2023-01-17

## 2022-12-09 RX ORDER — EPINEPHRINE 1 MG/ML
0.3 INJECTION, SOLUTION, CONCENTRATE INTRAVENOUS AS NEEDED
OUTPATIENT
Start: 2022-12-27

## 2022-12-09 RX ORDER — DIPHENHYDRAMINE HYDROCHLORIDE 50 MG/ML
25 INJECTION, SOLUTION INTRAMUSCULAR; INTRAVENOUS AS NEEDED
Start: 2023-01-03

## 2022-12-09 RX ORDER — ACETAMINOPHEN 325 MG/1
650 TABLET ORAL AS NEEDED
Start: 2023-01-03

## 2022-12-09 RX ORDER — SODIUM CHLORIDE 9 MG/ML
5-250 INJECTION, SOLUTION INTRAVENOUS AS NEEDED
OUTPATIENT
Start: 2022-12-27

## 2022-12-09 RX ORDER — PALONOSETRON 0.05 MG/ML
0.25 INJECTION, SOLUTION INTRAVENOUS ONCE
OUTPATIENT
Start: 2023-01-17 | End: 2023-01-17

## 2022-12-09 RX ORDER — HEPARIN 100 UNIT/ML
500 SYRINGE INTRAVENOUS AS NEEDED
Start: 2022-12-27

## 2022-12-09 RX ORDER — DIPHENHYDRAMINE HYDROCHLORIDE 50 MG/ML
50 INJECTION, SOLUTION INTRAMUSCULAR; INTRAVENOUS AS NEEDED
Start: 2022-12-13

## 2022-12-09 RX ORDER — PALONOSETRON 0.05 MG/ML
0.25 INJECTION, SOLUTION INTRAVENOUS ONCE
OUTPATIENT
Start: 2023-01-24 | End: 2023-01-24

## 2022-12-09 RX ORDER — HYDROCORTISONE SODIUM SUCCINATE 100 MG/2ML
100 INJECTION, POWDER, FOR SOLUTION INTRAMUSCULAR; INTRAVENOUS AS NEEDED
OUTPATIENT
Start: 2023-01-17

## 2022-12-09 RX ORDER — SODIUM CHLORIDE 0.9 % (FLUSH) 0.9 %
5-40 SYRINGE (ML) INJECTION AS NEEDED
OUTPATIENT
Start: 2022-12-13

## 2022-12-09 RX ORDER — HYDROCORTISONE SODIUM SUCCINATE 100 MG/2ML
100 INJECTION, POWDER, FOR SOLUTION INTRAMUSCULAR; INTRAVENOUS AS NEEDED
OUTPATIENT
Start: 2023-01-24

## 2022-12-09 RX ORDER — ACETAMINOPHEN 325 MG/1
650 TABLET ORAL AS NEEDED
Start: 2023-01-24

## 2022-12-09 RX ORDER — SODIUM CHLORIDE 9 MG/ML
5-250 INJECTION, SOLUTION INTRAVENOUS AS NEEDED
OUTPATIENT
Start: 2023-01-03

## 2022-12-09 RX ORDER — ONDANSETRON 2 MG/ML
8 INJECTION INTRAMUSCULAR; INTRAVENOUS AS NEEDED
OUTPATIENT
Start: 2023-01-03

## 2022-12-09 RX ORDER — SODIUM CHLORIDE 9 MG/ML
5-250 INJECTION, SOLUTION INTRAVENOUS AS NEEDED
OUTPATIENT
Start: 2023-01-10

## 2022-12-09 RX ORDER — ALBUTEROL SULFATE 0.83 MG/ML
2.5 SOLUTION RESPIRATORY (INHALATION) AS NEEDED
Start: 2022-12-13

## 2022-12-09 RX ORDER — EPINEPHRINE 1 MG/ML
0.3 INJECTION, SOLUTION, CONCENTRATE INTRAVENOUS AS NEEDED
OUTPATIENT
Start: 2023-01-24

## 2022-12-09 RX ORDER — EPINEPHRINE 1 MG/ML
0.3 INJECTION, SOLUTION, CONCENTRATE INTRAVENOUS AS NEEDED
OUTPATIENT
Start: 2023-01-10

## 2022-12-09 RX ORDER — SODIUM CHLORIDE 0.9 % (FLUSH) 0.9 %
5-40 SYRINGE (ML) INJECTION AS NEEDED
OUTPATIENT
Start: 2022-12-27

## 2022-12-09 RX ORDER — DIPHENHYDRAMINE HYDROCHLORIDE 50 MG/ML
25 INJECTION, SOLUTION INTRAMUSCULAR; INTRAVENOUS AS NEEDED
Start: 2022-12-13

## 2022-12-09 RX ORDER — DIPHENHYDRAMINE HYDROCHLORIDE 50 MG/ML
50 INJECTION, SOLUTION INTRAMUSCULAR; INTRAVENOUS AS NEEDED
Start: 2023-01-24

## 2022-12-09 RX ORDER — EPINEPHRINE 1 MG/ML
0.3 INJECTION, SOLUTION, CONCENTRATE INTRAVENOUS AS NEEDED
OUTPATIENT
Start: 2022-12-13

## 2022-12-09 RX ORDER — EPINEPHRINE 1 MG/ML
0.3 INJECTION, SOLUTION, CONCENTRATE INTRAVENOUS AS NEEDED
OUTPATIENT
Start: 2023-01-03

## 2022-12-09 RX ORDER — ACETAMINOPHEN 325 MG/1
650 TABLET ORAL AS NEEDED
Start: 2023-01-10

## 2022-12-09 RX ORDER — DIPHENHYDRAMINE HYDROCHLORIDE 50 MG/ML
50 INJECTION, SOLUTION INTRAMUSCULAR; INTRAVENOUS AS NEEDED
Start: 2023-01-03

## 2022-12-09 RX ORDER — ACETAMINOPHEN 325 MG/1
650 TABLET ORAL AS NEEDED
Start: 2022-12-27

## 2022-12-09 RX ORDER — PROCHLORPERAZINE EDISYLATE 5 MG/ML
10 INJECTION INTRAMUSCULAR; INTRAVENOUS
Status: COMPLETED | OUTPATIENT
Start: 2022-12-09 | End: 2022-12-09

## 2022-12-09 RX ORDER — ALBUTEROL SULFATE 0.83 MG/ML
2.5 SOLUTION RESPIRATORY (INHALATION) AS NEEDED
Start: 2023-01-17

## 2022-12-09 RX ORDER — HEPARIN 100 UNIT/ML
500 SYRINGE INTRAVENOUS AS NEEDED
Start: 2023-01-17

## 2022-12-09 RX ORDER — SODIUM CHLORIDE 9 MG/ML
5-40 INJECTION INTRAMUSCULAR; INTRAVENOUS; SUBCUTANEOUS AS NEEDED
OUTPATIENT
Start: 2022-12-20

## 2022-12-09 RX ORDER — SODIUM CHLORIDE 0.9 % (FLUSH) 0.9 %
5-40 SYRINGE (ML) INJECTION AS NEEDED
OUTPATIENT
Start: 2022-12-20

## 2022-12-09 RX ORDER — SODIUM CHLORIDE 0.9 % (FLUSH) 0.9 %
5-40 SYRINGE (ML) INJECTION AS NEEDED
OUTPATIENT
Start: 2023-01-24

## 2022-12-09 RX ORDER — ONDANSETRON 2 MG/ML
8 INJECTION INTRAMUSCULAR; INTRAVENOUS AS NEEDED
OUTPATIENT
Start: 2022-12-20

## 2022-12-09 RX ORDER — SODIUM CHLORIDE 9 MG/ML
5-250 INJECTION, SOLUTION INTRAVENOUS AS NEEDED
OUTPATIENT
Start: 2022-12-13

## 2022-12-09 RX ORDER — HEPARIN 100 UNIT/ML
500 SYRINGE INTRAVENOUS AS NEEDED
Start: 2023-01-10

## 2022-12-09 RX ORDER — SODIUM CHLORIDE 0.9 % (FLUSH) 0.9 %
5-40 SYRINGE (ML) INJECTION AS NEEDED
OUTPATIENT
Start: 2023-01-10

## 2022-12-09 RX ORDER — ALBUTEROL SULFATE 0.83 MG/ML
2.5 SOLUTION RESPIRATORY (INHALATION) AS NEEDED
Start: 2022-12-20

## 2022-12-09 RX ORDER — ONDANSETRON 2 MG/ML
8 INJECTION INTRAMUSCULAR; INTRAVENOUS AS NEEDED
OUTPATIENT
Start: 2023-01-10

## 2022-12-09 RX ORDER — HEPARIN 100 UNIT/ML
500 SYRINGE INTRAVENOUS AS NEEDED
Start: 2022-12-13

## 2022-12-09 RX ORDER — ALBUTEROL SULFATE 0.83 MG/ML
2.5 SOLUTION RESPIRATORY (INHALATION) AS NEEDED
Start: 2022-12-27

## 2022-12-09 RX ORDER — SODIUM CHLORIDE 0.9 % (FLUSH) 0.9 %
5-40 SYRINGE (ML) INJECTION AS NEEDED
OUTPATIENT
Start: 2023-01-03

## 2022-12-09 RX ORDER — SODIUM CHLORIDE 9 MG/ML
5-40 INJECTION INTRAMUSCULAR; INTRAVENOUS; SUBCUTANEOUS AS NEEDED
OUTPATIENT
Start: 2023-01-03

## 2022-12-09 RX ORDER — HYDROCORTISONE SODIUM SUCCINATE 100 MG/2ML
100 INJECTION, POWDER, FOR SOLUTION INTRAMUSCULAR; INTRAVENOUS AS NEEDED
OUTPATIENT
Start: 2023-01-10

## 2022-12-09 RX ORDER — HYDROCORTISONE SODIUM SUCCINATE 100 MG/2ML
100 INJECTION, POWDER, FOR SOLUTION INTRAMUSCULAR; INTRAVENOUS AS NEEDED
OUTPATIENT
Start: 2022-12-27

## 2022-12-09 RX ORDER — HYDROMORPHONE HYDROCHLORIDE 1 MG/ML
1 INJECTION, SOLUTION INTRAMUSCULAR; INTRAVENOUS; SUBCUTANEOUS
Status: DISCONTINUED | OUTPATIENT
Start: 2022-12-09 | End: 2022-12-10

## 2022-12-09 RX ORDER — PALONOSETRON 0.05 MG/ML
0.25 INJECTION, SOLUTION INTRAVENOUS ONCE
OUTPATIENT
Start: 2022-12-20 | End: 2022-12-20

## 2022-12-09 RX ORDER — IBUPROFEN 200 MG
1 TABLET ORAL DAILY
Status: DISCONTINUED | OUTPATIENT
Start: 2022-12-09 | End: 2022-12-11 | Stop reason: HOSPADM

## 2022-12-09 RX ORDER — IBUPROFEN 200 MG
1 TABLET ORAL DAILY
Status: DISCONTINUED | OUTPATIENT
Start: 2022-12-10 | End: 2022-12-09

## 2022-12-09 RX ORDER — SODIUM CHLORIDE 0.9 % (FLUSH) 0.9 %
5-40 SYRINGE (ML) INJECTION AS NEEDED
OUTPATIENT
Start: 2023-01-17

## 2022-12-09 RX ORDER — ALBUTEROL SULFATE 0.83 MG/ML
2.5 SOLUTION RESPIRATORY (INHALATION) AS NEEDED
Start: 2023-01-24

## 2022-12-09 RX ORDER — SODIUM CHLORIDE 9 MG/ML
5-40 INJECTION INTRAMUSCULAR; INTRAVENOUS; SUBCUTANEOUS AS NEEDED
OUTPATIENT
Start: 2023-01-17

## 2022-12-09 RX ORDER — HYDROCORTISONE SODIUM SUCCINATE 100 MG/2ML
100 INJECTION, POWDER, FOR SOLUTION INTRAMUSCULAR; INTRAVENOUS AS NEEDED
OUTPATIENT
Start: 2022-12-13

## 2022-12-09 RX ORDER — PALONOSETRON 0.05 MG/ML
0.25 INJECTION, SOLUTION INTRAVENOUS ONCE
OUTPATIENT
Start: 2022-12-27 | End: 2022-12-27

## 2022-12-09 RX ORDER — SODIUM CHLORIDE 9 MG/ML
5-40 INJECTION INTRAMUSCULAR; INTRAVENOUS; SUBCUTANEOUS AS NEEDED
OUTPATIENT
Start: 2023-01-24

## 2022-12-09 RX ORDER — DIPHENHYDRAMINE HYDROCHLORIDE 50 MG/ML
25 INJECTION, SOLUTION INTRAMUSCULAR; INTRAVENOUS AS NEEDED
Start: 2023-01-10

## 2022-12-09 RX ORDER — ALBUTEROL SULFATE 0.83 MG/ML
2.5 SOLUTION RESPIRATORY (INHALATION) AS NEEDED
Start: 2023-01-03

## 2022-12-09 RX ORDER — EPINEPHRINE 1 MG/ML
0.3 INJECTION, SOLUTION, CONCENTRATE INTRAVENOUS AS NEEDED
OUTPATIENT
Start: 2023-01-17

## 2022-12-09 RX ORDER — DIPHENHYDRAMINE HYDROCHLORIDE 50 MG/ML
25 INJECTION, SOLUTION INTRAMUSCULAR; INTRAVENOUS ONCE
Status: COMPLETED | OUTPATIENT
Start: 2022-12-09 | End: 2022-12-09

## 2022-12-09 RX ORDER — ACETAMINOPHEN 325 MG/1
650 TABLET ORAL AS NEEDED
Start: 2022-12-13

## 2022-12-09 RX ORDER — SODIUM CHLORIDE 9 MG/ML
5-40 INJECTION INTRAMUSCULAR; INTRAVENOUS; SUBCUTANEOUS AS NEEDED
OUTPATIENT
Start: 2022-12-27

## 2022-12-09 RX ORDER — ACETAMINOPHEN 325 MG/1
650 TABLET ORAL AS NEEDED
Start: 2023-01-17

## 2022-12-09 RX ORDER — PALONOSETRON 0.05 MG/ML
0.25 INJECTION, SOLUTION INTRAVENOUS ONCE
OUTPATIENT
Start: 2022-12-13 | End: 2022-12-13

## 2022-12-09 RX ORDER — HEPARIN 100 UNIT/ML
500 SYRINGE INTRAVENOUS AS NEEDED
Start: 2023-01-24

## 2022-12-09 RX ORDER — KETOROLAC TROMETHAMINE 30 MG/ML
30 INJECTION, SOLUTION INTRAMUSCULAR; INTRAVENOUS
Status: COMPLETED | OUTPATIENT
Start: 2022-12-09 | End: 2022-12-09

## 2022-12-09 RX ORDER — SODIUM CHLORIDE 9 MG/ML
5-40 INJECTION INTRAMUSCULAR; INTRAVENOUS; SUBCUTANEOUS AS NEEDED
OUTPATIENT
Start: 2022-12-13

## 2022-12-09 RX ORDER — SODIUM CHLORIDE 9 MG/ML
5-250 INJECTION, SOLUTION INTRAVENOUS AS NEEDED
OUTPATIENT
Start: 2023-01-24

## 2022-12-09 RX ORDER — DIPHENHYDRAMINE HYDROCHLORIDE 50 MG/ML
50 INJECTION, SOLUTION INTRAMUSCULAR; INTRAVENOUS AS NEEDED
Start: 2023-01-17

## 2022-12-09 RX ORDER — HEPARIN 100 UNIT/ML
500 SYRINGE INTRAVENOUS AS NEEDED
Start: 2022-12-20

## 2022-12-09 RX ORDER — ONDANSETRON 2 MG/ML
8 INJECTION INTRAMUSCULAR; INTRAVENOUS AS NEEDED
OUTPATIENT
Start: 2023-01-24

## 2022-12-09 RX ORDER — DIPHENHYDRAMINE HYDROCHLORIDE 50 MG/ML
50 INJECTION, SOLUTION INTRAMUSCULAR; INTRAVENOUS AS NEEDED
Start: 2022-12-27

## 2022-12-09 RX ORDER — HEPARIN 100 UNIT/ML
500 SYRINGE INTRAVENOUS AS NEEDED
Start: 2023-01-03

## 2022-12-09 RX ORDER — DIPHENHYDRAMINE HYDROCHLORIDE 50 MG/ML
25 INJECTION, SOLUTION INTRAMUSCULAR; INTRAVENOUS AS NEEDED
Start: 2022-12-27

## 2022-12-09 RX ORDER — DIPHENHYDRAMINE HYDROCHLORIDE 50 MG/ML
25 INJECTION, SOLUTION INTRAMUSCULAR; INTRAVENOUS AS NEEDED
Start: 2023-01-24

## 2022-12-09 RX ORDER — ONDANSETRON 2 MG/ML
8 INJECTION INTRAMUSCULAR; INTRAVENOUS AS NEEDED
OUTPATIENT
Start: 2022-12-27

## 2022-12-09 RX ORDER — PALONOSETRON 0.05 MG/ML
0.25 INJECTION, SOLUTION INTRAVENOUS ONCE
OUTPATIENT
Start: 2023-01-10 | End: 2023-01-10

## 2022-12-09 RX ORDER — HYDROCORTISONE SODIUM SUCCINATE 100 MG/2ML
100 INJECTION, POWDER, FOR SOLUTION INTRAMUSCULAR; INTRAVENOUS AS NEEDED
OUTPATIENT
Start: 2022-12-20

## 2022-12-09 RX ORDER — DIPHENHYDRAMINE HYDROCHLORIDE 50 MG/ML
25 INJECTION, SOLUTION INTRAMUSCULAR; INTRAVENOUS AS NEEDED
Start: 2023-01-17

## 2022-12-09 RX ORDER — ONDANSETRON 2 MG/ML
8 INJECTION INTRAMUSCULAR; INTRAVENOUS AS NEEDED
OUTPATIENT
Start: 2022-12-13

## 2022-12-09 RX ORDER — DIPHENHYDRAMINE HYDROCHLORIDE 50 MG/ML
50 INJECTION, SOLUTION INTRAMUSCULAR; INTRAVENOUS AS NEEDED
Start: 2023-01-10

## 2022-12-09 RX ORDER — DIPHENHYDRAMINE HYDROCHLORIDE 50 MG/ML
25 INJECTION, SOLUTION INTRAMUSCULAR; INTRAVENOUS AS NEEDED
Start: 2022-12-20

## 2022-12-09 RX ORDER — HYDROCORTISONE SODIUM SUCCINATE 100 MG/2ML
100 INJECTION, POWDER, FOR SOLUTION INTRAMUSCULAR; INTRAVENOUS AS NEEDED
OUTPATIENT
Start: 2023-01-03

## 2022-12-09 RX ORDER — EPINEPHRINE 1 MG/ML
0.3 INJECTION, SOLUTION, CONCENTRATE INTRAVENOUS AS NEEDED
OUTPATIENT
Start: 2022-12-20

## 2022-12-09 RX ORDER — SODIUM CHLORIDE 9 MG/ML
5-40 INJECTION INTRAMUSCULAR; INTRAVENOUS; SUBCUTANEOUS AS NEEDED
OUTPATIENT
Start: 2023-01-10

## 2022-12-09 RX ORDER — ACETAMINOPHEN 325 MG/1
650 TABLET ORAL AS NEEDED
Start: 2022-12-20

## 2022-12-09 RX ORDER — ALBUTEROL SULFATE 0.83 MG/ML
2.5 SOLUTION RESPIRATORY (INHALATION) AS NEEDED
Start: 2023-01-10

## 2022-12-09 RX ORDER — DIPHENHYDRAMINE HYDROCHLORIDE 50 MG/ML
50 INJECTION, SOLUTION INTRAMUSCULAR; INTRAVENOUS AS NEEDED
Start: 2022-12-20

## 2022-12-09 RX ADMIN — HYDROMORPHONE HYDROCHLORIDE 0.2 MG: 1 INJECTION, SOLUTION INTRAMUSCULAR; INTRAVENOUS; SUBCUTANEOUS at 11:04

## 2022-12-09 RX ADMIN — SODIUM CHLORIDE, PRESERVATIVE FREE 10 ML: 5 INJECTION INTRAVENOUS at 14:00

## 2022-12-09 RX ADMIN — TRAZODONE HYDROCHLORIDE 100 MG: 100 TABLET ORAL at 22:03

## 2022-12-09 RX ADMIN — SODIUM CHLORIDE, PRESERVATIVE FREE 10 ML: 5 INJECTION INTRAVENOUS at 08:19

## 2022-12-09 RX ADMIN — Medication 2 CAPSULE: at 12:46

## 2022-12-09 RX ADMIN — TAMSULOSIN HYDROCHLORIDE 0.4 MG: 0.4 CAPSULE ORAL at 08:42

## 2022-12-09 RX ADMIN — GABAPENTIN 400 MG: 400 CAPSULE ORAL at 22:03

## 2022-12-09 RX ADMIN — Medication 2 CAPSULE: at 10:20

## 2022-12-09 RX ADMIN — Medication 2 UNITS: at 12:40

## 2022-12-09 RX ADMIN — OXYCODONE AND ACETAMINOPHEN 1 TABLET: 5; 325 TABLET ORAL at 08:38

## 2022-12-09 RX ADMIN — BUTALBITAL, ACETAMINOPHEN, AND CAFFEINE 1 TABLET: 50; 325; 40 TABLET ORAL at 09:33

## 2022-12-09 RX ADMIN — PIPERACILLIN AND TAZOBACTAM 3.38 G: 3; .375 INJECTION, POWDER, LYOPHILIZED, FOR SOLUTION INTRAVENOUS at 00:56

## 2022-12-09 RX ADMIN — OXYCODONE AND ACETAMINOPHEN 1 TABLET: 5; 325 TABLET ORAL at 22:03

## 2022-12-09 RX ADMIN — KETOROLAC TROMETHAMINE 30 MG: 30 INJECTION, SOLUTION INTRAMUSCULAR; INTRAVENOUS at 12:42

## 2022-12-09 RX ADMIN — Medication 2 CAPSULE: at 17:19

## 2022-12-09 RX ADMIN — AMLODIPINE BESYLATE 10 MG: 5 TABLET ORAL at 08:47

## 2022-12-09 RX ADMIN — SODIUM CHLORIDE, PRESERVATIVE FREE 10 ML: 5 INJECTION INTRAVENOUS at 08:20

## 2022-12-09 RX ADMIN — GABAPENTIN 400 MG: 400 CAPSULE ORAL at 08:41

## 2022-12-09 RX ADMIN — ENOXAPARIN SODIUM 40 MG: 100 INJECTION SUBCUTANEOUS at 10:19

## 2022-12-09 RX ADMIN — ONDANSETRON 4 MG: 2 INJECTION INTRAMUSCULAR; INTRAVENOUS at 22:01

## 2022-12-09 RX ADMIN — PIPERACILLIN AND TAZOBACTAM 3.38 G: 3; .375 INJECTION, POWDER, LYOPHILIZED, FOR SOLUTION INTRAVENOUS at 08:38

## 2022-12-09 RX ADMIN — Medication 2 UNITS: at 17:18

## 2022-12-09 RX ADMIN — MIRTAZAPINE 30 MG: 15 TABLET, FILM COATED ORAL at 22:02

## 2022-12-09 RX ADMIN — METOPROLOL SUCCINATE 50 MG: 50 TABLET, EXTENDED RELEASE ORAL at 08:47

## 2022-12-09 RX ADMIN — DIPHENHYDRAMINE HYDROCHLORIDE 25 MG: 50 INJECTION, SOLUTION INTRAMUSCULAR; INTRAVENOUS at 12:39

## 2022-12-09 RX ADMIN — PROCHLORPERAZINE EDISYLATE 10 MG: 5 INJECTION INTRAMUSCULAR; INTRAVENOUS at 12:41

## 2022-12-09 RX ADMIN — LISINOPRIL 20 MG: 20 TABLET ORAL at 08:47

## 2022-12-09 RX ADMIN — PIPERACILLIN AND TAZOBACTAM 3.38 G: 3; .375 INJECTION, POWDER, LYOPHILIZED, FOR SOLUTION INTRAVENOUS at 17:18

## 2022-12-09 RX ADMIN — PANTOPRAZOLE SODIUM 40 MG: 40 TABLET, DELAYED RELEASE ORAL at 08:41

## 2022-12-09 RX ADMIN — SODIUM CHLORIDE, PRESERVATIVE FREE 10 ML: 5 INJECTION INTRAVENOUS at 22:04

## 2022-12-09 RX ADMIN — MORPHINE SULFATE 2 MG: 2 INJECTION, SOLUTION INTRAMUSCULAR; INTRAVENOUS at 02:00

## 2022-12-09 RX ADMIN — VANCOMYCIN HYDROCHLORIDE 1500 MG: 10 INJECTION, POWDER, LYOPHILIZED, FOR SOLUTION INTRAVENOUS at 10:19

## 2022-12-09 NOTE — PROGRESS NOTES
Pharmacist Note - Vancomycin Dosing  Therapy day 9  Indication: neck cellulitis/abscess  Current regimen: 1500mg IV Q18H    Recent Labs     12/09/22  0621 12/08/22  0345 12/07/22  0043   WBC 8.0  --  7.3   CREA 0.76 0.74 0.68*   BUN 10 8 5*       A random vancomycin level of 16.5 mcg/mL was obtained and from this level, the patient's AUC24 is calculated to be 477 with the current regimen. Goal target range AUC/HAKAN 400-500      Plan: Continue current regimen. Pharmacy will continue to monitor this patient daily for changes in clinical status and renal function. *Random vancomycin levels are used to calculate AUC/HAKAN, this level should not be interpreted as a trough. Vancomycin has been dosed using Bayesian kinetics software to target an AUC24:HAKAN of 400-600, which provides adequate exposure for as assumed infection due to MRSA with an HAKAN of 1 or less while reducing the risk of nephrotoxicity as seen with traditional trough based dosing goals.

## 2022-12-09 NOTE — PROGRESS NOTES
Physical Therapy    Order received, chart reviewed, evaluation attempted. Pt resting in L SL, hand over face, with family bedside upon arrival. Pt appears miserable and reports 7/10 frontal HA which increases with HOB elevated. Pt's /82 HR 81. Pt requesting follow up once pain is improved.     Thank you,  Susan Lord, PT, DPT

## 2022-12-09 NOTE — PROGRESS NOTES
Bedside shift change report given to [id number] Neri Palmer Md, Dr (oncoming nurse) by Vinay Rodriguez (offgoing nurse). Report included the following information SBAR, Kardex, Procedure Summary, Intake/Output, MAR, Recent Results, and Cardiac Rhythm n/a pacemaker placed this admission .

## 2022-12-09 NOTE — PROGRESS NOTES
Transition of Care  RUR 11% Low  Disposition Home  DME Cane and shower chair  Transportation Family   Follow Up PCP, Specialist    CM following for d/c needs.  Asad Knight

## 2022-12-09 NOTE — WOUND CARE
WOCN Note:     Follow-up visit for neck wound. Chart shows:  Admitted on 12/1/22. Admitted for cellulitis and abscess of neck - seen by Dr. Benoit Dailey, Otolaryngology. Waiting for results of previous fine needle aspiration if available. Assessment:   Appropriately conversational and reports no pain. Mobile and continent. Asking to shave and I assisted him. 1. 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  Cm; did not probe  Pink with some yellow   Clear, serous drainage  Induration noted circumferentially  Tx: cleaned away dried exudate with saline, applied foam cover dressing       Wound Recommendations:    Neck: change dry dressing as often as needed for drainage and comfort     Transition of Care: Plan to follow weekly and as needed while admitted to hospital.       MARIELOS VallecilloN, RN, [facility]  Certified Wound, Ostomy, Continence Nurse  office [phone]  Available via [address] [place] [address]

## 2022-12-09 NOTE — PROGRESS NOTES
[facility] Adult  Hospitalist Group                                                                                          Hospitalist Progress Note  Chad Gomez MD  Answering service: [phone] OR [phone] from in house phone        Date of Service:  2022  NAME:  Maria Fernanda Tsai. :  1955  MRN:  [id number]      Admission Summary: This 67-year-old man with a PMH of HTN, T2DM, Tobacco Dependence and Obesity who presented to the ED with left neck mass- present since 2022. The patient recently relocated to [place], [place] from [place], the initial swelling was evaluated in [place] including biopsy of the mass. The patient was told that the mass is cancerous after biopsy done by ENT Surgeon ( Dr. Emily Frances with [facility] E[facility] service). He was referred to a cancer institute in [facility] but had not yet begun workup/treatment because the patient's wife passed away on . The patient came to [place] to live with his daughter. He was treated with oral antibiotics in the [facility] in early November, swelling progressed and was associated with pain and also with purulent drainage bringing the patient came to [facility]. Work up in ED included a CT scan neck revealed complex fluid collection in the left lateral neck, mass causing the patient difficulty with swallowing and change in vocal quality. Interval history / Subjective:   No acute events overnight, no n/v/d or dyspnea. He has a bifrontal headache that worsened since yesterday, it has been present since admission.      Assessment & Plan:     Left Neck neoplasm, with possible Abscess/Cellulitis:   - per pt, previous bx consistent with cancer  -  CT chest/abdomen/pelvis w/contrast: no identified metastatic disease in thorax, abdomen or pelvis  - obtain records including biopsy report from his ENT office, Dr. Chris Salvador , medical record release form filled out with patient and faxed to their office [phone]   - Dr. Jolene Knight, [place] [facility]: appreciate expertise, not surgically resectable  - SLP recs appreciated: dysphagia, monitor caloric intake  - Heme/Onc - plan to initiate chemo this admission  - Rad Onc - s/p CT sim 12/7  - wound care  - IV ABx: continue Vanc, Zosyn. It is draining spontaneously  - pain management  -ECHO 12/5: Normal left ventricular systolic function with a visually estimated EF of 60 - 65%. Left ventricle size is normal. Mildly increased wall thickness. Findings consistent with mild concentric hypertrophy. Normal wall motion. Normal diastolic function.  -Nutrition- eval calorie intake vs needs  - PEG deferred, tolerating po intake     Headache: persistent since admission. Migraine?  -trial of ketorolac/compazine/diphenhydramine    Transient AVB: symptomatic with sinus pauses  - s/p leadless PPM 12/7, lower dose metoprolol resumed     HTN:  - continue metoprolol, lisinopril. Amlodipine  - PRN IV hydralazine  - likely worsened by pain/headache     T2DM: ISS ac&hs, hypoglycemic protocol, Hga1c 6.6. Tobacco Dependence: cessation advised, declined nicotine patch.        Can transfer off tele now that cardiac issues stable     DVTppx: start enoxaparin  Code Status: Full Code  Discharge: TBD  Ambulates: independently - OOB and PT/OT today     Hospital Problems  Date Reviewed: 12/1/2022            Codes Class Noted POA    Pacemaker ICD-10-CM: Z95.0  ICD-9-CM: V45.01  12/7/2022 Unknown    Overview Signed 12/7/2022 12:30 PM by Arielle Middleton MD     Leadless pacemaker implant 12/7/2022             AV block ICD-10-CM: I44.30  ICD-9-CM: 426.10  12/7/2022 Unknown        Bradycardia ICD-10-CM: R00.1  ICD-9-CM: 427.89  12/7/2022 Unknown        Mass of left side of neck ICD-10-CM: R22.1  ICD-9-CM: 784.2  12/1/2022 Unknown        * (Principal) Cellulitis and abscess of neck ICD-10-CM: L03.221, L02.11  ICD-9-CM: 682.1  12/1/2022 Yes         Review of Systems:   A comprehensive review of systems was negative except for that written in the HPI. Vital Signs:    Last 24hrs VS reviewed since prior progress note. Most recent are:  Visit Vitals  BP (!) 172/82 (BP 1 Location: Left upper arm, BP Patient Position: At rest;Supine)   Pulse 81   Temp 97.5 °F (36.4 °C)   Resp 16   Ht 5' 4\" (1.626 m)   Wt 78.2 kg (172 lb 6.4 oz)   SpO2 96%   BMI 29.59 kg/m²         Intake/Output Summary (Last 24 hours) at 12/9/2022 1208  Last data filed at 12/9/2022 6882  Gross per 24 hour   Intake 360 ml   Output 400 ml   Net -40 ml          Physical Examination:     I had a face to face encounter with this patient and independently examined them on 12/9/2022 as outlined below:          Constitutional:  No acute distress, cooperative, pleasant    ENT:  Oral mucosa moist. Anicteric sclerae, normal conjunctiva  Neck: L mass w/ purulent drainage, no surrounding cellulitic changes   Resp:  CTA bilaterally. No wheezing/rhonchi/rales. No accessory muscle use. CV:  Regular rhythm, normal rate, S1,S2.    GI/:  Soft, non distended, non tender, no guarding, BS present. Musculoskeletal:  No edema, warm. Neurologic:  Moves all extremities. Grossly non-focal  Skin:  left neck dressing, small amount yellow drainage  Psych:  Good insight, Not anxious nor agitated. Data Review:    Review and/or order of clinical lab test      Labs:     Recent Labs     12/09/22  0621 12/07/22  0043   WBC 8.0 7.3   HGB 12.9 13.4   HCT 39.1 39.8    331       Recent Labs     12/09/22  0621 12/08/22  0345 12/07/22  0043    142 140   K 3.8 4.1 3.6    108 105   CO2 30 31 29   BUN 10 8 5*   CREA 0.76 0.74 0.68*   * 129* 118*   CA 9.0 8.7 8.9   MG 1.9  --   --    PHOS 3.4  --   --        No results for input(s): ALT, AP, TBIL, TBILI, TP, ALB, GLOB, GGT, AML, LPSE in the last 72 hours. No lab exists for component: SGOT, GPT, AMYP, HLPSE  No results for input(s): INR, PTP, APTT, INREXT, INREXT in the last 72 hours.    No results for input(s): FE, TIBC, PSAT, FERR in the last 72 hours. No results found for: FOL, RBCF   No results for input(s): PH, PCO2, PO2 in the last 72 hours. No results for input(s): CPK, CKNDX, TROIQ in the last 72 hours.     No lab exists for component: CPKMB  No results found for: CHOL, CHOLX, CHLST, CHOLV, HDL, HDLP, LDL, LDLC, DLDLP, TGLX, TRIGL, TRIGP, CHHD, CHHDX  Lab Results   Component Value Date/Time    Glucose (POC) 142 (H) 12/09/2022 11:19 AM    Glucose (POC) 134 (H) 12/09/2022 07:03 AM    Glucose (POC) 184 (H) 12/08/2022 09:50 PM    Glucose (POC) 117 12/08/2022 04:29 PM    Glucose (POC) 170 (H) 12/08/2022 12:06 PM     Lab Results   Component Value Date/Time    Color YELLOW/STRAW 12/01/2022 05:08 PM    Appearance CLEAR 12/01/2022 05:08 PM    Specific gravity 1.025 12/01/2022 05:08 PM    pH (UA) 5.5 12/01/2022 05:08 PM    Protein TRACE (A) 12/01/2022 05:08 PM    Glucose Negative 12/01/2022 05:08 PM    Ketone 15 (A) 12/01/2022 05:08 PM    Bilirubin Negative 12/01/2022 05:08 PM    Urobilinogen 0.2 12/01/2022 05:08 PM    Nitrites Negative 12/01/2022 05:08 PM    Leukocyte Esterase Negative 12/01/2022 05:08 PM    Epithelial cells FEW 12/01/2022 05:08 PM    Bacteria Negative 12/01/2022 05:08 PM    WBC 0-4 12/01/2022 05:08 PM    RBC 0-5 12/01/2022 05:08 PM         Medications Reviewed:     Current Facility-Administered Medications   Medication Dose Route Frequency    HYDROmorphone (DILAUDID) injection 1 mg  1 mg IntraVENous Q6H PRN    nicotine (NICODERM CQ) 14 mg/24 hr patch 1 Patch  1 Patch TransDERmal DAILY    enoxaparin (LOVENOX) injection 40 mg  40 mg SubCUTAneous Q24H    sodium chloride (NS) flush 5-40 mL  5-40 mL IntraVENous Q8H    sodium chloride (NS) flush 5-40 mL  5-40 mL IntraVENous PRN    butalbital-acetaminophen-caffeine (FIORICET, ESGIC) -40 mg per tablet 1 Tablet  1 Tablet Oral Q6H PRN    metoprolol succinate (TOPROL-XL) XL tablet 50 mg  50 mg Oral DAILY    ALPRAZolam (XANAX) tablet 0.5 mg  0.5 mg Oral BID PRN    lisinopriL (PRINIVIL, ZESTRIL) tablet 20 mg  20 mg Oral DAILY AFTER BREAKFAST    amLODIPine (NORVASC) tablet 10 mg  10 mg Oral DAILY    cyclobenzaprine (FLEXERIL) tablet 10 mg  10 mg Oral TID PRN    gabapentin (NEURONTIN) capsule 400 mg  400 mg Oral TID    mirtazapine (REMERON) tablet 30 mg  30 mg Oral QHS    pantoprazole (PROTONIX) tablet 40 mg  40 mg Oral DAILY    tamsulosin (FLOMAX) capsule 0.4 mg  0.4 mg Oral DAILY    traZODone (DESYREL) tablet 100 mg  100 mg Oral QHS PRN    sodium chloride (NS) flush 5-40 mL  5-40 mL IntraVENous Q8H    sodium chloride (NS) flush 5-40 mL  5-40 mL IntraVENous PRN    acetaminophen (TYLENOL) tablet 650 mg  650 mg Oral Q6H PRN    Or    acetaminophen (TYLENOL) suppository 650 mg  650 mg Rectal Q6H PRN    polyethylene glycol (MIRALAX) packet 17 g  17 g Oral DAILY PRN    ondansetron (ZOFRAN ODT) tablet 4 mg  4 mg Oral Q8H PRN    Or    ondansetron (ZOFRAN) injection 4 mg  4 mg IntraVENous Q6H PRN    insulin lispro (HUMALOG) injection   SubCUTAneous AC&HS    glucose chewable tablet 16 g  4 Tablet Oral PRN    glucagon (GLUCAGEN) injection 1 mg  1 mg IntraMUSCular PRN    dextrose 10 % infusion 0-250 mL  0-250 mL IntraVENous PRN    oxyCODONE-acetaminophen (PERCOCET) 5-325 mg per tablet 1 Tablet  1 Tablet Oral Q4H PRN    Vancomycin - Pharmacy to Dose   Other Rx Dosing/Monitoring    piperacillin-tazobactam (ZOSYN) 3.375 g in 0.9% sodium chloride (MBP/ADV) 100 mL MBP  3.375 g IntraVENous Q8H    vancomycin (VANCOCIN) 1500 mg in  ml infusion  1,500 mg IntraVENous Q18H    lipase-protease-amylase (ZENPEP 10,000) capsule 2 Capsule  2 Capsule Oral TID WITH MEALS     ______________________________________________________________________  EXPECTED LENGTH OF STAY: 3d 9h  ACTUAL LENGTH OF STAY:          8                 Radha Alves MD

## 2022-12-09 NOTE — PROGRESS NOTES
[facility] at [address]  W: [phone]  F: [phone]    Reason for Evaluation:   Sandra You is a 75 y.o. male who is seen in consultation at the request of Dr. Belinda Butt for evaluation of suspected head and neck cancer. Hematology/Oncology Treatment History:     Presented June 2022 with enlarging next mass, failed to improve with antibiotics  Evaluated by ENT in [place], [place] in 7/2022  FNA 7/26/22 with atypical squamous cells, c/f malignancy  Core biopsy with skeletal muscle, fibrous tissue, and granulation with acute and chronic inflammation. Cytokeratin negative, p40 negative. History of Present Illness:   Sandra You is a pleasant 75 y.o. male with PMHx of HTN, DM, tobacco abuse who presents today for evaluation of suspected head and neck cancer. Patient recently relocated from [place] to [address] [place] [address]. Patient initially presented in 6/2022 with 6 week history of enlarging neck mass, not improved with antibiotics. He had ultrasound that showed irrecgular enhancing mass involving left submandibuar gland, suspicious for cancer. He was seen by Dr. Tru Duran ( ENT in [place]) in July. Follow up CT neck demonstrated soft tissue mass, measuring 5 cm with small annular opening with drainage. Mass noted to be compressing left internal jugular vein. Cytology from FNA on 7/26/22 showed atypical squamous cells concerning for malignancy. He then underwent core biopsy at [facility] in [place], which showed, \"Skeletal muscle, fibrous tissue, and granulation tissue with acute and chronic inflammation. \"  Per pathology comments, the sections contain skeletal muscle with distortion by fibrosis and inflammation with scattered atrophic and degeneraing muscle fibers. Rare epitheliod cells are present; however, pancytokeratin immunostain is geative, and p40 immunostain negative.   The previous cytology specimen reviewed and showed isolated atypical squamous cells an clusters of atypical squamous cells. \"  He was told he had cancer and was referred to oncologist in [place] but never initiated workup/treatment because his wife passed away on Oct. 4.  He then relocated to [place] to live with his three children. He was treated with oral antibiotics in early November for neck cellulitis. Pain and swelling progressed, prompting evaluation to ED.      11/30/22 CT neck: large 4.8 x 3.1 x 4.2 cm rim-enhancing complex collection in left lateral neck, extending from carotid space to the skin surface with associated skin thickening, edema, and prominent left neck lymph nodes. Findings most likely represent an abscess rather than necrotic/superinfected neoplasm. 12/1/22 CT CAP: left neck mass partially visualized. 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 Interval History  Status post permanent pacemaker for AV block. Patient is currently on vanc/zosyn. SLP has evaluated and notes functional oral swallow, mild pharyngeal dysphagia. Patient reports migraine headaches, relieved with Fiorcet and oxycodone. Social History:  Recently moved from [place] to [place] after the passing of his late wife. Living with each of his three children. Review of systems was obtained and pertinent findings reviewed above. Past medical history, social history, family history, medications, and allergies are located in the electronic medical record.     Physical Exam:   Visit Vitals  BP (!) 172/82 (BP 1 Location: Left upper arm, BP Patient Position: At rest;Supine)   Pulse 81   Temp 97.5 °F (36.4 °C)   Resp 16   Ht 5' 4\" (1.626 m)   Wt 172 lb 6.4 oz (78.2 kg)   SpO2 96%   BMI 29.59 kg/m²     ECOG PS: 2  General: no distress  Eyes: anicteric sclerae  HENT: oropharynx clear  Neck: large, ~5 cm firm mass in left submandibular region with ~ 1 cm area of purulent drainage and mild surrounding erythema  Lymphatic: no cervical, supraclavicular, or inguinal adenopathy  Respiratory: normal respiratory effort  CV: no peripheral edema  Ext: warm, well perfused, no edema  GI: soft, nontender, nondistended, no masses  Skin: no rashes, no ecchymoses, no petechiae  Neuro: grossly intact    Results:     Lab Results   Component Value Date/Time    WBC 8.0 12/09/2022 06:21 AM    HGB 12.9 12/09/2022 06:21 AM    HCT 39.1 12/09/2022 06:21 AM    PLATELET 512 70/78/1895 06:21 AM    .0 (H) 12/09/2022 06:21 AM    ABS. NEUTROPHILS 4.6 12/09/2022 06:21 AM     Lab Results   Component Value Date/Time    Sodium 140 12/09/2022 06:21 AM    Potassium 3.8 12/09/2022 06:21 AM    Chloride 107 12/09/2022 06:21 AM    CO2 30 12/09/2022 06:21 AM    Glucose 154 (H) 12/09/2022 06:21 AM    BUN 10 12/09/2022 06:21 AM    Creatinine 0.76 12/09/2022 06:21 AM    Calcium 9.0 12/09/2022 06:21 AM    Glucose (POC) 142 (H) 12/09/2022 11:19 AM     Lab Results   Component Value Date/Time    Bilirubin, total 0.5 12/04/2022 04:36 AM    ALT (SGPT) 26 12/04/2022 04:36 AM    Alk. phosphatase 127 (H) 12/04/2022 04:36 AM    Protein, total 7.2 12/04/2022 04:36 AM    Albumin 2.3 (L) 12/04/2022 04:36 AM    Globulin 4.9 (H) 12/04/2022 04:36 AM     [facility] from [facility]  [facility] summarized above. Test results above have been reviewed. 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 Pathology records reviewed. Cytology from FNA 7/26/22 with \"atypical squamous cells. \"  Follow-up core needle biopsy with \"rare epitheliod cells are present; however, pancytokeratin immunostain is negative, and p40 immunostain negative. \"   Although core needle biopsy not definitive, clinical picture (i.e. atypical squamous cells on FNA, enlarging neck mass with failure to improve to antibiotics in a chronic smoker) most consistent with squamous cell carcinoma of head and neck  Unclear primary - unclear if patient had pan-endoscopy in [place] prior to transfer to [address] [place] [address] negative for distant metastatic disease     Case discussed with Dr. Mckenna Ramirez. As patient is having ongoing complications from locally advanced head and neck cancer, would not recommend delaying treatment to allow for outpatient PET-CT or pan-endoscopy. Therefore, will plan for concurrent chemoradiation with weekly cisplatin. Discussed risks/benefits of chemotherapy, including but not limited to cytopenias, fatigue, increased risk of infection, ototoxicity, renal toxicity, neuropathy, among others. We discussed that chemotherapy works as a radio-sensitizer to enhance effects of radiation and that many side effects of treatment are local toxicities, including odynophagia, esophagitis, erosion, among others. Patient has consented to chemotherapy. #) Left neck abscess and cellulitis: currently on IV antibiotics; plan to transition to PO antibiotics     #) Heat block: Cardiology following, likely due to reflex from mass sitting over his right carotid artery. Monitoring on telemetry    #) Tobacco abuse:     #) HTN: management per medicine    #) Type 2 DM: management per medicine     #) Steatosis/Cirrhosis: incidentally noted on CT A/P    #) Macrocytosis: B12 level supra-therapeutic. Likely due to cirrhosis. #) Migraine headaches: supportive care per primary medicine. Plan:     From oncology perspective, patient may discharge once no longer requiring IV antibiotics.   Plan for outpatient follow up early next week to initiate chemoradiation  Plan to refer to Palliative Care as outpatient for pain management    Signed By: Amanda Bashir MD

## 2022-12-10 LAB
ANION GAP SERPL CALC-SCNC: 4 MMOL/L (ref 5–15)
BUN SERPL-MCNC: 11 MG/DL (ref 6–20)
BUN/CREAT SERPL: 14 (ref 12–20)
CALCIUM SERPL-MCNC: 8.8 MG/DL (ref 8.5–10.1)
CHLORIDE SERPL-SCNC: 107 MMOL/L (ref 97–108)
CO2 SERPL-SCNC: 29 MMOL/L (ref 21–32)
CREAT SERPL-MCNC: 0.79 MG/DL (ref 0.7–1.3)
FOLATE SERPL-MCNC: 16.4 NG/ML (ref 5–21)
GLUCOSE BLD STRIP.AUTO-MCNC: 122 MG/DL (ref 65–117)
GLUCOSE BLD STRIP.AUTO-MCNC: 159 MG/DL (ref 65–117)
GLUCOSE BLD STRIP.AUTO-MCNC: 212 MG/DL (ref 65–117)
GLUCOSE BLD STRIP.AUTO-MCNC: 215 MG/DL (ref 65–117)
GLUCOSE SERPL-MCNC: 202 MG/DL (ref 65–100)
POTASSIUM SERPL-SCNC: 4.2 MMOL/L (ref 3.5–5.1)
SERVICE CMNT-IMP: ABNORMAL
SODIUM SERPL-SCNC: 140 MMOL/L (ref 136–145)

## 2022-12-10 PROCEDURE — 82962 GLUCOSE BLOOD TEST: CPT

## 2022-12-10 PROCEDURE — 65270000029 HC RM PRIVATE

## 2022-12-10 PROCEDURE — 80048 BASIC METABOLIC PNL TOTAL CA: CPT

## 2022-12-10 PROCEDURE — 74011250637 HC RX REV CODE- 250/637: Performed by: NURSE PRACTITIONER

## 2022-12-10 PROCEDURE — 74011250636 HC RX REV CODE- 250/636: Performed by: INTERNAL MEDICINE

## 2022-12-10 PROCEDURE — 74011000250 HC RX REV CODE- 250: Performed by: INTERNAL MEDICINE

## 2022-12-10 PROCEDURE — 36415 COLL VENOUS BLD VENIPUNCTURE: CPT

## 2022-12-10 PROCEDURE — 97116 GAIT TRAINING THERAPY: CPT

## 2022-12-10 PROCEDURE — 74011000250 HC RX REV CODE- 250: Performed by: NURSE PRACTITIONER

## 2022-12-10 PROCEDURE — 74011250637 HC RX REV CODE- 250/637: Performed by: INTERNAL MEDICINE

## 2022-12-10 PROCEDURE — 74011636637 HC RX REV CODE- 636/637: Performed by: HOSPITALIST

## 2022-12-10 PROCEDURE — 74011250636 HC RX REV CODE- 250/636: Performed by: HOSPITALIST

## 2022-12-10 PROCEDURE — 97161 PT EVAL LOW COMPLEX 20 MIN: CPT

## 2022-12-10 PROCEDURE — 74011000258 HC RX REV CODE- 258: Performed by: INTERNAL MEDICINE

## 2022-12-10 PROCEDURE — 74011250637 HC RX REV CODE- 250/637: Performed by: HOSPITALIST

## 2022-12-10 PROCEDURE — 74011636637 HC RX REV CODE- 636/637: Performed by: INTERNAL MEDICINE

## 2022-12-10 PROCEDURE — 82746 ASSAY OF FOLIC ACID SERUM: CPT

## 2022-12-10 RX ORDER — AMOXICILLIN 500 MG/1
500 CAPSULE ORAL EVERY 8 HOURS
Status: DISCONTINUED | OUTPATIENT
Start: 2022-12-10 | End: 2022-12-11 | Stop reason: HOSPADM

## 2022-12-10 RX ORDER — BUTALBITAL, ACETAMINOPHEN AND CAFFEINE 50; 325; 40 MG/1; MG/1; MG/1
1 TABLET ORAL
Status: DISCONTINUED | OUTPATIENT
Start: 2022-12-10 | End: 2022-12-11 | Stop reason: HOSPADM

## 2022-12-10 RX ORDER — KETOROLAC TROMETHAMINE 30 MG/ML
30 INJECTION, SOLUTION INTRAMUSCULAR; INTRAVENOUS
Status: COMPLETED | OUTPATIENT
Start: 2022-12-10 | End: 2022-12-10

## 2022-12-10 RX ORDER — TRAMADOL HYDROCHLORIDE 50 MG/1
50 TABLET ORAL
Status: DISCONTINUED | OUTPATIENT
Start: 2022-12-10 | End: 2022-12-11 | Stop reason: HOSPADM

## 2022-12-10 RX ORDER — SUMATRIPTAN 6 MG/.5ML
6 INJECTION, SOLUTION SUBCUTANEOUS ONCE
Status: COMPLETED | OUTPATIENT
Start: 2022-12-10 | End: 2022-12-10

## 2022-12-10 RX ORDER — DOXYCYCLINE HYCLATE 100 MG
100 TABLET ORAL EVERY 12 HOURS
Status: DISCONTINUED | OUTPATIENT
Start: 2022-12-10 | End: 2022-12-11 | Stop reason: HOSPADM

## 2022-12-10 RX ORDER — METOPROLOL TARTRATE 25 MG/1
25 TABLET, FILM COATED ORAL ONCE
Status: DISPENSED | OUTPATIENT
Start: 2022-12-10 | End: 2022-12-11

## 2022-12-10 RX ORDER — HYDROMORPHONE HYDROCHLORIDE 2 MG/1
2 TABLET ORAL
Status: DISCONTINUED | OUTPATIENT
Start: 2022-12-10 | End: 2022-12-11 | Stop reason: HOSPADM

## 2022-12-10 RX ORDER — METOPROLOL SUCCINATE 50 MG/1
100 TABLET, EXTENDED RELEASE ORAL DAILY
Status: DISCONTINUED | OUTPATIENT
Start: 2022-12-11 | End: 2022-12-11 | Stop reason: HOSPADM

## 2022-12-10 RX ADMIN — KETOROLAC TROMETHAMINE 30 MG: 30 INJECTION, SOLUTION INTRAMUSCULAR; INTRAVENOUS at 11:55

## 2022-12-10 RX ADMIN — LISINOPRIL 20 MG: 20 TABLET ORAL at 11:22

## 2022-12-10 RX ADMIN — OXYCODONE AND ACETAMINOPHEN 1 TABLET: 5; 325 TABLET ORAL at 07:20

## 2022-12-10 RX ADMIN — Medication 2 CAPSULE: at 07:20

## 2022-12-10 RX ADMIN — ENOXAPARIN SODIUM 40 MG: 100 INJECTION SUBCUTANEOUS at 11:56

## 2022-12-10 RX ADMIN — GABAPENTIN 400 MG: 400 CAPSULE ORAL at 11:22

## 2022-12-10 RX ADMIN — SODIUM CHLORIDE, PRESERVATIVE FREE 10 ML: 5 INJECTION INTRAVENOUS at 21:22

## 2022-12-10 RX ADMIN — HYDROMORPHONE HYDROCHLORIDE 2 MG: 2 TABLET ORAL at 21:22

## 2022-12-10 RX ADMIN — PIPERACILLIN AND TAZOBACTAM 3.38 G: 3; .375 INJECTION, POWDER, LYOPHILIZED, FOR SOLUTION INTRAVENOUS at 11:23

## 2022-12-10 RX ADMIN — METOPROLOL SUCCINATE 50 MG: 50 TABLET, EXTENDED RELEASE ORAL at 11:23

## 2022-12-10 RX ADMIN — AMOXICILLIN 500 MG: 500 CAPSULE ORAL at 14:52

## 2022-12-10 RX ADMIN — TRAMADOL HYDROCHLORIDE 50 MG: 50 TABLET, COATED ORAL at 23:42

## 2022-12-10 RX ADMIN — Medication 3 UNITS: at 11:53

## 2022-12-10 RX ADMIN — MIRTAZAPINE 30 MG: 15 TABLET, FILM COATED ORAL at 21:16

## 2022-12-10 RX ADMIN — Medication 1 CAPSULE: at 14:52

## 2022-12-10 RX ADMIN — TRAZODONE HYDROCHLORIDE 100 MG: 100 TABLET ORAL at 21:22

## 2022-12-10 RX ADMIN — TAMSULOSIN HYDROCHLORIDE 0.4 MG: 0.4 CAPSULE ORAL at 11:22

## 2022-12-10 RX ADMIN — GABAPENTIN 400 MG: 400 CAPSULE ORAL at 23:34

## 2022-12-10 RX ADMIN — DOXYCYCLINE HYCLATE 100 MG: 100 TABLET, COATED ORAL at 21:16

## 2022-12-10 RX ADMIN — SODIUM CHLORIDE, PRESERVATIVE FREE 10 ML: 5 INJECTION INTRAVENOUS at 21:23

## 2022-12-10 RX ADMIN — VANCOMYCIN HYDROCHLORIDE 1500 MG: 10 INJECTION, POWDER, LYOPHILIZED, FOR SOLUTION INTRAVENOUS at 04:05

## 2022-12-10 RX ADMIN — AMLODIPINE BESYLATE 10 MG: 5 TABLET ORAL at 11:22

## 2022-12-10 RX ADMIN — ALPRAZOLAM 0.5 MG: 0.25 TABLET ORAL at 23:41

## 2022-12-10 RX ADMIN — AMOXICILLIN 500 MG: 500 CAPSULE ORAL at 21:16

## 2022-12-10 RX ADMIN — PIPERACILLIN AND TAZOBACTAM 3.38 G: 3; .375 INJECTION, POWDER, LYOPHILIZED, FOR SOLUTION INTRAVENOUS at 01:12

## 2022-12-10 RX ADMIN — DOXYCYCLINE HYCLATE 100 MG: 100 TABLET, COATED ORAL at 14:52

## 2022-12-10 RX ADMIN — PANTOPRAZOLE SODIUM 40 MG: 40 TABLET, DELAYED RELEASE ORAL at 11:23

## 2022-12-10 RX ADMIN — Medication 2 UNITS: at 23:35

## 2022-12-10 RX ADMIN — SUMATRIPTAN 6 MG: 6 INJECTION SUBCUTANEOUS at 11:59

## 2022-12-10 RX ADMIN — Medication 2 CAPSULE: at 14:53

## 2022-12-10 NOTE — PROGRESS NOTES
PHYSICAL THERAPY EVALUATION/DISCHARGE  Patient: Renata Raza (76 y.o. male)  Date: 12/10/2022  Primary Diagnosis: Mass of left side of neck [R22.1]  Procedure(s) (LRB):  INSERT OR REPLACE TRANSCATH PPM LEADLESS (N/A) 3 Days Post-Op   Precautions:   Fall      ASSESSMENT  Based on the objective data described below, the patient presents with near baseline level of functional mobility, somewhat reduced activity tolerance 2/2 9/10 HA pain, and moderate risk for falls. Patient POD #1 s/p leadless pacemaker insertion. Patient found supine in bed and agreeable to PT. Patient complaining of 9/10 HA pain at rest, BP stable at 150s/70s. Patient completed bed mobility with supervision, sit <> stand transfers with SBA, and ambulated 40 feet with CGA-SBA and SPC, which is patient's baseline. Patient with mild trunk sway but no LOB, and patient stating this is his normal. Patient completed 4 steps with R rail and L SPC with good stability. All VSS throughout session. Patient returned to supine in bed 2/2 HA pain, RN present and providing patient with medication. As patient is near functional baseline, no further PT intervention needed. Recommend OOB to chair and ambulation in hallway with RN 3x/day to prevent functional decline while admitted. Functional Outcome Measure: The patient scored 20/28 on the tinetti outcome measure which is indicative of moderate risk for falls. Other factors to consider for discharge: 9/10 HA pain      Further skilled acute physical therapy is not indicated at this time. PLAN :  Recommendation for discharge: (in order for the patient to meet his/her long term goals)  No skilled physical therapy/ follow up rehabilitation needs identified at this time.     This discharge recommendation:  A follow-up discussion with the attending provider and/or case management is planned    IF patient discharges home will need the following DME: patient owns DME required for discharge SUBJECTIVE:   Patient stated My head is killing me.     OBJECTIVE DATA SUMMARY:   HISTORY:    Past Medical History:   Diagnosis Date    Diabetes ([address]     HTN (hypertension)     Hypercholesteremia     Skin cancer     L sided neck skin cancer cells   No past surgical history on file. Prior level of function: mod I with SPC   Personal factors and/or comorbidities impacting plan of care: neck mass, difficulty swallowing, heart block s/p leadless pacemaker insertion     Home Situation  Home Environment: Private residence  # Steps to Enter: 4  Rails to Enter: Yes  Hand Rails : Bilateral  One/Two Story Residence: Two story, live on 1st floor  Living Alone: No  Support Systems: Child(catarina)  Patient Expects to be Discharged to[de-identified] Home  Current DME Used/Available at Home: Shower chair, Cane, straight, Grab bars  Tub or Shower Type: Tub/Shower combination    EXAMINATION/PRESENTATION/DECISION MAKING:   Critical Behavior:  Neurologic State: Alert  Orientation Level: Oriented X4  Cognition: Appropriate decision making  Safety/Judgement: Awareness of environment  Hearing: Auditory  Auditory Impairment: Hard of hearing, bilateral  Hearing Aids/Status: At home  Skin:  intact  Edema: none noted  Range Of Motion:  AROM: Generally decreased, functional                       Strength:    Strength: Generally decreased, functional                    Tone & Sensation:   Tone: Normal              Sensation: Impaired (neurppathy bilateral hands)               Coordination:  Coordination: Generally decreased, functional  Vision:      Functional Mobility:  Bed Mobility:     Supine to Sit: Supervision  Sit to Supine: Supervision  Scooting: Supervision  Transfers:     Stand to Sit: Supervision                       Balance:   Sitting: Intact; Without support  Standing: Intact; Without support  Ambulation/Gait Training:  Distance (ft): 40 Feet (ft)  Assistive Device: Gait belt;Cane, straight  Ambulation - Level of Assistance: Stand-by assistance        Gait Abnormalities: Decreased step clearance;Trunk sway increased        Base of Support: Widened     Speed/Rachel: Pace decreased (<100 feet/min)  Step Length: Right shortened;Left shortened                     Stairs:  Number of Stairs Trained: 4  Stairs - Level of Assistance: Contact guard assistance   Rail Use: Right  (SPC on L)        Functional Measure:  Tinetti test:    Sitting Balance: 1  Arises: 1  Attempts to Rise: 2  Immediate Standing Balance: 1  Standing Balance: 1  Nudged: 2  Eyes Closed: 1  Turn 360 Degrees - Continuous/Discontinuous: 1  Turn 360 Degrees - Steady/Unsteady: 1  Sitting Down: 1  Balance Score: 12 Balance total score  Indication of Gait: 1  R Step Length/Height: 1  L Step Length/Height: 1  R Foot Clearance: 1  L Foot Clearance: 1  Step Symmetry: 1  Step Continuity: 1  Path: 1  Trunk: 0  Walking Time: 0  Gait Score: 8 Gait total score  Total Score: 20/28 Overall total score         Tinetti Tool Score Risk of Falls  <19 = High Fall Risk  19-24 = Moderate Fall Risk  25-28 = Low Fall Risk  Tinetti ME. Performance-Oriented Assessment of Mobility Problems in Elderly Patients. Leigh 66; [id number].  (Scoring Description: PT Bulletin Feb. 10, 1993)    Older adults: tUe Soliman et al, 2009; n = 1000 [facility] elderly evaluated with ABC, MIHIR, ADL, and IADL)  · Mean MIHIR score for males aged 69-68 years = 26.21(3.40)  · Mean MIHIR score for females age 69-68 years = 25.16(4.30)  · Mean MIHIR score for males over 80 years = 23.29(6.02)  · Mean MIHIR score for females over 80 years = 17.20(8.32)            Physical Therapy Evaluation Charge Determination   History Examination Presentation Decision-Making   MEDIUM  Complexity : 1-2 comorbidities / personal factors will impact the outcome/ POC  LOW Complexity : 1-2 Standardized tests and measures addressing body structure, function, activity limitation and / or participation in recreation  LOW Complexity : Stable, uncomplicated  Other outcome measures tinetti  MEDIUM      Based on the above components, the patient evaluation is determined to be of the following complexity level: LOW     Pain Ratin/10 HA pain - RN aware and providing medication     Activity Tolerance:   Good and SpO2 stable on RA      After treatment patient left in no apparent distress:   Supine in bed, Call bell within reach, and Caregiver / family present    COMMUNICATION/EDUCATION:   The patients plan of care was discussed with: Registered nurse. Fall prevention education was provided and the patient/caregiver indicated understanding.     Thank you for this referral.  Toni Jimenez, PT   Time Calculation: 19 mins

## 2022-12-10 NOTE — PROGRESS NOTES
[facility]  Hospitalist Group                                                                                          Hospitalist Progress Note  Juancho May MD  Answering service: [phone] or [phone] from in house phone        Date of Service:  12/10/2022  NAME:  Yeni Faith. :  1955  MRN:  [id number]      Admission Summary: This 69-year-old man with a PMH of HTN, T2DM, Tobacco Dependence and Obesity who presented to the ED with left neck mass- present since 2022. The patient recently relocated to [facility], [facility] from [place], the initial swelling was evaluated in [place] including biopsy of the mass. The patient was told that the mass is cancerous after biopsy done by ENT Surgeon ( Dr. Samuel Whitney [facility]). He was referred to a cancer institute in [facility] but had not yet begun workup/treatment because the patient's wife passed away on . The patient came to [facility] to live with his daughter. He was treated with oral antibiotics in the [facility] in early November, swelling progressed and was associated with pain and also with purulent drainage bringing the patient came to [facility]. Work up in ED included a CT scan neck revealed complex fluid collection in the left lateral neck, mass causing the patient difficulty with swallowing and change in vocal quality. Interval history / Subjective:   Headache unchanged, didn't get much rest. No n/v/d or dyspnea. Has some neck pain around mass. Assessment & Plan:     Left Neck neoplasm, with possible Abscess/Cellulitis:   - locally advanced SCC  -  CT chest/abdomen/pelvis w/contrast: no identified metastatic disease in thorax, abdomen or pelvis   - Dr. Curtistine Nissen, [facility]: appreciate expertise, not surgically resectable  - Heme/rad-Onc - plan to initiate chemo/rad as an outpatient on   - wound care  - IV ABx: continue Vanc, Zosyn. It is draining spontaneously. Cellulitic area significantly improved; will change IV abx to PO  - pain management - wean off IV Dilaudid  -ECHO 12/5: Normal left ventricular systolic function with a visually estimated EF of 60 - 65%. Left ventricle size is normal. Mildly increased wall thickness. Findings consistent with mild concentric hypertrophy. Normal wall motion. Normal diastolic function.  -Nutrition- eval calorie intake vs needs  - PEG deferred, tolerating po intake     Headache: persistent since admission. Migraine?  -trial of ketorolac/compazine/diphenhydramine - didn't help  - trial sumatriptan with addition ketorolac today. Avoid opiates for headache.  - CT head without acute findings    Transient AVB: symptomatic with sinus pauses  - s/p leadless PPM 12/7, lower dose metoprolol resumed     HTN:  - continue metoprolol, lisinopril. Amlodipine  - PRN IV hydralazine  - likely worsened by pain/headache     T2DM: ISS ac&hs, hypoglycemic protocol, Hga1c 6.6. Tobacco Dependence:  cessation. Provide nicotine patch        DVTppx: sc enoxaparin  Code Status: Full Code  Discharge: home when off IV opiates and headache improved - possibly tomorrow     Hospital Problems  Date Reviewed: 12/1/2022            Codes Class Noted POA    Pacemaker ICD-10-CM: Z95.0  ICD-9-CM: V45.01  12/7/2022 Unknown    Overview Signed 12/7/2022 12:30 PM by Ashley Sosa MD     Leadless pacemaker implant 12/7/2022             AV block ICD-10-CM: I44.30  ICD-9-CM: 426.10  12/7/2022 Unknown        Bradycardia ICD-10-CM: R00.1  ICD-9-CM: 427.89  12/7/2022 Unknown        Mass of left side of neck ICD-10-CM: R22.1  ICD-9-CM: 784.2  12/1/2022 Unknown        * (Principal) Cellulitis and abscess of neck ICD-10-CM: L03.221, L02.11  ICD-9-CM: 682.1  12/1/2022 Yes         Review of Systems:   A comprehensive review of systems was negative except for that written in the HPI. Vital Signs:    Last 24hrs VS reviewed since prior progress note.  Most recent are:  Visit Vitals  BP (!) 153/78   Pulse 100   Temp 97.8 °F (36.6 °C)   Resp 16   Ht 5' 4\" (1.626 m)   Wt 78.2 kg (172 lb 6.4 oz)   SpO2 95%   BMI 29.59 kg/m²       No intake or output data in the 24 hours ending 12/10/22 1316       Physical Examination:     I had a face to face encounter with this patient and independently examined them on 12/10/2022 as outlined below:          Constitutional:  NAD   ENT:  Oral mucosa moist. Anicteric sclerae, normal conjunctiva  Neck: L mass w/ purulent drainage, no surrounding cellulitic changes. Drainage amount improved. Resp:  CTA bilaterally. No wheezing/rhonchi/rales. No accessory muscle use. CV:  Regular rhythm, normal rate, S1,S2.    GI/:  Soft, non distended, non tender, no guarding, BS present. Musculoskeletal:  No edema, warm. Neurologic:  Moves all extremities. Grossly non-focal  Skin:  left neck dressing, small amount yellow drainage  Psych:  not anxious nor agitated            Data Review:    Review and/or order of clinical lab test      Labs:     Recent Labs     12/09/22  0621   WBC 8.0   HGB 12.9   HCT 39.1          Recent Labs     12/10/22  0132 12/09/22  0621 12/08/22  0345    140 142   K 4.2 3.8 4.1    107 108   CO2 29 30 31   BUN 11 10 8   CREA 0.79 0.76 0.74   * 154* 129*   CA 8.8 9.0 8.7   MG  --  1.9  --    PHOS  --  3.4  --        No results for input(s): ALT, AP, TBIL, TBILI, TP, ALB, GLOB, GGT, AML, LPSE in the last 72 hours. No lab exists for component: SGOT, GPT, AMYP, HLPSE  No results for input(s): INR, PTP, APTT, INREXT, INREXT in the last 72 hours. No results for input(s): FE, TIBC, PSAT, FERR in the last 72 hours. Lab Results   Component Value Date/Time    Folate 16.4 12/10/2022 01:32 AM      No results for input(s): PH, PCO2, PO2 in the last 72 hours. No results for input(s): CPK, CKNDX, TROIQ in the last 72 hours.     No lab exists for component: CPKMB  No results found for: CHOL, CHOLX, CHLST, CHOLV, HDL, HDLP, LDL, LDLC, DLDLP, TGLX, TRIGL, TRIGP, CHHD, CHHDX  Lab Results   Component Value Date/Time    Glucose (POC) 215 (H) 12/10/2022 11:29 AM    Glucose (POC) 122 (H) 12/10/2022 07:15 AM    Glucose (POC) 163 (H) 12/09/2022 09:53 PM    Glucose (POC) 157 (H) 12/09/2022 04:26 PM    Glucose (POC) 142 (H) 12/09/2022 11:19 AM     Lab Results   Component Value Date/Time    Color YELLOW/STRAW 12/01/2022 05:08 PM    Appearance CLEAR 12/01/2022 05:08 PM    Specific gravity 1.025 12/01/2022 05:08 PM    pH (UA) 5.5 12/01/2022 05:08 PM    Protein TRACE (A) 12/01/2022 05:08 PM    Glucose Negative 12/01/2022 05:08 PM    Ketone 15 (A) 12/01/2022 05:08 PM    Bilirubin Negative 12/01/2022 05:08 PM    Urobilinogen 0.2 12/01/2022 05:08 PM    Nitrites Negative 12/01/2022 05:08 PM    Leukocyte Esterase Negative 12/01/2022 05:08 PM    Epithelial cells FEW 12/01/2022 05:08 PM    Bacteria Negative 12/01/2022 05:08 PM    WBC 0-4 12/01/2022 05:08 PM    RBC 0-5 12/01/2022 05:08 PM         Medications Reviewed:     Current Facility-Administered Medications   Medication Dose Route Frequency    amoxicillin (AMOXIL) capsule 500 mg  500 mg Oral Q8H    HYDROmorphone (DILAUDID) tablet 2 mg  2 mg Oral Q4H PRN    traMADoL (ULTRAM) tablet 50 mg  50 mg Oral Q6H PRN    butalbital-acetaminophen-caffeine (FIORICET, ESGIC) -40 mg per tablet 1 Tablet  1 Tablet Oral BID PRN    doxycycline (VIBRA-TABS) tablet 100 mg  100 mg Oral Q12H    L.acidophilus-paracasei-S.thermophil-bifidobacter (RISAQUAD) 8 billion cell capsule  1 Capsule Oral DAILY    nicotine (NICODERM CQ) 14 mg/24 hr patch 1 Patch  1 Patch TransDERmal DAILY    enoxaparin (LOVENOX) injection 40 mg  40 mg SubCUTAneous Q24H    sodium chloride (NS) flush 5-40 mL  5-40 mL IntraVENous Q8H    sodium chloride (NS) flush 5-40 mL  5-40 mL IntraVENous PRN    metoprolol succinate (TOPROL-XL) XL tablet 50 mg  50 mg Oral DAILY    ALPRAZolam (XANAX) tablet 0.5 mg  0.5 mg Oral BID PRN    lisinopriL (PRINIVIL, ZESTRIL) tablet 20 mg  20 mg Oral DAILY AFTER BREAKFAST    amLODIPine (NORVASC) tablet 10 mg  10 mg Oral DAILY    gabapentin (NEURONTIN) capsule 400 mg  400 mg Oral TID    mirtazapine (REMERON) tablet 30 mg  30 mg Oral QHS    pantoprazole (PROTONIX) tablet 40 mg  40 mg Oral DAILY    tamsulosin (FLOMAX) capsule 0.4 mg  0.4 mg Oral DAILY    traZODone (DESYREL) tablet 100 mg  100 mg Oral QHS PRN    sodium chloride (NS) flush 5-40 mL  5-40 mL IntraVENous Q8H    sodium chloride (NS) flush 5-40 mL  5-40 mL IntraVENous PRN    acetaminophen (TYLENOL) tablet 650 mg  650 mg Oral Q6H PRN    Or    acetaminophen (TYLENOL) suppository 650 mg  650 mg Rectal Q6H PRN    polyethylene glycol (MIRALAX) packet 17 g  17 g Oral DAILY PRN    ondansetron (ZOFRAN ODT) tablet 4 mg  4 mg Oral Q8H PRN    Or    ondansetron (ZOFRAN) injection 4 mg  4 mg IntraVENous Q6H PRN    insulin lispro (HUMALOG) injection   SubCUTAneous AC&HS    glucose chewable tablet 16 g  4 Tablet Oral PRN    glucagon (GLUCAGEN) injection 1 mg  1 mg IntraMUSCular PRN    dextrose 10 % infusion 0-250 mL  0-250 mL IntraVENous PRN    lipase-protease-amylase (ZENPEP 10,000) capsule 2 Capsule  2 Capsule Oral TID WITH MEALS     ______________________________________________________________________  EXPECTED LENGTH OF STAY: 3d 9h  ACTUAL LENGTH OF STAY:          9                 Nina Yusuf MD

## 2022-12-11 VITALS
DIASTOLIC BLOOD PRESSURE: 85 MMHG | WEIGHT: 172.4 LBS | TEMPERATURE: 97.5 F | BODY MASS INDEX: 29.43 KG/M2 | HEIGHT: 64 IN | HEART RATE: 103 BPM | RESPIRATION RATE: 18 BRPM | OXYGEN SATURATION: 97 % | SYSTOLIC BLOOD PRESSURE: 153 MMHG

## 2022-12-11 LAB
GLUCOSE BLD STRIP.AUTO-MCNC: 313 MG/DL (ref 65–117)
GLUCOSE BLD STRIP.AUTO-MCNC: 347 MG/DL (ref 65–117)
SERVICE CMNT-IMP: ABNORMAL
SERVICE CMNT-IMP: ABNORMAL

## 2022-12-11 PROCEDURE — 74011250637 HC RX REV CODE- 250/637: Performed by: NURSE PRACTITIONER

## 2022-12-11 PROCEDURE — 74011250637 HC RX REV CODE- 250/637: Performed by: INTERNAL MEDICINE

## 2022-12-11 PROCEDURE — 82962 GLUCOSE BLOOD TEST: CPT

## 2022-12-11 PROCEDURE — 74011636637 HC RX REV CODE- 636/637: Performed by: INTERNAL MEDICINE

## 2022-12-11 PROCEDURE — 74011250637 HC RX REV CODE- 250/637: Performed by: HOSPITALIST

## 2022-12-11 RX ORDER — HYDROMORPHONE HYDROCHLORIDE 2 MG/1
2 TABLET ORAL
Qty: 20 TABLET | Refills: 0 | Status: SHIPPED | OUTPATIENT
Start: 2022-12-11 | End: 2022-12-14

## 2022-12-11 RX ORDER — METOPROLOL SUCCINATE 100 MG/1
100 TABLET, EXTENDED RELEASE ORAL DAILY
Qty: 30 TABLET | Refills: 0 | Status: SHIPPED | OUTPATIENT
Start: 2022-12-12 | End: 2023-01-11

## 2022-12-11 RX ORDER — DOXYCYCLINE HYCLATE 100 MG
100 TABLET ORAL EVERY 12 HOURS
Qty: 7 TABLET | Refills: 0 | Status: SHIPPED | OUTPATIENT
Start: 2022-12-11 | End: 2022-12-15

## 2022-12-11 RX ORDER — AMLODIPINE BESYLATE 10 MG/1
10 TABLET ORAL DAILY
Qty: 30 TABLET | Refills: 0 | Status: SHIPPED | OUTPATIENT
Start: 2022-12-12 | End: 2023-01-11

## 2022-12-11 RX ORDER — LISINOPRIL 20 MG/1
20 TABLET ORAL
Qty: 30 TABLET | Refills: 0 | Status: SHIPPED | OUTPATIENT
Start: 2022-12-12 | End: 2023-01-11

## 2022-12-11 RX ORDER — BUTALBITAL, ACETAMINOPHEN AND CAFFEINE 50; 325; 40 MG/1; MG/1; MG/1
1 TABLET ORAL
Qty: 15 TABLET | Refills: 0 | Status: SHIPPED | OUTPATIENT
Start: 2022-12-11

## 2022-12-11 RX ORDER — AMOXICILLIN 500 MG/1
500 CAPSULE ORAL EVERY 8 HOURS
Qty: 12 CAPSULE | Refills: 0 | Status: SHIPPED | OUTPATIENT
Start: 2022-12-11 | End: 2022-12-15

## 2022-12-11 RX ADMIN — GABAPENTIN 400 MG: 400 CAPSULE ORAL at 10:11

## 2022-12-11 RX ADMIN — Medication 2 CAPSULE: at 10:13

## 2022-12-11 RX ADMIN — LISINOPRIL 20 MG: 20 TABLET ORAL at 10:11

## 2022-12-11 RX ADMIN — TRAMADOL HYDROCHLORIDE 50 MG: 50 TABLET, COATED ORAL at 06:50

## 2022-12-11 RX ADMIN — TAMSULOSIN HYDROCHLORIDE 0.4 MG: 0.4 CAPSULE ORAL at 10:10

## 2022-12-11 RX ADMIN — Medication 7 UNITS: at 06:58

## 2022-12-11 RX ADMIN — DOXYCYCLINE HYCLATE 100 MG: 100 TABLET, COATED ORAL at 10:10

## 2022-12-11 RX ADMIN — AMLODIPINE BESYLATE 10 MG: 5 TABLET ORAL at 10:10

## 2022-12-11 RX ADMIN — AMOXICILLIN 500 MG: 500 CAPSULE ORAL at 06:30

## 2022-12-11 RX ADMIN — Medication 1 CAPSULE: at 10:10

## 2022-12-11 RX ADMIN — METOPROLOL SUCCINATE 100 MG: 50 TABLET, EXTENDED RELEASE ORAL at 10:11

## 2022-12-11 RX ADMIN — BUTALBITAL, ACETAMINOPHEN, AND CAFFEINE 1 TABLET: 50; 325; 40 TABLET ORAL at 06:30

## 2022-12-11 RX ADMIN — HYDROMORPHONE HYDROCHLORIDE 2 MG: 2 TABLET ORAL at 03:11

## 2022-12-11 RX ADMIN — PANTOPRAZOLE SODIUM 40 MG: 40 TABLET, DELAYED RELEASE ORAL at 10:10

## 2022-12-11 NOTE — DISCHARGE SUMMARY
Discharge Summary     Patient: Reanna Lind MRN: [id number]  SSN: [id number]    YOB: 1955  Age: 75 y.o. Sex: male       Admit Date: 12/1/2022    Discharge Date: 12/11/2022      Admission Diagnoses: Mass of left side of neck [R22.1]    Discharge Diagnoses:   SCC of head and neck  Neck cellulitis  AV block with prolonged pauses, s/p pacemaker placement  Headache  Hypertension  Type 2 DM  Tobacco use      Discharge Condition: Stable    Hospital Course: 76 yo man w/ multiple medical problems who presented to the ED with a left neck mass. He located to [address] [place] [address] from [place], initially had a biopsy there but did not have further work-up/ treatment. Records obtained consistent with SCC. Work-up here showed cutaneous drainage from a fungating mass with cellulitic changes. He was started on IV antibiotics and transitioned to PO. The cellulitis drastically improved but is likely to continue draining due to the fungating nature of the mass. He was also noted to have a heart block with prolonged pauses and ultimately needed a pacemaker. Oncology consulted and plans to begin chemo/radiation as an outpatient early next week. The patient complained of a headache since admission that has persisted. Head CT without acute findings. Trial of migraine therapies without improvement. On further assessment it appears this is related to the mass as he endorsed origin from his left neck with radiation to the frontal region. Significant Diagnostic Studies:   CT GUIDED THERAPY PLAN/PLACEMENT    Result Date: 12/7/2022  CT for pretherapy planning. Disposition: home    S: sleeping this AM when I saw him, easily awakened, reports a persistent headache. Tolerating diet, no n/v/d. Neck mass draining less.     Visit Vitals  BP (!) 153/85 (BP 1 Location: Right arm)   Pulse (!) 103   Temp 97.5 °F (36.4 °C)   Resp 18   Ht 5' 4\" (1.626 m)   Wt 78.2 kg (172 lb 6.4 oz)   SpO2 97%   BMI 29.59 kg/m²     NAD  RRR  Lungs CTAB  Abd soft NT  Skin L neck mass w/ scant drainage and resolving cellulitic changes      Discharge Medications:   Current Discharge Medication List        START taking these medications    Details   amLODIPine (NORVASC) 10 mg tablet Take 1 Tablet by mouth daily for 30 days. Qty: 30 Tablet, Refills: 0      amoxicillin (AMOXIL) 500 mg capsule Take 1 Capsule by mouth every eight (8) hours for 12 doses. Qty: 12 Capsule, Refills: 0      butalbital-acetaminophen-caffeine (FIORICET, ESGIC) -40 mg per tablet Take 1 Tablet by mouth two (2) times daily as needed for Headache for up to 15 doses. Qty: 15 Tablet, Refills: 0      doxycycline (VIBRA-TABS) 100 mg tablet Take 1 Tablet by mouth every twelve (12) hours for 7 doses. Qty: 7 Tablet, Refills: 0      HYDROmorphone (DILAUDID) 2 mg tablet Take 1 Tablet by mouth every four (4) hours as needed for Pain for up to 3 days. Max Daily Amount: 12 mg.  Qty: 20 Tablet, Refills: 0    Associated Diagnoses: Squamous cell carcinoma of head and neck (HCC)      lisinopriL (PRINIVIL, ZESTRIL) 20 mg tablet Take 1 Tablet by mouth daily (after breakfast) for 30 days. Qty: 30 Tablet, Refills: 0      metoprolol succinate (TOPROL-XL) 100 mg tablet Take 1 Tablet by mouth daily for 30 days. Indications: high blood pressure  Qty: 30 Tablet, Refills: 0           CONTINUE these medications which have NOT CHANGED    Details   cyclobenzaprine (FLEXERIL) 10 mg tablet Take 10 mg by mouth three (3) times daily as needed for Muscle Spasm(s). Pt takes for pain, prescribed by an Liborio from [place]  Indications: muscle spasm      colchicine 0.6 mg tablet Take 0.6 mg by mouth as needed for Gout or Pain. Takes as needed for gout      gabapentin (NEURONTIN) 400 mg capsule Take 400 mg by mouth three (3) times daily. SITagliptin (JANUVIA) 100 mg tablet Take 100 mg by mouth daily. mirtazapine (REMERON) 30 mg tablet Take  by mouth nightly. pantoprazole (PROTONIX) 40 mg tablet Take 40 mg by mouth daily. tamsulosin (FLOMAX) 0.4 mg capsule Take 0.4 mg by mouth daily. traZODone (DESYREL) 100 mg tablet Take 100 mg by mouth nightly as needed for Sleep. Patient occasionally alternates with mirtazapine for a few days when mirtazapine stops helping his sleep. STOP taking these medications       metoprolol tartrate (LOPRESSOR) 50 mg tablet Comments:   Reason for Stopping:         traMADoL (ULTRAM) 50 mg tablet Comments:   Reason for Stopping:         mupirocin (BACTROBAN) 2 % ointment Comments:   Reason for Stopping:             Future Appointments   Date Time Provider [facility]   12/13/2022  7:30 AM B2 MADELYN LONG [address] [place] [address]   12/13/2022  8:00 AM Filomena Kenny  [address] BS AMB   12/20/2022  7:30 AM A2 MADELYN LONG [address] [place] [address]   12/27/2022  7:30 AM A2 MADELYN LONG [place] [facility]   3/15/2023  9:00 AM PACEMAKER, STFRANCES [facility] BS AMB   3/15/2023  9:20 AM Ivana Burdick MD [facility] BS AMB       Signed By: Alejandra Riddle MD     December 11, 2022      Greater than 30 minutes spent on discharge management.

## 2022-12-12 ENCOUNTER — HOSPITAL ENCOUNTER (OUTPATIENT)
Dept: RADIATION THERAPY | Age: 67
Discharge: HOME | End: 2022-12-12
Payer: MEDICARE

## 2022-12-12 DIAGNOSIS — F41.9 ANXIETY IN CANCER PATIENT: Primary | ICD-10-CM

## 2022-12-12 PROCEDURE — 77300 RADIATION THERAPY DOSE PLAN: CPT

## 2022-12-12 PROCEDURE — 77301 RADIOTHERAPY DOSE PLAN IMRT: CPT

## 2022-12-12 PROCEDURE — 77338 DESIGN MLC DEVICE FOR IMRT: CPT

## 2022-12-12 RX ORDER — DEXAMETHASONE 4 MG/1
8 TABLET ORAL DAILY
Qty: 24 TABLET | Refills: 0 | Status: CANCELLED | OUTPATIENT
Start: 2022-12-12

## 2022-12-12 RX ORDER — DIAZEPAM 5 MG/1
TABLET ORAL
Qty: 40 TABLET | Refills: 0 | Status: SHIPPED | OUTPATIENT
Start: 2022-12-12 | End: 2022-12-27 | Stop reason: SDUPTHER

## 2022-12-12 NOTE — PROGRESS NOTES
[address]  W: [phone]  F: [phone]    Reason for Visit:   Lynn Villa is a 75 y.o. male who is seen in consultation at the request of Dr. Anna Medrano for evaluation of locally advanced [facility]    Hematology/Oncology Treatment History:     PRIMARY DIAGNOSIS: Locally advanced HNSCC    DATE OF DIAGNOSIS: 7/26/22  ORIGINAL STAGE: Stage CAN (cT0 cN2b cM0)  DIAGNOSTICS: FNA ([address] [place] [address]) with atypical squamous cells concerning for malignancy. Core biopsy with skeletal muscle, fibrous tissue, and granulation with acute and chronic inflammation. Cytokeratin negative, p40 negative. SITES OF DISEASE: Left neck mass with prominent left-sided lymph nodes. Pan-endoscopy and PET-CT not preformed given delays in patient's treatment  PRIOR TREATMENT: None    CURRENT TREATMENT: concurrent chemoradiation with weekly cisplatin 40 mg/m2 (D1C1 = 12/13/22)    History of Present Illness:   Lynn Villa is a pleasant 75 y.o. male who presents today for evaluation of stage CAN HNSCC of left neck. Presented June 2022 with enlarging next mass, failed to improve with antibiotics  Evaluated by ENT (Dr. Brooke Carrillo) in [address] [place] [address] in 7/2022. uUtrasound that showed irrecgular enhancing mass involving left submandibuar gland, suspicious for cancer. Follow up CT neck demonstrated soft tissue mass, measuring 5 cm with small annular opening with drainage. Mass noted to be compressing left internal jugular vein. FNA 7/26/22 with atypical squamous cells, c/f malignancy  Core biopsy with skeletal muscle, fibrous tissue, and granulation with acute and chronic inflammation. Cytokeratin negative, p40 negative. Patient referred to medical and radiation oncology in [place], but did not follow up due to passing of his wife  Presented to [facility] 12/1/2022 with progressively worsening neck pain and swelling as well as purulent cellulitis/abscess. Treated with IV Antibiotics and discharged on PO. Hospital course complicated by AV block with prolonged pause (likely due to reflex from compression of mass on right carotid artery) s/p ppm.    11/30/22 CT neck: large 4.8 x 3.1 x 4.2 cm rim-enhancing complex collection in left lateral neck, extending from carotid space to the skin surface with associated skin thickening, edema, and prominent left neck lymph nodes. Findings most likely represent an abscess rather than necrotic/superinfected neoplasm. 12/1/22 CT CAP: left neck mass partially visualized. 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 He presents today with his son Quique Garsia. Patient reports intractable migraine since leaving the hospital. Alternating tylenol and ibuprofen. Has not used any Fiorcet  Has not taken any dilaudid. Pain controlled other than the headache  Bowels regular  Patient states that he feels hungry but does not have a desire to eat. Remains on amoxicillin and doxycycline. No fevers. Continues to have non-odorous drainage from neck mass. Social History:  Recently moved from [place] to [place] after the passing of his late wife. Living with each of his three children. Oldest son Quique Garsia. Family History: reviewed, non-contributory. Review of systems was obtained and pertinent findings reviewed above. Past medical history, social history, family history, medications, and allergies are located in the electronic medical record.     Physical Exam:   Visit Vitals  /71 (BP 1 Location: Left upper arm, BP Patient Position: Sitting)   Pulse (!) 117   Temp 97.1 °F (36.2 °C) (Temporal)   Ht 5' 4\" (1.626 m)   Wt 169 lb 6.4 oz (76.8 kg)   SpO2 95%   BMI 29.08 kg/m²     ECOG PS: 1  General: no distress, appears uncomfortable  Eyes: anicteric sclerae  HENT: oropharynx clear  Neck: large ~5 cm firm mass in left submandibular region with ~1 cm area of serous discharge, no surrounding erythema  Lymphatic: no right-sided cervical adenopathy, no supraclavicular adenopathy bilaterally  Respiratory: normal respiratory effort, CTAB, no wheezes, rales, rhonci  CV: no peripheral edema  Ext: warm, well perfused  GI: soft, nontender, nondistended, no masses  Skin: no rashes, no ecchymoses, no petechiae    Results:     Lab Results   Component Value Date/Time    WBC 9.5 12/13/2022 07:44 AM    HGB 14.1 12/13/2022 07:44 AM    HCT 41.2 12/13/2022 07:44 AM    PLATELET 051 (H) 26/57/9037 07:44 AM    MCV 99.3 (H) 12/13/2022 07:44 AM    ABS. NEUTROPHILS 6.3 12/13/2022 07:44 AM     Lab Results   Component Value Date/Time    Sodium 136 12/13/2022 07:44 AM    Potassium 4.0 12/13/2022 07:44 AM    Chloride 102 12/13/2022 07:44 AM    CO2 26 12/13/2022 07:44 AM    Glucose 161 (H) 12/13/2022 07:44 AM    BUN 8 12/13/2022 07:44 AM    Creatinine 0.71 12/13/2022 07:44 AM    Calcium 9.0 12/13/2022 07:44 AM    Glucose (POC) 313 (H) 12/11/2022 11:30 AM     Lab Results   Component Value Date/Time    Bilirubin, total 0.6 12/13/2022 07:44 AM    ALT (SGPT) 40 12/13/2022 07:44 AM    Alk. phosphatase 136 (H) 12/13/2022 07:44 AM    Protein, total 8.8 (H) 12/13/2022 07:44 AM    Albumin 3.1 (L) 12/13/2022 07:44 AM    Globulin 5.7 (H) 12/13/2022 07:44 AM     Records from [place] reviewed and summarized above. Test results above have been reviewed. Assessment:     Mr. Olegario Trejo is a pleasant 76 yo male with PMHx of hypertension, diabetes, and hepatic steatosis/possible cirrhosis who presents for evaluation of stage CAN HNSCC of unknown primary    #) Stage CAN Squamous Cell Carcinoma of unknown primary:  CT neck with 4.8 x 3.1 cm mass/collection in left lateral neck with prominent cervical lymph nodes. Pathology records from [place] reviewed. Cytology from FNA 7/26/22 with \"atypical squamous cells. \"  Follow-up core needle biopsy with \"rare epitheliod cells are present; however, pancytokeratin immunostain is negative, and p40 immunostain negative. \"   Although core needle biopsy not definitive, clinical picture (i.e. atypical squamous cells on FNA, enlarging neck mass with failure to improve to antibiotics in a chronic smoker) most consistent with squamous cell carcinoma of head and neck  Unclear primary. Given ongoing complications from untreated disease (heart block, recurrent infection), elected not to pursue PET-CT or pan-endoscopy while inpatient to minimize delays in treatment   Staging CT CAP negative for distant metastatic disease     Presents today for consideration of cycle 1 of concurrent chemoradiation with weekly cisplatin 40 mg/m2. We reviewed risks/benefits of chemotherapy, including but not limited to cytopenias, fatigue, increased risk of infection, ototoxicity, renal toxicity, neuropathy, among others. We discussed that chemotherapy works as a radio-sensitizer to enhance effects of radiation and that many side effects of treatment are local toxicities, including mucositis, odynophagia, dehydration, among others. Patient has consented to chemotherapy. #) Intractable migraine headaches: controlled with Excedrin and migraine cocktail while inpatient, now unrelieved with alternating tylenol and ibuprofen. #) Left neck abscess and cellulitis: currently finishing out course of antibiotics with doxycycline and amoxicillin      #) Moderate protein-calorie malnutrition: poor oral intake since hospital discharge. Will refer for PEG tube placement in anticipation of worsening PO intake during CRT    #) Heart block: Likely due to reflex from mass sitting over his right carotid artery. Status post PPM on 22. #) Tobacco abuse: counseled on cessation     #) Steatosis/Cirrhosis: incidentally noted on CT A/P     #) Macrocytosis: B12 level supra-therapeutic. Likely due to cirrhosis. #) Psychosocial: wife recently .   Moved from [place] to be closer to his children. Has good support from family (oldest son John Almeida is HCPOA). Plan:     Proceed with cycle 1, week 1 of Cisplatin (40mg/m2), given every week concurrently with radiation. Radiation therapy with Dr. Valeria Obando before each treatment: CBC, BMP, Mag  Antiemetic prophylaxis: Ondansetron, Dexamethasone, Fosaprepitant prior to each chemotherapy. Dexamethasone for 2 days after therapy at home. PRN antiemetics at home: Ondansetron, Prochlorperazine, Lorazepam  Encouraged patient to push fluids (2L+ per day)  Referral to IR for PEG tube placement  Dietitian Cole Calloway aware  Referral to Palliative Care for symptom management  Obtain CT head w/ and w/o contrast (patient unable to tolerate MRI due to claustrophobia)  Return to see me in 1 week with labs, office visit, and infusion    The patient requires intensive monitoring for toxicity from therapy with labs and physical exam weekly while on chemoradiation.     Signed By: Camryn Oneil MD

## 2022-12-13 ENCOUNTER — PATIENT MESSAGE (OUTPATIENT)
Dept: ONCOLOGY | Age: 67
End: 2022-12-13

## 2022-12-13 ENCOUNTER — TELEPHONE (OUTPATIENT)
Dept: ONCOLOGY | Age: 67
End: 2022-12-13

## 2022-12-13 ENCOUNTER — DOCUMENTATION ONLY (OUTPATIENT)
Dept: ONCOLOGY | Age: 67
End: 2022-12-13

## 2022-12-13 ENCOUNTER — HOSPITAL ENCOUNTER (OUTPATIENT)
Dept: RADIATION THERAPY | Age: 67
Discharge: HOME | End: 2022-12-13
Payer: MEDICARE

## 2022-12-13 ENCOUNTER — HOSPITAL ENCOUNTER (OUTPATIENT)
Dept: INFUSION THERAPY | Age: 67
Discharge: HOME | End: 2022-12-13
Payer: MEDICARE

## 2022-12-13 ENCOUNTER — OFFICE VISIT (OUTPATIENT)
Dept: ONCOLOGY | Age: 67
End: 2022-12-13
Payer: MEDICARE

## 2022-12-13 VITALS
DIASTOLIC BLOOD PRESSURE: 87 MMHG | RESPIRATION RATE: 18 BRPM | WEIGHT: 173 LBS | BODY MASS INDEX: 29.53 KG/M2 | TEMPERATURE: 97.1 F | HEART RATE: 107 BPM | SYSTOLIC BLOOD PRESSURE: 146 MMHG | HEIGHT: 64 IN

## 2022-12-13 VITALS
HEIGHT: 64 IN | OXYGEN SATURATION: 95 % | DIASTOLIC BLOOD PRESSURE: 71 MMHG | TEMPERATURE: 97.1 F | HEART RATE: 117 BPM | WEIGHT: 169.4 LBS | SYSTOLIC BLOOD PRESSURE: 139 MMHG | BODY MASS INDEX: 28.92 KG/M2

## 2022-12-13 DIAGNOSIS — D75.89 MACROCYTOSIS: ICD-10-CM

## 2022-12-13 DIAGNOSIS — E44.0 MODERATE PROTEIN-CALORIE MALNUTRITION (HCC): ICD-10-CM

## 2022-12-13 DIAGNOSIS — L02.11 CELLULITIS AND ABSCESS OF NECK: ICD-10-CM

## 2022-12-13 DIAGNOSIS — L03.221 CELLULITIS AND ABSCESS OF NECK: ICD-10-CM

## 2022-12-13 DIAGNOSIS — C76.0 SQUAMOUS CELL CARCINOMA OF HEAD AND NECK (HCC): Primary | ICD-10-CM

## 2022-12-13 DIAGNOSIS — Z72.0 TOBACCO ABUSE: ICD-10-CM

## 2022-12-13 DIAGNOSIS — G43.519 INTRACTABLE PERSISTENT MIGRAINE AURA WITHOUT CEREBRAL INFARCTION AND WITHOUT STATUS MIGRAINOSUS: ICD-10-CM

## 2022-12-13 DIAGNOSIS — Z79.899 HIGH RISK MEDICATION USE: ICD-10-CM

## 2022-12-13 LAB
ALBUMIN SERPL-MCNC: 3.1 G/DL (ref 3.5–5)
ALBUMIN/GLOB SERPL: 0.5 {RATIO} (ref 1.1–2.2)
ALP SERPL-CCNC: 136 U/L (ref 45–117)
ALT SERPL-CCNC: 40 U/L (ref 12–78)
ANION GAP SERPL CALC-SCNC: 8 MMOL/L (ref 5–15)
AST SERPL-CCNC: 64 U/L (ref 15–37)
BASOPHILS # BLD: 0.1 K/UL (ref 0–0.1)
BASOPHILS NFR BLD: 1 % (ref 0–1)
BILIRUB SERPL-MCNC: 0.6 MG/DL (ref 0.2–1)
BUN SERPL-MCNC: 8 MG/DL (ref 6–20)
BUN/CREAT SERPL: 11 (ref 12–20)
CALCIUM SERPL-MCNC: 9 MG/DL (ref 8.5–10.1)
CHLORIDE SERPL-SCNC: 102 MMOL/L (ref 97–108)
CO2 SERPL-SCNC: 26 MMOL/L (ref 21–32)
CREAT SERPL-MCNC: 0.71 MG/DL (ref 0.7–1.3)
DIFFERENTIAL METHOD BLD: ABNORMAL
EOSINOPHIL # BLD: 0.1 K/UL (ref 0–0.4)
EOSINOPHIL NFR BLD: 1 % (ref 0–7)
ERYTHROCYTE [DISTWIDTH] IN BLOOD BY AUTOMATED COUNT: 13.2 % (ref 11.5–14.5)
GLOBULIN SER CALC-MCNC: 5.7 G/DL (ref 2–4)
GLUCOSE SERPL-MCNC: 161 MG/DL (ref 65–100)
HBV SURFACE AB SER QL: REACTIVE
HBV SURFACE AB SER-ACNC: 28.8 MIU/ML
HBV SURFACE AG SER QL: <0.1 INDEX
HBV SURFACE AG SER QL: NEGATIVE
HCT VFR BLD AUTO: 41.2 % (ref 36.6–50.3)
HGB BLD-MCNC: 14.1 G/DL (ref 12.1–17)
IMM GRANULOCYTES # BLD AUTO: 0 K/UL (ref 0–0.04)
IMM GRANULOCYTES NFR BLD AUTO: 0 % (ref 0–0.5)
LYMPHOCYTES # BLD: 2.4 K/UL (ref 0.8–3.5)
LYMPHOCYTES NFR BLD: 25 % (ref 12–49)
MAGNESIUM SERPL-MCNC: 1.8 MG/DL (ref 1.6–2.4)
MCH RBC QN AUTO: 34 PG (ref 26–34)
MCHC RBC AUTO-ENTMCNC: 34.2 G/DL (ref 30–36.5)
MCV RBC AUTO: 99.3 FL (ref 80–99)
MONOCYTES # BLD: 0.6 K/UL (ref 0–1)
MONOCYTES NFR BLD: 6 % (ref 5–13)
NEUTS SEG # BLD: 6.3 K/UL (ref 1.8–8)
NEUTS SEG NFR BLD: 67 % (ref 32–75)
NRBC # BLD: 0 K/UL (ref 0–0.01)
NRBC BLD-RTO: 0 PER 100 WBC
PLATELET # BLD AUTO: 493 K/UL (ref 150–400)
PMV BLD AUTO: 9.6 FL (ref 8.9–12.9)
POTASSIUM SERPL-SCNC: 4 MMOL/L (ref 3.5–5.1)
PROT SERPL-MCNC: 8.8 G/DL (ref 6.4–8.2)
RBC # BLD AUTO: 4.15 M/UL (ref 4.1–5.7)
SODIUM SERPL-SCNC: 136 MMOL/L (ref 136–145)
WBC # BLD AUTO: 9.5 K/UL (ref 4.1–11.1)

## 2022-12-13 PROCEDURE — 1111F DSCHRG MED/CURRENT MED MERGE: CPT | Performed by: INTERNAL MEDICINE

## 2022-12-13 PROCEDURE — 86704 HEP B CORE ANTIBODY TOTAL: CPT

## 2022-12-13 PROCEDURE — 74011000250 HC RX REV CODE- 250: Performed by: INTERNAL MEDICINE

## 2022-12-13 PROCEDURE — 96367 TX/PROPH/DG ADDL SEQ IV INF: CPT

## 2022-12-13 PROCEDURE — 80053 COMPREHEN METABOLIC PANEL: CPT

## 2022-12-13 PROCEDURE — G8536 NO DOC ELDER MAL SCRN: HCPCS | Performed by: INTERNAL MEDICINE

## 2022-12-13 PROCEDURE — 3017F COLORECTAL CA SCREEN DOC REV: CPT | Performed by: INTERNAL MEDICINE

## 2022-12-13 PROCEDURE — 74011250636 HC RX REV CODE- 250/636: Performed by: INTERNAL MEDICINE

## 2022-12-13 PROCEDURE — 96413 CHEMO IV INFUSION 1 HR: CPT

## 2022-12-13 PROCEDURE — 1123F ACP DISCUSS/DSCN MKR DOCD: CPT | Performed by: INTERNAL MEDICINE

## 2022-12-13 PROCEDURE — 85025 COMPLETE CBC W/AUTO DIFF WBC: CPT

## 2022-12-13 PROCEDURE — 87340 HEPATITIS B SURFACE AG IA: CPT

## 2022-12-13 PROCEDURE — G8427 DOCREV CUR MEDS BY ELIG CLIN: HCPCS | Performed by: INTERNAL MEDICINE

## 2022-12-13 PROCEDURE — 99205 OFFICE O/P NEW HI 60 MIN: CPT | Performed by: INTERNAL MEDICINE

## 2022-12-13 PROCEDURE — G8417 CALC BMI ABV UP PARAM F/U: HCPCS | Performed by: INTERNAL MEDICINE

## 2022-12-13 PROCEDURE — 74011000258 HC RX REV CODE- 258: Performed by: INTERNAL MEDICINE

## 2022-12-13 PROCEDURE — 83735 ASSAY OF MAGNESIUM: CPT

## 2022-12-13 PROCEDURE — 36415 COLL VENOUS BLD VENIPUNCTURE: CPT

## 2022-12-13 PROCEDURE — 96361 HYDRATE IV INFUSION ADD-ON: CPT

## 2022-12-13 PROCEDURE — 86706 HEP B SURFACE ANTIBODY: CPT

## 2022-12-13 PROCEDURE — G8510 SCR DEP NEG, NO PLAN REQD: HCPCS | Performed by: INTERNAL MEDICINE

## 2022-12-13 PROCEDURE — 96375 TX/PRO/DX INJ NEW DRUG ADDON: CPT

## 2022-12-13 PROCEDURE — 1101F PT FALLS ASSESS-DOCD LE1/YR: CPT | Performed by: INTERNAL MEDICINE

## 2022-12-13 RX ORDER — SODIUM CHLORIDE 9 MG/ML
5-250 INJECTION, SOLUTION INTRAVENOUS AS NEEDED
Status: DISPENSED | OUTPATIENT
Start: 2022-12-13 | End: 2022-12-13

## 2022-12-13 RX ORDER — ONDANSETRON 4 MG/1
4 TABLET, ORALLY DISINTEGRATING ORAL
Qty: 90 TABLET | Refills: 1 | Status: SHIPPED | OUTPATIENT
Start: 2022-12-13

## 2022-12-13 RX ORDER — HEPARIN 100 UNIT/ML
500 SYRINGE INTRAVENOUS AS NEEDED
Status: ACTIVE | OUTPATIENT
Start: 2022-12-13 | End: 2022-12-13

## 2022-12-13 RX ORDER — PALONOSETRON 0.05 MG/ML
0.25 INJECTION, SOLUTION INTRAVENOUS ONCE
Status: COMPLETED | OUTPATIENT
Start: 2022-12-13 | End: 2022-12-13

## 2022-12-13 RX ORDER — ONDANSETRON 2 MG/ML
8 INJECTION INTRAMUSCULAR; INTRAVENOUS AS NEEDED
Status: ACTIVE | OUTPATIENT
Start: 2022-12-13 | End: 2022-12-13

## 2022-12-13 RX ORDER — SODIUM CHLORIDE 9 MG/ML
5-40 INJECTION INTRAMUSCULAR; INTRAVENOUS; SUBCUTANEOUS AS NEEDED
Status: ACTIVE | OUTPATIENT
Start: 2022-12-13 | End: 2022-12-13

## 2022-12-13 RX ORDER — DIPHENHYDRAMINE HYDROCHLORIDE 50 MG/ML
25 INJECTION, SOLUTION INTRAMUSCULAR; INTRAVENOUS AS NEEDED
Status: ACTIVE | OUTPATIENT
Start: 2022-12-13 | End: 2022-12-13

## 2022-12-13 RX ORDER — SODIUM CHLORIDE 0.9 % (FLUSH) 0.9 %
5-40 SYRINGE (ML) INJECTION AS NEEDED
Status: DISPENSED | OUTPATIENT
Start: 2022-12-13 | End: 2022-12-13

## 2022-12-13 RX ORDER — PROCHLORPERAZINE MALEATE 10 MG
10 TABLET ORAL
Qty: 90 TABLET | Refills: 5 | Status: SHIPPED | OUTPATIENT
Start: 2022-12-13

## 2022-12-13 RX ORDER — DEXAMETHASONE 4 MG/1
8 TABLET ORAL
Qty: 24 TABLET | Refills: 0 | Status: SHIPPED | OUTPATIENT
Start: 2022-12-13

## 2022-12-13 RX ORDER — NUT TX, LACT-REDUCED, IRON 0.09G-2.25
5 LIQUID (ML) ORAL DAILY
Qty: 150 EACH | Refills: 3
Start: 2022-12-13

## 2022-12-13 RX ORDER — ACETAMINOPHEN 325 MG/1
650 TABLET ORAL AS NEEDED
Status: ACTIVE | OUTPATIENT
Start: 2022-12-13 | End: 2022-12-13

## 2022-12-13 RX ADMIN — SODIUM CHLORIDE, PRESERVATIVE FREE 10 ML: 5 INJECTION INTRAVENOUS at 13:35

## 2022-12-13 RX ADMIN — SODIUM CHLORIDE 75.2 MG: 9 INJECTION, SOLUTION INTRAVENOUS at 11:19

## 2022-12-13 RX ADMIN — FOSAPREPITANT DIMEGLUMINE 150 MG: 150 INJECTION, POWDER, LYOPHILIZED, FOR SOLUTION INTRAVENOUS at 10:16

## 2022-12-13 RX ADMIN — SODIUM CHLORIDE 25 ML/HR: 9 INJECTION, SOLUTION INTRAVENOUS at 10:11

## 2022-12-13 RX ADMIN — DEXAMETHASONE SODIUM PHOSPHATE 12 MG: 4 INJECTION, SOLUTION INTRAMUSCULAR; INTRAVENOUS at 10:44

## 2022-12-13 RX ADMIN — PALONOSETRON 0.25 MG: 0.05 INJECTION, SOLUTION INTRAVENOUS at 10:12

## 2022-12-13 RX ADMIN — POTASSIUM CHLORIDE: 2 INJECTION, SOLUTION, CONCENTRATE INTRAVENOUS at 11:12

## 2022-12-13 NOTE — PATIENT INSTRUCTIONS
Cancer Effie at [address]: [phone]  F: [phone]    It was a pleasure to see you in clinic today. We will start on treatment today. For nausea, I have prescribed 3 medications  - dexamethasone: take 2 tabs (8 mg) on days 2 and 3 after chemotherapy  - zofran: take 1 tab (4 mg) under the tongue every 8 hours as needed, alternating with  - compazine: take 1 tab (10 mg) every 8 hours as needed    For your migraines, please use the Fiorcet alternating with Tylenol. We will obtain a CT scan of your head to rule out any spread of cancer. I have referred to the Palliative Care doctor for further management of your pain/migraines. Continue to use dilaudid as needed for severe pain and a bowel regimen (miralax + senna) while on dilaudid. I have also referred for feeding tube placement. Return to clinic in 1 week for labs, visit, and chemotherapy.      Brendon Salvador MD

## 2022-12-13 NOTE — PROGRESS NOTES
Pharmacy Note- Chemotherapy Education    [facility] Carlos. is a  75 y.o.male  diagnosed with head and neck cancer here today for Cycle 1, Day 1 chemotherapy counseling and consent. Mr. Isatu Stubbs is being treated with CISplatin. Provided education on CISplatin and premedications - fosaprepitant, palonosetron and dexamethasone. Provided Mr. Isatu Stubbs with a handout and reviewed home supportive care regimen. Side effects of chemotherapy reviewed included s/s infection, anemia, appetite changes, thrombocytopenia, fatigue, hair loss/alopecia, bone pain, skin and nail changes, diarrhea/constipation, hearing changes and kidney changes. Patient given ways to manage these side effects and when to contact office. [address]ira [address] Handout of medications provided to patient. Mr. Isatu Stubbs verbalized understanding of the information presented and all of the patient's questions were answered.     Prachi Reyna, PharmD, BCPS, [address] in [address]: No  Recommendation Provided To: Patient/Caregiver: 1 via In person    Intervention Accepted By: Patient/Caregiver: 1  Time Spent (min): 30

## 2022-12-13 NOTE — TELEPHONE ENCOUNTER
Received a call from radiation oncology at [facility] to inform us that patient was not able to tolerate his radiation today due to claustrophobia. Tech reported that patient stated he took his anti anxiety medication too early today and it has worn off. Plan is to come to [facility] and get chemo and will go tomorrow to radiation onc and have treatment. Patient will time his medication accordingly tomorrow for tx. Dr Shaw Aguilar made aware.

## 2022-12-13 NOTE — PROGRESS NOTES
Eula Teran. is a 75 y.o. male    Chief Complaint   Patient presents with    Chemotherapy     locally advanced HNSCC   1. Have you been to the ER, urgent care clinic since your last visit? Hospitalized since your last visit? Yes, patient went to ER for having infection near tumor. 2. Have you seen or consulted any other health care providers outside of the [address] [place] [address] since your last visit? Include any pap smears or colon screening. No    Patient states he has been having headaches for more than a week, patient sounds like he is in a lot of pain this morning.

## 2022-12-13 NOTE — PROGRESS NOTES
[facility] Progress Note    0730 Mr. Mcadams Arrived ambulatory and in no distress for cycle 1 day 1 of Cisplatin regimen. Assessment was completed by access RN. PIV placed to Left wrist by access RN, labs drawn and processed. Chemotherapy Flowsheet 12/13/2022   Cycle C1   Date 12/13/2022   Drug / Regimen Cisplatin   Pre Hydration given   Pre Meds given   Notes given         Mr. Brandy Glass vitals were reviewed. Patient Vitals for the past 12 hrs:   Temp Pulse Resp BP   12/13/22 1330 -- (!) 107 -- (!) 146/87   12/13/22 0740 97.1 °F (36.2 °C) (!) 117 18 139/71         Lab results were obtained and reviewed. Recent Results (from the past 12 hour(s))   CBC WITH AUTOMATED DIFF    Collection Time: 12/13/22  7:44 AM   Result Value Ref Range    WBC 9.5 4.1 - 11.1 K/uL    RBC 4.15 4.10 - 5.70 M/uL    HGB 14.1 12.1 - 17.0 g/dL    HCT 41.2 36.6 - 50.3 %    MCV 99.3 (H) 80.0 - 99.0 FL    MCH 34.0 26.0 - 34.0 PG    MCHC 34.2 30.0 - 36.5 g/dL    RDW 13.2 11.5 - 14.5 %    PLATELET 672 (H) 182 - 400 K/uL    MPV 9.6 8.9 - 12.9 FL    NRBC 0.0 0  WBC    ABSOLUTE NRBC 0.00 0.00 - 0.01 K/uL    NEUTROPHILS 67 32 - 75 %    LYMPHOCYTES 25 12 - 49 %    MONOCYTES 6 5 - 13 %    EOSINOPHILS 1 0 - 7 %    BASOPHILS 1 0 - 1 %    IMMATURE GRANULOCYTES 0 0.0 - 0.5 %    ABS. NEUTROPHILS 6.3 1.8 - 8.0 K/UL    ABS. LYMPHOCYTES 2.4 0.8 - 3.5 K/UL    ABS. MONOCYTES 0.6 0.0 - 1.0 K/UL    ABS. EOSINOPHILS 0.1 0.0 - 0.4 K/UL    ABS. BASOPHILS 0.1 0.0 - 0.1 K/UL    ABS. IMM.  GRANS. 0.0 0.00 - 0.04 K/UL    DF AUTOMATED     METABOLIC PANEL, COMPREHENSIVE    Collection Time: 12/13/22  7:44 AM   Result Value Ref Range    Sodium 136 136 - 145 mmol/L    Potassium 4.0 3.5 - 5.1 mmol/L    Chloride 102 97 - 108 mmol/L    CO2 26 21 - 32 mmol/L    Anion gap 8 5 - 15 mmol/L    Glucose 161 (H) 65 - 100 mg/dL    BUN 8 6 - 20 MG/DL    Creatinine 0.71 0.70 - 1.30 MG/DL    BUN/Creatinine ratio 11 (L) 12 - 20      eGFR >60 >60 ml/min/1.73m2    Calcium 9.0 8.5 - 10.1 MG/DL Bilirubin, total 0.6 0.2 - 1.0 MG/DL    ALT (SGPT) 40 12 - 78 U/L    AST (SGOT) 64 (H) 15 - 37 U/L    Alk. phosphatase 136 (H) 45 - 117 U/L    Protein, total 8.8 (H) 6.4 - 8.2 g/dL    Albumin 3.1 (L) 3.5 - 5.0 g/dL    Globulin 5.7 (H) 2.0 - 4.0 g/dL    A-G Ratio 0.5 (L) 1.1 - 2.2     MAGNESIUM    Collection Time: 12/13/22  7:44 AM   Result Value Ref Range    Magnesium 1.8 1.6 - 2.4 mg/dL   HEP B SURFACE AB    Collection Time: 12/13/22  7:44 AM   Result Value Ref Range    Hepatitis B surface Ab 28.80 mIU/mL    Hep B surface Ab Interp. REACTIVE (A) NR     HEP B SURFACE AG    Collection Time: 12/13/22  7:44 AM   Result Value Ref Range    Hepatitis B surface Ag <0.10 Index    Hep B surface Ag Interp. Negative NEG     Patient went to appointment with medical oncology and also went to radiation therapy. Patient returned to [facility] after appointments. Pre-medications  were administered as ordered and chemotherapy was initiated.   Medications Administered       0.9% sodium chloride 1,000 mL with potassium chloride 10 mEq, magnesium sulfate 2 g infusion       Admin Date  12/13/2022 Action  New Bag Dose   Rate  1,000 mL/hr Route  IntraVENous Administered By  Cindy Rubin RN              0.9% sodium chloride infusion       Admin Date  12/13/2022 Action  New Bag Dose  25 mL/hr Rate  25 mL/hr Route  IntraVENous Administered By  Cindy Rubin RN              CISplatin (PLATINOL) 75.2 mg in 0.9% sodium chloride 500 mL, overfill volume 50 mL chemo infusion       Admin Date  12/13/2022 Action  New Bag Dose  75.2 mg Rate  625.2 mL/hr Route  IntraVENous Administered By  Cindy Rubin RN              dexamethasone (DECADRON) 12 mg in 0.9% sodium chloride 50 mL, overfill volume 5 mL IVPB       Admin Date  12/13/2022 Action  New Bag Dose  12 mg Rate  232 mL/hr Route  IntraVENous Administered By  Cindy Rubin RN              fosaprepitant (EMEND) 150 mg in 0.9% sodium chloride 150 mL IVPB       Admin Date  12/13/2022 Action  New Bag Dose  150 mg Rate  450 mL/hr Route  IntraVENous Administered By  Dominga De Jesus RN              palonosetron HCl (ALOXI) injection 0.25 mg       Admin Date  12/13/2022 Action  Given Dose  0.25 mg Route  IntraVENous Administered By  Dominga De Jesus RN              sodium chloride (NS) flush 5-40 mL       Admin Date  12/13/2022 Action  Given Dose  10 mL Route  IntraVENous Administered By  Dominga De Jesus RN                    Two nurses verified prior to administering: Drug name, Drug dose, Infusion volume or drug volume when prepared in a syringe, Rate of administration, Route of administration, Expiration dates and/or times, Appearance and physical integrity of the drugs, Rate set on infusion pump, when used, and Sequencing of drug administration. Mr. Cathy Boswell tolerated treatment well, PIV flushed, removed 2x2 and coban placed. Patient was discharged from [place] [address] in stable condition at 1336.      Future Appointments   Date Time Provider Tad Maryjo   12/13/2022 11:30 PM  [address]   12/15/2022  2:00 PM [facility] ER [address] [facility]   12/19/2022  1:00 PM [facility] [address]GIO [address] [facility]   12/20/2022  7:30 AM A2 MADELYN LONG [place] [facility]   12/27/2022  7:30 AM A2 MADELYN LONG [place] [facility]   3/15/2023  9:00 AM PACEMAKER, STFRANCES ROB ARCEO AMB   3/15/2023  9:20 AM MD ROB Laureano AMB         Domenic Walton RN  December 13, 2022

## 2022-12-13 NOTE — TELEPHONE ENCOUNTER
Lizeth Buchanan verified     Informed patients son Dorothy Baeza of the CT scan scheduled for 12/15 @ 2pm. Provided pre instructions . No solids for 4 hrs , clears OK, no NSAIDs that day. Thanked us for the call.

## 2022-12-14 ENCOUNTER — HOSPITAL ENCOUNTER (OUTPATIENT)
Dept: RADIATION THERAPY | Age: 67
Discharge: HOME | End: 2022-12-14
Payer: MEDICARE

## 2022-12-14 LAB
HBV CORE AB SERPL QL IA: NEGATIVE
HBV CORE AB SERPL QL IA: NEGATIVE
HBV CORE IGM SERPL QL IA: NEGATIVE

## 2022-12-14 PROCEDURE — 77386 HC IMRT TRMT DLVR COMPL: CPT

## 2022-12-14 NOTE — ADT AUTH CERT NOTES
Comment          Patient Demographics    Patient Name   Alyce Mcgregor. [facility]   [id number] Legal Sex   Male    1955 Address   [address] [address] Phone   [phone] [facility])   [phone] (Mobile) *Preferred*     Patient Demographics    Patient Name   Alyce Mcgrgeor.  [facility]   [id number] Legal Sex   Male    1955 Address   [facility] [address] Phone   [phone] (Home)   [phone] (Mobile) *Preferred*     CSN:   [id number]     Admit Date: Admit Time Room Bed   Dec 1, 2022  2:54  [23558] 01 [06024]     Attending Providers    Provider Pager From To   Molly Martinez MD   22   Patrick Plata MD  22   Teresa Platt MD  22   Evette Loya MD  22   Patrick Plata MD  22   Teresa Platt MD  22   Carmelina Boyd MD  22     Patient Contacts    Name Relation Home Work [place] MARY BETH Son (Jackelin) [phone]   Liliya Guidry Daughter [phone]     Utilization Reviews       Head and Neck Disease [address] Day 8 (2022) by Junior Hans RN       Review Entered Review Status   2022 1112 Completed      Criteria Review      Care Day: 8 Care Date: 2022 Level of Care: Inpatient Floor    Guideline Day 3    Level Of Care    (X) * Activity level acceptable    Clinical Status    (X) * Visual abnormalities absent, stable, or improving    2022 11:12:47 EST by Junior Maxwell      stable    (X) * Ocular status acceptable    2022 11:12:47 EST by Junior Maxwell      stable    (X) * Orbital infection or inflammation absent or controlled    ( ) * Airway and swallowing status acceptable    2022 11:12:47 EST by Junior Maxwell      has some odynophagia after diet advanced to regular    (X) * Respiratory status acceptable    2022 11:12:47 EST by Junior Maxwell      acceptable    (X) * Pain and nausea absent or adequately managed    2022 11:12:47 EST by Stanton Smith      managed    (X) * No infection, or status acceptable    2022 11:12:47 EST by Stanton Smith      acceptable    ( ) * General Discharge Criteria met    Interventions    (X) * No surgical procedures needed    ( ) * Intake acceptable    2022 11:12:47 EST by Stanton Smith      has some odynophagia after diet advanced to regular    ( ) * No inpatient interventions needed    2022 11:12:47 EST by Stanton Allardt      SLP recs appreciated: dysphagia, monitor caloric intake  - Heme/Onc - plan to initiate chemo this admission  - Rad Onc - s/p CT sim   - wound care  - IV ABx: continue Vanc, Zosyn. It is draining spontaneously    * Milestone   Additional Notes   :       Hospitalist PN-   Interval history / Subjective:   No acute events overnight, has some odynophagia after diet advanced to regular, no n/v/d, no dyspnea. Daughter at bedside feels like he is getting weaker from being in the bed for so long. Vital Signs:    Last 24hrs VS reviewed since prior progress note. Most recent are:   Visit Vitals   BP (!) 143/82   Pulse 91   Temp 98 °F (36.7 °C)   Resp 19   Ht 5' 4\" (1.626 m)   Wt 78.2 kg (172 lb 6.4 oz)   SpO2 96% RA      Physical Examination:       I had a face to face encounter with this patient and independently examined them on 2022 as outlined below:                                                          Constitutional:  No acute distress, cooperative, pleasant    ENT:  Oral mucosa moist. Anicteric sclerae, normal conjunctiva   Neck: L mass w/ purulent drainage, no surrounding cellulitic changes   Resp:  CTA bilaterally. No wheezing/rhonchi/rales. No accessory muscle use. CV:  Regular rhythm, normal rate, S1,S2.    GI/:  Soft, non distended, non tender, no guarding, BS present. Musculoskeletal:  No edema, warm. Neurologic:  Moves all extremities.  Grossly non-focal   Skin: left neck dressing, small amount yellow drainage   Psych:  Good insight, Not anxious nor agitated. LABS: AG 3  .6      MEDS: Norvasc 10mg PO qd Fioricet, ESGIC 1 tab PO q6h PRN (x1) Neurontin 400mg PO TID Prinivil, Zestril 20mg PO qam Toprol-XL 50mg PO qd Percocet 5-325mg PO q4h PRN (x1) Protonix 40mg PO qd Zosyn 3.375g IV q8h Flomax 0.4mg PO every day      Assessment & Plan:       Left Neck neoplasm, with possible Abscess/Cellulitis:    - per pt, previous bx consistent with cancer   - 12/1 CT chest/abdomen/pelvis w/contrast: no identified metastatic disease in thorax, abdomen or pelvis   - obtain records including biopsy report from his ENT office, Dr. Mendel Landsberg , medical record release form filled out with patient and faxed to their office [phone]    - Dr. Lisandro Baugh, [place] [facility]: appreciate expertise, not surgically resectable   - SLP recs appreciated: dysphagia, monitor caloric intake   - Heme/Onc - plan to initiate chemo this admission   - Rad Onc - s/p CT sim 12/7   - wound care   - IV ABx: continue Vanc, Zosyn. It is draining spontaneously   - pain management   -ECHO 12/5: Normal left ventricular systolic function with a visually estimated EF of 60 - 65%. Left ventricle size is normal. Mildly increased wall thickness. Findings consistent with mild concentric hypertrophy. Normal wall motion. Normal diastolic function.   -Nutrition- eval calorie intake vs needs   - PEG deferred, tolerating po intake       Transient AVB: symptomatic with sinus pauses   - s/p leadless PPM 12/7, lower dose metoprolol resumed       HTN:   - continue metoprolol, lisinopril       T2DM: ISS ac&hs, hypoglycemic protocol, Hga1c 6.6. Tobacco Dependence: cessation advised, declined nicotine patch.          Can transfer off tele now that cardiac issues stable       DVTppx: start enoxaparin      Cardiology-   Leadless Pacemaker check this am showed proper function       20% RV pacing  VDD mode Head and Neck Disease [facility] - Care Day 5 (12/5/2022) by Lynnette Gottlieb RN       Review Entered Review Status   12/5/2022 1324 Completed      Criteria Review      Care Day: 5 Care Date: 12/5/2022 Level of Care: Telemetry    Guideline Day 3    Level Of Care    (X) * Activity level acceptable    12/5/2022 13:24:49 EST by Lynnette Gottlieb      Activity: OOB to chair TID and PRN    Clinical Status    (X) * Visual abnormalities absent, stable, or improving    12/5/2022 13:24:49 EST by Lynnette Gottlieb      absent    (X) * Ocular status acceptable    12/5/2022 13:24:49 EST by Lynnette Gottlieb      acceptable    (X) * Orbital infection or inflammation absent or controlled    12/5/2022 13:24:49 EST by Redbel Gottlieb      absent    (X) * Airway and swallowing status acceptable    (X) * Respiratory status acceptable    12/5/2022 13:24:49 EST by Lynnette Gottlieb      RR's WDLs    (X) * Pain and nausea absent or adequately managed    12/5/2022 13:24:49 EST by Redbel Gottlieb      managed    (X) * No infection, or status acceptable    12/5/2022 13:24:49 EST by Redbel Gottlieb      acceptable    ( ) * General Discharge Criteria met    Interventions    (X) * No surgical procedures needed    12/5/2022 13:24:49 EST by Lynnette Gottlieb      not surgically resectable    (X) * Intake acceptable    12/5/2022 13:24:49 EST by Lynnette Gottlieb      Diet: Cardiac Flull Liquid    ( ) * No inpatient interventions needed    12/5/2022 13:24:49 EST by Eather Severance on Car[facility] monitoring through 12/6    * Milestone   Additional Notes   : 12/5      Hospitalist-          Interval history / Subjective: Follow-up for issues listed below.    -Patient seen and examined, no c/o's. Vital Signs:    Last 24hrs VS reviewed since prior progress note.  Most recent are:   Visit Vitals   BP (!) 152/78   Pulse 94   Temp 97.4 °F (36.3 °C)   Resp 16   Ht 5' 4\" (1.626 m)   Wt 86.6 kg (191 lb)   SpO2 93% RA      Physical Examination:      Constitutional: No acute distress, cooperative, pleasant    ENT: Oral mucosa moist.    Resp: CTA bilaterally. No wheezing/rhonchi/rales. No accessory muscle use. CV: Regular rhythm, normal rate, S1,S2.    GI/: Soft, non distended, non tender, no guarding, BS present. Voids Freely. Musculoskeletal: No edema, warm. Neurologic: Moves all extremities. AAOx3. Skin:  left neck mass, drainage noted to dressing   Psych:  Good insight, Not anxious nor agitated. LABS: CO2 34 AG 1  BUN 3       MEDS: Tylenol 650mg PO q6h PRN (x1) Norvasc 10mg PO qd Neurontin 400mg PO TID Humalog SSI SC QID 2 units given Zenpep 10,000 2 cap PO TID Prinivil, Zestril 20mg PO qam Percocet 5-325mg OP q4h PRN (x3) Protonix 40mg PO qd Zosyn 3.375g IV q8h Flomax 0.4mg PO every day      Assessment & Plan:       Left Neck Abscess/Cellulitis:    - per pt, previous bx consistent with cancer   - 12/1 CT chest/abdomen/pelvis w/contrast: no identified metastatic disease in thorax, abdomen or pelvis   - obtain records including biopsy report from his ENT office, Dr. Mya Dalton , medical record release form filled out with patient and faxed to their office [phone]    - Dr. Daniel Tate, [place] [facility]: appreciate expertise, not surgically resectable   - consider PEG, SLP following: dysphagia, monitor caloric intake   - Heme/Onc    - Rad Onc   - wound care   - IV ABT: Vanc, Zosyn   - pain management   -ECHO   -Nutrition- eval calorie intake vs needs       Transient AVB:   - Cardiology following: appreciate expertise   - avoid BB       HTN:   - home metoprolol 100 mg BID, d/damon on 12/2 d/t AVB   - started on lisinopril 10 mg every day on 12/1, as BP was in the 170's   - 12/3 increase lisinopril to 20 mg QD       T2DM: ISS ac&hs, hypoglycemic protocol, Hga1c 6.6. Tobacco Dependence: cessation advised, declined nicotine patch.              DVTppx: SCDs   GIppx: Pepcid   Code Status: Full Code   Diet: Cardiac Activity: OOB to chair TID and PRN   Discharge: TBD      Cardiology Consult-      SUBJECTIVE:         Ruthell Schuyler. denies chest pain/ shortness of breath, orthopnea, PND, LE edema, palpitations,  + presyncope when moving his head, jeremi stretching the left neck. Telemetry NSR sinus bradycardia but no more av block       BP (!) 169/91   Pulse 74   Temp 98.4 °F (36.9 °C)   Resp 16   Ht 5' 4\" (1.626 m)   Wt 191 lb 5.8 oz (86.8 kg)   SpO2 93%   BMI 32.85 kg/m²    General:   Alert, cooperative, no distress. Large size left neck mass, dressing on   Psychiatric:    Normal Mood and affect    Eye/ENT:      Pupils equal    Lungs:     Clear to auscultation bilaterally. Heart[de-identified]   Regular rhythm, no murmur, click, rub or gallop     Abdomen:     Soft, non-tender. Extremities:    no edema. Neurologic:    No gross focal deficits       Assessment and Plan       1. Transient  AV block with multiple non conducted P waves for 3-6 seconds on 12/4/2022 at 12:52pm   He was not sleeping   Cause most likely carotid sinus pressure from his left neck squamous cell cancer   Metoprolol has been stopped   I have not seen long pause again but he said if he moves his neck he felt pre syncopal   If this is persistent and confirmed with recurrent av block > 3 second pause. Will continue to monitor although anticipate improvement off bb and with reduction of mass in neck with chemo/radiation. His left neck squamous cell cancer is 4 to 5 cm fixed mass on the left side of the neck, which is adherent to the skin with erythema. The skin is open and draining necrotic tumor material through the opening. He will need radiation and chemotherapy per Dr Marla Obrien of ENT so transvenous pacemaker is not a choice   However is he continues to have sinus pauses >5 sec he would be a candidate for Micra leadless Pacemaker. 2. Left neck cancer- oncologist and ENT have seen him.    Should have radiation and chemotherapy and need PEG Need echo for baseline LVEF       3. Hypertension. stopped metoprolol due to above problem with pause from av block and start amlodipine 10mg. On lisinopril, may increase in dose to 40 mg qd       4. Type 2 diabetes. on sliding scale with insulin coverage. 5  Tobacco abuse. The patient advised to quit smoking, jeremi with neck cancer              Cardiology will sign off if echo is WNL. Pls call if pt has 5 sec or > pauses going forward.

## 2022-12-15 ENCOUNTER — TELEPHONE (OUTPATIENT)
Dept: FAMILY MEDICINE CLINIC | Age: 67
End: 2022-12-15

## 2022-12-15 ENCOUNTER — HOSPITAL ENCOUNTER (OUTPATIENT)
Dept: RADIATION THERAPY | Age: 67
Discharge: HOME | End: 2022-12-15
Payer: MEDICARE

## 2022-12-15 ENCOUNTER — DOCUMENTATION ONLY (OUTPATIENT)
Dept: ONCOLOGY | Age: 67
End: 2022-12-15

## 2022-12-15 ENCOUNTER — HOSPITAL ENCOUNTER (OUTPATIENT)
Dept: CT IMAGING | Age: 67
End: 2022-12-15
Attending: INTERNAL MEDICINE
Payer: MEDICARE

## 2022-12-15 DIAGNOSIS — G43.519 INTRACTABLE PERSISTENT MIGRAINE AURA WITHOUT CEREBRAL INFARCTION AND WITHOUT STATUS MIGRAINOSUS: ICD-10-CM

## 2022-12-15 PROCEDURE — 74011000636 HC RX REV CODE- 636: Performed by: STUDENT IN AN ORGANIZED HEALTH CARE EDUCATION/TRAINING PROGRAM

## 2022-12-15 PROCEDURE — 70470 CT HEAD/BRAIN W/O & W/DYE: CPT

## 2022-12-15 PROCEDURE — 77386 HC IMRT TRMT DLVR COMPL: CPT

## 2022-12-15 RX ADMIN — IOPAMIDOL 100 ML: 612 INJECTION, SOLUTION INTRAVENOUS at 14:35

## 2022-12-15 NOTE — PROGRESS NOTES
[facility] [place] at [facility]   [address], [place] [address]  [place]: [phone]  F: [phone]    Medical Nutrition Therapy  Nutrition Encounter:    Met with patient and his son Mely Fry after radiation treatment. We reviewed Gtube placement and tube feed recommendations. He is diabetic and discussed using the Boost VHC instead of Glucerna 1.5 as the Boost is more calorically dense and requires less cartons. The following was discussed:   - During feedings and when giving medications; sit upright and remain sitting up at least 30 minutes afterwards. Do not lay down during feeding.   - Formula Storage- keep at room temperature. Once open store in refrigerator and throw away all unused formula after 24 hours. Allow formula to return to room temperature before giving feeds.    - Explained pinching or bending the end of the feeding tube to keep the contents from leaking out. Used a sample feeding tube to visually show and patient practiced- patient practiced holding tube, pinching and opening cap and placing syringe in the tip of feeding tube. - Discussed and visually showed patient how to give a water flush using the syringe. -Explained that patient is to give at least one water flush to clean the tubing once daily if she is not using the feeding tube. -Discussed giving formula via tube. Suggested to start with ½ can and give slowly using syringe.   -Discussed formulas that can go through the tube. - Explained site care- cleaning area with warm soapy water. He was seen by SLP and showed to have mild pharyngeal dysphagia. He is unable to meet nutrition needs by mouth alone given dysphagia. He will benefit from a feeding tube placement and this would be his primary source of nutrition needs. Scheduled for Gtube placement on 12/19/22. Son is aware of appointment.      Concurrent chemoradiation   Chemotherapy Flowsheet 12/13/2022   Cycle C1   Date 12/13/2022 Drug / Regimen Cisplatin   Pre Hydration given   Pre Meds given   Notes given       Wt Readings from Last 8 Encounters:   12/13/22 173 lb (78.5 kg)   12/13/22 169 lb 6.4 oz (76.8 kg)   12/08/22 172 lb 6.4 oz (78.2 kg)   11/30/22 191 lb 5.8 oz (86.8 kg)      Estimated Nutrition Needs:   Calorie Range: 2184-2688kcal/day     Protein Range: 78-95g/day     Fluid Needs: 2200ml     Plan:   - Gtube placement on 12/19/22 with IR. Patient to arrive at 12:30 for   1pm appointment. Nothing to eat or drink 8 hours prior. He can take morning meds with sip of water. Stop blood thinner 5 days prior.    - To meet 100% of estimated nutrition needs, will need 5 cans of Boost VHC per day. To provide 2650kcal, 110g protein and 800ml free water.    - Bolus feeds: 1 can 5 times a day with 120ml before and after Boost VHC. (Water flushes to provide 1200ml free water)  - PO as able to preserve swallow integrity.       Signed By: Fariba Driver, [employer]

## 2022-12-15 NOTE — TELEPHONE ENCOUNTER
Darlene Jose verified     Informed son Luther Martínez that Mr Rosalee Patel CT of his head  showed no cancer of the brain. Luther Martínez stated he will relay message to his Dad whom could not come to the phone . Luther Martínez stated they will review in my chart as well. Thanked us for the call.

## 2022-12-15 NOTE — TELEPHONE ENCOUNTER
----- Message from Balta Valderrama sent at 12/15/2022 10:26 AM EST -----  Subject: Hospital Follow Up    QUESTIONS  What hospital was the Patient Discharged [facility]  Date of Discharge? 2022-12-12  Discharge Location? Home  Reason for hospitalization as patient stated? sore on neck  What question does the patient have, if applicable?   ---------------------------------------------------------------------------  --------------  CALL BACK INFO  What is the best way for the office to contact you? OK to leave message on   voicemail  Preferred Call Back Phone Number? [phone]  ---------------------------------------------------------------------------  --------------  SCRIPT ANSWERS  Relationship to Patient?  Self

## 2022-12-15 NOTE — TELEPHONE ENCOUNTER
Radha Johnson verified     Informed patient that a website [employer] is the public site . Explained that this office is currently not trained in medical marijuana . Thanked us for the call.

## 2022-12-16 ENCOUNTER — HOSPITAL ENCOUNTER (OUTPATIENT)
Dept: RADIATION THERAPY | Age: 67
Discharge: HOME | End: 2022-12-16
Payer: MEDICARE

## 2022-12-16 PROCEDURE — 77386 HC IMRT TRMT DLVR COMPL: CPT

## 2022-12-18 NOTE — PROGRESS NOTES
[address] [facility] Oncology   [phone]    Hematology / Oncology Follow-up    Reason for Visit:   Reanna Lind is a 75 y.o. male PMHx of hypertension, diabetes, and hepatic steatosis/possible cirrhosis who is seen for follow-up of locally advanced HNSCC. Hematology/Oncology Treatment History:      PRIMARY DIAGNOSIS: Locally advanced HNSCC     DATE OF DIAGNOSIS: 7/26/22  ORIGINAL STAGE: Stage CAN (cT0 cN2b cM0)  DIAGNOSTICS: FNA ([address] [place] [address]) with atypical squamous cells concerning for malignancy. Core biopsy with skeletal muscle, fibrous tissue, and granulation with acute and chronic inflammation. Cytokeratin negative, p40 negative. SITES OF DISEASE: Left neck mass with prominent left-sided lymph nodes. 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 Pathology records from [place] reviewed. Cytology from FNA 7/26/22 with \"atypical squamous cells. \"  Follow-up core needle biopsy with \"rare epitheliod cells are present; however, pancytokeratin immunostain is negative, and p40 immunostain negative. \"   Although core needle biopsy not definitive, clinical picture (i.e. atypical squamous cells on FNA, enlarging neck mass with failure to improve to antibiotics in a chronic smoker) consistent with HNSCC  Unclear primary.  Given ongoing complications from untreated disease (heart block, recurrent infection), elected not to pursue PET-CT or pan-endoscopy while inpatient to minimize delays in treatment   Staging CT CAP negative for distant metastatic disease   Initiated on concurrent chemoradiation (D1C1 weekly cisplatin 40 mg/m2 on 12/13/22)     Tolerating chemoradiation reasonably well after 1 cycle. Mass appears to have shrunk slightly. No current esophagitis symptoms or other major toxicities of treatment. #) Intractable migraine headaches: possibly tension headache from large neck mass. CT head without any acute pathology or meningeal enhancement. Unable to obtain MRI brain due to claustrophobia. Management with flexeril for neck spasms and hydromorphone prn. #) Left neck abscess and cellulitis, resolved: no evidence of purulence on exam.  Continue to monitor. #) Moderate protein-calorie malnutrition: poor oral intake although improved with initiation of chemoradiation. Dietitian Amna Espinoza following. Recommend supplement 1 Boost with each meal if unable to eat usual size meal.  PEG tube placement re-scheduled as patient ate prior to appointment. #) Heart block: Likely due to reflex from mass sitting over his right carotid artery. Status post PPM on 22. #) Tobacco abuse: counseled on cessation     #) Steatosis/Cirrhosis: incidentally noted on CT A/P     #) Macrocytosis: B12 level supra-therapeutic. Likely due to cirrhosis. #) Psychosocial: wife recently . Moved from [place] to be closer to his children. Has good support from family (oldest son Norma Muniz is HCPOA). Plan:     Radiation therapy with Dr. Sally Hines  Lab monitoring p/t each treatment: CBC, BMP, Mag  Labs reviewed, proceed with cycle 1, week 2 of cisplatin 40 mg/m2; given weekly with concurrent RT  Antiemetic prophylaxis: Ondansetron, Dexamethasone, Fosaprepitant prior to each chemotherapy. Dexamethasone for 2 days after therapy at home.    PRN antiemetics at home: Ondansetron, Prochlorperazine, Lorazepam  PEG tube placement re-scheduled for ; tube feeds per dietitian   Encouraged patient to push fluids (2L+ per day)  Headaches: flexeril and Excedrin prn  Pain: dilaudid 2 mg q6h prn #30 tabs + bowel regimen  Palliative Care referral placed previously; not yet scheduled   Follow up in 1 week for labs and infusion; RTC in 2 weeks for labs, OV, and infusion    The patient requires intensive monitoring for toxicity from therapy with labs every 1 week and physical exam every 1 week. Signed By: Juan Brewster MD    December 20, 2022        Interval History:     Presented June 2022 with enlarging next mass, failed to improve with antibiotics  Evaluated by ENT (Dr. Rafael Chavira) in [facility] in 7/2022. uUtrasound that showed irrecgular enhancing mass involving left submandibuar gland, suspicious for cancer. Follow up CT neck demonstrated soft tissue mass, measuring 5 cm with small annular opening with drainage. Mass noted to be compressing left internal jugular vein. FNA 7/26/22 with atypical squamous cells, c/f malignancy  Core biopsy with skeletal muscle, fibrous tissue, and granulation with acute and chronic inflammation. Cytokeratin negative, p40 negative. Patient referred to medical and radiation oncology in [place], but did not follow up due to passing of his wife  Presented to [facility] 12/1/2022 with progressively worsening neck pain and swelling as well as purulent cellulitis/abscess. Treated with IV Antibiotics and discharged on PO. Hospital course complicated by AV block with prolonged pause (likely due to reflex from compression of mass on right carotid artery) s/p ppm.    11/30/22 CT neck: large 4.8 x 3.1 x 4.2 cm rim-enhancing complex collection in left lateral neck, extending from carotid space to the skin surface with associated skin thickening, edema, and prominent left neck lymph nodes. Findings most likely represent an abscess rather than necrotic/superinfected neoplasm. 12/1/22 CT CAP: left neck mass partially visualized. 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     12/13/22 C1 Weekly cisplatin  12/14/22 concurrent radiation initiated      Well since initiation of recent chemoradiation. Placement had to be rescheduled because he ate prior to the appointment. Plan is for 12/2922 for PEG tube placement. Appetite has improved somewhat since initiation of treatment. Denies dysphagia, odynophagia, fevers, chills. He still has some serous drainage from the left neck mass but there has been no purulence. His main complaint is of the intractable headaches. We obtained CT head with and without contrast, which did not show any acute pathology or metastatic spread. He is using Flexeril for muscle spasm pain and hydromorphone prior to bedtime. He is not having any issues with constipation    Medications reviewed in the EMR. No Known Allergies     Review of Systems: A 6-point review of systems was obtained, negative except as reviewed in the HPI. Physical Exam:   Visit Vitals  BP (!) 80/51 (BP 1 Location: Right arm, BP Patient Position: Sitting)   Pulse (!) 113   Temp (!) 96.2 °F (35.7 °C) (Temporal)   Ht 5' 5\" (1.651 m)   Wt 172 lb 6.4 oz (78.2 kg)   SpO2 95%   BMI 28.69 kg/m²     General: No distress  Eyes: Anicteric sclerae  HENT: Atraumatic  Neck: Supple,  large ~5 cm firm mass in left submandibular region with ~1 cm area of serous discharge, no surrounding erythema  Respiratory: CTAB, normal respiratory effort  CV: Normal S1 and S2, no peripheral edema  GI: Soft, nontender, nondistended, no masses, normal bowel sounds  Skin: No rashes, ecchymoses, or petechiae  Psych: Alert, oriented, appropriate affect, normal judgment/insight    Results:     Lab Results   Component Value Date/Time    WBC 8.8 12/19/2022 02:27 PM    HGB 12.2 12/19/2022 02:27 PM    HCT 35.9 (L) 12/19/2022 02:27 PM    PLATELET 283 76/52/1759 02:27 PM    .3 (H) 12/19/2022 02:27 PM    ABS.  NEUTROPHILS 5.9 12/19/2022 02:27 PM     Lab Results   Component Value Date/Time    Sodium 135 (L) 12/19/2022 02:27 PM    Potassium 4.5 12/19/2022 02:27 PM Chloride 100 12/19/2022 02:27 PM    CO2 31 12/19/2022 02:27 PM    Glucose 156 (H) 12/19/2022 02:27 PM    BUN 18 12/19/2022 02:27 PM    Creatinine 0.90 12/19/2022 02:27 PM    Calcium 9.3 12/19/2022 02:27 PM    Glucose (POC) 313 (H) 12/11/2022 11:30 AM     Lab Results   Component Value Date/Time    Bilirubin, total 0.8 12/19/2022 02:27 PM    ALT (SGPT) 28 12/19/2022 02:27 PM    Alk. phosphatase 106 12/19/2022 02:27 PM    Protein, total 7.3 12/19/2022 02:27 PM    Albumin 3.0 (L) 12/19/2022 02:27 PM    Globulin 4.3 (H) 12/19/2022 02:27 PM       CT head images reviewed personally and findings discussed with the patient.

## 2022-12-19 ENCOUNTER — APPOINTMENT (OUTPATIENT)
Dept: INFUSION THERAPY | Age: 67
End: 2022-12-19

## 2022-12-19 ENCOUNTER — HOSPITAL ENCOUNTER (OUTPATIENT)
Dept: INFUSION THERAPY | Age: 67
Discharge: HOME | End: 2022-12-19
Payer: MEDICARE

## 2022-12-19 ENCOUNTER — HOSPITAL ENCOUNTER (OUTPATIENT)
Dept: RADIATION THERAPY | Age: 67
End: 2022-12-19
Payer: MEDICARE

## 2022-12-19 ENCOUNTER — HOSPITAL ENCOUNTER (OUTPATIENT)
Dept: INTERVENTIONAL RADIOLOGY/VASCULAR | Age: 67
Discharge: HOME | End: 2022-12-19
Attending: INTERNAL MEDICINE | Admitting: STUDENT IN AN ORGANIZED HEALTH CARE EDUCATION/TRAINING PROGRAM

## 2022-12-19 VITALS — BODY MASS INDEX: 29.99 KG/M2 | HEIGHT: 65 IN | WEIGHT: 180 LBS

## 2022-12-19 DIAGNOSIS — C76.0 SQUAMOUS CELL CARCINOMA OF HEAD AND NECK (HCC): Primary | ICD-10-CM

## 2022-12-19 DIAGNOSIS — C76.0 SQUAMOUS CELL CARCINOMA OF HEAD AND NECK (HCC): ICD-10-CM

## 2022-12-19 LAB
ALBUMIN SERPL-MCNC: 3 G/DL (ref 3.5–5)
ALBUMIN/GLOB SERPL: 0.7 {RATIO} (ref 1.1–2.2)
ALP SERPL-CCNC: 106 U/L (ref 45–117)
ALT SERPL-CCNC: 28 U/L (ref 12–78)
ANION GAP SERPL CALC-SCNC: 4 MMOL/L (ref 5–15)
AST SERPL-CCNC: 30 U/L (ref 15–37)
BASOPHILS # BLD: 0 K/UL (ref 0–0.1)
BASOPHILS NFR BLD: 0 % (ref 0–1)
BILIRUB SERPL-MCNC: 0.8 MG/DL (ref 0.2–1)
BUN SERPL-MCNC: 18 MG/DL (ref 6–20)
BUN/CREAT SERPL: 20 (ref 12–20)
CALCIUM SERPL-MCNC: 9.3 MG/DL (ref 8.5–10.1)
CHLORIDE SERPL-SCNC: 100 MMOL/L (ref 97–108)
CO2 SERPL-SCNC: 31 MMOL/L (ref 21–32)
CREAT SERPL-MCNC: 0.9 MG/DL (ref 0.7–1.3)
DIFFERENTIAL METHOD BLD: ABNORMAL
EOSINOPHIL # BLD: 0.1 K/UL (ref 0–0.4)
EOSINOPHIL NFR BLD: 1 % (ref 0–7)
ERYTHROCYTE [DISTWIDTH] IN BLOOD BY AUTOMATED COUNT: 13 % (ref 11.5–14.5)
GLOBULIN SER CALC-MCNC: 4.3 G/DL (ref 2–4)
GLUCOSE SERPL-MCNC: 156 MG/DL (ref 65–100)
HCT VFR BLD AUTO: 35.9 % (ref 36.6–50.3)
HGB BLD-MCNC: 12.2 G/DL (ref 12.1–17)
IMM GRANULOCYTES # BLD AUTO: 0.1 K/UL (ref 0–0.04)
IMM GRANULOCYTES NFR BLD AUTO: 1 % (ref 0–0.5)
LYMPHOCYTES # BLD: 2.1 K/UL (ref 0.8–3.5)
LYMPHOCYTES NFR BLD: 24 % (ref 12–49)
MAGNESIUM SERPL-MCNC: 1.4 MG/DL (ref 1.6–2.4)
MCH RBC QN AUTO: 34.8 PG (ref 26–34)
MCHC RBC AUTO-ENTMCNC: 34 G/DL (ref 30–36.5)
MCV RBC AUTO: 102.3 FL (ref 80–99)
MONOCYTES # BLD: 0.6 K/UL (ref 0–1)
MONOCYTES NFR BLD: 7 % (ref 5–13)
NEUTS SEG # BLD: 5.9 K/UL (ref 1.8–8)
NEUTS SEG NFR BLD: 67 % (ref 32–75)
NRBC # BLD: 0 K/UL (ref 0–0.01)
NRBC BLD-RTO: 0 PER 100 WBC
PLATELET # BLD AUTO: 307 K/UL (ref 150–400)
PMV BLD AUTO: 9.7 FL (ref 8.9–12.9)
POTASSIUM SERPL-SCNC: 4.5 MMOL/L (ref 3.5–5.1)
PROT SERPL-MCNC: 7.3 G/DL (ref 6.4–8.2)
RBC # BLD AUTO: 3.51 M/UL (ref 4.1–5.7)
SODIUM SERPL-SCNC: 135 MMOL/L (ref 136–145)
WBC # BLD AUTO: 8.8 K/UL (ref 4.1–11.1)

## 2022-12-19 PROCEDURE — 77386 HC IMRT TRMT DLVR COMPL: CPT

## 2022-12-19 PROCEDURE — 36415 COLL VENOUS BLD VENIPUNCTURE: CPT

## 2022-12-19 PROCEDURE — 80053 COMPREHEN METABOLIC PANEL: CPT

## 2022-12-19 PROCEDURE — 85025 COMPLETE CBC W/AUTO DIFF WBC: CPT

## 2022-12-19 PROCEDURE — 83735 ASSAY OF MAGNESIUM: CPT

## 2022-12-19 RX ORDER — MAGNESIUM SULFATE HEPTAHYDRATE 40 MG/ML
2 INJECTION, SOLUTION INTRAVENOUS ONCE
Status: CANCELLED
Start: 2022-12-20

## 2022-12-19 RX ORDER — LIDOCAINE HYDROCHLORIDE 10 MG/ML
20 INJECTION INFILTRATION; PERINEURAL
Status: DISCONTINUED | OUTPATIENT
Start: 2022-12-19 | End: 2022-12-19 | Stop reason: HOSPADM

## 2022-12-19 NOTE — PROGRESS NOTES
Outpatient Infusion Center - Chemotherapy Progress Note    1430 Pt admit to [facility] for pre-chemo lab draw ambulatory in stable condition. Labs drawn peripherally and sent for processing. Pt aware of next [facility] appointment scheduled for 12/20/2022.     Please review labs in CC once resulted

## 2022-12-19 NOTE — PROGRESS NOTES
Patient here for gastrostomy tube placement. Patient reports eating soup at 1100 today. Patient rescheduled for 12/29 around 9am/ after his radiation appointment. Patient verbalized understanding.

## 2022-12-20 ENCOUNTER — HOSPITAL ENCOUNTER (OUTPATIENT)
Dept: RADIATION THERAPY | Age: 67
Discharge: HOME | End: 2022-12-20
Payer: MEDICARE

## 2022-12-20 ENCOUNTER — HOSPITAL ENCOUNTER (OUTPATIENT)
Dept: INFUSION THERAPY | Age: 67
Discharge: HOME | End: 2022-12-20
Payer: MEDICARE

## 2022-12-20 ENCOUNTER — OFFICE VISIT (OUTPATIENT)
Dept: ONCOLOGY | Age: 67
End: 2022-12-20
Payer: MEDICARE

## 2022-12-20 ENCOUNTER — TELEPHONE (OUTPATIENT)
Dept: ONCOLOGY | Age: 67
End: 2022-12-20

## 2022-12-20 VITALS
DIASTOLIC BLOOD PRESSURE: 51 MMHG | TEMPERATURE: 96.2 F | BODY MASS INDEX: 28.72 KG/M2 | WEIGHT: 172.4 LBS | HEART RATE: 113 BPM | HEIGHT: 65 IN | SYSTOLIC BLOOD PRESSURE: 80 MMHG | OXYGEN SATURATION: 95 %

## 2022-12-20 VITALS — SYSTOLIC BLOOD PRESSURE: 100 MMHG | DIASTOLIC BLOOD PRESSURE: 56 MMHG | HEART RATE: 67 BPM

## 2022-12-20 DIAGNOSIS — G43.519 INTRACTABLE PERSISTENT MIGRAINE AURA WITHOUT CEREBRAL INFARCTION AND WITHOUT STATUS MIGRAINOSUS: ICD-10-CM

## 2022-12-20 DIAGNOSIS — G89.3 CANCER RELATED PAIN: ICD-10-CM

## 2022-12-20 DIAGNOSIS — E44.0 MODERATE PROTEIN-CALORIE MALNUTRITION (HCC): ICD-10-CM

## 2022-12-20 DIAGNOSIS — R51.9 CHRONIC INTRACTABLE HEADACHE, UNSPECIFIED HEADACHE TYPE: ICD-10-CM

## 2022-12-20 DIAGNOSIS — C76.0 SQUAMOUS CELL CARCINOMA OF HEAD AND NECK (HCC): Primary | ICD-10-CM

## 2022-12-20 DIAGNOSIS — G89.29 CHRONIC INTRACTABLE HEADACHE, UNSPECIFIED HEADACHE TYPE: ICD-10-CM

## 2022-12-20 DIAGNOSIS — Z79.899 HIGH RISK MEDICATION USE: ICD-10-CM

## 2022-12-20 PROBLEM — L03.221 CELLULITIS AND ABSCESS OF NECK: Status: RESOLVED | Noted: 2022-01-01 | Resolved: 2022-01-01

## 2022-12-20 PROBLEM — L02.11 CELLULITIS AND ABSCESS OF NECK: Status: RESOLVED | Noted: 2022-12-01 | Resolved: 2022-12-20

## 2022-12-20 PROBLEM — L02.11 CELLULITIS AND ABSCESS OF NECK: Status: RESOLVED | Noted: 2022-01-01 | Resolved: 2022-01-01

## 2022-12-20 PROBLEM — L03.221 CELLULITIS AND ABSCESS OF NECK: Status: RESOLVED | Noted: 2022-12-01 | Resolved: 2022-12-20

## 2022-12-20 PROCEDURE — 1101F PT FALLS ASSESS-DOCD LE1/YR: CPT | Performed by: INTERNAL MEDICINE

## 2022-12-20 PROCEDURE — 74011250636 HC RX REV CODE- 250/636: Performed by: INTERNAL MEDICINE

## 2022-12-20 PROCEDURE — G8417 CALC BMI ABV UP PARAM F/U: HCPCS | Performed by: INTERNAL MEDICINE

## 2022-12-20 PROCEDURE — 3017F COLORECTAL CA SCREEN DOC REV: CPT | Performed by: INTERNAL MEDICINE

## 2022-12-20 PROCEDURE — 1123F ACP DISCUSS/DSCN MKR DOCD: CPT | Performed by: INTERNAL MEDICINE

## 2022-12-20 PROCEDURE — G8536 NO DOC ELDER MAL SCRN: HCPCS | Performed by: INTERNAL MEDICINE

## 2022-12-20 PROCEDURE — 96413 CHEMO IV INFUSION 1 HR: CPT

## 2022-12-20 PROCEDURE — 96367 TX/PROPH/DG ADDL SEQ IV INF: CPT

## 2022-12-20 PROCEDURE — 77336 RADIATION PHYSICS CONSULT: CPT

## 2022-12-20 PROCEDURE — 74011250637 HC RX REV CODE- 250/637: Performed by: INTERNAL MEDICINE

## 2022-12-20 PROCEDURE — G8427 DOCREV CUR MEDS BY ELIG CLIN: HCPCS | Performed by: INTERNAL MEDICINE

## 2022-12-20 PROCEDURE — 74011000250 HC RX REV CODE- 250: Performed by: INTERNAL MEDICINE

## 2022-12-20 PROCEDURE — 96361 HYDRATE IV INFUSION ADD-ON: CPT

## 2022-12-20 PROCEDURE — G8510 SCR DEP NEG, NO PLAN REQD: HCPCS | Performed by: INTERNAL MEDICINE

## 2022-12-20 PROCEDURE — 99215 OFFICE O/P EST HI 40 MIN: CPT | Performed by: INTERNAL MEDICINE

## 2022-12-20 PROCEDURE — 1111F DSCHRG MED/CURRENT MED MERGE: CPT | Performed by: INTERNAL MEDICINE

## 2022-12-20 PROCEDURE — 96375 TX/PRO/DX INJ NEW DRUG ADDON: CPT

## 2022-12-20 PROCEDURE — 74011000258 HC RX REV CODE- 258: Performed by: INTERNAL MEDICINE

## 2022-12-20 PROCEDURE — 77386 HC IMRT TRMT DLVR COMPL: CPT

## 2022-12-20 RX ORDER — ACETAMINOPHEN 325 MG/1
650 TABLET ORAL AS NEEDED
Status: DISPENSED | OUTPATIENT
Start: 2022-12-20 | End: 2022-12-20

## 2022-12-20 RX ORDER — PALONOSETRON 0.05 MG/ML
0.25 INJECTION, SOLUTION INTRAVENOUS ONCE
Status: COMPLETED | OUTPATIENT
Start: 2022-12-20 | End: 2022-12-20

## 2022-12-20 RX ORDER — SODIUM CHLORIDE 9 MG/ML
5-250 INJECTION, SOLUTION INTRAVENOUS AS NEEDED
Status: DISPENSED | OUTPATIENT
Start: 2022-12-20 | End: 2022-12-20

## 2022-12-20 RX ORDER — HEPARIN 100 UNIT/ML
500 SYRINGE INTRAVENOUS AS NEEDED
Start: 2022-12-20

## 2022-12-20 RX ORDER — SODIUM CHLORIDE 0.9 % (FLUSH) 0.9 %
5-40 SYRINGE (ML) INJECTION AS NEEDED
Status: DISPENSED | OUTPATIENT
Start: 2022-12-20 | End: 2022-12-20

## 2022-12-20 RX ORDER — SODIUM CHLORIDE 0.9 % (FLUSH) 0.9 %
5-40 SYRINGE (ML) INJECTION AS NEEDED
OUTPATIENT
Start: 2022-12-20

## 2022-12-20 RX ORDER — HYDROMORPHONE HYDROCHLORIDE 2 MG/1
2 TABLET ORAL
Qty: 30 TABLET | Refills: 0 | Status: SHIPPED | OUTPATIENT
Start: 2022-12-20 | End: 2023-01-03

## 2022-12-20 RX ORDER — ONDANSETRON 2 MG/ML
8 INJECTION INTRAMUSCULAR; INTRAVENOUS AS NEEDED
Status: ACTIVE | OUTPATIENT
Start: 2022-12-20 | End: 2022-12-20

## 2022-12-20 RX ORDER — HEPARIN 100 UNIT/ML
500 SYRINGE INTRAVENOUS AS NEEDED
Status: ACTIVE | OUTPATIENT
Start: 2022-12-20 | End: 2022-12-20

## 2022-12-20 RX ORDER — DIPHENHYDRAMINE HYDROCHLORIDE 50 MG/ML
25 INJECTION, SOLUTION INTRAMUSCULAR; INTRAVENOUS AS NEEDED
Status: ACTIVE | OUTPATIENT
Start: 2022-12-20 | End: 2022-12-20

## 2022-12-20 RX ORDER — SODIUM CHLORIDE 9 MG/ML
5-40 INJECTION INTRAMUSCULAR; INTRAVENOUS; SUBCUTANEOUS AS NEEDED
Status: ACTIVE | OUTPATIENT
Start: 2022-12-20 | End: 2022-12-20

## 2022-12-20 RX ORDER — SODIUM CHLORIDE 9 MG/ML
5-250 INJECTION, SOLUTION INTRAVENOUS AS NEEDED
OUTPATIENT
Start: 2022-12-20

## 2022-12-20 RX ORDER — SODIUM CHLORIDE 9 MG/ML
5-40 INJECTION INTRAMUSCULAR; INTRAVENOUS; SUBCUTANEOUS AS NEEDED
OUTPATIENT
Start: 2022-12-20

## 2022-12-20 RX ADMIN — SODIUM CHLORIDE 500 ML: 900 INJECTION, SOLUTION INTRAVENOUS at 10:07

## 2022-12-20 RX ADMIN — FOSAPREPITANT DIMEGLUMINE 150 MG: 150 INJECTION, POWDER, LYOPHILIZED, FOR SOLUTION INTRAVENOUS at 10:15

## 2022-12-20 RX ADMIN — ACETAMINOPHEN 650 MG: 325 TABLET ORAL at 10:09

## 2022-12-20 RX ADMIN — SODIUM CHLORIDE, PRESERVATIVE FREE 10 ML: 5 INJECTION INTRAVENOUS at 13:13

## 2022-12-20 RX ADMIN — DEXAMETHASONE SODIUM PHOSPHATE 12 MG: 4 INJECTION, SOLUTION INTRAMUSCULAR; INTRAVENOUS at 10:40

## 2022-12-20 RX ADMIN — CISPLATIN 75.2 MG: 1 INJECTION INTRAVENOUS at 11:19

## 2022-12-20 RX ADMIN — POTASSIUM CHLORIDE: 2 INJECTION, SOLUTION, CONCENTRATE INTRAVENOUS at 11:19

## 2022-12-20 RX ADMIN — PALONOSETRON 0.25 MG: 0.05 INJECTION, SOLUTION INTRAVENOUS at 10:11

## 2022-12-20 NOTE — PROGRESS NOTES
Mendel Boroughs. is a 75 y.o. male  Chief Complaint   Patient presents with    Chemotherapy     locally advanced HNSCC. 1. Have you been to the ER, urgent care clinic since your last visit? Hospitalized since your last visit? No    2. Have you seen or consulted any other health care providers outside of the [address] [place] [address] since your last visit? Include any pap smears or colon screening. No    Patient states he has been having pain in his neck/head but feels as if neck issue is not getting any better and thinks tumor is pushing up against neck from what the doctors think is happening.

## 2022-12-20 NOTE — PATIENT INSTRUCTIONS
Treatment today  Follow up 12/27 for labs and week #3 of chemotherapy  Follow up on 1/3 for labs, office visit with me, and week #4 of chemotherapy  Continue with radiation as scheduled  I have refilled your pain medication. This medicine can cause constipation. If you have issues with constipation, please start miralax daily + senna 1 tab daily. Proceed with PEG tube placement on 12/29/22. Brooke Varela, our dietitian, will follow up to talk about your tube feeds.   In the meantime, if you do not eat your usual size of meal, please supplement with 1 Boost.

## 2022-12-20 NOTE — LETTER
12/20/2022    Patient: Sylvie Gonzalez. YOB: 1955   Date of Visit: 12/20/2022     Sushila Gauthier NP  [address] [place] [address] In Basket    Dear Sushila Gauthier NP,      Thank you for referring Mr. Jules Gutierrez to [address] [place] [address] for evaluation. My notes for this consultation are attached. If you have questions, please do not hesitate to call me. I look forward to following your patient along with you.       Sincerely,    Gracie Brittle, MD

## 2022-12-20 NOTE — TELEPHONE ENCOUNTER
[facility] at [facility]   [address], [address]: [phone]  F: [phone]    Medical Nutrition Therapy  Nutrition Encounter:    Called to check in after IR appointment yesterday. He was unable to get G-tube placement because he ate within the restricted window. It was rescheduled for 12/29. He received supplies for the tube feed, formula, syringes, etc.  He reports Mr. Jt Gauthier is eating. Concurrent chemoradiation   Chemotherapy Flowsheet 12/13/2022   Cycle C1   Date 12/13/2022   Drug / Regimen Cisplatin   Pre Hydration given   Pre Meds given   Notes given       Wt Readings from Last 8 Encounters:   12/20/22 172 lb 6.4 oz (78.2 kg)   12/19/22 180 lb (81.6 kg)   12/13/22 173 lb (78.5 kg)   12/13/22 169 lb 6.4 oz (76.8 kg)   12/08/22 172 lb 6.4 oz (78.2 kg)   11/30/22 191 lb 5.8 oz (86.8 kg)      Estimated Nutrition Needs:   Calorie Range: 2184-2688kcal/day     Protein Range: 78-95g/day     Fluid Needs: 2200ml     Plan:   - Gtube placement on 12/29/22 with IR.   - To meet 100% of estimated nutrition needs, will need 5 cans of Boost VHC per day. To provide 2650kcal, 110g protein and 800ml free water.    - Bolus feeds: 1 can 5 times a day with 120ml before and after Boost VHC.     (Water flushes to provide 1200ml free water)  - PO as able to preserve swallow integrity.    - Can drink the Boost VHC     Signed By: Fariba Driver, [employer]

## 2022-12-20 NOTE — PROGRESS NOTES
hospitals Progress Note    1000 Mr. Mcadams Arrived ambulatory and in no distress for C1D8 Cisplatin regimen. Assessment was completed by Erick Valderrama RN. PIV placed to right arm by access RN, labs reviewed from yesterday and medications ordered. NS bolus orders received d/t low BP; per  MD pt instructed to stop Lisinopril, pt informed and verbalized understanding. Chemotherapy Flowsheet 12/20/2022   Cycle C1D8   Date 12/20/2022   Drug / Regimen Cisplatin   Pre Hydration 500 NS Bolus   Post Hydration given   Pre Meds given   Notes given         Mr. Singh Davis vitals were reviewed. Patient Vitals for the past 12 hrs:   Pulse BP   12/20/22 1317 67 (!) 100/56   12/20/22 0958 -- (!) 90/55         Pre-medications  were administered as ordered and chemotherapy was initiated.   Medications Administered       0.9% sodium chloride 1,000 mL with potassium chloride 10 mEq, magnesium sulfate 2 g infusion       Admin Date  12/20/2022 Action  New Bag Dose   Rate  1,000 mL/hr Route  IntraVENous Administered By  Azul Mcfarland RN              acetaminophen (TYLENOL) tablet 650 mg       Admin Date  12/20/2022 Action  Given Dose  650 mg Route  Oral Administered By  Azul Mcfarland RN              CISplatin (PLATINOL) 75.2 mg in 0.9% sodium chloride 500 mL, overfill volume 50 mL chemo infusion       Admin Date  12/20/2022 Action  New Bag Dose  75.2 mg Rate  625.2 mL/hr Route  IntraVENous Administered By  Azul Mcfarland RN              dexamethasone (DECADRON) 12 mg in 0.9% sodium chloride 50 mL, overfill volume 5 mL IVPB       Admin Date  12/20/2022 Action  New Bag Dose  12 mg Rate  232 mL/hr Route  IntraVENous Administered By  Azul Mcfarland RN              fosaprepitant (EMEND) 150 mg in 0.9% sodium chloride 150 mL IVPB       Admin Date  12/20/2022 Action  New Bag Dose  150 mg Rate  450 mL/hr Route  IntraVENous Administered By  Azul Mcfarland RN              palonosetron HCl (ALOXI) injection 0.25 mg       Admin Date  12/20/2022 Action  Given Dose  0.25 mg Route  IntraVENous Administered By  Javon Doan RN              sodium chloride (NS) flush 5-40 mL       Admin Date  12/20/2022 Action  Given Dose  10 mL Route  IntraVENous Administered By  Javon Doan RN              sodium chloride 0.9 % bolus infusion 500 mL       Admin Date  12/20/2022 Action  New Bag Dose  500 mL Route  IntraVENous Administered By  Javon Doan RN                        Two nurses verified prior to administering: Drug name, Drug dose, Infusion volume or drug volume when prepared in a syringe, Rate of administration, Route of administration, Expiration dates and/or times, Appearance and physical integrity of the drugs, Rate set on infusion pump, when used, and Sequencing of drug administration. Mr. Yoly Parnell tolerated treatment well, PIV flushed, removed 2x2 and coban placed.  Patient was discharged from [place] [address] in stable condition at 1315    Future Appointments   Date Time Provider Tad Molinai   12/27/2022  7:30 AM A2 MADELYN LONG [address] [facility] H   1/3/2023  8:00 AM D1 MADELYN LONG [facility]   1/3/2023  8:15 AM Amarjit Casas  [address] BS AMB   1/9/2023  3:00 PM H3 MADELYN LAB [facility] H   1/10/2023  8:00 AM F1 MADELYN LONG [place] [facility] H   1/10/2023  3:00 PM [address] [facility] BS AMB   1/16/2023  3:30 PM H3 MADELYN LAB [facility] H   1/17/2023  8:00 AM F1 MADELYN LONG [place] [facility] H   1/23/2023  3:30 PM H3 MADELYN LAB [facility] H   1/24/2023  8:00 AM F1 MADELYN LONG [place] [facility] H   1/30/2023 11:00 AM Mick Garza, NP PMA BS AMB   3/15/2023  9:00 AM PACEMAKER, STFRANCES [facility] BS AMB   3/15/2023  9:20 AM Marek Sigala MD [facility] BS AMB         Grace Diaz RN  December 20, 2022

## 2022-12-21 ENCOUNTER — TELEPHONE (OUTPATIENT)
Dept: PALLATIVE CARE | Age: 67
End: 2022-12-21

## 2022-12-21 ENCOUNTER — HOSPITAL ENCOUNTER (OUTPATIENT)
Dept: RADIATION THERAPY | Age: 67
Discharge: HOME | End: 2022-12-21
Payer: MEDICARE

## 2022-12-21 PROCEDURE — 77386 HC IMRT TRMT DLVR COMPL: CPT

## 2022-12-21 NOTE — TELEPHONE ENCOUNTER
[employer] Palliative Medicine Office  Nursing Note  [phone]  Fax [phone]      Name:  Mack Dean. YOB: 1955    Received outpatient Palliative Medicine referral from Dr. Viola John to see patient for symptom management and supportive care. Chart  reviewed. Mack Zhang is a 75 y.o. male with locally advanced head and neck SCC. Patient's most recent office visit with Dr. Shaw Aguilar was on 12/20/22. See her note for complete HPI, treatment history, and plan. ACP:    Pages 2-6 of AMD signed on 12/2/22 is on file. Page one was not scanned, so [address] are not known. Nurse called patient to [facility] outpatient Palliative Medicine services and to schedule appointment. No answer, nurse left message requesting call back.      MARIELOS WangN, RN-BC, [facility]

## 2022-12-21 NOTE — TELEPHONE ENCOUNTER
Received a call from radiation oncology that patient stated at his appointment that he was having difficulty getting out of bed. Patient purchased a used electric bed which was broken and unusable. Attempted to reach patient, no answer. Will try again, palliative care is attempting to reach patient as well.

## 2022-12-22 ENCOUNTER — HOSPITAL ENCOUNTER (OUTPATIENT)
Dept: RADIATION THERAPY | Age: 67
Discharge: HOME | End: 2022-12-22
Payer: MEDICARE

## 2022-12-22 PROCEDURE — 77386 HC IMRT TRMT DLVR COMPL: CPT

## 2022-12-23 ENCOUNTER — HOSPITAL ENCOUNTER (OUTPATIENT)
Dept: RADIATION THERAPY | Age: 67
Discharge: HOME | End: 2022-12-23
Payer: MEDICARE

## 2022-12-23 PROCEDURE — 77386 HC IMRT TRMT DLVR COMPL: CPT

## 2022-12-27 ENCOUNTER — HOSPITAL ENCOUNTER (OUTPATIENT)
Dept: INFUSION THERAPY | Age: 67
Discharge: HOME | End: 2022-12-27
Payer: MEDICARE

## 2022-12-27 ENCOUNTER — HOSPITAL ENCOUNTER (OUTPATIENT)
Dept: RADIATION THERAPY | Age: 67
Discharge: HOME | End: 2022-12-27
Payer: MEDICARE

## 2022-12-27 VITALS
RESPIRATION RATE: 18 BRPM | DIASTOLIC BLOOD PRESSURE: 63 MMHG | HEIGHT: 65 IN | HEART RATE: 101 BPM | OXYGEN SATURATION: 99 % | WEIGHT: 168.2 LBS | BODY MASS INDEX: 28.02 KG/M2 | SYSTOLIC BLOOD PRESSURE: 116 MMHG | TEMPERATURE: 97.8 F

## 2022-12-27 DIAGNOSIS — C76.0 SQUAMOUS CELL CARCINOMA OF HEAD AND NECK (HCC): Primary | ICD-10-CM

## 2022-12-27 LAB
ALBUMIN SERPL-MCNC: 3.2 G/DL (ref 3.5–5)
ALBUMIN/GLOB SERPL: 0.7 {RATIO} (ref 1.1–2.2)
ALP SERPL-CCNC: 168 U/L (ref 45–117)
ALT SERPL-CCNC: 30 U/L (ref 12–78)
ANION GAP SERPL CALC-SCNC: 6 MMOL/L (ref 5–15)
AST SERPL-CCNC: 37 U/L (ref 15–37)
BASOPHILS # BLD: 0 K/UL (ref 0–0.1)
BASOPHILS NFR BLD: 1 % (ref 0–1)
BILIRUB SERPL-MCNC: 0.7 MG/DL (ref 0.2–1)
BUN SERPL-MCNC: 42 MG/DL (ref 6–20)
BUN/CREAT SERPL: 31 (ref 12–20)
CALCIUM SERPL-MCNC: 8.7 MG/DL (ref 8.5–10.1)
CHLORIDE SERPL-SCNC: 101 MMOL/L (ref 97–108)
CO2 SERPL-SCNC: 30 MMOL/L (ref 21–32)
CREAT SERPL-MCNC: 1.35 MG/DL (ref 0.7–1.3)
DIFFERENTIAL METHOD BLD: ABNORMAL
EOSINOPHIL # BLD: 0.1 K/UL (ref 0–0.4)
EOSINOPHIL NFR BLD: 1 % (ref 0–7)
ERYTHROCYTE [DISTWIDTH] IN BLOOD BY AUTOMATED COUNT: 12.8 % (ref 11.5–14.5)
GLOBULIN SER CALC-MCNC: 4.4 G/DL (ref 2–4)
GLUCOSE SERPL-MCNC: 225 MG/DL (ref 65–100)
HCT VFR BLD AUTO: 35.5 % (ref 36.6–50.3)
HGB BLD-MCNC: 12.1 G/DL (ref 12.1–17)
IMM GRANULOCYTES # BLD AUTO: 0.1 K/UL (ref 0–0.04)
IMM GRANULOCYTES NFR BLD AUTO: 1 % (ref 0–0.5)
LYMPHOCYTES # BLD: 1.2 K/UL (ref 0.8–3.5)
LYMPHOCYTES NFR BLD: 18 % (ref 12–49)
MAGNESIUM SERPL-MCNC: 1.5 MG/DL (ref 1.6–2.4)
MCH RBC QN AUTO: 34.3 PG (ref 26–34)
MCHC RBC AUTO-ENTMCNC: 34.1 G/DL (ref 30–36.5)
MCV RBC AUTO: 100.6 FL (ref 80–99)
MONOCYTES # BLD: 0.5 K/UL (ref 0–1)
MONOCYTES NFR BLD: 7 % (ref 5–13)
NEUTS SEG # BLD: 4.9 K/UL (ref 1.8–8)
NEUTS SEG NFR BLD: 72 % (ref 32–75)
NRBC # BLD: 0 K/UL (ref 0–0.01)
NRBC BLD-RTO: 0 PER 100 WBC
PLATELET # BLD AUTO: 208 K/UL (ref 150–400)
PMV BLD AUTO: 10.3 FL (ref 8.9–12.9)
POTASSIUM SERPL-SCNC: 4.2 MMOL/L (ref 3.5–5.1)
PROT SERPL-MCNC: 7.6 G/DL (ref 6.4–8.2)
RBC # BLD AUTO: 3.53 M/UL (ref 4.1–5.7)
SODIUM SERPL-SCNC: 137 MMOL/L (ref 136–145)
WBC # BLD AUTO: 6.7 K/UL (ref 4.1–11.1)

## 2022-12-27 PROCEDURE — 74011000258 HC RX REV CODE- 258: Performed by: INTERNAL MEDICINE

## 2022-12-27 PROCEDURE — 74011000250 HC RX REV CODE- 250: Performed by: INTERNAL MEDICINE

## 2022-12-27 PROCEDURE — 96361 HYDRATE IV INFUSION ADD-ON: CPT

## 2022-12-27 PROCEDURE — 96413 CHEMO IV INFUSION 1 HR: CPT

## 2022-12-27 PROCEDURE — 74011250636 HC RX REV CODE- 250/636: Performed by: INTERNAL MEDICINE

## 2022-12-27 PROCEDURE — 96375 TX/PRO/DX INJ NEW DRUG ADDON: CPT

## 2022-12-27 PROCEDURE — 77386 HC IMRT TRMT DLVR COMPL: CPT

## 2022-12-27 PROCEDURE — 96367 TX/PROPH/DG ADDL SEQ IV INF: CPT

## 2022-12-27 PROCEDURE — 83735 ASSAY OF MAGNESIUM: CPT

## 2022-12-27 PROCEDURE — 85025 COMPLETE CBC W/AUTO DIFF WBC: CPT

## 2022-12-27 PROCEDURE — 80053 COMPREHEN METABOLIC PANEL: CPT

## 2022-12-27 PROCEDURE — 36415 COLL VENOUS BLD VENIPUNCTURE: CPT

## 2022-12-27 RX ORDER — SODIUM CHLORIDE 9 MG/ML
5-250 INJECTION, SOLUTION INTRAVENOUS AS NEEDED
Status: DISPENSED | OUTPATIENT
Start: 2022-12-27 | End: 2022-12-27

## 2022-12-27 RX ORDER — SODIUM CHLORIDE 0.9 % (FLUSH) 0.9 %
5-40 SYRINGE (ML) INJECTION AS NEEDED
Status: DISPENSED | OUTPATIENT
Start: 2022-12-27 | End: 2022-12-27

## 2022-12-27 RX ORDER — PALONOSETRON 0.05 MG/ML
0.25 INJECTION, SOLUTION INTRAVENOUS ONCE
Status: COMPLETED | OUTPATIENT
Start: 2022-12-27 | End: 2022-12-27

## 2022-12-27 RX ADMIN — POTASSIUM CHLORIDE: 2 INJECTION, SOLUTION, CONCENTRATE INTRAVENOUS at 11:51

## 2022-12-27 RX ADMIN — SODIUM CHLORIDE, PRESERVATIVE FREE 10 ML: 5 INJECTION INTRAVENOUS at 09:40

## 2022-12-27 RX ADMIN — DEXAMETHASONE SODIUM PHOSPHATE 12 MG: 4 INJECTION, SOLUTION INTRAMUSCULAR; INTRAVENOUS at 12:54

## 2022-12-27 RX ADMIN — CISPLATIN 75.2 MG: 1 INJECTION INTRAVENOUS at 13:30

## 2022-12-27 RX ADMIN — SODIUM CHLORIDE 25 ML/HR: 9 INJECTION, SOLUTION INTRAVENOUS at 11:16

## 2022-12-27 RX ADMIN — FOSAPREPITANT DIMEGLUMINE 150 MG: 150 INJECTION, POWDER, LYOPHILIZED, FOR SOLUTION INTRAVENOUS at 11:18

## 2022-12-27 RX ADMIN — PALONOSETRON 0.25 MG: 0.05 INJECTION, SOLUTION INTRAVENOUS at 12:54

## 2022-12-27 NOTE — PROGRESS NOTES
Hospitals in [place] Progress Note    5484 Mr. Mcadams Arrived ambulatory and in no distress for Cisplatin (C1D15) regimen. Assessment, PIV access, and lab work completed by Annamarie Cook RN. Chemotherapy Flowsheet 12/27/2022   Cycle C1D15   Date 12/27/2022   Drug / Regimen Cisplatin   Pre Hydration given   Post Hydration -   Pre Meds given   Notes given         Mr. Driss Wise vitals were reviewed. Patient Vitals for the past 12 hrs:   Temp Pulse Resp BP SpO2   12/27/22 1433 -- (!) 101 -- 116/63 --   12/27/22 0925 97.8 °F (36.6 °C) (!) 110 18 119/70 99 %         Lab results were obtained and reviewed. Recent Results (from the past 12 hour(s))   CBC WITH AUTOMATED DIFF    Collection Time: 12/27/22  9:33 AM   Result Value Ref Range    WBC 6.7 4.1 - 11.1 K/uL    RBC 3.53 (L) 4.10 - 5.70 M/uL    HGB 12.1 12.1 - 17.0 g/dL    HCT 35.5 (L) 36.6 - 50.3 %    .6 (H) 80.0 - 99.0 FL    MCH 34.3 (H) 26.0 - 34.0 PG    MCHC 34.1 30.0 - 36.5 g/dL    RDW 12.8 11.5 - 14.5 %    PLATELET 861 230 - 397 K/uL    MPV 10.3 8.9 - 12.9 FL    NRBC 0.0 0  WBC    ABSOLUTE NRBC 0.00 0.00 - 0.01 K/uL    NEUTROPHILS 72 32 - 75 %    LYMPHOCYTES 18 12 - 49 %    MONOCYTES 7 5 - 13 %    EOSINOPHILS 1 0 - 7 %    BASOPHILS 1 0 - 1 %    IMMATURE GRANULOCYTES 1 (H) 0.0 - 0.5 %    ABS. NEUTROPHILS 4.9 1.8 - 8.0 K/UL    ABS. LYMPHOCYTES 1.2 0.8 - 3.5 K/UL    ABS. MONOCYTES 0.5 0.0 - 1.0 K/UL    ABS. EOSINOPHILS 0.1 0.0 - 0.4 K/UL    ABS. BASOPHILS 0.0 0.0 - 0.1 K/UL    ABS. IMM.  GRANS. 0.1 (H) 0.00 - 0.04 K/UL    DF AUTOMATED     METABOLIC PANEL, COMPREHENSIVE    Collection Time: 12/27/22  9:33 AM   Result Value Ref Range    Sodium 137 136 - 145 mmol/L    Potassium 4.2 3.5 - 5.1 mmol/L    Chloride 101 97 - 108 mmol/L    CO2 30 21 - 32 mmol/L    Anion gap 6 5 - 15 mmol/L    Glucose 225 (H) 65 - 100 mg/dL    BUN 42 (H) 6 - 20 MG/DL    Creatinine 1.35 (H) 0.70 - 1.30 MG/DL    BUN/Creatinine ratio 31 (H) 12 - 20      eGFR 57 (L) >60 ml/min/1.73m2    Calcium 8.7 8.5 - 10.1 MG/DL    Bilirubin, total 0.7 0.2 - 1.0 MG/DL    ALT (SGPT) 30 12 - 78 U/L    AST (SGOT) 37 15 - 37 U/L    Alk. phosphatase 168 (H) 45 - 117 U/L    Protein, total 7.6 6.4 - 8.2 g/dL    Albumin 3.2 (L) 3.5 - 5.0 g/dL    Globulin 4.4 (H) 2.0 - 4.0 g/dL    A-G Ratio 0.7 (L) 1.1 - 2.2     MAGNESIUM    Collection Time: 12/27/22  9:33 AM   Result Value Ref Range    Magnesium 1.5 (L) 1.6 - 2.4 mg/dL       Pre-medications  were administered as ordered and chemotherapy was initiated.   Medications Administered       0.9% sodium chloride 1,000 mL with potassium chloride 10 mEq, magnesium sulfate 2 g infusion       Admin Date  12/27/2022 Action  New Bag Dose   Rate  1,000 mL/hr Route  IntraVENous Administered By  Erum Daugherty RN              0.9% sodium chloride infusion       Admin Date  12/27/2022 Action  New Bag Dose  25 mL/hr Rate  25 mL/hr Route  IntraVENous Administered By  Erum Daugherty RN              CISplatin (PLATINOL) 75.2 mg in 0.9% sodium chloride 500 mL, overfill volume 50 mL chemo infusion       Admin Date  12/27/2022 Action  New Bag Dose  75.2 mg Rate  625.2 mL/hr Route  IntraVENous Administered By  Erum Daugherty RN              dexamethasone (DECADRON) 12 mg in 0.9% sodium chloride 50 mL, overfill volume 5 mL IVPB       Admin Date  12/27/2022 Action  New Bag Dose  12 mg Rate  232 mL/hr Route  IntraVENous Administered By  Erum Daugherty RN              fosaprepitant (EMEND) 150 mg in 0.9% sodium chloride 150 mL IVPB       Admin Date  12/27/2022 Action  New Bag Dose  150 mg Rate  450 mL/hr Route  IntraVENous Administered By  Erum Daugherty RN              palonosetron HCl (ALOXI) injection 0.25 mg       Admin Date  12/27/2022 Action  Given Dose  0.25 mg Route  IntraVENous Administered By  Erum Daugherty RN              sodium chloride (NS) flush 5-40 mL       Admin Date  12/27/2022 Action  Given Dose  10 mL Route  IntraVENous Administered By  Krystal Sheffield RN               Two nurses verified prior to administering: Drug name, Drug dose, Infusion volume or drug volume when prepared in a syringe, Rate of administration, Route of administration, Expiration dates and/or times, Appearance and physical integrity of the drugs, Rate set on infusion pump, when used, and Sequencing of drug administration. Mr. Umm Rose tolerated treatment well, PIV flushed and removed, patient was discharged from [place] [address] in stable condition at 1440. Patient is aware of next appointment.      Future Appointments   Date Time Provider Tad Sibley   12/29/2022  9:30 AM [facility] ANGIO 1 [address] H   1/3/2023  8:00 AM D1 MADELYN LONG [address] [facility] H   1/3/2023  8:15 AM Juancho Pastor  [address] BS AMB   1/9/2023  3:00 PM H3 MAD[facility]N [facility] H   1/10/2023  8:00 AM F1 MADELYN LONG [place] [facility] H   1/10/2023  3:00 PM [address] [facility] BS AMB   1/16/2023  3:30 PM H3 M[facility] H   1/17/2023  8:00 AM F1 MADELYN LONG [place] [facility] H   1/23/2023  3:30 PM H3 M[facility]N [facility]   1/24/2023  8:00 AM F1 MADELYN LONG [place] [facility] H   1/30/2023 11:00 AM Bolivar Rogers NP PMA BS AMB   3/15/2023  9:00 AM PACEMAKER, STFRANCES [facility] BS AMB   3/15/2023  9:20 AM Zonia Sears MD [facility] BS AMB         David Rogers, [place]  December 27, 2022

## 2022-12-28 ENCOUNTER — HOSPITAL ENCOUNTER (OUTPATIENT)
Dept: RADIATION THERAPY | Age: 67
Discharge: HOME | End: 2022-12-28
Payer: MEDICARE

## 2022-12-28 PROCEDURE — 77386 HC IMRT TRMT DLVR COMPL: CPT

## 2022-12-28 PROCEDURE — 77336 RADIATION PHYSICS CONSULT: CPT

## 2022-12-29 ENCOUNTER — HOSPITAL ENCOUNTER (OUTPATIENT)
Dept: INTERVENTIONAL RADIOLOGY/VASCULAR | Age: 67
Discharge: HOME | End: 2022-12-29
Attending: INTERNAL MEDICINE | Admitting: RADIOLOGY
Payer: MEDICARE

## 2022-12-29 ENCOUNTER — HOSPITAL ENCOUNTER (OUTPATIENT)
Dept: RADIATION THERAPY | Age: 67
End: 2022-12-29
Payer: MEDICARE

## 2022-12-29 VITALS
SYSTOLIC BLOOD PRESSURE: 100 MMHG | TEMPERATURE: 98 F | DIASTOLIC BLOOD PRESSURE: 83 MMHG | OXYGEN SATURATION: 96 % | BODY MASS INDEX: 27.99 KG/M2 | HEIGHT: 65 IN | RESPIRATION RATE: 30 BRPM | WEIGHT: 168 LBS | HEART RATE: 115 BPM

## 2022-12-29 PROCEDURE — 74011000250 HC RX REV CODE- 250: Performed by: RADIOLOGY

## 2022-12-29 PROCEDURE — 49440 PLACE GASTROSTOMY TUBE PERC: CPT

## 2022-12-29 PROCEDURE — 2709999900 HC NON-CHARGEABLE SUPPLY

## 2022-12-29 PROCEDURE — C1769 GUIDE WIRE: HCPCS

## 2022-12-29 PROCEDURE — 74011000636 HC RX REV CODE- 636: Performed by: RADIOLOGY

## 2022-12-29 PROCEDURE — 77386 HC IMRT TRMT DLVR COMPL: CPT

## 2022-12-29 PROCEDURE — 74011250636 HC RX REV CODE- 250/636: Performed by: RADIOLOGY

## 2022-12-29 RX ORDER — FENTANYL CITRATE 50 UG/ML
200 INJECTION, SOLUTION INTRAMUSCULAR; INTRAVENOUS
Status: DISCONTINUED | OUTPATIENT
Start: 2022-12-29 | End: 2022-12-29 | Stop reason: HOSPADM

## 2022-12-29 RX ORDER — MIDAZOLAM HYDROCHLORIDE 1 MG/ML
5 INJECTION, SOLUTION INTRAMUSCULAR; INTRAVENOUS
Status: DISCONTINUED | OUTPATIENT
Start: 2022-12-29 | End: 2022-12-29 | Stop reason: HOSPADM

## 2022-12-29 RX ORDER — LIDOCAINE HYDROCHLORIDE 10 MG/ML
20 INJECTION INFILTRATION; PERINEURAL
Status: COMPLETED | OUTPATIENT
Start: 2022-12-29 | End: 2022-12-29

## 2022-12-29 RX ORDER — HEPARIN SODIUM 1000 [USP'U]/ML
10000 INJECTION, SOLUTION INTRAVENOUS; SUBCUTANEOUS
Status: DISCONTINUED | OUTPATIENT
Start: 2022-12-29 | End: 2022-12-29

## 2022-12-29 RX ADMIN — IOPAMIDOL 20 ML: 612 INJECTION, SOLUTION INTRAVENOUS at 13:28

## 2022-12-29 RX ADMIN — MIDAZOLAM 0.5 MG: 1 INJECTION INTRAMUSCULAR; INTRAVENOUS at 13:23

## 2022-12-29 RX ADMIN — FENTANYL CITRATE 25 MCG: 50 INJECTION INTRAMUSCULAR; INTRAVENOUS at 13:22

## 2022-12-29 RX ADMIN — LIDOCAINE HYDROCHLORIDE 15 ML: 10 INJECTION, SOLUTION INFILTRATION; PERINEURAL at 13:28

## 2022-12-29 RX ADMIN — CEFAZOLIN 2 G: 1 INJECTION, POWDER, FOR SOLUTION INTRAMUSCULAR; INTRAVENOUS at 12:33

## 2022-12-29 RX ADMIN — MIDAZOLAM 1 MG: 1 INJECTION INTRAMUSCULAR; INTRAVENOUS at 13:10

## 2022-12-29 RX ADMIN — FENTANYL CITRATE 50 MCG: 50 INJECTION INTRAMUSCULAR; INTRAVENOUS at 13:10

## 2022-12-29 NOTE — DISCHARGE INSTRUCTIONS
Percutaneous Endoscopic Gastrostomy: What to Expect at [address]  PEG is a procedure to make an opening between the skin of your belly and your stomach. The doctor put a thin tube called a gastrostomy tube (also called G-tube, PEG tube, or feeding tube) into your stomach through the opening. The tube can put liquid nutrition, fluid, and medicines directly into your stomach. The tube also may be used to drain liquid or air from the stomach. Your belly may feel sore, like you pulled a muscle, for several days. Your doctor will give you pain medicine for this. It will take about a week for the skin around your feeding tube to heal. You may have some yellowish mucus where the feeding tube comes out of your belly. This is normal. It's not a sign of infection. You will need to learn how to use and care for your feeding tube. Your doctor may recommend that you have a nurse or dietitian visit you at home to help you get started with your feeding tube. At first you may need a friend or family member to help you with your tube feedings. But with practice, you may be able to do it yourself. A feeding tube can break down over time. If this happens, the tube will be removed and replaced. Sometimes a tube is removed if you have an infection that is getting worse. Sometimes a tube will come out by itself. Your doctor will give you instructions about what to do if this happens. This care sheet gives you a general idea about how long it will take for you to recover. But each person recovers at a different pace. Follow the steps below to feel better as quickly as possible. How can you care for yourself at home? Activity    Rest when you feel tired. Getting enough sleep will help you recover. Try to walk each day. Start by walking a little more than you did the day before. Bit by bit, increase the amount you walk. Walking boosts blood flow and helps prevent pneumonia and constipation.      Ask your doctor when you can drive again. Until your doctor says it is okay, avoid lifting anything that would make you strain. This may include heavy grocery bags and milk containers, a heavy briefcase or backpack, cat litter or dog food bags, a vacuum , or a child. You can shower as usual. Pat dry the skin around your feeding tube. Do not take a bath unless your doctor tells you it is okay. Diet    Follow your doctor's instructions about eating and drinking. Follow your doctor's instructions about what nutrition and fluids to feed through your tube. Medicines    Your doctor will tell you if and when you can restart your medicines. You will also be given instructions about taking any new medicines. If you stopped taking aspirin or some other blood thinner, your doctor will tell you when to start taking it again. If you take medicine through your feeding tube: Follow exactly your doctor's instructions about how to do this. Do not try to put whole pills in your feeding tube. They may get stuck. Ask your doctor if liquid medicine is available. Do not mix your medicine with tube-feeding formula. This can cause a clog in the feeding tube. Do not put more than one medicine down your feeding tube at a time. Flush the tube with water before and after you put each medicine down your tube. Be safe with medicines. Take pain medicines exactly as directed. If the doctor gave you a prescription medicine for pain, take it as prescribed. If you are not taking a prescription pain medicine, ask your doctor if you can take an over-the-counter medicine. Do not take two or more pain medicines at the same time unless the doctor told you to. Many pain medicines have acetaminophen, which is Tylenol. Too much acetaminophen (Tylenol) can be harmful. If you think your pain medicine is making you sick to your stomach: Take your pain medicine after meals (unless your doctor has told you not to).   Ask your doctor for a different pain medicine. If your doctor prescribed antibiotics, take them as directed. Do not stop taking them just because you feel better. You need to take the full course of antibiotics. Incision care    Your doctor or nurse will show you how to care for the skin around the tube. Be sure to follow the instructions on keeping the area clean. Wash the skin around your feeding tube daily with warm, soapy water, and pat it dry. Other cleaning products, such as hydrogen peroxide, can make the wound heal more slowly. You may cover the area with a gauze bandage if it weeps or rubs against clothing. Change the bandage every day. Keep the area clean and dry. Using your feeding tube    Follow your doctor's instructions about using the feeding tube. Your doctor or nurse will:  Tell you what to put through the tube. Teach you how to watch for a leaking tube, infection at the tube site, or a clog in the tube. Tell you what activities you can do. Keep your feeding tube clamped unless you are using it. Keep the tube taped to your skin at all times, and leave some slack in the tube. This helps prevent pain and keeps the tube from coming out. Wash your hands before you handle the tube and tube-feeding formula. Wash the top of the can of formula before you open it. Keep the formula in the refrigerator after you open it. Follow your doctor's instructions about how long formula can sit out at room temperature. Sit up or keep your head up during the feeding and for 30 to 60 minutes after. If you feel sick to your stomach or have stomach cramps during the tube feeding, slow the rate that the formula comes through the tube. Then gradually increase the rate as you can tolerate it. Follow-up care is a key part of your treatment and safety. Be sure to make and go to all appointments, and call your doctor if you are having problems.  It's also a good idea to know your test results and keep a list of the medicines you take. When should you call for help? Call 911 anytime you think you may need emergency care. For example, call if:    You pass out (lose consciousness). You have severe trouble breathing. You have sudden chest pain and shortness of breath, or you cough up blood. You have severe belly pain. Call your doctor now or seek immediate medical care if:    You have pain that does not get better after you take pain medicine. You have a fever over 100°F.     You have signs of infection, such as: Increased pain, swelling, warmth, or redness. Red streaks leading from the area. Pus draining from the area. A fever. The stitches that hold the feeding tube in place are loose or come out. Your feeding tube comes out. Your feeding tube is clogged, or liquid will not go down the tube. You cough a lot or have other trouble during tube feedings. You have nausea, vomiting, or diarrhea. Watch closely for any changes in your health, and be sure to contact your doctor if:    You have any problems with your feeding tube. Where can you learn more? Go to http://www.gray.com/  Enter [id number] in the search box to learn more about \"Percutaneous Endoscopic Gastrostomy: What to Expect at Home. \"  Current as of: June 6, 2022               Content Version: 13.4  © 2006-2022 [employer]. Care instructions adapted under license by [employer] (which disclaims liability or warranty for this information). If you have questions about a medical condition or this instruction, always ask your healthcare professional. [place] [address] any warranty or liability for your use of this information. You have received sedation medications today. YOU SHOULD NOT DRIVE FOR 24 HOURS, DO NOT OPERATE HEAVY MACHINERY, DO NOT MAKE ANY LEGAL DECISIONS OR SIGN LEGAL DOCUMENTS FOR 24 HOURS.  DO NOT DRINK ALCOHOL, TAKE ANY MEDICATIONS UNLESS PRESCRIBED BY YOUR DOCTOR. IF YOU ARE A CAREGIVER, SOMEONE SHOULD TAKE THAT ROLE FOR 24 HOURS. Side effects of sedation medications and other medications used today have been reviewed  Those side effects can include but are not limited to: dizziness, drowsiness, poor balance, fatigue, sleepiness. Take precautions at home to prevent falls, such as assistance with walking or stairs if allowed and /or when needed or position changes. Allergic or adverse reactions could include nausea, itching, hives, dizziness, or anything else out of the ordinary. Should you experience any of these significant changes, please call [phone] between the hours of 7:30 am and 3:30 pm or [phone] after hours. After hours, ask the  to page the X-ray Technologist, and describe the problem to the technologist. If you are experiencing chest pain, shortness of breath, altered mental state, unusual bleeding or any other emergent symptom you should call [phone] immediately.

## 2022-12-29 NOTE — PROGRESS NOTES
Pt arrives ambulatory to angio department for gastrostomy tube placement procedure. All assessments completed and consent was reviewed. Education given was regarding procedure, moderate sedation, post-procedure care and  management/follow-up. Opportunity for questions was provided and all questions and concerns were addressed. Pt has an open wound on left cheek. Cleaned with normal saline and covered with gauze and tegaderm.

## 2022-12-29 NOTE — H&P
Interventional and Vascular Radiology History and Physical    Patient: Cuca Mohamud. 75 y.o. male       Chief Complaint: No chief complaint on file. History of Present Illness: nutritional support     History:    Past Medical History:   Diagnosis Date    Cellulitis and abscess of neck 12/1/2022    Diabetes ([address]     HTN (hypertension)     Hypercholesteremia     Mass of left side of neck 12/1/2022    Skin cancer     L sided neck skin cancer cells     No family history on file. Social History     Socioeconomic History    Marital status:      Spouse name: Not on file    Number of children: Not on file    Years of education: Not on file    Highest education level: Not on file   Occupational History    Not on file   Tobacco Use    Smoking status: Every Day     Packs/day: 0.50     Types: Cigarettes    Smokeless tobacco: Never   Substance and Sexual Activity    Alcohol use: Yes     Comment: 2 drinks/day    Drug use: Never    Sexual activity: Not on file   Other Topics Concern    Not on file   Social History Narrative    Not on file     Social Determinants of Health     Financial Resource Strain: Not on file   Food Insecurity: Not on file   Transportation Needs: Not on file   Physical Activity: Not on file   Stress: Not on file   Social Connections: Not on file   Intimate Partner Violence: Not on file   Housing Stability: Not on file       Allergies: No Known Allergies    Current Medications:  Current Facility-Administered Medications   Medication Dose Route Frequency    fentaNYL citrate (PF) injection 200 mcg  200 mcg IntraVENous Rad Multiple    midazolam (VERSED) injection 5 mg  5 mg IntraVENous Rad Multiple    lidocaine (XYLOCAINE) 10 mg/mL (1 %) injection 20 mL  20 mL SubCUTAneous RAD ONCE    heparin (porcine) 1,000 unit/mL injection 10,000 Units  10,000 Units Hemodialysis RAD ONCE        Physical Exam:  Blood pressure (!) 158/90, pulse 100, temperature 98 °F (36.7 °C), resp.  rate 18, height 5' 5\" (1.651 m), weight 76.2 kg (168 lb), SpO2 94 %. LUNG: clear to auscultation bilaterally, HEART: regular rate and rhythm, S1, S2 normal, no murmur, click, rub or gallop      Alerts:    Hospital Problems  Date Reviewed: 12/20/2022   None      Laboratory:      Recent Labs     12/27/22  0933   HGB 12.1   HCT 35.5*   WBC 6.7      BUN 42*   CREA 1.35*   K 4.2         Plan of Care/Planned Procedure:  Risks, benefits, and alternatives reviewed with patient and he agrees to proceed with the procedure. Conscious sedation will be performed with IV fentanyl and versed.  Plan is for G tube placement       Keshawn Sandoval MD

## 2022-12-30 ENCOUNTER — DOCUMENTATION ONLY (OUTPATIENT)
Dept: ONCOLOGY | Age: 67
End: 2022-12-30

## 2022-12-30 ENCOUNTER — HOSPITAL ENCOUNTER (OUTPATIENT)
Dept: RADIATION THERAPY | Age: 67
End: 2022-12-30
Payer: MEDICARE

## 2022-12-30 PROCEDURE — 77386 HC IMRT TRMT DLVR COMPL: CPT

## 2022-12-30 NOTE — PROGRESS NOTES
Cancer [place] at [address] [address]  Susan Reese [address]: [phone]  F: [phone]    Medical Nutrition Therapy  Nutrition Encounter:    Met with patient and his daughter. He had the Gtube placed yesterday via IR. He has yet to change the guaze. He reports a little sore. Site looks good. Discussed removing the guaze and replacing it. Did not have additional guaze to change it. He has some clamps at home. Reviewed with daughter flushing the tube with water. He is eating soft foods such as chili, cream of chicken, applesauce and drinking 4 Boost VHC. Concurrent chemoradiation   Chemotherapy Flowsheet 12/27/2022   Cycle [id number]   Date 12/27/2022   Drug / Regimen Cisplatin   Pre Hydration given   Post Hydration -   Pre Meds given   Notes given       Wt Readings from Last 8 Encounters:   12/29/22 168 lb (76.2 kg)   12/27/22 168 lb 3.2 oz (76.3 kg)   12/20/22 172 lb 6.4 oz (78.2 kg)   12/19/22 180 lb (81.6 kg)   12/13/22 173 lb (78.5 kg)   12/13/22 169 lb 6.4 oz (76.8 kg)   12/08/22 172 lb 6.4 oz (78.2 kg)   11/30/22 191 lb 5.8 oz (86.8 kg)      Estimated Nutrition Needs:   Calorie Range: 2184-2688kcal/day     Protein Range: 78-95g/day     Fluid Needs: 2200ml     Plan:   - Continue with at least 4 Boost VHC per day. - To meet 100% of estimated nutrition needs, will need 5 cans of Boost VHC per day. To provide 2650kcal, 110g protein and 800ml free water.    - Bolus feeds: 1 can 5 times a day with 120ml before and after Boost VHC. (Water flushes to provide 1200ml free water)  - PO as able to preserve swallow integrity.    - Flush tube daily with water.       Signed By: Yousif Hernandez, [employer]

## 2023-01-01 ENCOUNTER — TELEPHONE (OUTPATIENT)
Age: 68
End: 2023-01-01

## 2023-01-01 DIAGNOSIS — C13.8 MALIGNANT NEOPLASM OF OVERLAPPING SITES OF HYPOPHARYNX (HCC): Primary | ICD-10-CM

## 2023-01-02 ENCOUNTER — APPOINTMENT (OUTPATIENT)
Dept: INFUSION THERAPY | Age: 68
End: 2023-01-02

## 2023-01-03 ENCOUNTER — OFFICE VISIT (OUTPATIENT)
Dept: ONCOLOGY | Age: 68
End: 2023-01-03
Payer: MEDICARE

## 2023-01-03 ENCOUNTER — TELEPHONE (OUTPATIENT)
Dept: ONCOLOGY | Age: 68
End: 2023-01-03

## 2023-01-03 ENCOUNTER — TELEPHONE (OUTPATIENT)
Dept: PALLATIVE CARE | Age: 68
End: 2023-01-03

## 2023-01-03 ENCOUNTER — HOSPITAL ENCOUNTER (OUTPATIENT)
Dept: RADIATION THERAPY | Age: 68
Discharge: HOME | End: 2023-01-03
Payer: MEDICARE

## 2023-01-03 ENCOUNTER — HOSPITAL ENCOUNTER (OUTPATIENT)
Dept: INFUSION THERAPY | Age: 68
Discharge: HOME | End: 2023-01-03
Payer: MEDICARE

## 2023-01-03 VITALS
SYSTOLIC BLOOD PRESSURE: 99 MMHG | HEART RATE: 102 BPM | RESPIRATION RATE: 18 BRPM | BODY MASS INDEX: 27.22 KG/M2 | DIASTOLIC BLOOD PRESSURE: 62 MMHG | HEIGHT: 65 IN | WEIGHT: 163.4 LBS | TEMPERATURE: 97.5 F

## 2023-01-03 VITALS
OXYGEN SATURATION: 96 % | HEART RATE: 114 BPM | SYSTOLIC BLOOD PRESSURE: 117 MMHG | DIASTOLIC BLOOD PRESSURE: 72 MMHG | TEMPERATURE: 97.5 F | RESPIRATION RATE: 18 BRPM

## 2023-01-03 DIAGNOSIS — C76.0 SQUAMOUS CELL CARCINOMA OF HEAD AND NECK (HCC): Primary | ICD-10-CM

## 2023-01-03 DIAGNOSIS — E44.0 MODERATE PROTEIN-CALORIE MALNUTRITION (HCC): ICD-10-CM

## 2023-01-03 DIAGNOSIS — Z79.899 HIGH RISK MEDICATION USE: ICD-10-CM

## 2023-01-03 DIAGNOSIS — G89.3 CANCER RELATED PAIN: ICD-10-CM

## 2023-01-03 DIAGNOSIS — Z72.0 TOBACCO ABUSE: ICD-10-CM

## 2023-01-03 LAB
ALBUMIN SERPL-MCNC: 3.1 G/DL (ref 3.5–5)
ALBUMIN/GLOB SERPL: 0.8 (ref 1.1–2.2)
ALP SERPL-CCNC: 158 U/L (ref 45–117)
ALT SERPL-CCNC: 22 U/L (ref 12–78)
ANION GAP SERPL CALC-SCNC: 7 MMOL/L (ref 5–15)
AST SERPL-CCNC: 29 U/L (ref 15–37)
BASOPHILS # BLD: 0 K/UL (ref 0–0.1)
BASOPHILS NFR BLD: 0 % (ref 0–1)
BILIRUB SERPL-MCNC: 0.8 MG/DL (ref 0.2–1)
BUN SERPL-MCNC: 31 MG/DL (ref 6–20)
BUN/CREAT SERPL: 23 (ref 12–20)
CALCIUM SERPL-MCNC: 8.3 MG/DL (ref 8.5–10.1)
CHLORIDE SERPL-SCNC: 99 MMOL/L (ref 97–108)
CO2 SERPL-SCNC: 31 MMOL/L (ref 21–32)
CREAT SERPL-MCNC: 1.33 MG/DL (ref 0.7–1.3)
DIFFERENTIAL METHOD BLD: ABNORMAL
EOSINOPHIL # BLD: 0 K/UL (ref 0–0.4)
EOSINOPHIL NFR BLD: 1 % (ref 0–7)
ERYTHROCYTE [DISTWIDTH] IN BLOOD BY AUTOMATED COUNT: 12.5 % (ref 11.5–14.5)
GLOBULIN SER CALC-MCNC: 4.1 G/DL (ref 2–4)
GLUCOSE SERPL-MCNC: 140 MG/DL (ref 65–100)
HCT VFR BLD AUTO: 31.8 % (ref 36.6–50.3)
HGB BLD-MCNC: 10.9 G/DL (ref 12.1–17)
IMM GRANULOCYTES # BLD AUTO: 0 K/UL (ref 0–0.04)
IMM GRANULOCYTES NFR BLD AUTO: 1 % (ref 0–0.5)
LYMPHOCYTES # BLD: 0.8 K/UL (ref 0.8–3.5)
LYMPHOCYTES NFR BLD: 21 % (ref 12–49)
MAGNESIUM SERPL-MCNC: 1.1 MG/DL (ref 1.6–2.4)
MCH RBC QN AUTO: 33.9 PG (ref 26–34)
MCHC RBC AUTO-ENTMCNC: 34.3 G/DL (ref 30–36.5)
MCV RBC AUTO: 98.8 FL (ref 80–99)
MONOCYTES # BLD: 0.3 K/UL (ref 0–1)
MONOCYTES NFR BLD: 8 % (ref 5–13)
NEUTS SEG # BLD: 2.7 K/UL (ref 1.8–8)
NEUTS SEG NFR BLD: 69 % (ref 32–75)
NRBC # BLD: 0 K/UL (ref 0–0.01)
NRBC BLD-RTO: 0 PER 100 WBC
PLATELET # BLD AUTO: 155 K/UL (ref 150–400)
PMV BLD AUTO: 10.3 FL (ref 8.9–12.9)
POTASSIUM SERPL-SCNC: 3.9 MMOL/L (ref 3.5–5.1)
PROT SERPL-MCNC: 7.2 G/DL (ref 6.4–8.2)
RBC # BLD AUTO: 3.22 M/UL (ref 4.1–5.7)
SODIUM SERPL-SCNC: 137 MMOL/L (ref 136–145)
WBC # BLD AUTO: 3.9 K/UL (ref 4.1–11.1)

## 2023-01-03 PROCEDURE — 83735 ASSAY OF MAGNESIUM: CPT

## 2023-01-03 PROCEDURE — 74011000258 HC RX REV CODE- 258: Performed by: INTERNAL MEDICINE

## 2023-01-03 PROCEDURE — 99215 OFFICE O/P EST HI 40 MIN: CPT | Performed by: INTERNAL MEDICINE

## 2023-01-03 PROCEDURE — G8417 CALC BMI ABV UP PARAM F/U: HCPCS | Performed by: INTERNAL MEDICINE

## 2023-01-03 PROCEDURE — 36415 COLL VENOUS BLD VENIPUNCTURE: CPT

## 2023-01-03 PROCEDURE — 1101F PT FALLS ASSESS-DOCD LE1/YR: CPT | Performed by: INTERNAL MEDICINE

## 2023-01-03 PROCEDURE — 96413 CHEMO IV INFUSION 1 HR: CPT

## 2023-01-03 PROCEDURE — 96368 THER/DIAG CONCURRENT INF: CPT

## 2023-01-03 PROCEDURE — 80053 COMPREHEN METABOLIC PANEL: CPT

## 2023-01-03 PROCEDURE — 1123F ACP DISCUSS/DSCN MKR DOCD: CPT | Performed by: INTERNAL MEDICINE

## 2023-01-03 PROCEDURE — 96367 TX/PROPH/DG ADDL SEQ IV INF: CPT

## 2023-01-03 PROCEDURE — 85025 COMPLETE CBC W/AUTO DIFF WBC: CPT

## 2023-01-03 PROCEDURE — 74011250636 HC RX REV CODE- 250/636: Performed by: INTERNAL MEDICINE

## 2023-01-03 PROCEDURE — 1111F DSCHRG MED/CURRENT MED MERGE: CPT | Performed by: INTERNAL MEDICINE

## 2023-01-03 PROCEDURE — G8536 NO DOC ELDER MAL SCRN: HCPCS | Performed by: INTERNAL MEDICINE

## 2023-01-03 PROCEDURE — G8510 SCR DEP NEG, NO PLAN REQD: HCPCS | Performed by: INTERNAL MEDICINE

## 2023-01-03 PROCEDURE — 96375 TX/PRO/DX INJ NEW DRUG ADDON: CPT

## 2023-01-03 PROCEDURE — 77386 HC IMRT TRMT DLVR COMPL: CPT

## 2023-01-03 PROCEDURE — G8427 DOCREV CUR MEDS BY ELIG CLIN: HCPCS | Performed by: INTERNAL MEDICINE

## 2023-01-03 PROCEDURE — 3017F COLORECTAL CA SCREEN DOC REV: CPT | Performed by: INTERNAL MEDICINE

## 2023-01-03 RX ORDER — SODIUM CHLORIDE 9 MG/ML
5-250 INJECTION, SOLUTION INTRAVENOUS AS NEEDED
Status: DISPENSED | OUTPATIENT
Start: 2023-01-03 | End: 2023-01-03

## 2023-01-03 RX ORDER — SODIUM CHLORIDE 9 MG/ML
5-40 INJECTION INTRAVENOUS AS NEEDED
Status: ACTIVE | OUTPATIENT
Start: 2023-01-03 | End: 2023-01-03

## 2023-01-03 RX ORDER — SODIUM CHLORIDE 0.9 % (FLUSH) 0.9 %
5-40 SYRINGE (ML) INJECTION AS NEEDED
Status: DISPENSED | OUTPATIENT
Start: 2023-01-03 | End: 2023-01-03

## 2023-01-03 RX ORDER — HEPARIN 100 UNIT/ML
500 SYRINGE INTRAVENOUS AS NEEDED
Status: ACTIVE | OUTPATIENT
Start: 2023-01-03 | End: 2023-01-03

## 2023-01-03 RX ORDER — PALONOSETRON 0.05 MG/ML
0.25 INJECTION, SOLUTION INTRAVENOUS ONCE
Status: COMPLETED | OUTPATIENT
Start: 2023-01-03 | End: 2023-01-03

## 2023-01-03 RX ADMIN — PALONOSETRON 0.25 MG: 0.05 INJECTION, SOLUTION INTRAVENOUS at 11:24

## 2023-01-03 RX ADMIN — FOSAPREPITANT DIMEGLUMINE 150 MG: 150 INJECTION, POWDER, LYOPHILIZED, FOR SOLUTION INTRAVENOUS at 11:59

## 2023-01-03 RX ADMIN — DEXAMETHASONE SODIUM PHOSPHATE 12 MG: 4 INJECTION, SOLUTION INTRAMUSCULAR; INTRAVENOUS at 11:30

## 2023-01-03 RX ADMIN — POTASSIUM CHLORIDE: 2 INJECTION, SOLUTION, CONCENTRATE INTRAVENOUS at 12:34

## 2023-01-03 RX ADMIN — SODIUM CHLORIDE 25 ML/HR: 9 INJECTION, SOLUTION INTRAVENOUS at 11:23

## 2023-01-03 RX ADMIN — SODIUM CHLORIDE 75.2 MG: 9 INJECTION, SOLUTION INTRAVENOUS at 12:38

## 2023-01-03 NOTE — TELEPHONE ENCOUNTER
[facility] Outpatient Palliative Medicine Office  Nursing Note  [phone]  Fax [phone]     Name:  Dayanara Adjutant. YOB: 1955     Call # 2 to patient to follow up on outpatient Palliative Medicine appointment. See this nurse's note from 12/21/22. Patient's son answered the phone today and says he schedules patient's appointments. Appt scheduled for 1/19/23 at 9:30am with Dr. Vale Weldon. Patient has 8:15 am radiation that day.       Christi Dow, MARIELOSN, RN  Palliative Medicine  [phone]

## 2023-01-03 NOTE — PROGRESS NOTES
Jean Carlos Toth. is a 75 y.o. male     locally advanced [facility]. 1. Have you been to the ER, urgent care clinic since your last visit? Hospitalized since your last visit? No    2. Have you seen or consulted any other health care providers outside of the [address] [place] [address] since your last visit? Include any pap smears or colon screening.  No

## 2023-01-03 NOTE — TELEPHONE ENCOUNTER
[facility] [place] [facility] [address] [facility] [address]  Susan Reese [address]: [phone]  F: [phone]    Medical Nutrition Therapy  Nutrition Encounter:    Called and spoke with patients son. He has lost 5#. Son reports patient is eating well, at least 3 meals a day and drinking 4 Boost VHC. He has not been as mobile as previously. Concurrent chemoradiation   Chemotherapy Flowsheet 1/3/2023   Cycle [id number]   Date 1/3/2023   Drug / Regimen Cisplatin   Pre Hydration given   Post Hydration -   Pre Meds given   Notes given       Wt Readings from Last 8 Encounters:   01/03/23 163 lb 6.4 oz (74.1 kg)   12/29/22 168 lb (76.2 kg)   12/27/22 168 lb 3.2 oz (76.3 kg)   12/20/22 172 lb 6.4 oz (78.2 kg)   12/19/22 180 lb (81.6 kg)   12/13/22 173 lb (78.5 kg)   12/13/22 169 lb 6.4 oz (76.8 kg)   12/08/22 172 lb 6.4 oz (78.2 kg)      Estimated Nutrition Needs:   Calorie Range: 2184-2688kcal/day     Protein Range: 78-95g/day     Fluid Needs: 2200ml     Plan:   - Increase to 5 Boost VHC per day, either by PO or Gtube. - To meet 100% of estimated nutrition needs, will need 5 cans of Boost VHC per day. To provide 2650kcal, 110g protein and 800ml free water.    - Bolus feeds: 1 can 5 times a day with 120ml before and after Boost VHC. (Water flushes to provide 1200ml free water)  - PO as able to preserve swallow integrity.    - Flush tube daily with water.       Signed By: Jordin Hill, [employer]

## 2023-01-03 NOTE — LETTER
1/3/2023    Patient: Ayla Arzate. YOB: 1955   Date of Visit: 1/3/2023     Esdras Herrera NP  [address] [place] [address]  [address]  [facility]  Via In Basket    Dear Esdras Herrera NP,      Thank you for referring Mr. Alejandre to [address] [place] [address] for evaluation. My notes for this consultation are attached. If you have questions, please do not hesitate to call me. I look forward to following your patient along with you.       Sincerely,    Charis Ramos MD

## 2023-01-03 NOTE — PROGRESS NOTES
[id number] [facility]   [phone]    Hematology / Oncology Follow-up    Reason for Visit:   Michael Gill is a 75 y.o. male PMHx of hypertension, diabetes, and hepatic steatosis/possible cirrhosis who is seen for follow-up of locally advanced HNSCC. Hematology/Oncology Treatment History:      PRIMARY DIAGNOSIS: Locally advanced HNSCC     DATE OF DIAGNOSIS: 7/26/22  ORIGINAL STAGE: Stage CAN (cT0 cN2b cM0)  DIAGNOSTICS: FNA ([address] [place] [address]) with atypical squamous cells concerning for malignancy. Core biopsy with skeletal muscle, fibrous tissue, and granulation with acute and chronic inflammation. Cytokeratin negative, p40 negative. SITES OF DISEASE: Left neck mass with prominent left-sided lymph nodes. 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 Pathology records from [place] reviewed. Cytology from FNA 7/26/22 with \"atypical squamous cells. \"  Follow-up core needle biopsy with \"rare epitheliod cells are present; however, pancytokeratin immunostain is negative, and p40 immunostain negative. \"   Although core needle biopsy not definitive, clinical picture (i.e. atypical squamous cells on FNA, enlarging neck mass with failure to improve to antibiotics in a chronic smoker) consistent with HNSCC  Unclear primary.  Given ongoing complications from untreated disease (heart block, recurrent infection), elected not to pursue PET-CT or pan-endoscopy while inpatient to minimize delays in treatment   Staging CT CAP negative for distant metastatic disease   Initiated on concurrent chemoradiation (D1C1 weekly cisplatin 40 mg/m2 on 12/13/22)     Tolerating chemoradiation reasonably well other than some mucositis/odynophagia. #) Intractable migraine headaches: possibly tension headache from large neck mass. CT head without any acute pathology or meningeal enhancement. Unable to obtain MRI brain due to claustrophobia. Management with flexeril for neck spasms, scheduled tylenol. and hydromorphone prn. [facility] #) Left neck abscess and cellulitis, resolved: no evidence of purulence on exam.  Continue to monitor. #) Moderate protein-calorie malnutrition: s/p PEG tube placement on 22. Has not yet started utilizing. Weight continues to decline. Advised patient to start tube feeds per Dietitian Chance Swift and eat for pleasure as tolerated. #) Heart block: Likely due to reflex from mass sitting over his right carotid artery. Status post PPM on 22. #) Tobacco abuse: has cut back to 2 cigarettes per day; congratulated on success     #) Steatosis/Cirrhosis: incidentally noted on CT A/P     #) Macrocytosis: B12 level supra-therapeutic; folate wnl. Likely due to cirrhosis. #) Debility: patient chronically debilitated and largely bed-bound as a result of locally advanced HNSCC and would benefit from hospital bed. #) Psychosocial: wife recently . Moved from [place] to be closer to his children. Has good support from family (oldest son Leeroy Llanos is HCPOA). Plan:     Radiation therapy with Dr. Jazmin Sher  Lab monitoring p/t each treatment: CBC, BMP, Mag  Labs reviewed, proceed with cycle 1, week 4 of cisplatin 40 mg/m2; given weekly with concurrent RT  Antiemetic prophylaxis: Ondansetron, Dexamethasone, Fosaprepitant prior to each chemotherapy. Dexamethasone for 2 days after therapy at home. PRN antiemetics at home: Ondansetron, Prochlorperazine, Lorazepam  Status post PEG tube placement on ; tube feeds per dietitian. Not yet utilizing PEG tube.    Encouraged patient to push fluids (2L+ per day)  Headaches: flexeril and Excedrin prn + tylenol TID  Pain: dilaudid 2 mg q6h prn with bowel regimen; magic mouthwash prn  Palliative Care referral placed previously; not yet scheduled - will reach out again  Follow up in 1 week for labs, exam, and infusion (cycle 1, week 5)    The patient requires intensive monitoring for toxicity from therapy with labs every 1 week and physical exam every 1 week. Signed By: Aly Perez MD    January 3, 2023        Interval History:     Presented June 2022 with enlarging next mass, failed to improve with antibiotics  Evaluated by ENT (Dr. Ash Phan) in [facility] [address] in 7/2022. uUtrasound that showed irrecgular enhancing mass involving left submandibuar gland, suspicious for cancer. Follow up CT neck demonstrated soft tissue mass, measuring 5 cm with small annular opening with drainage. Mass noted to be compressing left internal jugular vein. FNA 7/26/22 with atypical squamous cells, c/f malignancy  Core biopsy with skeletal muscle, fibrous tissue, and granulation with acute and chronic inflammation. Cytokeratin negative, p40 negative. Patient referred to medical and radiation oncology in [place], but did not follow up due to passing of his wife  Presented to [facility]/1/2022 with progressively worsening neck pain and swelling as well as purulent cellulitis/abscess. Treated with IV Antibiotics and discharged on PO. Hospital course complicated by AV block with prolonged pause (likely due to reflex from compression of mass on right carotid artery) s/p ppm.    11/30/22 CT neck: large 4.8 x 3.1 x 4.2 cm rim-enhancing complex collection in left lateral neck, extending from carotid space to the skin surface with associated skin thickening, edema, and prominent left neck lymph nodes. Findings most likely represent an abscess rather than necrotic/superinfected neoplasm. 12/1/22 CT CAP: left neck mass partially visualized.   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 12/13/22 C1 Weekly cisplatin  12/14/22 concurrent radiation initiated  12/29/22 PEG tube placed     Patient reports he is doing overall okay. He is starting to have pain with swallowing. He has not yet picked up the magic mouthwash prescription. He had his PEG tube placed but has not yet been using it for feeding. He is eating by mouth, primarily applesauce, fruit cups, yogurt. His weight is down 5 pounsd. His main complaint if of the intractable headaches. He remains on Dilaudid 2 mg every 4-6 hours alternating with tylenol or ibuprofen. Says this does not fully control his pain. He has had 2 episodes of nausea with vomiting. States that the compazine helped. Has not yet tried the Zofran. He has cut down on smoking - down to 2 cigarettes per day. He inquires about medical marijuana card. Medications reviewed in the EMR. No Known Allergies     Review of Systems: A 6-point review of systems was obtained, negative except as reviewed in the HPI.     Physical Exam:   Visit Vitals  /72   Pulse (!) 114   Temp 97.5 °F (36.4 °C)   Resp 18   SpO2 96%       General: No distress, chronically ill appearing  Eyes: Anicteric sclerae  HENT: Atraumatic, no oral ulcers, +erythema  Neck: Supple,  large ~4-5 cm firm mass in left submandibular region with ~1 cm area of serous discharge, no surrounding erythema, purulence or malodorous smell  Respiratory: CTAB, normal respiratory effort  CV: Normal S1 and S2, no peripheral edema  GI: Soft, nontender, nondistended, no masses, normal bowel sounds  Skin: No rashes, ecchymoses, or petechiae  Psych: Alert, oriented, appropriate affect, normal judgment/insight    Results:     Lab Results   Component Value Date/Time    WBC 3.9 (L) 01/03/2023 09:20 AM    HGB 10.9 (L) 01/03/2023 09:20 AM    HCT 31.8 (L) 01/03/2023 09:20 AM    PLATELET 687 80/62/6672 09:20 AM    MCV 98.8 01/03/2023 09:20 AM    ABS. NEUTROPHILS 2.7 01/03/2023 09:20 AM     Lab Results   Component Value Date/Time    Sodium 137 12/27/2022 09:33 AM    Potassium 4.2 12/27/2022 09:33 AM    Chloride 101 12/27/2022 09:33 AM    CO2 30 12/27/2022 09:33 AM    Glucose 225 (H) 12/27/2022 09:33 AM    BUN 42 (H) 12/27/2022 09:33 AM    Creatinine 1.35 (H) 12/27/2022 09:33 AM    Calcium 8.7 12/27/2022 09:33 AM    Glucose (POC) 313 (H) 12/11/2022 11:30 AM     Lab Results   Component Value Date/Time    Bilirubin, total 0.7 12/27/2022 09:33 AM    ALT (SGPT) 30 12/27/2022 09:33 AM    Alk.  phosphatase 168 (H) 12/27/2022 09:33 AM    Protein, total 7.6 12/27/2022 09:33 AM    Albumin 3.2 (L) 12/27/2022 09:33 AM    Globulin 4.4 (H) 12/27/2022 09:33 AM       No new images to review

## 2023-01-04 ENCOUNTER — HOSPITAL ENCOUNTER (OUTPATIENT)
Dept: RADIATION THERAPY | Age: 68
Discharge: HOME | End: 2023-01-04
Payer: MEDICARE

## 2023-01-04 PROCEDURE — 77386 HC IMRT TRMT DLVR COMPL: CPT

## 2023-01-05 ENCOUNTER — DOCUMENTATION ONLY (OUTPATIENT)
Dept: ONCOLOGY | Age: 68
End: 2023-01-05

## 2023-01-05 ENCOUNTER — HOSPITAL ENCOUNTER (OUTPATIENT)
Dept: RADIATION THERAPY | Age: 68
Discharge: HOME | End: 2023-01-05
Payer: MEDICARE

## 2023-01-05 DIAGNOSIS — G89.3 CANCER RELATED PAIN: Primary | ICD-10-CM

## 2023-01-05 PROCEDURE — 77386 HC IMRT TRMT DLVR COMPL: CPT

## 2023-01-05 PROCEDURE — 77336 RADIATION PHYSICS CONSULT: CPT

## 2023-01-05 RX ORDER — DIAZEPAM 5 MG/1
TABLET ORAL
Qty: 40 TABLET | Refills: 0 | Status: SHIPPED | OUTPATIENT
Start: 2023-01-05

## 2023-01-06 ENCOUNTER — HOSPITAL ENCOUNTER (OUTPATIENT)
Dept: RADIATION THERAPY | Age: 68
Discharge: HOME | End: 2023-01-06
Payer: MEDICARE

## 2023-01-06 PROCEDURE — 77386 HC IMRT TRMT DLVR COMPL: CPT

## 2023-01-06 NOTE — PROGRESS NOTES
[facility] [place] [facility] [id number]  Susan Reese 103: [phone]  F: [phone]    Medical Nutrition Therapy  Nutrition Encounter:    Met with patient and son. He reports his weight has been stable. He monitors his weight closely at home and feels the scale at the office was incorrect. He has only flushed his tube once. During visit, we practiced using the feeding tube. RD flushed tube with 140ml water. He tolerated well. Concurrent chemoradiation   Chemotherapy Flowsheet 1/3/2023   Cycle [id number]   Date 1/3/2023   Drug / Regimen Cisplatin   Pre Hydration given   Post Hydration -   Pre Meds given   Notes given       Wt Readings from Last 8 Encounters:   01/03/23 163 lb 6.4 oz (74.1 kg)   12/29/22 168 lb (76.2 kg)   12/27/22 168 lb 3.2 oz (76.3 kg)   12/20/22 172 lb 6.4 oz (78.2 kg)   12/19/22 180 lb (81.6 kg)   12/13/22 173 lb (78.5 kg)   12/13/22 169 lb 6.4 oz (76.8 kg)   12/08/22 172 lb 6.4 oz (78.2 kg)      Estimated Nutrition Needs:   Calorie Range: 2184-2688kcal/day     Protein Range: 78-95g/day     Fluid Needs: 2200ml     Plan:   - Increase to 5 Boost VHC per day, either by PO or Gtube. - To meet 100% of estimated nutrition needs, will need 5 cans of Boost VHC per day. To provide 2650kcal, 110g protein and 800ml free water.    - Bolus feeds: 1 can 5 times a day with 120ml before and after Boost VHC. (Water flushes to provide 1200ml free water)  - PO as tolerated.       Signed By: Karin Bernard, [facility]

## 2023-01-09 ENCOUNTER — HOSPITAL ENCOUNTER (OUTPATIENT)
Dept: INFUSION THERAPY | Age: 68
Discharge: HOME | End: 2023-01-09
Payer: MEDICARE

## 2023-01-09 ENCOUNTER — DOCUMENTATION ONLY (OUTPATIENT)
Dept: ONCOLOGY | Age: 68
End: 2023-01-09

## 2023-01-09 ENCOUNTER — HOSPITAL ENCOUNTER (OUTPATIENT)
Dept: RADIATION THERAPY | Age: 68
Discharge: HOME | End: 2023-01-09
Payer: MEDICARE

## 2023-01-09 VITALS
HEART RATE: 110 BPM | SYSTOLIC BLOOD PRESSURE: 114 MMHG | DIASTOLIC BLOOD PRESSURE: 70 MMHG | RESPIRATION RATE: 18 BRPM | TEMPERATURE: 97 F

## 2023-01-09 DIAGNOSIS — C76.0 SQUAMOUS CELL CARCINOMA OF HEAD AND NECK (HCC): Primary | ICD-10-CM

## 2023-01-09 PROBLEM — I95.89 HYPOTENSION DUE TO HYPOVOLEMIA: Status: ACTIVE | Noted: 2023-01-01

## 2023-01-09 PROBLEM — N17.9 ACUTE KIDNEY INJURY (HCC): Status: ACTIVE | Noted: 2023-01-01

## 2023-01-09 PROBLEM — E83.42 HYPOMAGNESEMIA: Status: ACTIVE | Noted: 2023-01-01

## 2023-01-09 PROBLEM — E86.1 HYPOTENSION DUE TO HYPOVOLEMIA: Status: ACTIVE | Noted: 2023-01-09

## 2023-01-09 PROBLEM — E86.1 HYPOTENSION DUE TO HYPOVOLEMIA: Status: ACTIVE | Noted: 2023-01-01

## 2023-01-09 PROBLEM — E83.42 HYPOMAGNESEMIA: Status: ACTIVE | Noted: 2023-01-09

## 2023-01-09 PROBLEM — N17.9 ACUTE KIDNEY INJURY (HCC): Status: ACTIVE | Noted: 2023-01-09

## 2023-01-09 PROBLEM — I95.89 HYPOTENSION DUE TO HYPOVOLEMIA: Status: ACTIVE | Noted: 2023-01-09

## 2023-01-09 LAB
ALBUMIN SERPL-MCNC: 3 G/DL (ref 3.5–5)
ALBUMIN/GLOB SERPL: 0.7 (ref 1.1–2.2)
ALP SERPL-CCNC: 120 U/L (ref 45–117)
ALT SERPL-CCNC: 23 U/L (ref 12–78)
ANION GAP SERPL CALC-SCNC: 3 MMOL/L (ref 5–15)
AST SERPL-CCNC: 24 U/L (ref 15–37)
BASOPHILS # BLD: 0 K/UL (ref 0–0.1)
BASOPHILS NFR BLD: 0 % (ref 0–1)
BILIRUB SERPL-MCNC: 0.6 MG/DL (ref 0.2–1)
BUN SERPL-MCNC: 67 MG/DL (ref 6–20)
BUN/CREAT SERPL: 30 (ref 12–20)
CALCIUM SERPL-MCNC: 7.8 MG/DL (ref 8.5–10.1)
CHLORIDE SERPL-SCNC: 92 MMOL/L (ref 97–108)
CO2 SERPL-SCNC: 37 MMOL/L (ref 21–32)
CREAT SERPL-MCNC: 2.22 MG/DL (ref 0.7–1.3)
DIFFERENTIAL METHOD BLD: ABNORMAL
EOSINOPHIL # BLD: 0 K/UL (ref 0–0.4)
EOSINOPHIL NFR BLD: 1 % (ref 0–7)
ERYTHROCYTE [DISTWIDTH] IN BLOOD BY AUTOMATED COUNT: 12.5 % (ref 11.5–14.5)
GLOBULIN SER CALC-MCNC: 4.2 G/DL (ref 2–4)
GLUCOSE SERPL-MCNC: 324 MG/DL (ref 65–100)
HCT VFR BLD AUTO: 30.9 % (ref 36.6–50.3)
HGB BLD-MCNC: 10.5 G/DL (ref 12.1–17)
IMM GRANULOCYTES # BLD AUTO: 0 K/UL (ref 0–0.04)
IMM GRANULOCYTES NFR BLD AUTO: 1 % (ref 0–0.5)
LACTATE SERPL-SCNC: 2 MMOL/L (ref 0.4–2)
LYMPHOCYTES # BLD: 0.9 K/UL (ref 0.8–3.5)
LYMPHOCYTES NFR BLD: 25 % (ref 12–49)
MAGNESIUM SERPL-MCNC: 0.9 MG/DL (ref 1.6–2.4)
MCH RBC QN AUTO: 33.7 PG (ref 26–34)
MCHC RBC AUTO-ENTMCNC: 34 G/DL (ref 30–36.5)
MCV RBC AUTO: 99 FL (ref 80–99)
MONOCYTES # BLD: 0.3 K/UL (ref 0–1)
MONOCYTES NFR BLD: 7 % (ref 5–13)
NEUTS SEG # BLD: 2.4 K/UL (ref 1.8–8)
NEUTS SEG NFR BLD: 66 % (ref 32–75)
NRBC # BLD: 0 K/UL (ref 0–0.01)
NRBC BLD-RTO: 0 PER 100 WBC
PLATELET # BLD AUTO: 114 K/UL (ref 150–400)
PMV BLD AUTO: 11.3 FL (ref 8.9–12.9)
POTASSIUM SERPL-SCNC: 3.7 MMOL/L (ref 3.5–5.1)
PROT SERPL-MCNC: 7.2 G/DL (ref 6.4–8.2)
RBC # BLD AUTO: 3.12 M/UL (ref 4.1–5.7)
SODIUM SERPL-SCNC: 132 MMOL/L (ref 136–145)
WBC # BLD AUTO: 3.6 K/UL (ref 4.1–11.1)

## 2023-01-09 PROCEDURE — 77386 HC IMRT TRMT DLVR COMPL: CPT

## 2023-01-09 PROCEDURE — 83735 ASSAY OF MAGNESIUM: CPT

## 2023-01-09 PROCEDURE — 36415 COLL VENOUS BLD VENIPUNCTURE: CPT

## 2023-01-09 PROCEDURE — 83605 ASSAY OF LACTIC ACID: CPT

## 2023-01-09 PROCEDURE — 85025 COMPLETE CBC W/AUTO DIFF WBC: CPT

## 2023-01-09 PROCEDURE — 74011250636 HC RX REV CODE- 250/636: Performed by: INTERNAL MEDICINE

## 2023-01-09 PROCEDURE — 80053 COMPREHEN METABOLIC PANEL: CPT

## 2023-01-09 PROCEDURE — 96360 HYDRATION IV INFUSION INIT: CPT

## 2023-01-09 RX ORDER — SODIUM CHLORIDE 0.9 % (FLUSH) 0.9 %
5-40 SYRINGE (ML) INJECTION AS NEEDED
OUTPATIENT
Start: 2023-01-12

## 2023-01-09 RX ORDER — SODIUM CHLORIDE 9 MG/ML
5-40 INJECTION INTRAVENOUS AS NEEDED
OUTPATIENT
Start: 2023-01-12

## 2023-01-09 RX ORDER — SODIUM CHLORIDE 9 MG/ML
5-250 INJECTION, SOLUTION INTRAVENOUS AS NEEDED
OUTPATIENT
Start: 2023-01-12

## 2023-01-09 RX ORDER — HEPARIN 100 UNIT/ML
500 SYRINGE INTRAVENOUS AS NEEDED
Start: 2023-01-12

## 2023-01-09 RX ORDER — MAGNESIUM SULFATE HEPTAHYDRATE 40 MG/ML
2 INJECTION, SOLUTION INTRAVENOUS
Status: COMPLETED | OUTPATIENT
Start: 2023-01-10 | End: 2023-01-10

## 2023-01-09 RX ADMIN — SODIUM CHLORIDE 500 ML: 900 INJECTION, SOLUTION INTRAVENOUS at 12:50

## 2023-01-09 NOTE — PROGRESS NOTES
[facility] Short Note                       Date: 2023    Name: Jeremiah Ayon. MRN: [id number]         : 1955      Pt admit to [facility] for pre treatment labs ambulatory in stable condition. Assessment completed and documented in flowsheets. Patient hypotensive and reports falling in kitchen yesterday after losing balance. Pt reports dizziness, no headache and did not hit head. Pt reports poor PO intake. Dr. Madhav Malave notified, 500 cc bolus given after lab draw. Patient educated to continue tube feeds to support PO intake, discontinue amlodipine per Dr. Madhav Malave at fall safety. Please review pending lab results in [address] [place] [address]. Mr. Joshua Castillo vitals were reviewed prior to and after treatment. Patient Vitals for the past 12 hrs:   Temp Pulse Resp BP   23 1400 -- (!) 110 -- 114/70   23 1214 -- -- -- (!) 80/52   23 1211 -- (!) 107 -- (!) 87/48   23 1207 97 °F (36.1 °C) (!) 115 18 (!) 87/56         Lab results were obtained and reviewed. Labs within parameter for treatment. Recent Results (from the past 12 hour(s))   CBC WITH AUTOMATED DIFF    Collection Time: 23 12:30 PM   Result Value Ref Range    WBC 3.6 (L) 4.1 - 11.1 K/uL    RBC 3.12 (L) 4.10 - 5.70 M/uL    HGB 10.5 (L) 12.1 - 17.0 g/dL    HCT 30.9 (L) 36.6 - 50.3 %    MCV 99.0 80.0 - 99.0 FL    MCH 33.7 26.0 - 34.0 PG    MCHC 34.0 30.0 - 36.5 g/dL    RDW 12.5 11.5 - 14.5 %    PLATELET 498 (L) 692 - 400 K/uL    MPV 11.3 8.9 - 12.9 FL    NRBC 0.0 0  WBC    ABSOLUTE NRBC 0.00 0.00 - 0.01 K/uL    NEUTROPHILS 66 32 - 75 %    LYMPHOCYTES 25 12 - 49 %    MONOCYTES 7 5 - 13 %    EOSINOPHILS 1 0 - 7 %    BASOPHILS 0 0 - 1 %    IMMATURE GRANULOCYTES 1 (H) 0.0 - 0.5 %    ABS. NEUTROPHILS 2.4 1.8 - 8.0 K/UL    ABS. LYMPHOCYTES 0.9 0.8 - 3.5 K/UL    ABS. MONOCYTES 0.3 0.0 - 1.0 K/UL    ABS. EOSINOPHILS 0.0 0.0 - 0.4 K/UL    ABS. BASOPHILS 0.0 0.0 - 0.1 K/UL    ABS. IMM.  GRANS. 0.0 0.00 - 0.04 K/UL    DF AUTOMATED METABOLIC PANEL, COMPREHENSIVE    Collection Time: 01/09/23 12:30 PM   Result Value Ref Range    Sodium 132 (L) 136 - 145 mmol/L    Potassium 3.7 3.5 - 5.1 mmol/L    Chloride 92 (L) 97 - 108 mmol/L    CO2 37 (H) 21 - 32 mmol/L    Anion gap 3 (L) 5 - 15 mmol/L    Glucose 324 (H) 65 - 100 mg/dL    BUN 67 (H) 6 - 20 MG/DL    Creatinine 2.22 (H) 0.70 - 1.30 MG/DL    BUN/Creatinine ratio 30 (H) 12 - 20      eGFR 31 (L) >60 ml/min/1.73m2    Calcium 7.8 (L) 8.5 - 10.1 MG/DL    Bilirubin, total 0.6 0.2 - 1.0 MG/DL    ALT (SGPT) 23 12 - 78 U/L    AST (SGOT) 24 15 - 37 U/L    Alk. phosphatase 120 (H) 45 - 117 U/L    Protein, total 7.2 6.4 - 8.2 g/dL    Albumin 3.0 (L) 3.5 - 5.0 g/dL    Globulin 4.2 (H) 2.0 - 4.0 g/dL    A-G Ratio 0.7 (L) 1.1 - 2.2     MAGNESIUM    Collection Time: 01/09/23 12:30 PM   Result Value Ref Range    Magnesium 0.9 (LL) 1.6 - 2.4 mg/dL   LACTIC ACID    Collection Time: 01/09/23 12:30 PM   Result Value Ref Range    Lactic acid 2.0 0.4 - 2.0 MMOL/L         Medications given: via PIV  Medications Administered       sodium chloride 0.9 % bolus infusion 500 mL       Admin Date  01/09/2023 Action  New Bag Dose  500 mL Route  IntraVENous Administered By  Jayme Padron RN                    PIV positive blood return noted, flushed and removed prior to discharge. Mr. Ayan Catalan tolerated the infusion, and had no complaints. Mr. Ayan Catalan was discharged from [place] [address] in stable condition with son and is aware of future appointments.      Future Appointments   Date Time Provider Tad Sibley   1/9/2023  3:00 PM [facility]   1/10/2023  8:00 AM F1 [facility] [place] [facility]   1/10/2023  8:15 AM RAD ONC THERAPY [facility]CHI[facility] H   1/10/2023  8:30 AM Yudith Shaw  [address]  BS AMB   1/10/2023  3:00 PM [address] BS AMB   1/11/2023  8:15 AM RAD ONC THERAPY [facility]   1/12/2023  8:15 AM RAD ONC THERAPY [facility] 1/13/2023  8:15 AM RAD ONC THERAPY [place] [facility]   1/16/2023  8:15 AM RAD ONC TH[facility]APY [place] [facility]   1/16/2023  3:30 PM [facility]   1/17/2023  8:00 AM F[facility] [place] [facility]   1/17/2023  8:15 AM RAD ONC THERAPY [place] [facility] H   1/18/2023  8:15 AM RAD ONC THERAPY [place] [facility] H   1/19/2023  8:15 AM [facility] [place] [facility]   1/19/2023  9:30 AM Sima Kan MD [address] [facility] BS AMB   1/20/2023  8:15 AM RAD ONC THERAPY [place] [facility]   1/23/2023  8:15 AM RAD ONC THERAPY [facility]   1/23/2023  3:30 PM H3 [facility]   1/24/2023  8:00 AM F1 [facility] [place] [facility]   1/24/2023  8:15 AM RAD ONC THERAPY [facility]   1/25/2023  8:15 AM RAD ONC THERAPY [place] [facility]   1/26/2023  8:15 AM RAD ONC THERAPY [place] [facility] H   1/27/2023  8:15 AM RAD ONC THERAPY [place] [facility] H   1/30/2023  8:15 AM RAD ONC THERAPY [facility] PSYCHIATRIC [facility] H   1/30/2023 11:00 AM Rodriguez Cochran NP PMA BS AMB   1/31/2023  8:15 AM RAD ONC THERAPY [facility]   2/1/2023  8:15 AM RAD ONC THERAPY [facility]   3/15/2023  9:00 AM PACEMAKERDOC BS AMB   3/15/2023  9:20 AM MD INDU MaharajF BS AMB       Gris Bell RN  January 9, 2023  2:41 PM

## 2023-01-09 NOTE — PROGRESS NOTES
[address] [facility]   [phone]    Hematology / Oncology Follow-up    Reason for Visit:   Sylvie Valverde is a 75 y.o. male PMHx of hypertension, diabetes, and hepatic steatosis/possible cirrhosis who is seen for follow-up of locally advanced HNSCC. Hematology/Oncology Treatment History:      PRIMARY DIAGNOSIS: Locally advanced HNSCC     DATE OF DIAGNOSIS: 7/26/22  ORIGINAL STAGE: Stage CAN (cT0 cN3b cM0)  DIAGNOSTICS: FNA ([address] [place] [address]) with atypical squamous cells concerning for malignancy. Core biopsy with skeletal muscle, fibrous tissue, and granulation with acute and chronic inflammation. Cytokeratin negative, p40 negative. SITES OF DISEASE: Left neck mass with prominent left-sided lymph nodes. 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 Pathology records from [place] reviewed. Cytology from FNA 7/26/22 with \"atypical squamous cells. \"  Follow-up core needle biopsy with \"rare epitheliod cells are present; however, pancytokeratin immunostain is negative, and p40 immunostain negative. \"   Although core needle biopsy not definitive, clinical picture (i.e. atypical squamous cells on FNA, enlarging neck mass with failure to improve to antibiotics in a chronic smoker) consistent with HNSCC  Unclear primary.  Given ongoing complications from untreated disease (heart block, recurrent infection), elected not to pursue PET-CT or pan-endoscopy while inpatient to minimize delays in treatment   Staging CT CAP negative for distant metastatic disease   Initiated on concurrent chemoradiation (D1C1 weekly cisplatin 40 mg/m2 on 12/13/22)     He had been tolerating treatment reasonably well other than mild mucositis. Per [facility], he has had 10% estimated shrinkage on set up scans. Now with grade 2 cisplatin-induced acute kidney injury and hypomagnesemia.     - Hold week 5 of cisplatin today     #) Grade 2 acute kidney injury: due to cisplatin and dehydration.    - Hold week 5 of cisplatin as above. Proceed with IVFs and encourage hydration via PEG tube. - Repeat CMP in 2 days    #) Hypomagnesemia: due to cisplatin. - Give IV magnesium and start PO magnesium 400 mg daily. Recheck magnesium in 2 days    #) Anemia, grade 1: treatment-related. Continue to monitor. #) Intractable migraine headaches, stable: possibly tension headache from large neck mass. CT head without any acute pathology or meningeal enhancement. Unable to obtain MRI brain due to claustrophobia. Management with flexeril for neck spasms.  - Has upcoming visit with [facility]. #) Left neck abscess and cellulitis, resolved: no evidence of purulence on exam.  Continue to monitor. #) Moderate protein-calorie malnutrition: s/p PEG tube placement on 22. Still not yet utilizing. Weight continues to decline. Wt Readings from Last 3 Encounters:   01/10/23 161 lb (73 kg)   23 163 lb 6.4 oz (74.1 kg)   22 168 lb (76.2 kg)   - Continue Boost 5x per day (either PO if tolerated or via PEG tube)      #) Heart block: Likely due to reflex from mass sitting over his right carotid artery. Status post PPM on 22. #) Tobacco abuse: has cut back to 2 cigarettes per day; congratulated on success     #) Steatosis/Cirrhosis: incidentally noted on CT A/P     #) Macrocytosis: B12 level supra-therapeutic; folate wnl. Likely due to cirrhosis. #) Debility: patient chronically debilitated and largely bed-bound as a result of locally advanced HNSCC and would benefit from hospital bed. #) Psychosocial: wife recently .   Moved from [place] to be closer to his children. Has good support from family (oldest son Lyudmila Wallace is HCPOA). Plan:     Radiation therapy with Dr. Joy Yousif  Lab monitoring p/t each treatment: CBC, BMP, Mag  Labs reviewed, hold cycle 1, week 5 of cisplatin 40 mg/m2; given weekly with concurrent RT  Give IVF + IV magnesium today  Start PO magnesium 2 mg daily   Antiemetic prophylaxis: Ondansetron, Dexamethasone, Fosaprepitant prior to each chemotherapy. Dexamethasone for 2 days after therapy at home. PRN antiemetics at home: Ondansetron, Prochlorperazine, Lorazepam  Status post PEG tube placement on 12/29; tube feeds/Boost per dietitian. Encouraged patient to push fluids (2L+ per day)  Headaches: flexeril and Excedrin prn + tylenol TID  Pain: dilaudid 2 mg q6h prn with bowel regimen (refilled); magic mouthwash prn  Has Palliative Care visit 1/19/22 with Dr. Lagunas Mail visit for repeat CMP and magnesium in 2 days  Follow up in 1 week for labs, exam, and consideration of cycle 1, week 6    The patient requires intensive monitoring for toxicity from therapy with labs every 1 week and physical exam every 1 week. Signed By: Vimal Mcgarry MD    January 10, 2023        Interval History:     Presented June 2022 with enlarging next mass, failed to improve with antibiotics  Evaluated by ENT (Dr. Vanessa Fermin) in [facility] in 7/2022. uUtrasound that showed irrecgular enhancing mass involving left submandibuar gland, suspicious for cancer. Follow up CT neck demonstrated soft tissue mass, measuring 5 cm with small annular opening with drainage. Mass noted to be compressing left internal jugular vein. FNA 7/26/22 with atypical squamous cells, c/f malignancy  Core biopsy with skeletal muscle, fibrous tissue, and granulation with acute and chronic inflammation. Cytokeratin negative, p40 negative.     Patient referred to medical and radiation oncology in [place], but did not follow up due to passing of his wife  Presented to [address] 12/1/2022 with progressively worsening neck pain and swelling as well as purulent cellulitis/abscess. Treated with IV Antibiotics and discharged on PO. Hospital course complicated by AV block with prolonged pause (likely due to reflex from compression of mass on right carotid artery) s/p ppm.    11/30/22 CT neck: large 4.8 x 3.1 x 4.2 cm rim-enhancing complex collection in left lateral neck, extending from carotid space to the skin surface with associated skin thickening, edema, and prominent left neck lymph nodes. Findings most likely represent an abscess rather than necrotic/superinfected neoplasm. 12/1/22 CT CAP: left neck mass partially visualized. 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 12/13/22 C1 Weekly cisplatin  12/14/22 concurrent radiation initiated  12/29/22 PEG tube placed   1/10/23 Week 5 Cisplatin held due to MOR    Per Rad Onc notes, patient has had 10% shrinkage on estimated on set up scans. Patient presented to [facility] yesterday for his pre-treatment labs. In [facility], was noted to be hypotensive with BP 80/60. Received 500 cc bolus and blood pressure improved to systemic 110s. After patient left, labs demonstrated elevated creatinine 2.22 and low magnesemia 0.9. Instructed to hold his amlodipine. He reported a fall the day prior to [facility] visit. States he was looking up in the cupboard for a snack and then got lightheaded and fell forward. Landed on his hands/knees. Denies hitting his head. No LOC. No family witnessed the event. States he has had occasional lightheadedness in the last week. Trying to keep himself hydrated and drinking 4 Boost per day. Has not yet utilized his PEG tube other than for flushes. He states the oral pain is \"getting there. \"  Has not picked up [address] Has been taking dilaudid  nightly for pain. Hacks all night clear mucous. No blood. Denies nausea, vomiting, constipation. No complaint of headaches. Patient reports he is doing overall okay. He is starting to have pain with swallowing. He has not yet picked up the magic mouthwash prescription. He had his PEG tube placed but has not yet been using it for feeding. He is eating by mouth, primarily applesauce, fruit cups, yogurt. His weight is down 5 pounsd. His main complaint if of the intractable headaches. He remains on Dilaudid 2 mg every 4-6 hours alternating with tylenol or ibuprofen. Says this does not fully control his pain. He has had 2 episodes of nausea with vomiting. States that the compazine helped. Has not yet tried the Zofran. He has cut down on smoking - down to 2 cigarettes per day. He inquires about medical marijuana card. Medications reviewed in the EMR. No Known Allergies     Review of Systems: A 6-point review of systems was obtained, negative except as reviewed in the HPI.     Physical Exam:   Visit Vitals  BP 95/60 (BP 1 Location: Left upper arm, BP Patient Position: Sitting)   Pulse 97   Temp 98.7 °F (37.1 °C)   Resp 18   Wt 161 lb (73 kg)   SpO2 (!) 86%   BMI 26.79 kg/m²       General: No distress, chronically ill appearing  Eyes: Anicteric sclerae  HENT: Atraumatic, no oral ulcers, +erythema  Neck: Supple,  ~3-4 cm firm mass in left submandibular region, decreased in size, with ~1 cm area of serous discharge, no surrounding erythema, purulence or malodorous smell  Respiratory: CTAB, normal respiratory effort  CV: Normal S1 and S2, no peripheral edema  GI: Soft, nontender, nondistended, no masses, normal bowel sounds  Skin: No rashes, ecchymoses, or petechiae  Psych: Alert, oriented, appropriate affect, normal judgment/insight    Results:     Lab Results   Component Value Date/Time    WBC 3.6 (L) 01/09/2023 12:30 PM    HGB 10.5 (L) 01/09/2023 12:30 PM    HCT 30.9 (L) 01/09/2023 12:30 PM    PLATELET 923 (L) 37/93/9523 12:30 PM    MCV 99.0 01/09/2023 12:30 PM    ABS. NEUTROPHILS 2.4 01/09/2023 12:30 PM     Lab Results   Component Value Date/Time    Sodium 132 (L) 01/09/2023 12:30 PM    Potassium 3.7 01/09/2023 12:30 PM    Chloride 92 (L) 01/09/2023 12:30 PM    CO2 37 (H) 01/09/2023 12:30 PM    Glucose 324 (H) 01/09/2023 12:30 PM    BUN 67 (H) 01/09/2023 12:30 PM    Creatinine 2.22 (H) 01/09/2023 12:30 PM    Calcium 7.8 (L) 01/09/2023 12:30 PM    Glucose (POC) 313 (H) 12/11/2022 11:30 AM     Lab Results   Component Value Date/Time    Bilirubin, total 0.6 01/09/2023 12:30 PM    ALT (SGPT) 23 01/09/2023 12:30 PM    Alk.  phosphatase 120 (H) 01/09/2023 12:30 PM    Protein, total 7.2 01/09/2023 12:30 PM    Albumin 3.0 (L) 01/09/2023 12:30 PM    Globulin 4.2 (H) 01/09/2023 12:30 PM     No new images to review

## 2023-01-09 NOTE — PROGRESS NOTES
Oncology Pharmacist Note    Cuca Hancock is a  75 y.o.male  diagnosed with head and neck cancer. Mr. Roxie Gayle is being treated with CISplatin. Mr. Roxie Gayle is scheduled for Day 29 Cisplatin on 1/10/23. Labs drawn on 1/9 show Magnesium level of 0.9 mg/dL and Scr of 2.22 mg/dL    Ordered 500 mL NS bolus and 4 grams IV magnesium to be administered on 1/10/23. Updated beacon plan to hold chemotherapy on 1/10 due to elevation in Scr level, after discussion with Dr. Wai Tang.      Alexandria Recinos, PHARMD, BCOP, BCPS    For Pharmacy Admin Tracking Only    Program: Medical Group  CPA in place: Yes  Recommendation Provided To: Provider: 1 via Verbally to provider  and Patient/Caregiver: 1 via In person  Intervention Detail: New Rx: 1, reason: Needs Additional Therapy  Intervention Accepted By: Provider: 1 and Patient/Caregiver: 1    Time Spent (min): 15

## 2023-01-10 ENCOUNTER — HOSPITAL ENCOUNTER (OUTPATIENT)
Dept: INFUSION THERAPY | Age: 68
Discharge: HOME | End: 2023-01-10
Payer: MEDICARE

## 2023-01-10 ENCOUNTER — OFFICE VISIT (OUTPATIENT)
Dept: ONCOLOGY | Age: 68
End: 2023-01-10
Payer: MEDICARE

## 2023-01-10 ENCOUNTER — HOSPITAL ENCOUNTER (OUTPATIENT)
Dept: RADIATION THERAPY | Age: 68
Discharge: HOME | End: 2023-01-10
Payer: MEDICARE

## 2023-01-10 VITALS
BODY MASS INDEX: 26.79 KG/M2 | SYSTOLIC BLOOD PRESSURE: 95 MMHG | DIASTOLIC BLOOD PRESSURE: 60 MMHG | WEIGHT: 161 LBS | RESPIRATION RATE: 18 BRPM | HEART RATE: 97 BPM | OXYGEN SATURATION: 86 % | TEMPERATURE: 98.7 F

## 2023-01-10 VITALS
RESPIRATION RATE: 18 BRPM | HEART RATE: 94 BPM | SYSTOLIC BLOOD PRESSURE: 116 MMHG | DIASTOLIC BLOOD PRESSURE: 64 MMHG | TEMPERATURE: 98.7 F

## 2023-01-10 DIAGNOSIS — G89.3 CANCER RELATED PAIN: ICD-10-CM

## 2023-01-10 DIAGNOSIS — E83.42 HYPOMAGNESEMIA: ICD-10-CM

## 2023-01-10 DIAGNOSIS — E44.0 MODERATE PROTEIN-CALORIE MALNUTRITION (HCC): ICD-10-CM

## 2023-01-10 DIAGNOSIS — G43.519 INTRACTABLE PERSISTENT MIGRAINE AURA WITHOUT CEREBRAL INFARCTION AND WITHOUT STATUS MIGRAINOSUS: ICD-10-CM

## 2023-01-10 DIAGNOSIS — C76.0 SQUAMOUS CELL CARCINOMA OF HEAD AND NECK (HCC): Primary | ICD-10-CM

## 2023-01-10 DIAGNOSIS — Z79.899 HIGH RISK MEDICATION USE: ICD-10-CM

## 2023-01-10 DIAGNOSIS — C76.0 SQUAMOUS CELL CARCINOMA OF HEAD AND NECK (HCC): ICD-10-CM

## 2023-01-10 DIAGNOSIS — N17.9 ACUTE KIDNEY INJURY (HCC): ICD-10-CM

## 2023-01-10 PROCEDURE — 74011250636 HC RX REV CODE- 250/636: Performed by: INTERNAL MEDICINE

## 2023-01-10 PROCEDURE — 96361 HYDRATE IV INFUSION ADD-ON: CPT

## 2023-01-10 PROCEDURE — 1101F PT FALLS ASSESS-DOCD LE1/YR: CPT | Performed by: INTERNAL MEDICINE

## 2023-01-10 PROCEDURE — 96368 THER/DIAG CONCURRENT INF: CPT

## 2023-01-10 PROCEDURE — 96360 HYDRATION IV INFUSION INIT: CPT

## 2023-01-10 PROCEDURE — 3017F COLORECTAL CA SCREEN DOC REV: CPT | Performed by: INTERNAL MEDICINE

## 2023-01-10 PROCEDURE — 99215 OFFICE O/P EST HI 40 MIN: CPT | Performed by: INTERNAL MEDICINE

## 2023-01-10 PROCEDURE — G8417 CALC BMI ABV UP PARAM F/U: HCPCS | Performed by: INTERNAL MEDICINE

## 2023-01-10 PROCEDURE — 96365 THER/PROPH/DIAG IV INF INIT: CPT

## 2023-01-10 PROCEDURE — G8536 NO DOC ELDER MAL SCRN: HCPCS | Performed by: INTERNAL MEDICINE

## 2023-01-10 PROCEDURE — 77386 HC IMRT TRMT DLVR COMPL: CPT

## 2023-01-10 PROCEDURE — 1111F DSCHRG MED/CURRENT MED MERGE: CPT | Performed by: INTERNAL MEDICINE

## 2023-01-10 PROCEDURE — G8427 DOCREV CUR MEDS BY ELIG CLIN: HCPCS | Performed by: INTERNAL MEDICINE

## 2023-01-10 PROCEDURE — G8432 DEP SCR NOT DOC, RNG: HCPCS | Performed by: INTERNAL MEDICINE

## 2023-01-10 PROCEDURE — 96366 THER/PROPH/DIAG IV INF ADDON: CPT

## 2023-01-10 PROCEDURE — 1123F ACP DISCUSS/DSCN MKR DOCD: CPT | Performed by: INTERNAL MEDICINE

## 2023-01-10 RX ORDER — OXYCODONE HYDROCHLORIDE 5 MG/1
5 TABLET ORAL
Qty: 30 TABLET | Refills: 0 | Status: SHIPPED | OUTPATIENT
Start: 2023-01-10 | End: 2023-01-10 | Stop reason: CLARIF

## 2023-01-10 RX ORDER — SODIUM CHLORIDE 9 MG/ML
5-40 INJECTION INTRAVENOUS AS NEEDED
Status: CANCELLED | OUTPATIENT
Start: 2023-01-13

## 2023-01-10 RX ORDER — HYDROMORPHONE HYDROCHLORIDE 2 MG/1
2 TABLET ORAL
Qty: 30 TABLET | Refills: 0 | Status: SHIPPED | OUTPATIENT
Start: 2023-01-10 | End: 2023-01-24

## 2023-01-10 RX ORDER — SODIUM CHLORIDE 9 MG/ML
5-250 INJECTION, SOLUTION INTRAVENOUS AS NEEDED
Status: CANCELLED | OUTPATIENT
Start: 2023-01-13

## 2023-01-10 RX ORDER — HEPARIN 100 UNIT/ML
500 SYRINGE INTRAVENOUS AS NEEDED
Status: CANCELLED
Start: 2023-01-13

## 2023-01-10 RX ORDER — LANOLIN ALCOHOL/MO/W.PET/CERES
400 CREAM (GRAM) TOPICAL DAILY
Qty: 20 TABLET | Refills: 0 | Status: SHIPPED | OUTPATIENT
Start: 2023-01-10

## 2023-01-10 RX ORDER — SODIUM CHLORIDE 0.9 % (FLUSH) 0.9 %
5-40 SYRINGE (ML) INJECTION AS NEEDED
Status: CANCELLED | OUTPATIENT
Start: 2023-01-13

## 2023-01-10 RX ADMIN — MAGNESIUM SULFATE IN WATER 2 G: 40 INJECTION, SOLUTION INTRAVENOUS at 12:33

## 2023-01-10 RX ADMIN — MAGNESIUM SULFATE IN WATER 2 G: 40 INJECTION, SOLUTION INTRAVENOUS at 10:25

## 2023-01-10 RX ADMIN — SODIUM CHLORIDE 500 ML: 900 INJECTION, SOLUTION INTRAVENOUS at 10:24

## 2023-01-10 NOTE — PROGRESS NOTES
[facility] Short Note                   8947 Pt admit to [facility] for hydration/ IV magnesium ambulatory in stable condition. Assessment completed. No new concerns voiced. PIV established in [facility] with brisk blood return. Mr. [address] Aragon vitals were reviewed prior to and after treatment. Patient Vitals for the past 12 hrs:   Temp Pulse Resp BP   01/10/23 1513 -- 94 -- 116/64   01/10/23 1016 98.7 °F (37.1 °C) 97 18 95/60     Medications Administered       magnesium sulfate 2 g/50 ml IVPB (premix or compounded)       Admin Date  01/10/2023 Action  New Bag Dose  2 g Rate  25 mL/hr Route  IntraVENous Administered By  Rosey Jarquin RN               Admin Date  01/10/2023 Action  New Bag Dose  2 g Rate  25 mL/hr Route  IntraVENous Administered By  Rosey Jarquin, [place]              sodium chloride 0.9 % bolus infusion 500 mL       Admin Date  01/10/2023 Action  New Bag Dose  500 mL Rate  25 mL/hr Route  IntraVENous Administered By  Rosey Jarquin, [place]             Mr. Anel Schwartz tolerated the infusion, and had no complaints. PIV removed without difficulty. Arm wrapped with 2x2 and coban. Mr. Anel Schwartz was discharged from [place] [address] in stable condition at 1510. Patient is aware of next appointment.      Future Appointments   Date Time Provider Tad Sibley   1/11/2023  8:15 AM RAD ONC THERAPY [address] [place] [address]'S H   1/12/2023  8:15 AM RAD ONC THERAPY [address] [place] [address]'S H   1/13/2023  8:15 AM RAD ONC THERAPY [address] [place] [address]'S H   1/16/2023  8:15 AM RAD ONC THERAPY [address] [place] [address]'S H   1/16/2023  3:30 PM H3 MAD[facility]N LAB [facility]   1/17/2023  8:00 AM F1 MADELYN LONG TX [facility]   1/17/2023  8:15 AM RAD ONC THERAPY [address] [place] [address] H   1/17/2023  8:30 AM London Juárez  [address] BS AMB   1/18/2023  8:15 AM RAD ONC THERAPY [facility] H   1/19/2023  8:15 AM RAD ONC THERAPY [facility]   1/19/2023  9:30 AM aMrcial Rodriguez MD [facility] BS AMB   1/20/2023 8:15 AM RAD ONC THERAPY [facility]   1/23/2023  8:15 AM RAD ONC THERAPY [facility]   1/23/2023  3:30 PM H3 MADELYN LAB [facility]   1/24/2023  8:00 AM F1 MADELYN LONG [place] [facility]   1/24/2023  8:15 AM RAD ONC THERAPY [facility]   1/25/2023  8:15 AM RAD ONC THERAPY [facility]   1/26/2023  8:15 AM RAD ONC THERAPY [facility]   1/27/2023  8:15 AM RAD ONC THERAPY [facility]   1/30/2023  8:15 AM RAD ONC THERAPY [facility]   1/30/2023 11:00 AM Russell Diane NP PMA BS AMB   1/31/2023  8:15 AM RAD ONC THERAPY [facility]   2/1/2023  8:15 AM RAD ONC THERAPY [facility]   3/15/2023  9:00 AM PACEMAKER, STFRANCES [facility] BS AMB   3/15/2023  9:20 AM Lakshmi Aguilar MD [facility] BS AMB       Howard Tam RN  January 10, 2023  3:14 PM

## 2023-01-10 NOTE — PATIENT INSTRUCTIONS
Cancer [place] at [address]: [phone]  F: [phone]    It was a pleasure to see you in clinic today. For your pain, I have refilled the oxycodone 5 mg every 4-6 hours as needed. Please  the Magic Mouthwash to help with the mucositis. Continue to either drink 5 Boosts or take B boosts through the feeding tube daily. Your labs show your kidney function is down (Creatinine elevated). We will hold chemotherapy today. Keep pushing the oral fluids or using your feeding tube for hydration. Return on Thursday for a lab check to make sure this is improving. Return to clinic in 1 week for labs, office visit, and cycle 6.       Gracie Brittle, MD

## 2023-01-10 NOTE — PROGRESS NOTES
Tyler Garrison. is a 75 y.o. male    Chief Complaint   Patient presents with    Follow-up     PMHx of hypertension, diabetes, and hepatic steatosis/possible cirrhosis who is seen for follow-up of locally advanced HNSCC. 1. Have you been to the ER, urgent care clinic since your last visit? Hospitalized since your last visit? No    2. Have you seen or consulted any other health care providers outside of the [address] [place] [address] since your last visit? Include any pap smears or colon screening.  No

## 2023-01-11 ENCOUNTER — HOSPITAL ENCOUNTER (OUTPATIENT)
Dept: RADIATION THERAPY | Age: 68
Discharge: HOME | End: 2023-01-11
Payer: MEDICARE

## 2023-01-11 PROCEDURE — 77301 RADIOTHERAPY DOSE PLAN IMRT: CPT

## 2023-01-11 PROCEDURE — 77338 DESIGN MLC DEVICE FOR IMRT: CPT

## 2023-01-11 PROCEDURE — 77386 HC IMRT TRMT DLVR COMPL: CPT

## 2023-01-11 PROCEDURE — 77300 RADIATION THERAPY DOSE PLAN: CPT

## 2023-01-12 ENCOUNTER — HOSPITAL ENCOUNTER (OUTPATIENT)
Dept: RADIATION THERAPY | Age: 68
End: 2023-01-12
Payer: MEDICARE

## 2023-01-12 PROCEDURE — 77336 RADIATION PHYSICS CONSULT: CPT

## 2023-01-12 PROCEDURE — 77386 HC IMRT TRMT DLVR COMPL: CPT

## 2023-01-13 ENCOUNTER — HOSPITAL ENCOUNTER (OUTPATIENT)
Dept: RADIATION THERAPY | Age: 68
End: 2023-01-13
Payer: MEDICARE

## 2023-01-13 ENCOUNTER — DOCUMENTATION ONLY (OUTPATIENT)
Dept: ONCOLOGY | Age: 68
End: 2023-01-13

## 2023-01-13 PROCEDURE — 77386 HC IMRT TRMT DLVR COMPL: CPT

## 2023-01-13 NOTE — PROGRESS NOTES
[facility] [place] [address] [place]: [phone]  F: [phone]    Medical Nutrition Therapy  Nutrition Encounter:    Met with patient and youngest son. Patient reports his weight at home this morning was 166# which is reflective of a 2# loss, which is consistent with office scales reflecting a 2# loss this week. He is struggling with thicker secretions and dry mouth. Pain with swallowing which is alleviated somewhat with magic mouthwash. He is using mucinex for the thick secretions and reports using it yesterday 3 times (liquid mucinex). He is not eating by mouth but taking sips of water by mouth; unable to quantify. He is utilizing the feeding tube and using gravity bags. Each bag he adds about 200ml of water and does this 4 times a day. Tube feeds providinkcal, 88g protein 640ml free water. Water flushes providing additional 1L. Concurrent chemoradiation   Chemotherapy Flowsheet 1/3/2023   Cycle [id number]   Date 1/3/2023   Drug / Regimen Cisplatin   Pre Hydration given   Post Hydration -   Pre Meds given   Notes given       Wt Readings from Last 8 Encounters:   01/10/23 161 lb (73 kg)   23 163 lb 6.4 oz (74.1 kg)   22 168 lb (76.2 kg)   22 168 lb 3.2 oz (76.3 kg)   22 172 lb 6.4 oz (78.2 kg)   22 180 lb (81.6 kg)   22 173 lb (78.5 kg)   22 169 lb 6.4 oz (76.8 kg)      Estimated Nutrition Needs:   Calorie Range: 2184-2688kcal/day     Protein Range: 78-95g/day     Fluid Needs: 2200ml     Plan:   - Increase to 5 Boost VHC per day (provides 2650kcal, 110g protein and 800ml free water)  - Continue to add 200ml water to each Boost VHC gravity bag- to provide additional 1.2L   - Continue with mucinex to help thin secretions  - Consider additional fluids later in the week.       Signed By: Salvador Okeefe, [email or web address]

## 2023-01-16 ENCOUNTER — TELEPHONE (OUTPATIENT)
Dept: PALLATIVE CARE | Age: 68
End: 2023-01-16

## 2023-01-16 ENCOUNTER — APPOINTMENT (OUTPATIENT)
Dept: RADIATION THERAPY | Age: 68
End: 2023-01-16
Payer: MEDICARE

## 2023-01-16 PROCEDURE — 77386 HC IMRT TRMT DLVR COMPL: CPT

## 2023-01-16 NOTE — TELEPHONE ENCOUNTER
Called patient to advise/confirm upcoming appt with Dr. Aamir Lim on 1/19/23 at 9:30 at [facility]. Spoke with Rayo Spring and confirmed appointment. Also advised to please bring in your 's License and Insurance Card and any pain medications in the original container with you to appointment.

## 2023-01-17 ENCOUNTER — HOSPITAL ENCOUNTER (OUTPATIENT)
Dept: INFUSION THERAPY | Age: 68
Discharge: HOME | End: 2023-01-17
Payer: MEDICARE

## 2023-01-17 ENCOUNTER — TELEPHONE (OUTPATIENT)
Dept: ONCOLOGY | Age: 68
End: 2023-01-17

## 2023-01-17 ENCOUNTER — APPOINTMENT (OUTPATIENT)
Dept: RADIATION THERAPY | Age: 68
End: 2023-01-17
Payer: MEDICARE

## 2023-01-17 ENCOUNTER — OFFICE VISIT (OUTPATIENT)
Dept: ONCOLOGY | Age: 68
End: 2023-01-17
Payer: MEDICARE

## 2023-01-17 ENCOUNTER — DOCUMENTATION ONLY (OUTPATIENT)
Dept: ONCOLOGY | Age: 68
End: 2023-01-17

## 2023-01-17 VITALS
DIASTOLIC BLOOD PRESSURE: 64 MMHG | HEART RATE: 103 BPM | RESPIRATION RATE: 18 BRPM | TEMPERATURE: 98.3 F | SYSTOLIC BLOOD PRESSURE: 114 MMHG

## 2023-01-17 VITALS
HEART RATE: 84 BPM | RESPIRATION RATE: 18 BRPM | DIASTOLIC BLOOD PRESSURE: 61 MMHG | SYSTOLIC BLOOD PRESSURE: 139 MMHG | HEIGHT: 65 IN | BODY MASS INDEX: 28.36 KG/M2 | TEMPERATURE: 98.3 F | WEIGHT: 170.2 LBS

## 2023-01-17 DIAGNOSIS — C76.0 SQUAMOUS CELL CARCINOMA OF HEAD AND NECK (HCC): Primary | ICD-10-CM

## 2023-01-17 DIAGNOSIS — N17.9 ACUTE KIDNEY INJURY (HCC): ICD-10-CM

## 2023-01-17 LAB
ALBUMIN SERPL-MCNC: 3 G/DL (ref 3.5–5)
ALBUMIN/GLOB SERPL: 0.8 (ref 1.1–2.2)
ALP SERPL-CCNC: 145 U/L (ref 45–117)
ALT SERPL-CCNC: 22 U/L (ref 12–78)
ANION GAP SERPL CALC-SCNC: 6 MMOL/L (ref 5–15)
APPEARANCE UR: CLEAR
AST SERPL-CCNC: 25 U/L (ref 15–37)
BACTERIA URNS QL MICRO: NEGATIVE /HPF
BASOPHILS # BLD: 0 K/UL (ref 0–0.1)
BASOPHILS NFR BLD: 0 % (ref 0–1)
BILIRUB SERPL-MCNC: 0.6 MG/DL (ref 0.2–1)
BILIRUB UR QL: NEGATIVE
BUN SERPL-MCNC: 41 MG/DL (ref 6–20)
BUN/CREAT SERPL: 22 (ref 12–20)
CALCIUM SERPL-MCNC: 8.5 MG/DL (ref 8.5–10.1)
CHLORIDE SERPL-SCNC: 94 MMOL/L (ref 97–108)
CO2 SERPL-SCNC: 33 MMOL/L (ref 21–32)
COLOR UR: NORMAL
CREAT SERPL-MCNC: 1.86 MG/DL (ref 0.7–1.3)
DIFFERENTIAL METHOD BLD: ABNORMAL
EOSINOPHIL # BLD: 0 K/UL (ref 0–0.4)
EOSINOPHIL NFR BLD: 1 % (ref 0–7)
EPITH CASTS URNS QL MICRO: NORMAL /LPF
ERYTHROCYTE [DISTWIDTH] IN BLOOD BY AUTOMATED COUNT: 13.3 % (ref 11.5–14.5)
GLOBULIN SER CALC-MCNC: 3.9 G/DL (ref 2–4)
GLUCOSE SERPL-MCNC: 206 MG/DL (ref 65–100)
GLUCOSE UR STRIP.AUTO-MCNC: NEGATIVE MG/DL
HCT VFR BLD AUTO: 25.4 % (ref 36.6–50.3)
HGB BLD-MCNC: 9.2 G/DL (ref 12.1–17)
HGB UR QL STRIP: NEGATIVE
HYALINE CASTS URNS QL MICRO: NORMAL /LPF (ref 0–5)
IMM GRANULOCYTES # BLD AUTO: 0 K/UL (ref 0–0.04)
IMM GRANULOCYTES NFR BLD AUTO: 0 % (ref 0–0.5)
KETONES UR QL STRIP.AUTO: NEGATIVE MG/DL
LEUKOCYTE ESTERASE UR QL STRIP.AUTO: NEGATIVE
LYMPHOCYTES # BLD: 0.7 K/UL (ref 0.8–3.5)
LYMPHOCYTES NFR BLD: 29 % (ref 12–49)
MAGNESIUM SERPL-MCNC: 1.2 MG/DL (ref 1.6–2.4)
MCH RBC QN AUTO: 34.7 PG (ref 26–34)
MCHC RBC AUTO-ENTMCNC: 36.2 G/DL (ref 30–36.5)
MCV RBC AUTO: 95.8 FL (ref 80–99)
MONOCYTES # BLD: 0.2 K/UL (ref 0–1)
MONOCYTES NFR BLD: 8 % (ref 5–13)
NEUTS SEG # BLD: 1.5 K/UL (ref 1.8–8)
NEUTS SEG NFR BLD: 62 % (ref 32–75)
NITRITE UR QL STRIP.AUTO: NEGATIVE
NRBC # BLD: 0 K/UL (ref 0–0.01)
NRBC BLD-RTO: 0 PER 100 WBC
PH UR STRIP: 5 (ref 5–8)
PLATELET # BLD AUTO: 108 K/UL (ref 150–400)
PMV BLD AUTO: 10.5 FL (ref 8.9–12.9)
POTASSIUM SERPL-SCNC: 3.9 MMOL/L (ref 3.5–5.1)
PROT SERPL-MCNC: 6.9 G/DL (ref 6.4–8.2)
PROT UR STRIP-MCNC: NEGATIVE MG/DL
RBC # BLD AUTO: 2.65 M/UL (ref 4.1–5.7)
RBC #/AREA URNS HPF: NORMAL /HPF (ref 0–5)
RBC MORPH BLD: ABNORMAL
SODIUM SERPL-SCNC: 133 MMOL/L (ref 136–145)
SP GR UR REFRACTOMETRY: 1.01 (ref 1–1.03)
UA: UC IF INDICATED,UAUC: NORMAL
UROBILINOGEN UR QL STRIP.AUTO: 0.2 EU/DL (ref 0.2–1)
WBC # BLD AUTO: 2.4 K/UL (ref 4.1–11.1)
WBC URNS QL MICRO: NORMAL /HPF (ref 0–4)

## 2023-01-17 PROCEDURE — 74011000250 HC RX REV CODE- 250: Performed by: INTERNAL MEDICINE

## 2023-01-17 PROCEDURE — 96361 HYDRATE IV INFUSION ADD-ON: CPT

## 2023-01-17 PROCEDURE — 99215 OFFICE O/P EST HI 40 MIN: CPT | Performed by: INTERNAL MEDICINE

## 2023-01-17 PROCEDURE — 81001 URINALYSIS AUTO W/SCOPE: CPT

## 2023-01-17 PROCEDURE — 1123F ACP DISCUSS/DSCN MKR DOCD: CPT | Performed by: INTERNAL MEDICINE

## 2023-01-17 PROCEDURE — 83735 ASSAY OF MAGNESIUM: CPT

## 2023-01-17 PROCEDURE — 96366 THER/PROPH/DIAG IV INF ADDON: CPT

## 2023-01-17 PROCEDURE — 3017F COLORECTAL CA SCREEN DOC REV: CPT | Performed by: INTERNAL MEDICINE

## 2023-01-17 PROCEDURE — 1101F PT FALLS ASSESS-DOCD LE1/YR: CPT | Performed by: INTERNAL MEDICINE

## 2023-01-17 PROCEDURE — G8432 DEP SCR NOT DOC, RNG: HCPCS | Performed by: INTERNAL MEDICINE

## 2023-01-17 PROCEDURE — 96365 THER/PROPH/DIAG IV INF INIT: CPT

## 2023-01-17 PROCEDURE — 77386 HC IMRT TRMT DLVR COMPL: CPT

## 2023-01-17 PROCEDURE — G8417 CALC BMI ABV UP PARAM F/U: HCPCS | Performed by: INTERNAL MEDICINE

## 2023-01-17 PROCEDURE — 80053 COMPREHEN METABOLIC PANEL: CPT

## 2023-01-17 PROCEDURE — 36415 COLL VENOUS BLD VENIPUNCTURE: CPT

## 2023-01-17 PROCEDURE — G8536 NO DOC ELDER MAL SCRN: HCPCS | Performed by: INTERNAL MEDICINE

## 2023-01-17 PROCEDURE — 85025 COMPLETE CBC W/AUTO DIFF WBC: CPT

## 2023-01-17 PROCEDURE — 74011250636 HC RX REV CODE- 250/636: Performed by: INTERNAL MEDICINE

## 2023-01-17 PROCEDURE — G8427 DOCREV CUR MEDS BY ELIG CLIN: HCPCS | Performed by: INTERNAL MEDICINE

## 2023-01-17 RX ORDER — MAGNESIUM SULFATE HEPTAHYDRATE 40 MG/ML
2 INJECTION, SOLUTION INTRAVENOUS ONCE
Status: COMPLETED | OUTPATIENT
Start: 2023-01-17 | End: 2023-01-17

## 2023-01-17 RX ORDER — SODIUM CHLORIDE 9 MG/ML
5-40 INJECTION INTRAVENOUS AS NEEDED
Status: ACTIVE | OUTPATIENT
Start: 2023-01-17 | End: 2023-01-17

## 2023-01-17 RX ORDER — SODIUM CHLORIDE 9 MG/ML
5-250 INJECTION, SOLUTION INTRAVENOUS AS NEEDED
Status: DISPENSED | OUTPATIENT
Start: 2023-01-17 | End: 2023-01-17

## 2023-01-17 RX ORDER — ACETAMINOPHEN 325 MG/1
650 TABLET ORAL AS NEEDED
Status: DISCONTINUED | OUTPATIENT
Start: 2023-01-17 | End: 2023-01-17

## 2023-01-17 RX ORDER — DIPHENHYDRAMINE HYDROCHLORIDE 50 MG/ML
50 INJECTION, SOLUTION INTRAMUSCULAR; INTRAVENOUS AS NEEDED
Status: DISCONTINUED | OUTPATIENT
Start: 2023-01-17 | End: 2023-01-17

## 2023-01-17 RX ORDER — HYDROCORTISONE SODIUM SUCCINATE 100 MG/2ML
100 INJECTION, POWDER, FOR SOLUTION INTRAMUSCULAR; INTRAVENOUS AS NEEDED
Status: DISCONTINUED | OUTPATIENT
Start: 2023-01-17 | End: 2023-01-17

## 2023-01-17 RX ORDER — PALONOSETRON 0.05 MG/ML
0.25 INJECTION, SOLUTION INTRAVENOUS ONCE
Status: DISCONTINUED | OUTPATIENT
Start: 2023-01-17 | End: 2023-01-17

## 2023-01-17 RX ORDER — HEPARIN 100 UNIT/ML
500 SYRINGE INTRAVENOUS AS NEEDED
Status: ACTIVE | OUTPATIENT
Start: 2023-01-17 | End: 2023-01-17

## 2023-01-17 RX ORDER — EPINEPHRINE 1 MG/ML
0.3 INJECTION, SOLUTION, CONCENTRATE INTRAVENOUS AS NEEDED
Status: DISCONTINUED | OUTPATIENT
Start: 2023-01-17 | End: 2023-01-17

## 2023-01-17 RX ORDER — ALBUTEROL SULFATE 0.83 MG/ML
2.5 SOLUTION RESPIRATORY (INHALATION) AS NEEDED
Status: DISCONTINUED | OUTPATIENT
Start: 2023-01-17 | End: 2023-01-17

## 2023-01-17 RX ORDER — DIPHENHYDRAMINE HYDROCHLORIDE 50 MG/ML
25 INJECTION, SOLUTION INTRAMUSCULAR; INTRAVENOUS AS NEEDED
Status: DISCONTINUED | OUTPATIENT
Start: 2023-01-17 | End: 2023-01-17

## 2023-01-17 RX ORDER — SODIUM CHLORIDE 0.9 % (FLUSH) 0.9 %
5-40 SYRINGE (ML) INJECTION AS NEEDED
Status: DISPENSED | OUTPATIENT
Start: 2023-01-17 | End: 2023-01-17

## 2023-01-17 RX ORDER — ONDANSETRON 2 MG/ML
8 INJECTION INTRAMUSCULAR; INTRAVENOUS AS NEEDED
Status: DISCONTINUED | OUTPATIENT
Start: 2023-01-17 | End: 2023-01-17

## 2023-01-17 RX ADMIN — SODIUM CHLORIDE 1000 ML: 900 INJECTION, SOLUTION INTRAVENOUS at 11:52

## 2023-01-17 RX ADMIN — SODIUM CHLORIDE, PRESERVATIVE FREE 10 ML: 5 INJECTION INTRAVENOUS at 09:30

## 2023-01-17 RX ADMIN — MAGNESIUM SULFATE 2 G: 2 INJECTION INTRAVENOUS at 11:53

## 2023-01-17 NOTE — PROGRESS NOTES
Oncology Pharmacist Note     Iveth Neri is a  75 y.o.male  diagnosed with head and neck cancer. Mr. Mirian Garvey is being treated with CISplatin. Mr. Mirian Garvey is scheduled for Day 36 Cisplatin on 1/17/23. Labs drawn on 1/17 show Magnesium level of 1.2 mg/dL and Scr of 1.86 mg/dL     Ordered 1000  mL NS bolus and 2 grams IV magnesium to be administered  in [facility] - chemotherapy held. Updated beacon plan to defer chemotherapy on 1/17 due to elevation in Scr level and will recheck labs for improvement. and possible chemotherapy on 1/20, after discussion with Dr. Tangela Orellana. Message sent to scheduling to update [facility] schedule.        Lorenza Andrade, PHARMD, BCOP, BCPS    For Pharmacy Admin Tracking Only    Program: Medical Group  CPA in place: Yes  Recommendation Provided To: Patient/Caregiver: 1 via In person  Intervention Detail: New Rx: 1, reason: Needs Additional Therapy  Intervention Accepted By: Patient/Caregiver: 1    Time Spent (min): 15

## 2023-01-17 NOTE — PROGRESS NOTES
[id number] [facility]   [phone]    Hematology / Oncology Follow-up    Reason for Visit:   Lise Palmer is a 75 y.o. male PMHx of hypertension, diabetes, and hepatic steatosis/possible cirrhosis who is seen for follow-up of locally advanced HNSCC. Hematology/Oncology Treatment History:      PRIMARY DIAGNOSIS: Locally advanced HNSCC     DATE OF DIAGNOSIS: 7/26/22  ORIGINAL STAGE: Stage CAN (cT0 cN3b cM0)  DIAGNOSTICS: FNA ([address] [place] [address]) with atypical squamous cells concerning for malignancy. Core biopsy with skeletal muscle, fibrous tissue, and granulation with acute and chronic inflammation. Cytokeratin negative, p40 negative. SITES OF DISEASE: Left neck mass with prominent left-sided lymph nodes. 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 Pathology records from [place] reviewed. Cytology from FNA 7/26/22 with \"atypical squamous cells. \"  Follow-up core needle biopsy with \"rare epitheliod cells are present; however, pancytokeratin immunostain is negative, and p40 immunostain negative. \"   Although core needle biopsy not definitive, clinical picture (i.e. atypical squamous cells on FNA, enlarging neck mass with failure to improve to antibiotics in a chronic smoker) consistent with HNSCC  Unclear primary.  Given ongoing complications from untreated disease (heart block, recurrent infection), elected not to pursue PET-CT or pan-endoscopy while inpatient to minimize delays in treatment   Staging CT CAP negative for distant metastatic disease   Initiated on concurrent chemoradiation (D1C1 weekly cisplatin 40 mg/m2 on 12/13/22) He had been tolerating treatment reasonably well other than mucositis. Per [employer], he has had 10% estimated shrinkage on set up scans. Now with grade 2 cisplatin-induced acute kidney injury, requiring treatment hold. - Delay week 6 cisplatin given MOR. #) Grade 2 acute kidney injury  hypomagnesemia: due to cisplatin and dehydration. Week 5 cisplatin held without significant improvement    - Hold week 6 of cisplatin as above. - Proceed with IVFs (1 L LR) today and return to clinic in 2 days for additional IV fluids  - Replete with IV magnesium + PO magnesium 400 mg daily  - Encourage hydration via PEG tube  - Obtain urinalysis and renal ultrasound    #) Anemia, grade 1-2: treatment-related. Continue to monitor.    - Worsening, no indication for blood transfusion at this time    #) Intractable migraine headaches, improved: possibly tension headache from large neck mass. CT head without any acute pathology or meningeal enhancement. Unable to obtain MRI brain due to claustrophobia. Management with flexeril for neck spasms.  - Has upcoming visit with [facility]. #) Left neck abscess and cellulitis, resolved: no evidence of purulence on exam.  Still with serous drainage. Continue to monitor. #) Moderate protein-calorie malnutrition: s/p PEG tube placement on 12/29/22. Weight improved today. Wt Readings from Last 3 Encounters:   01/17/23 170 lb 3.2 oz (77.2 kg)   01/10/23 161 lb (73 kg)   01/03/23 163 lb 6.4 oz (74.1 kg)   - Continue Boost 5x per day (currently via PEG tube)      #) Heart block: Likely due to reflex from mass sitting over his right carotid artery. Status post PPM on 12/7/22. #) Tobacco abuse: has cut back to 2 cigarettes per day; congratulated on success     #) Steatosis/Cirrhosis: incidentally noted on CT A/P. #) Macrocytosis: B12 level supra-therapeutic; folate wnl. Likely due to cirrhosis.       #) Debility: patient chronically debilitated and largely bed-bound as a result of locally advanced HNSCC and would benefit from hospital bed. #) Psychosocial: wife recently . Moved from [place] to be closer to his children. Has good support from family (oldest son Antoine Polo is HCPOA). Plan:     Radiation therapy with Dr. Jessica Mckinney reviewed, delay cycle 1, week 6 of cisplatin 40 mg/m2  Give 1 L IVF + IV magnesium today  Continue PO magnesium 2 mg daily   Obtain urinalysis and renal ultrasound  RTC for infusion visit in 2 days () for 1 L IVFs and IV magnesium  RTC in 3 days () for labs, fluids, and consideration of week 6 of chemotherapy pending kidney function  Antiemetic prophylaxis: Ondansetron, Dexamethasone, Fosaprepitant prior to each chemotherapy. Dexamethasone for 2 days after therapy at home. PRN antiemetics at home: Ondansetron, Prochlorperazine, Lorazepam  Status post PEG tube placement on ; tube feeds/Boost per dietitian. Headaches: flexeril and Excedrin prn + tylenol TID  Pain: oxycodone prn with bowel regimen; magic mouthwash prn  Has Palliative Care visit 22 with Dr. Efren Nunez  Follow up with me for office visit, labs, and consideration of chemo on     The patient requires intensive monitoring for toxicity from therapy with labs every ~1 week and physical exam every 1 week. Signed By: Apolinar Robertson MD    2023        Interval History:     Presented 2022 with enlarging next mass, failed to improve with antibiotics  Evaluated by ENT (Dr. Mikayla Samuel) in [facility] [address] in 2022. uUtrasound that showed irrecgular enhancing mass involving left submandibuar gland, suspicious for cancer. Follow up CT neck demonstrated soft tissue mass, measuring 5 cm with small annular opening with drainage. Mass noted to be compressing left internal jugular vein.     FNA 22 with atypical squamous cells, c/f malignancy  Core biopsy with skeletal muscle, fibrous tissue, and granulation with acute and chronic inflammation. Cytokeratin negative, p40 negative. Patient referred to medical and radiation oncology in [place], but did not follow up due to passing of his wife  Presented to [facility] 12/1/2022 with progressively worsening neck pain and swelling as well as purulent cellulitis/abscess. Treated with IV Antibiotics and discharged on PO. Hospital course complicated by AV block with prolonged pause (likely due to reflex from compression of mass on right carotid artery) s/p ppm.    11/30/22 CT neck: large 4.8 x 3.1 x 4.2 cm rim-enhancing complex collection in left lateral neck, extending from carotid space to the skin surface with associated skin thickening, edema, and prominent left neck lymph nodes. Findings most likely represent an abscess rather than necrotic/superinfected neoplasm. 12/1/22 CT CAP: left neck mass partially visualized. 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 12/13/22 C1 Weekly cisplatin  12/14/22 concurrent radiation initiated  12/29/22 PEG tube placed   1/10/23 Week 5 Cisplatin held due to MOR  1/17/23 Week 6 Cisplatin held due to MOR    Patient presents to clinic for consideration of week 6 of cisplatin. He reports he is feeling a bit better compared to last week. His mouth pain/odynophagia is a bit worse. He has the magic mouthwash, not sure if it is helping. He is no longer eating by mouth and is using his PEG tube for feeding and hydration. He is getting in 4 Boost per day. His headaches have improved. He is using the oxycodone at night, but does not typically require it in the daytime. He also uses the flexeril for his headaches with good affect. Denies nausea, vomiting, fevers, chills, malodorous discharge. Reports regular Bms. Medications reviewed in the EMR.   No Known Allergies     Review of Systems: A 6-point review of systems was obtained, negative except as reviewed in the HPI. Physical Exam:   Visit Vitals  /64   Pulse (!) 103   Temp 98.3 °F (36.8 °C)   Resp 18         General: No distress, chronically ill appearing  Eyes: Anicteric sclerae  HENT: Atraumatic, no oral ulcers, +erythema  Neck: Supple,  ~3-4 cm firm mass in left submandibular region, decreased in size, with ~1 cm area of serous discharge, no surrounding erythema, purulence or malodorous smell  Respiratory: CTAB, normal respiratory effort  CV: Normal S1 and S2, no peripheral edema  GI: Soft, nontender, nondistended, no masses, normal bowel sounds  Skin: No rashes, ecchymoses, or petechiae  Psych: Alert, oriented, appropriate affect, normal judgment/insight    Results:     Lab Results   Component Value Date/Time    WBC 2.4 (L) 01/17/2023 09:18 AM    HGB 9.2 (L) 01/17/2023 09:18 AM    HCT 25.4 (L) 01/17/2023 09:18 AM    PLATELET 730 (L) 18/44/6667 09:18 AM    MCV 95.8 01/17/2023 09:18 AM    ABS. NEUTROPHILS 1.5 (L) 01/17/2023 09:18 AM     Lab Results   Component Value Date/Time    Sodium 133 (L) 01/17/2023 09:18 AM    Potassium 3.9 01/17/2023 09:18 AM    Chloride 94 (L) 01/17/2023 09:18 AM    CO2 33 (H) 01/17/2023 09:18 AM    Glucose 206 (H) 01/17/2023 09:18 AM    BUN 41 (H) 01/17/2023 09:18 AM    Creatinine 1.86 (H) 01/17/2023 09:18 AM    Calcium 8.5 01/17/2023 09:18 AM    Glucose (POC) 313 (H) 12/11/2022 11:30 AM     Lab Results   Component Value Date/Time    Bilirubin, total 0.6 01/17/2023 09:18 AM    ALT (SGPT) 22 01/17/2023 09:18 AM    Alk.  phosphatase 145 (H) 01/17/2023 09:18 AM    Protein, total 6.9 01/17/2023 09:18 AM    Albumin 3.0 (L) 01/17/2023 09:18 AM    Globulin 3.9 01/17/2023 09:18 AM     No new images to review

## 2023-01-17 NOTE — PROGRESS NOTES
Margoth Garcia. is a 75 y.o. male    locally advanced HNSCC. 1. Have you been to the ER, urgent care clinic since your last visit? Hospitalized since your last visit? No    2. Have you seen or consulted any other health care providers outside of the [address] [place] [address] since your last visit? Include any pap smears or colon screening.  No

## 2023-01-17 NOTE — PROGRESS NOTES
[facility] Short Note                   [id number] Pt admit to [facility] for C1D36 of Cisplatin ambulatory in stable condition. Assessment completed and PIV accessed by assessment RN. Per MD office, treatment HELD today and IV hydration/magnesium ordered. Mr. Bennie eJffrey vitals were reviewed prior to and after treatment. Patient Vitals for the past 12 hrs:   Temp Pulse Resp BP   01/17/23 1513 -- 84 -- 139/61   01/17/23 0912 98.3 °F (36.8 °C) (!) 103 18 114/64     Medications given:  Medications Administered       magnesium sulfate 2 g/50 ml IVPB (premix or compounded)       Admin Date  01/17/2023 Action  New Bag Dose  2 g Rate  25 mL/hr Route  IntraVENous Administered By  Jose Bolanos RN              sodium chloride (NS) flush 5-40 mL       Admin Date  01/17/2023 Action  Given Dose  10 mL Route  IntraVENous Administered By  Katharine Gordon RN              sodium chloride 0.9 % bolus infusion 1,000 mL       Admin Date  01/17/2023 Action  New Bag Dose  1,000 mL Rate  1,000 mL/hr Route  IntraVENous Administered By  Jose Bolanos RN                    Patient Vitals for the past 12 hrs:   Temp Pulse Resp BP   01/17/23 1513 -- 84 -- 139/61   01/17/23 0912 98.3 °F (36.8 °C) (!) 103 18 114/64     Pt unable to urinate pre-treatment. UA collected post treatment per order. See CC for pending lab results. Mr. Adam Yost tolerated the infusion, and had no complaints. PIV removed without difficulty. Arm wrapped with 2x2 and coban. Mr. Adam Yost was discharged from [place] [address] in stable condition at 1520. Patient is aware of next appointment.      Future Appointments   Date Time Provider Tad Sibley   1/18/2023  8:15 AM  [facility]   1/19/2023  8:15 AM RAD ONC THERAPY [facility]   1/19/2023  8:30 AM DULCE MARIA GIBBS [facility]   1/19/2023  9:30 AM [facility]   1/20/2023  8:15 AM [facility]   1/20/2023  8:30 AM G1 MADELYN FASTRACK [facility]   1/23/2023  8:15 AM RAD ONC THERAPY [facility]   1/23/2023  3:30 PM H3 MADELYN LAB [facility]   1/24/2023  8:00 AM F1 MADELYN LONG [place] [facility]   1/24/2023  8:15 AM Sheree Guerrero  [address] BS AMB   1/24/2023  8:15 AM RAD ONC THERAPY [facility]   1/25/2023  8:15 AM RAD ONC THERAPY [facility]   1/26/2023  8:15 AM RAD ONC THERAPY [facility]   1/27/2023  8:15 AM RAD ONC THERAPY [facility]   1/30/2023  8:15 AM RAD ONC THERAPY [facility]   1/30/2023 11:00 AM Nikole Bhatt NP PMA BS AMB   1/31/2023  8:15 AM RAD ONC THERAPY [facility]   2/1/2023  8:15 AM RAD ONC THERAPY [facility]   2/7/2023 10:30 AM REMOTE1, MC CAV[facility] BS AMB   3/15/2023  9:00 AM PACEMAKER, STFRANCES [facility] BS AMB   3/15/2023  9:20 AM Michael Concepcion MD [facility] BS AMB       Bassam [place], [place]  January 17, 2023  3:14 PM

## 2023-01-17 NOTE — TELEPHONE ENCOUNTER
Cali rivera   Informed Mr Estevan Clay that Dr Cole Stevens would like him to have an ultrasound of his kidneys. Patient did not have a pen or paper and asked that I send # in a my chart message. Thanked us for the call.

## 2023-01-17 NOTE — PATIENT INSTRUCTIONS
Cancer [facility] at [facility]  [address]: [phone]  F: [phone]    It was a pleasure to see you in clinic today. Proceed with chemotherapy today. On Thursday, please come to infusion after your radiation for a liter of fluids, then go [place] see   [place] in [address] [place] [address]. Return to clinic in 1 week for labs, office visit, and chemotherapy.      Irineo Jimenez MD

## 2023-01-18 ENCOUNTER — APPOINTMENT (OUTPATIENT)
Dept: RADIATION THERAPY | Age: 68
End: 2023-01-18
Payer: MEDICARE

## 2023-01-19 ENCOUNTER — HOSPITAL ENCOUNTER (OUTPATIENT)
Dept: INFUSION THERAPY | Age: 68
Discharge: HOME | End: 2023-01-19
Payer: MEDICARE

## 2023-01-19 ENCOUNTER — APPOINTMENT (OUTPATIENT)
Dept: RADIATION THERAPY | Age: 68
End: 2023-01-19
Payer: MEDICARE

## 2023-01-19 ENCOUNTER — DOCUMENTATION ONLY (OUTPATIENT)
Dept: ONCOLOGY | Age: 68
End: 2023-01-19

## 2023-01-19 ENCOUNTER — OFFICE VISIT (OUTPATIENT)
Dept: PALLATIVE CARE | Age: 68
End: 2023-01-19
Payer: MEDICARE

## 2023-01-19 ENCOUNTER — HOSPITAL ENCOUNTER (OUTPATIENT)
Dept: ULTRASOUND IMAGING | Age: 68
Discharge: HOME | End: 2023-01-19
Attending: INTERNAL MEDICINE
Payer: MEDICARE

## 2023-01-19 VITALS
WEIGHT: 165 LBS | RESPIRATION RATE: 20 BRPM | HEART RATE: 96 BPM | BODY MASS INDEX: 27.49 KG/M2 | OXYGEN SATURATION: 97 % | TEMPERATURE: 97.7 F | HEIGHT: 65 IN | SYSTOLIC BLOOD PRESSURE: 161 MMHG | DIASTOLIC BLOOD PRESSURE: 93 MMHG

## 2023-01-19 VITALS
DIASTOLIC BLOOD PRESSURE: 77 MMHG | TEMPERATURE: 96.7 F | RESPIRATION RATE: 18 BRPM | SYSTOLIC BLOOD PRESSURE: 151 MMHG | HEART RATE: 102 BPM

## 2023-01-19 DIAGNOSIS — F10.21 HISTORY OF ALCOHOL DEPENDENCE (HCC): ICD-10-CM

## 2023-01-19 DIAGNOSIS — N17.9 ACUTE KIDNEY INJURY (HCC): ICD-10-CM

## 2023-01-19 DIAGNOSIS — F17.200 TOBACCO DEPENDENCE: ICD-10-CM

## 2023-01-19 DIAGNOSIS — N17.9 ACUTE KIDNEY INJURY (HCC): Primary | ICD-10-CM

## 2023-01-19 DIAGNOSIS — C76.0 SQUAMOUS CELL CARCINOMA OF HEAD AND NECK (HCC): ICD-10-CM

## 2023-01-19 DIAGNOSIS — Z93.1 STATUS POST INSERTION OF PERCUTANEOUS ENDOSCOPIC GASTROSTOMY (PEG) TUBE (HCC): ICD-10-CM

## 2023-01-19 DIAGNOSIS — F39 MOOD DISORDER (HCC): ICD-10-CM

## 2023-01-19 DIAGNOSIS — C76.0 SQUAMOUS CELL CARCINOMA OF HEAD AND NECK (HCC): Primary | ICD-10-CM

## 2023-01-19 DIAGNOSIS — G89.3 CANCER ASSOCIATED PAIN: ICD-10-CM

## 2023-01-19 LAB
ALBUMIN SERPL-MCNC: 3.3 G/DL (ref 3.5–5)
ALBUMIN/GLOB SERPL: 0.7 (ref 1.1–2.2)
ALP SERPL-CCNC: 156 U/L (ref 45–117)
ALT SERPL-CCNC: 23 U/L (ref 12–78)
ANION GAP SERPL CALC-SCNC: 4 MMOL/L (ref 5–15)
AST SERPL-CCNC: 27 U/L (ref 15–37)
BILIRUB SERPL-MCNC: 0.5 MG/DL (ref 0.2–1)
BUN SERPL-MCNC: 30 MG/DL (ref 6–20)
BUN/CREAT SERPL: 21 (ref 12–20)
CALCIUM SERPL-MCNC: 9.5 MG/DL (ref 8.5–10.1)
CHLORIDE SERPL-SCNC: 99 MMOL/L (ref 97–108)
CO2 SERPL-SCNC: 34 MMOL/L (ref 21–32)
CREAT SERPL-MCNC: 1.43 MG/DL (ref 0.7–1.3)
GLOBULIN SER CALC-MCNC: 4.8 G/DL (ref 2–4)
GLUCOSE SERPL-MCNC: 160 MG/DL (ref 65–100)
POTASSIUM SERPL-SCNC: 3.6 MMOL/L (ref 3.5–5.1)
PROT SERPL-MCNC: 8.1 G/DL (ref 6.4–8.2)
SODIUM SERPL-SCNC: 137 MMOL/L (ref 136–145)

## 2023-01-19 PROCEDURE — 99205 OFFICE O/P NEW HI 60 MIN: CPT | Performed by: INTERNAL MEDICINE

## 2023-01-19 PROCEDURE — 3017F COLORECTAL CA SCREEN DOC REV: CPT | Performed by: INTERNAL MEDICINE

## 2023-01-19 PROCEDURE — G8417 CALC BMI ABV UP PARAM F/U: HCPCS | Performed by: INTERNAL MEDICINE

## 2023-01-19 PROCEDURE — 1101F PT FALLS ASSESS-DOCD LE1/YR: CPT | Performed by: INTERNAL MEDICINE

## 2023-01-19 PROCEDURE — 77386 HC IMRT TRMT DLVR COMPL: CPT

## 2023-01-19 PROCEDURE — 76770 US EXAM ABDO BACK WALL COMP: CPT

## 2023-01-19 PROCEDURE — G8432 DEP SCR NOT DOC, RNG: HCPCS | Performed by: INTERNAL MEDICINE

## 2023-01-19 PROCEDURE — 96365 THER/PROPH/DIAG IV INF INIT: CPT

## 2023-01-19 PROCEDURE — 74011250636 HC RX REV CODE- 250/636: Performed by: INTERNAL MEDICINE

## 2023-01-19 PROCEDURE — 36415 COLL VENOUS BLD VENIPUNCTURE: CPT

## 2023-01-19 PROCEDURE — 80053 COMPREHEN METABOLIC PANEL: CPT

## 2023-01-19 PROCEDURE — G8427 DOCREV CUR MEDS BY ELIG CLIN: HCPCS | Performed by: INTERNAL MEDICINE

## 2023-01-19 PROCEDURE — 96366 THER/PROPH/DIAG IV INF ADDON: CPT

## 2023-01-19 PROCEDURE — 96361 HYDRATE IV INFUSION ADD-ON: CPT

## 2023-01-19 PROCEDURE — 96360 HYDRATION IV INFUSION INIT: CPT

## 2023-01-19 PROCEDURE — G8536 NO DOC ELDER MAL SCRN: HCPCS | Performed by: INTERNAL MEDICINE

## 2023-01-19 RX ORDER — SODIUM CHLORIDE 9 MG/ML
5-250 INJECTION, SOLUTION INTRAVENOUS AS NEEDED
OUTPATIENT
Start: 2023-01-24

## 2023-01-19 RX ORDER — EPINEPHRINE 1 MG/ML
0.3 INJECTION, SOLUTION, CONCENTRATE INTRAVENOUS AS NEEDED
OUTPATIENT
Start: 2023-01-24

## 2023-01-19 RX ORDER — PALONOSETRON 0.05 MG/ML
0.25 INJECTION, SOLUTION INTRAVENOUS ONCE
OUTPATIENT
Start: 2023-01-24 | End: 2023-01-24

## 2023-01-19 RX ORDER — ACETAMINOPHEN 325 MG/1
650 TABLET ORAL AS NEEDED
OUTPATIENT
Start: 2023-01-24

## 2023-01-19 RX ORDER — HEPARIN 100 UNIT/ML
500 SYRINGE INTRAVENOUS AS NEEDED
OUTPATIENT
Start: 2023-01-24

## 2023-01-19 RX ORDER — SODIUM CHLORIDE 9 MG/ML
5-40 INJECTION INTRAVENOUS AS NEEDED
OUTPATIENT
Start: 2023-01-24

## 2023-01-19 RX ORDER — ALBUTEROL SULFATE 0.83 MG/ML
2.5 SOLUTION RESPIRATORY (INHALATION) AS NEEDED
OUTPATIENT
Start: 2023-01-24

## 2023-01-19 RX ORDER — MAGNESIUM SULFATE HEPTAHYDRATE 40 MG/ML
2 INJECTION, SOLUTION INTRAVENOUS ONCE
Status: COMPLETED | OUTPATIENT
Start: 2023-01-19 | End: 2023-01-19

## 2023-01-19 RX ORDER — DIPHENHYDRAMINE HYDROCHLORIDE 50 MG/ML
50 INJECTION, SOLUTION INTRAMUSCULAR; INTRAVENOUS AS NEEDED
OUTPATIENT
Start: 2023-01-24

## 2023-01-19 RX ORDER — CYCLOBENZAPRINE HCL 5 MG
5 TABLET ORAL
Qty: 30 TABLET | Refills: 0 | Status: SHIPPED | OUTPATIENT
Start: 2023-01-19 | End: 2023-01-19

## 2023-01-19 RX ORDER — DIPHENHYDRAMINE HYDROCHLORIDE 50 MG/ML
25 INJECTION, SOLUTION INTRAMUSCULAR; INTRAVENOUS AS NEEDED
OUTPATIENT
Start: 2023-01-24

## 2023-01-19 RX ORDER — HYDROCORTISONE SODIUM SUCCINATE 100 MG/2ML
100 INJECTION, POWDER, FOR SOLUTION INTRAMUSCULAR; INTRAVENOUS AS NEEDED
OUTPATIENT
Start: 2023-01-24

## 2023-01-19 RX ORDER — SODIUM CHLORIDE 0.9 % (FLUSH) 0.9 %
5-40 SYRINGE (ML) INJECTION AS NEEDED
OUTPATIENT
Start: 2023-01-24

## 2023-01-19 RX ORDER — CYCLOBENZAPRINE HCL 10 MG
10 TABLET ORAL
Qty: 15 TABLET | Refills: 0 | Status: SHIPPED | OUTPATIENT
Start: 2023-01-19

## 2023-01-19 RX ORDER — ONDANSETRON 2 MG/ML
8 INJECTION INTRAMUSCULAR; INTRAVENOUS AS NEEDED
OUTPATIENT
Start: 2023-01-24

## 2023-01-19 RX ADMIN — MAGNESIUM SULFATE 2 G: 2 INJECTION INTRAVENOUS at 12:03

## 2023-01-19 RX ADMIN — SODIUM CHLORIDE 1000 ML: 900 INJECTION, SOLUTION INTRAVENOUS at 11:11

## 2023-01-19 NOTE — Clinical Note
Thank you for the referral, I was able to meet him today but he was alone so some parts of the history were limited. Our  is going to stay involved with him considering his significant grief. I didn't make any changes today as he could barely get through his med list- but may over time as I get to know him.

## 2023-01-19 NOTE — PROGRESS NOTES
[facility] Short Note                 [id number] Pt admit to [address] [place] [address] for Hydration/Magnesium ambulatory in stable condition. PIV established in [facility] with positive blood return, labs collected and sent for processing. Mr. Nile Puri vitals were reviewed prior to and after treatment. Patient Vitals for the past 12 hrs:   Temp Pulse Resp BP   01/19/23 1426 -- -- -- (!) 151/77   01/19/23 1105 (!) 96.7 °F (35.9 °C) (!) 102 18 124/65         Lab results were obtained and reviewed. Recent Results (from the past 12 hour(s))   METABOLIC PANEL, COMPREHENSIVE    Collection Time: 01/19/23 11:13 AM   Result Value Ref Range    Sodium 137 136 - 145 mmol/L    Potassium 3.6 3.5 - 5.1 mmol/L    Chloride 99 97 - 108 mmol/L    CO2 34 (H) 21 - 32 mmol/L    Anion gap 4 (L) 5 - 15 mmol/L    Glucose 160 (H) 65 - 100 mg/dL    BUN 30 (H) 6 - 20 MG/DL    Creatinine 1.43 (H) 0.70 - 1.30 MG/DL    BUN/Creatinine ratio 21 (H) 12 - 20      eGFR 53 (L) >60 ml/min/1.73m2    Calcium 9.5 8.5 - 10.1 MG/DL    Bilirubin, total 0.5 0.2 - 1.0 MG/DL    ALT (SGPT) 23 12 - 78 U/L    AST (SGOT) 27 15 - 37 U/L    Alk. phosphatase 156 (H) 45 - 117 U/L    Protein, total 8.1 6.4 - 8.2 g/dL    Albumin 3.3 (L) 3.5 - 5.0 g/dL    Globulin 4.8 (H) 2.0 - 4.0 g/dL    A-G Ratio 0.7 (L) 1.1 - 2.2       Medications Administered       magnesium sulfate 2 g/50 ml IVPB (premix or compounded)       Admin Date  01/19/2023 Action  New Bag Dose  2 g Rate  25 mL/hr Route  IntraVENous Administered By  Emilio Sparks RN              sodium chloride 0.9 % bolus infusion 1,000 mL       Admin Date  01/19/2023 Action  New Bag Dose  1,000 mL Rate  1,000 mL/hr Route  IntraVENous Administered By  Emilio Sparks RN             Mr. Marguerite Quintanilla tolerated the infusion, and had no complaints. PIV removed without difficulty. Mr. Marguerite Quintanilla was discharged from [place] [address] in stable condition at 1420. Patient is aware of next appointment.      Future Appointments   Date Time Provider Department [place]   1/20/2023  8:15 AM RAD ONC THERAPY [address]   1/20/2023  8:30 AM G1 MADELYN FASTRACK [facility]   1/23/2023  8:15 AM RAD ONC THERAPY [facility]   1/23/2023  3:30 PM H3 MADELYN LAB [facility]   1/24/2023  8:00 AM F1 [facility] [place] [facility]   1/24/2023  8:15 AM Devan Ernst  [address] BS AMB   1/24/2023  8:15 AM RAD ONC THERAPY [facility]   1/25/2023  8:15 AM RAD ONC THERAPY [facility]   1/26/2023  8:15 AM RAD ONC THERAPY [facility]   1/27/2023  8:15 AM RAD ONC THERAPY [facility]   1/30/2023  8:15 AM RAD ONC THERAPY [facility]   1/30/2023 11:00 AM Osman Mckinney NP [facility] BS AMB   1/31/2023  8:15 AM RAD ONC THERAPY [facility]   1/31/2023  9:00 AM G3 MADELYN LONG [place] [facility]   2/1/2023  8:15 AM RAD ONC THERAPY [facility]   2/7/2023 10:30 AM REMOTE1, SF [facility] BS AMB   3/15/2023  9:00 AM PACEMAKER, STFRANCES [facility] AMB   3/15/2023  9:20 AM Teresa Wilson MD [facility] BS AMB     Duane Ken [place], [place]  January 19, 2023

## 2023-01-19 NOTE — PROGRESS NOTES
Palliative Medicine Office Visit  Palliative Medicine Nurse Check In  [phone]    Patient Name: Nicolasa Weller. YOB: 1955      Date of Office Visit: 1/19/2023    Patient states: \" \"  Unable to complete med reconciliation, as patient does not know the medications by name. From Check In Sheet (scanned in Media):  Has a medical provider talked with you about cardiopulmonary resuscitation (CPR)? [x] Yes   [] No   [] Unable to obtain    Nurse reminder to complete or update ACP FlowSheet:    Is ACP on the Problem List?    [x] Yes    [] No  IF ACP Document is ON FILE; Nurse to place ACP on Problem List     Is there an ACP Note in Chart Review/Note? [] Yes    [x] No   If NO: ALERT PROVIDER       [facility] [phone]  Advance Care Planning 1/19/2023   Patient's Healthcare Decision Maker is: Legal Next of Kin   Confirm Advance Directive Yes, on file   Patient Would Like to Complete Advance Directive -        Is there anything that we should know about you as a person in order to provide you the best care possible? Functional status (describe):         Last BM: 1/18/2023     accessed (date):      Bottle review (for opioid pain medication):  Medication 1:   Date filled:   Directions:   # filled:   # left:   # pills taking per day:  Last dose taken:    Medication 2:   Date filled:   Directions:   # filled:   # left:   # pills taking per day:  Last dose taken:    Medication 3:   Date filled:   Directions:   # filled:   # left:   # pills taking per day:  Last dose taken:    Medication 4:   Date filled:   Directions:   # filled:   # left:   # pills taking per day:  Last dose taken:

## 2023-01-19 NOTE — PROGRESS NOTES
Cancer [facility] at [facility]   [address] [facility], [address]  Susan Reese 103: [phone]  F: [phone]    Medical Nutrition Therapy  Nutrition Encounter:    Met with patient and [facility]. Patient reports his weight at home this morning was 166# which is reflective of a 2# loss. Our scales reflect a gain. He is still swallowing his pills and taking sips of water. Eating some applesauce. He is struggling with thicker secretions and dry mouth. Pain with swallowing which is alleviated somewhat with magic mouthwash. He is using mucinex for the thick secretions and reports using it yesterday 3 times (liquid mucinex). He is utilizing the feeding tube and using gravity bags. Each bag he adds about 200ml of water and does this 4 times a day. Tube feeds providinkcal, 88g protein 640ml free water. Water flushes providing additional 1L. Concurrent chemoradiation   Chemotherapy Flowsheet 2023   Cycle C1D36   Date 2023   Drug / Regimen Cisplatin   Pre Hydration -   Post Hydration -   Pre Meds HELD   Notes held and hydration/Magnesium given       Wt Readings from Last 8 Encounters:   23 170 lb 3.2 oz (77.2 kg)   01/10/23 161 lb (73 kg)   23 163 lb 6.4 oz (74.1 kg)   22 168 lb (76.2 kg)   22 168 lb 3.2 oz (76.3 kg)   22 172 lb 6.4 oz (78.2 kg)   22 180 lb (81.6 kg)   22 173 lb (78.5 kg)      Estimated Nutrition Needs:   Calorie Range: 2184-2688kcal/day     Protein Range: 78-95g/day     Fluid Needs: 2200ml     Plan:   - Increase to 5 Boost VHC per day (provides 2650kcal, 110g protein and 800ml free water)  - Continue to add 200ml water to each Boost VHC gravity bag- to provide additional 1.2L   - Continue with mucinex to help thin secretions  - Consider additional fluids later in the week.       Signed By: Tangela Mariscal, [address] [facility]

## 2023-01-19 NOTE — PROGRESS NOTES
Palliative Medicine [facility]    Outpatient Social Work Visit                                                  [phone])        Verona Israel. Cordell Klinefelter. MRN: [id number]  : 1955  Date of visit:2023          Presentation: Brandt Santo is a 76 yo. white male with a history of SCC of unknown primary. He had presented the summer of  with a large left sided neck mass, CT showed 4.8 x 3.1 cm mass/collection in left lateral neck with prominent cervical lymph nodes. He was referred to Palliative Medicine by Dr. Beatriz Clancy for symptom management and supportive care. Palliative Medicine is addressing the following current patient/family concerns: history of alcohol use disorder, mood disorder and grief, cancer related pain and advance care planning. He is currently on concurrent chemoradiation treatments. He presents to outpatient PM clinic for his new patient appt. Patient seen in the examination room to assess for SW needs. Met with patient to introduce self and role of LCSW with the Palliative Medicine team.     Patient is alert, oriented x4. He is sad and tearful at times during the visit. Thought process is logical, has fair insight and judgement. Makes good eye contact. He is cooperative and provides history for this assessment. Family and Social History: Patient lost his significant other, Flako Randle in early October. They were previously ,  for 10 years and then got back together. He moved to [place] soon after Flako Randle passed away. He has 4 biological adult children. His older daughter, Kassi Lindsay lives in [place]. He lives with the oldest son, Britton Benitez and his fiancé in [place], [place]. He has a son, Lyly Soto who lives in [place] and a daughter, Rakesh Gambino who lives in [facility].   He has a long history of alcohol dependence with prior inpatient rehabilitation stays, history of incarceration. He is independent with most ADLs.  He states he helps his son in the yard, cleaning the kitchen and dishes. He walks with the help of a cane. He is dependent on his children to bring to him to all his medical appointments. Insurance information: He is covered under a Medicare Advantage plan for healthcare benefits. Advance Directive: Patient has an AMD that is scanned into his EMR naming his son, Danielle Aragon as his health care decision maker. Cognitive and Emotional Concerns: Patient appears sad and tearful when talking about his SO, Ata Hernandez and her recent loss. She was the love of my life, he states. He shared they both struggled with alcoholism and the health impacts stemming from it. He states he has promised his son he will abstain from alcohol and has been able to keep his promise. Has been sober since his move to [place]. He is well supported by his children. He is overwhelmed with his cancer diagnosis/treatment and the grief he is experiencing with the loss of his SO. He is currently on Mirtazapine and Trazadone for his mood related issues. Discussed a men's grief support group he could attend when he does not have so many medical appointments. RIKKI will continue to follow him in outpatient clinic for support. Patient discussed with Dr. Geovany Gaytan.      Kathy Covington LCSW

## 2023-01-19 NOTE — PATIENT INSTRUCTIONS
Dear Junito Walker. ,    It was a pleasure seeing you today at our [facility] office. We will see you again in 4 weeks. If labs or imaging tests have been ordered for you today, please call the office  at [phone] 48 hours after completion to obtain the results. Your stated goal:  \"To have a few more years with my kids\"    Your described symptoms were: Fatigue: 6 Drowsiness: 7   Depression: 9 Pain: 6   Anxiety: 4 Nausea: 0   Anorexia:  (Peg tube placement, but eats soft foods) Dyspnea: 3 (Continues to smoke 1-2 cigerattes a day)   Best Well-Bein Constipation: No   Other Problem (Comment): 6 (Intermittant dizziness)       This is the plan we talked about:  1. Grief and mental health  - You continue to deeply feel the loss of John Paul Olson and shared with us that this is the greatest hardship right now, much greater than your physical symptoms. Today you met Brandon Ellsworth LCSW, in our office who provided you resources for support. - You have been taking mirtazapine 30 mg and Trazodone 100 mg at bedtime, both were prescribed a few years ago. You previously took separately but lately have been taking together. As you do thinking they are both helpful for your mood and sleep, we should continue for now. - Please call us if you need a refill of either medicine- Trazodone or mirtazapine. 2.  Nutrition. Instructions from Carloz Galindo on :  - Increase to 5 Boost VHC per day (provides 2650kcal, 110g protein and 800ml free water)  - Continue to add 200ml water to each Boost VHC gravity bag- to provide additional 1.2L   - Continue with mucinex to help thin secretions  - Consider additional fluids later in the week.       You report doing well with this though it has been hard to get the 5 cans in, so far just 4.     3.  Pain and headaches  - You have pain and referred headaches related to your large left neck mass  - You have Dilaudid 2-mg tabs that you take at night to help with sleep  - You tried Fioricet which kept you awake- so I removed this from your medicine list.    - You also have chronic low back pain which flares periodically if you lift a heavy object or move wrong, you used to use Flexeril for this but haven't had a refill since moving here. I sent in a script for Flexeril 10 mg tablets- take only one daily as needed. I recommend to use sparingly for muscle strain or spasms.   - You may take 650 mg of Tylenol every 4 hours as needed as well. 4.  Alcohol and tobacco use  - Congratulations on staying sober from alcohol the past few months, this is remarkable  - You have also cut back from 1/2 ppd to 2 cigarettes per day. You do not find nicotine patches or gums helpful so I did not prescribe today. You were previously on Chantix in [place]- but are not requesting today to go back on. You do not recall if you experienced any bad dreams on this medicine, but I think it is most important to focus on your mood, sleep and remaining sober from alcohol right now. We can reconsider Chantix at a later time if needed. 5.  Squamous cell cancer  - you are in the midst of a radiation-chemo course, due to end at the end of next week  - You follow with Drs Ele Wong and Grecia  - We are here to support any symptoms that emerge or worsen including pain, sleep problems, headaches, anxiety or mood. - We will review your advance directive at a future visit. We noted that the first page of the document that is uploaded is missing.   - Today you shared with us that you are hopeful to have more time with your children and seem quite open to continuing your current treatment plan.      This is what you have shared with us about Advance Care Planning:      Primary Decision Maker: [place] Dcjhdf - [phone]    Advance Care Planning 1/19/2023   Patient's Healthcare Decision Maker is: Legal Next of Kin   Confirm Advance Directive Yes, on file   Patient Would Like to Complete Advance Directive -           The Palliative Medicine Team is here to support you and your family.        Sincerely,      Chani Tran MD and the Palliative Medicine Team

## 2023-01-19 NOTE — PROGRESS NOTES
Palliative Medicine Outpatient Services  [place]: [phone] ([phone])    Patient Name: Sarita Alaniz. YOB: 1955    Date of Current Visit: 01/19/23  Location of Current Visit:    [x] [facility] Office  [] [facility] Office  [] [address] [place] [address]  [] Home  []Synchronous real-time A/V virtual visit    Date of Initial Visit: 1/19/23   Referral from: Dr. Regis Beyer   Primary Care Physician: None      SUMMARY:   Sarita Thompson is a 75y.o. year old with a history of SCC of unknown primary. He had presented the summer of 2022 with a large left sided neck mass, CT showed 4.8 x 3.1 cm mass/collection in left lateral neck with prominent cervical lymph nodes. He had an FNA in [place] which showed atypical cells. He has not had a pan-endoscopy due to treatment delays with his transition to [place] and need to initiate a plan. Scans did not show distant mets. He was referred to Palliative Medicine by Dr. lEe Wong for symptom management and supportive care. Palliative Medicine is addressing the following current patient/family concerns: history of alcohol use disorder, mood disorder and grief, cancer related pain and advance care planning. PMHx:  History of alcohol dependence, active tobacco dependence, hypertension, diabetes, and hepatic steatosis/possible cirrhosis. Psychosocial Hx:  Lost his significant other Geovany Jamison in early October. They had been  previously, then reconnected 10 years after their divorce. He moved from [address] [place] [address] to [place] the day after her passing. His oldest daughter Renae Zhou lives in [place], he then had three children with Geovany Jamison who all live in [place]- eldest son Mendez Rodriguez III ([place]), Suresh ([place]) and Mario Trujillo ([place]). He is mostly residing with Mendez Rodriguez III and his fiance right now, but some days stays with Mario Ronnie. He has a long history of alcohol dependence with prior inpatient rehabilitation stays, history of incarceration.       Current treatment: starting 23- concurrent chemoradiation with weekly cisplatin, week 5 held d/t MOR    Care team:  -Dr. Mich Crane   -Dr. Kojo Petit  -PCP Salty Holt NP - to establish   -Cards Dr. Nohemy Jama- to establish 3/15    Initial Referral Intake note reviewed   PALLIATIVE DIAGNOSES:       ICD-10-CM ICD-9-CM    1. Squamous cell carcinoma of head and neck (HCC)  C76.0 195.0       2. Status post insertion of percutaneous endoscopic gastrostomy (PEG) tube ([facility] [address]  Z93.1 V44.1       3. History of alcohol dependence (HCC)  F10.21 303.93       4. Cancer associated pain  G89.3 338.3       5. Tobacco dependence  F17.200 305.1       6. Mood disorder [facility])  F39 296.90              PLAN:     Patient Instructions   Dear Yuki Salmon. ,    It was a pleasure seeing you today at our [facility] office. We will see you again in 4 weeks. If labs or imaging tests have been ordered for you today, please call the office  at [phone] 48 hours after completion to obtain the results. Your stated goal:  \"To have a few more years with my kids\"    Your described symptoms were: Fatigue: 6 Drowsiness: 7   Depression: 9 Pain: 6   Anxiety: 4 Nausea: 0   Anorexia:  (Peg tube placement, but eats soft foods) Dyspnea: 3 (Continues to smoke 1-2 cigerattes a day)   Best Well-Bein Constipation: No   Other Problem (Comment): 6 (Intermittant dizziness)       This is the plan we talked about:  1. Grief and mental health  - You continue to deeply feel the loss of Ying Morrison and shared with us that this is the greatest hardship right now, much greater than your physical symptoms. Today you met Jerald Monique LCSW, in our office who provided you resources for support. - You have been taking mirtazapine 30 mg and Trazodone 100 mg at bedtime, both were prescribed a few years ago. You previously took separately but lately have been taking together. As you do thinking they are both helpful for your mood and sleep, we should continue for now.    - Please call us if you need a refill of either medicine- Trazodone or mirtazapine. 2.  Nutrition. Instructions from Vince Morel on 1/13:  - Increase to 5 Boost VHC per day (provides 2650kcal, 110g protein and 800ml free water)  - Continue to add 200ml water to each Boost VHC gravity bag- to provide additional 1.2L   - Continue with mucinex to help thin secretions  - Consider additional fluids later in the week. You report doing well with this though it has been hard to get the 5 cans in, so far just 4.     3.  Pain and headaches  - You have pain and referred headaches related to your large left neck mass  - You have Dilaudid 2-mg tabs that you take at night to help with sleep  - You tried Fioricet which kept you awake- so I removed this from your medicine list.    - You also have chronic low back pain which flares periodically if you lift a heavy object or move wrong, you used to use Flexeril for this but haven't had a refill since moving here. I sent in a script for Flexeril 10 mg tablets- take only one daily as needed. I recommend to use sparingly for muscle strain or spasms.   - You may take 650 mg of Tylenol every 4 hours as needed as well. 4.  Alcohol and tobacco use  - Congratulations on staying sober from alcohol the past few months, this is remarkable  - You have also cut back from 1/2 ppd to 2 cigarettes per day. You do not find nicotine patches or gums helpful so I did not prescribe today. You were previously on Chantix in [place]- but are not requesting today to go back on. You do not recall if you experienced any bad dreams on this medicine, but I think it is most important to focus on your mood, sleep and remaining sober from alcohol right now. We can reconsider Chantix at a later time if needed.       5.  Squamous cell cancer  - you are in the midst of a radiation-chemo course, due to end at the end of next week  - You follow with Radha Lott and Grecia  - We are here to support any symptoms that emerge or worsen including pain, sleep problems, headaches, anxiety or mood. - We will review your advance directive at a future visit. We noted that the first page of the document that is uploaded is missing.   - Today you shared with us that you are hopeful to have more time with your children and seem quite open to continuing your current treatment plan. This is what you have shared with us about Advance Care Planning:      Primary Decision Maker: Emani \[facility]\"" - [phone]    Advance Care Planning 1/19/2023   Patient's Healthcare Decision Maker is: Legal Next of Kin   Confirm Advance Directive Yes, on file   Patient Would Like to Complete Advance Directive -           The Palliative Medicine Team is here to support you and your family. Sincerely,      Luzma Borjas MD and the Palliative Medicine Team     GOALS OF CARE / TREATMENT PREFERENCES:     GOALS OF CARE:  Patient / health care proxy stated goals: See Patient Instructions / Summary    TREATMENT PREFERENCES:   Code Status:  [x] Attempt Resuscitation       [] Do Not Attempt Resuscitation    Advance Care Planning:  [x] The [facility] Interdisciplinary Team has updated the ACP Navigator with Decision Maker and Patient Capacity      Primary Decision MakerRfrancisco \[facility]\"" - [phone]    Advance Care Planning 1/19/2023   Patient's Healthcare Decision Maker is: Legal Next of Kin   Confirm Advance Directive Yes, on file   Patient Would Like to Complete Advance Directive -        PRESCRIPTIONS GIVEN:     Medications Ordered Today   Medications    DISCONTD: cyclobenzaprine (FLEXERIL) 5 mg tablet     Sig: Take 1 Tablet by mouth daily as needed for Muscle Spasm(s). Indications: muscle spasm     Dispense:  30 Tablet     Refill:  0    cyclobenzaprine (FLEXERIL) 10 mg tablet     Sig: Take 1 Tablet by mouth daily as needed for Muscle Spasm(s).      Dispense:  15 Tablet     Refill:  0         FOLLOW UP:     Future Appointments   Date Time Provider Tad Sibley   1/19/2023  3:00 PM [facility] US OP 1 [facility]   1/20/2023  8:15 AM RAD ONC THERAPY [facility]   1/20/2023  8:30 AM G1 MADELYN FASTRACK [facility]   1/23/2023  8:15 AM RAD ONC THERAPY [facility]   1/23/2023  3:30 PM [facility]   1/24/2023  8:00 AM F1 [facility] LONG [place] [facility]   1/24/2023  8:15 AM Loren Lau  [address] BS AMB   1/24/2023  8:15 AM RAD ONC THERAPY [facility]   1/25/2023  8:15 AM RAD ONC THERAPY [facility]   1/26/2023  8:15 AM RAD ONC THERAPY [facility]   1/27/2023  8:15 AM RAD ONC THERAPY [facility]   1/30/2023  8:15 AM RAD ONC THERAPY [facility]   1/30/2023 11:00 AM Sisi Ponce, NP PMA BS AMB   1/31/2023  8:15 AM RAD ONC THERAPY [facility]   1/31/2023  9:00 AM G3 [facility] [place] [facility]   2/1/2023  8:15 AM RAD ONC THERAPY [facility]   2/7/2023 10:30 AM REMOTE1, [facility] BS AMB   3/15/2023  9:00 AM PACEMAKER, STFRANCES [facility] BS AMB   3/15/2023  9:20 AM John Haro MD [facility] BS AMB           PHYSICIANS INVOLVED IN CARE:   Patient Care Team:  None as PCP - General       HISTORY:   Reviewed patient-completed ESAS and advance care planning form. Reviewed patient record in prescription monitoring program.    CHIEF COMPLAINT:   Chief Complaint   Patient presents with    Neck Pain    New Patient       HPI/SUBJECTIVE:    The patient is: [x] Verbal / [] Nonverbal     Mr. Franklin Baugh is here today to establish care in our clinic. He is alone during the visit today. Medication history initially limited as his sons kareem is managing his medications at home. I did have a list that was in the EMR that I was able to go through with him. He was familiar with some of his medicines but this required a great deal of prompting.     He shared with us some about the loss of his significant other Silvia who  at the beginning of October while they were living together in [place]. He shared that they both suffered from medical issues due to alcoholism, it sounds like she passed away in their home together. Considering the grief he was feeling he moved out the very next day to [place] to be close to his children. He continues to work through his state of grief, he is tearful and shared how much he loved her. This is the hardest thing for him right now over any physical issues he is having. I reviewed Dr. Nikunj Yoder notes and he reaffirmed that he was found to have this left neck mass back in 2022, he had had a biopsy which was not totally definitive but entire clinical picture certainly consistent with malignancy. He did not start treatment until arrival in [facility]. He is currently in the midst of a course of concurrent chemoradiation and tolerating fairly well. He had a PEG tube placed  and is tolerating tube feeds but has not reached max recommended enteral feeding. He is meeting with Angeles Gore periodically. I reviewed her notes today. He is still able to take in soft foods such as puddings and soups. He can also drink the boost that is intended for his PEG tube which she sometimes does. He was open about his alcoholism and share that he had previously been incarcerated as well as in rehabilitation for alcohol use disorder. His last rehabilitation program was approximately 10 years ago. He subsequently relapsed and been actively drinking for 2 weeks after moving to [place]. He has since made a commitment to his son to stop drinking. He also says for the first time he has been motivated to stop tobacco and has managed to cut back from 1/2 pack/day to 2 cigarettes/day. He was on Chantix up in [place] but not utilizing anything here.   He reports sampling other substances in the past but nothing that he used on a regular basis or became \"addicted to\". He was a  and developed chronic low back pain. In [place] he was taking Flexeril 10 mg as needed. He is also experiencing pain in the left side of his neck as well as headaches which are likely referred from the mass. He did not go into great detail about pain or discomfort but states that taking Dilaudid at bedtime is helpful for him to sleep. He is no longer using Fioricet as it kept him awake. He did not request any changes to his medications or dose adjustments. Clinical Pain Assessment (nonverbal scale for nonverbal patients):   [++++ Clinical Pain Assessment++++]  [++++Pain Severity++++]: Pain: 6  [++++Pain Character++++]:  [++++Pain Duration++++]:  [++++Pain Effect++++]:  [++++Pain Factors++++]:  [++++Pain Frequency++++]:  [++++Pain Location++++]:  [++++ Clinical Pain Assessment++++]       FUNCTIONAL ASSESSMENT:     Palliative Performance Scale (PPS):  PPS: 70       PSYCHOSOCIAL/SPIRITUAL SCREENING:     Any spiritual / Yazdanism concerns:  [] Yes /  [x] No    Caregiver Burnout:  [] Yes /  [] No /  [x] No Caregiver Present      Anticipatory grief assessment:   [x] Normal  / [] Maladaptive       ESAS Anxiety: Anxiety: 4    ESAS Depression: Depression: 9       REVIEW OF SYSTEMS:     The following systems were [x] reviewed / [] unable to be reviewed  Systems: constitutional, ears/nose/mouth/throat, respiratory, gastrointestinal, genitourinary, musculoskeletal, integumentary, neurologic, psychiatric, endocrine. Positive findings noted below.   Modified ESAS Completed by: provider   Fatigue: 6 Drowsiness: 7   Depression: 9 Pain: 6   Anxiety: 4 Nausea: 0   Anorexia:  (Peg tube placement, but eats soft foods) Dyspnea: 3 (Continues to smoke 1-2 cigerattes a day)   Best Well-Bein Constipation: No   Other Problem (Comment): 6 (Intermittant dizziness)          PHYSICAL EXAM:     Wt Readings from Last 3 Encounters:   23 165 lb (74.8 kg)   23 170 lb 3.2 oz (77.2 kg) 01/10/23 161 lb (73 kg)     Blood pressure (!) 161/93, pulse 96, temperature 97.7 °F (36.5 °C), temperature source Oral, resp. rate 20, height 5' 5\" (1.651 m), weight 165 lb (74.8 kg), SpO2 97 %. Last bowel movement: See Nursing Note    Constitutional: Age appropriate white male, sitting up in chair, unkempt  Eyes: sclerae anicteric  ENMT: large left sided mid neck mass, there is yellow drainage from the site (pt state chronic- unchanged), mass is non-tender to light palpation  Cardiovascular: regular rhythm, distal pulses intact  Respiratory: breathing not labored, symmetric, no oxygen needs  Gastrointestinal: soft non-tender, +bowel sounds  Musculoskeletal: no deformity, no tenderness to palpation  Skin: warm, dry  Neurologic: A&Ox4, full cognition, following commands, moving all extremities, no tremor or rigidity  Psychiatric: affect initially flat, but he does engage and answer all questions appropriately, intermittently tearful as we talked about his loss, no hallucinations, agitation or restlessness       HISTORY:     Past Medical History:   Diagnosis Date    Cellulitis and abscess of neck 12/1/2022    Diabetes ([address]     HTN (hypertension)     Hypercholesteremia     Mass of left side of neck 12/1/2022    Skin cancer     L sided neck skin cancer cells      Past Surgical History:   Procedure Laterality Date    IR INSERT GASTROSTOMY TUBE [facility]  12/29/2022      History reviewed. No pertinent family history. History reviewed, no pertinent family history. Social History     Tobacco Use    Smoking status: Every Day     Packs/day: 0.50     Types: Cigarettes    Smokeless tobacco: Never   Substance Use Topics    Alcohol use: Yes     Comment: 2 drinks/day     No Known Allergies   Current Outpatient Medications   Medication Sig    cyclobenzaprine (FLEXERIL) 10 mg tablet Take 1 Tablet by mouth daily as needed for Muscle Spasm(s).    magnesium oxide (MAG-OX) 400 mg tablet Take 1 Tablet by mouth daily.     HYDROmorphone (DILAUDID) 2 mg tablet Take 1 Tablet by mouth every six (6) hours as needed for Pain for up to 14 days. Max Daily Amount: 8 mg.    diazePAM (Valium) 5 mg tablet Take 2 tabs 45 minutes prior to radiation treatment for claustrophobia  Indications: inducing of a relaxed easy state    ondansetron (ZOFRAN ODT) 4 mg disintegrating tablet Take 1 Tablet by mouth every eight (8) hours as needed for Nausea or Vomiting. prochlorperazine (COMPAZINE) 10 mg tablet Take 1 Tablet by mouth every six (6) hours as needed for Nausea or Vomiting. dexAMETHasone (DECADRON) 4 mg tablet Take 8 mg by mouth daily (with breakfast). On day 2 and 3 after chemotherapy. nut tx, lact-reduced, iron (Boost VHC) 0.09-2.25 gram-kcal/mL liqd 5 Bottles by Gastrostomy Tube route daily. colchicine 0.6 mg tablet Take 0.6 mg by mouth as needed for Gout or Pain. Takes as needed for gout    gabapentin (NEURONTIN) 400 mg capsule Take 400 mg by mouth three (3) times daily. SITagliptin (JANUVIA) 100 mg tablet Take 100 mg by mouth daily. mirtazapine (REMERON) 30 mg tablet Take  by mouth nightly. pantoprazole (PROTONIX) 40 mg tablet Take 40 mg by mouth daily. tamsulosin (FLOMAX) 0.4 mg capsule Take 0.4 mg by mouth daily. traZODone (DESYREL) 100 mg tablet Take 100 mg by mouth nightly as needed for Sleep. Patient occasionally alternates with mirtazapine for a few days when mirtazapine stops helping his sleep. No current facility-administered medications for this visit.      Facility-Administered Medications Ordered in Other Visits   Medication Dose Route Frequency    magnesium sulfate 2 g/50 ml IVPB (premix or compounded)  2 g IntraVENous ONCE    sodium chloride 0.9 % bolus infusion 1,000 mL  1,000 mL IntraVENous ONCE          LAB and other DATA REVIEWED:     Lab Results   Component Value Date/Time    WBC 2.4 (L) 01/17/2023 09:18 AM    HGB 9.2 (L) 01/17/2023 09:18 AM    PLATELET 012 (L) 57/05/6333 09:18 AM     Lab Results   Component Value Date/Time    Sodium 133 (L) 01/17/2023 09:18 AM    Potassium 3.9 01/17/2023 09:18 AM    Chloride 94 (L) 01/17/2023 09:18 AM    CO2 33 (H) 01/17/2023 09:18 AM    BUN 41 (H) 01/17/2023 09:18 AM    Creatinine 1.86 (H) 01/17/2023 09:18 AM    Calcium 8.5 01/17/2023 09:18 AM    Magnesium 1.2 (L) 01/17/2023 09:18 AM    Phosphorus 3.4 12/09/2022 06:21 AM      Lab Results   Component Value Date/Time    Alk. phosphatase 145 (H) 01/17/2023 09:18 AM    Protein, total 6.9 01/17/2023 09:18 AM    Albumin 3.0 (L) 01/17/2023 09:18 AM    Globulin 3.9 01/17/2023 09:18 AM     No results found for: INR, PTMR, PTP, PT1, PT2, APTT, INREXT, INREXT   No results found for: IRON, FE, TIBC, IBCT, PSAT, FERR     Detail chart reviewed. Other specialists and referral provider notes reviewed.      CONTROLLED SUBSTANCES SAFETY ASSESSMENT (IF ON CONTROLLED SUBSTANCES):     Reviewed opioid safety handout:  [x] Yes   [] No  24 hour opioid dose >150mg morphine equivalent/day:  [] Yes   [x] No  Benzodiazepines:  [x] Yes   [] No - prn use prior to radiation   Sleep apnea:  [] Yes   [x] No  Urine Toxicology Testing within last 6 months:  [] Yes   [x] No  History of or new aberrant medication taking behaviors:  [] Yes   [x] No  Has Narcan been prescribed [x] Yes   [] No          Total time: 65m  Counseling / coordination time: 65m  > 50% counseling / coordination?: yes- review of medical records,

## 2023-01-20 ENCOUNTER — APPOINTMENT (OUTPATIENT)
Dept: RADIATION THERAPY | Age: 68
End: 2023-01-20
Payer: MEDICARE

## 2023-01-20 ENCOUNTER — HOSPITAL ENCOUNTER (OUTPATIENT)
Dept: INFUSION THERAPY | Age: 68
Discharge: HOME | End: 2023-01-20
Payer: MEDICARE

## 2023-01-20 VITALS
DIASTOLIC BLOOD PRESSURE: 79 MMHG | RESPIRATION RATE: 16 BRPM | SYSTOLIC BLOOD PRESSURE: 148 MMHG | TEMPERATURE: 98.8 F | OXYGEN SATURATION: 96 % | HEIGHT: 66 IN | WEIGHT: 167.55 LBS | BODY MASS INDEX: 26.93 KG/M2 | HEART RATE: 100 BPM

## 2023-01-20 DIAGNOSIS — N17.9 ACUTE KIDNEY INJURY (HCC): Primary | ICD-10-CM

## 2023-01-20 DIAGNOSIS — C76.0 SQUAMOUS CELL CARCINOMA OF HEAD AND NECK (HCC): ICD-10-CM

## 2023-01-20 LAB
ALBUMIN SERPL-MCNC: 3 G/DL (ref 3.5–5)
ALBUMIN/GLOB SERPL: 0.8 (ref 1.1–2.2)
ALP SERPL-CCNC: 142 U/L (ref 45–117)
ALT SERPL-CCNC: 22 U/L (ref 12–78)
ANION GAP SERPL CALC-SCNC: 5 MMOL/L (ref 5–15)
AST SERPL-CCNC: 23 U/L (ref 15–37)
BILIRUB SERPL-MCNC: 0.5 MG/DL (ref 0.2–1)
BUN SERPL-MCNC: 21 MG/DL (ref 6–20)
BUN/CREAT SERPL: 17 (ref 12–20)
CALCIUM SERPL-MCNC: 8.4 MG/DL (ref 8.5–10.1)
CHLORIDE SERPL-SCNC: 103 MMOL/L (ref 97–108)
CO2 SERPL-SCNC: 31 MMOL/L (ref 21–32)
CREAT SERPL-MCNC: 1.22 MG/DL (ref 0.7–1.3)
GLOBULIN SER CALC-MCNC: 3.9 G/DL (ref 2–4)
GLUCOSE SERPL-MCNC: 168 MG/DL (ref 65–100)
POTASSIUM SERPL-SCNC: 3.9 MMOL/L (ref 3.5–5.1)
PROT SERPL-MCNC: 6.9 G/DL (ref 6.4–8.2)
SODIUM SERPL-SCNC: 139 MMOL/L (ref 136–145)

## 2023-01-20 PROCEDURE — 74011250636 HC RX REV CODE- 250/636: Performed by: INTERNAL MEDICINE

## 2023-01-20 PROCEDURE — 96360 HYDRATION IV INFUSION INIT: CPT

## 2023-01-20 PROCEDURE — 36415 COLL VENOUS BLD VENIPUNCTURE: CPT

## 2023-01-20 PROCEDURE — 77386 HC IMRT TRMT DLVR COMPL: CPT

## 2023-01-20 PROCEDURE — 80053 COMPREHEN METABOLIC PANEL: CPT

## 2023-01-20 PROCEDURE — 77336 RADIATION PHYSICS CONSULT: CPT

## 2023-01-20 RX ADMIN — SODIUM CHLORIDE 1000 ML: 900 INJECTION, SOLUTION INTRAVENOUS at 08:50

## 2023-01-20 NOTE — PROGRESS NOTES
Please call Christian Pema to let him know that his ultrasound is normal.  His kidney function looks much better after the IVFs. We will plan on resuming treatment next week.

## 2023-01-20 NOTE — PROGRESS NOTES
[facility]IC Short Note                       Date: 2023  Name: Junito Walker. MRN: [id number]       : 1955    [0840] Pt admit to [facility] for [Hydration/Labs] Denies pain, fever, cough, N/V, recent falls. PIV [Left AC, 22g]. Positive blood return. Labs ordered/ drawn. See [facility] for details. Tolerated infusion well without issue. Patient declined post infusion monitoring time. Education provided. PIV removed post infusion. Site clean, dry, intact. Bandaid and Gauze Applied    Patient aware of upcoming appointment. Departed at [1000]     Mr. Rommie Kocher vitals were reviewed prior to treatment.    Patient Vitals for the past 12 hrs:   Temp Pulse Resp BP SpO2   23 1000 98.8 °F (37.1 °C) 100 16 (!) 148/79 96 %   23 0840 98.9 °F (37.2 °C) (!) 112 18 124/70 95 %       Medications Administered       sodium chloride 0.9 % bolus infusion 1,000 mL       Admin Date  2023 Action  New Bag Dose  1,000 mL Rate  1,000 mL/hr Route  IntraVENous Administered By  Agus Beckwith RN                  Future Appointments   Date Time Provider Tad Sibley   2023  8:15 AM RAD ONC THERAPY [facility]   2023  3:30 PM [facility]   2023  8:00 AM F1 MADELYN LONG [address] [place] [address]   2023  8:15 AM Parisa Weeks  [address] BS AMB   2023  8:15 AM RAD ONC THERAPY [facility]   2023  8:15 AM RAD ONC THERAPY [facility] H   2023  8:15 AM RAD ONC THERAPY [facility] H   2023  8:15 AM RAD ONC THERAPY [facility]   2023  8:15 AM RAD ONC THERAPY [facility]   2023 11:00 AM Khadra Martinez NP PMA BS AMB   2023  8:15 AM RAD ONC THERAPY [facility]   2023  9:00 AM G3 MADELYN LONG [place] [facility]   2023  8:15 AM RAD ONC THERAPY [facility]   [facility]23 10[facility]   3/15/2023  9:00 AM PACEMAKER, STFRCK [facility] BS AMB   3/15/2023  9:20 AM Jorge A Suero MD [facility] BS AMB     Gissell Marrufo, RN  January 20, 2023

## 2023-01-22 NOTE — PROGRESS NOTES
[address] [facility]   [phone]    Hematology / Oncology Follow-up    Reason for Visit:   Joshua Varela is a 75 y.o. male PMHx of hypertension, diabetes, and hepatic steatosis/possible cirrhosis 2/2 EtOH who is seen for follow-up of locally advanced HNSCC. Hematology/Oncology Treatment History:      PRIMARY DIAGNOSIS: Locally advanced HNSCC     DATE OF DIAGNOSIS: 7/26/22  ORIGINAL STAGE: Stage CAN (cT0 cN3b cM0)  DIAGNOSTICS: FNA ([address] [place] [address]) with atypical squamous cells concerning for malignancy. Core biopsy with skeletal muscle, fibrous tissue, and granulation with acute and chronic inflammation. Cytokeratin negative, p40 negative. SITES OF DISEASE: Left neck mass with prominent left-sided lymph nodes. 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 Pathology records from [place] reviewed. Cytology from FNA 7/26/22 with \"atypical squamous cells. \"  Follow-up core needle biopsy with \"rare epitheliod cells are present; however, pancytokeratin immunostain is negative, and p40 immunostain negative. \"   Although core needle biopsy not definitive, clinical picture (i.e. atypical squamous cells on FNA, enlarging neck mass with failure to improve to antibiotics in a chronic smoker) consistent with HNSCC  Unclear primary.  Given ongoing complications from untreated disease (heart block, recurrent infection), elected not to pursue PET-CT or pan-endoscopy while inpatient to minimize delays in treatment   Staging CT CAP negative for distant metastatic disease   Initiated on concurrent chemoradiation (D1C1 weekly cisplatin 40 mg/m2 on 22)     He had been tolerating treatment reasonably well other than mucositis, grade 2 MOR, now resolved, and grade 2 neutropenia. Per Rad Onc at ~week 4, 10% estimated shrinkage seen on set up scans.   - Proceed with cisplatin 40 mg/m2    #) Grade 2 acute kidney injury, resolved  hypomagnesemia: due to cisplatin and dehydration. Weeks 5 and 6 cisplatin held. Now resolved after aggressive IVF support. Renal ultrasound normal.  - Add on 1 L IVFs on days 3 and 4 after chemotherapy   - Replete with IV magnesium + PO magnesium 400 mg daily  - Encourage hydration via PEG tube    #) Neutropenia, grade 2  Anemia, grade 1-2: treatment-related. Continue to monitor.    - Worsening, no indication for blood transfusion at this time    #) Intractable migraine headaches, improved: possibly tension headache from large neck mass. CT head without any acute pathology or meningeal enhancement. Unable to obtain MRI brain due to claustrophobia. Management with flexeril for neck spasms. #) Left neck abscess and cellulitis, resolved: no evidence of purulence on exam.  Still with serous drainage. Continue to monitor. #) Moderate protein-calorie malnutrition: s/p PEG tube placement on 22. Weight improved today. Wt Readings from Last 3 Encounters:   23 168 lb (76.2 kg)   23 168 lb 8 oz (76.4 kg)   23 167 lb 8.8 oz (76 kg)   - Continue Boost 4-5x per day (currently via PEG tube)      #) Heart block: Likely due to reflex from mass sitting over his right carotid artery. Status post PPM on 22. #) Tobacco abuse: has cut back to 2 cigarettes per day; congratulated on success, encouraged complete cessation     #) Steatosis/Cirrhosis: incidentally noted on CT A/P. #) Macrocytosis: B12 level supra-therapeutic; folate wnl. Likely due to cirrhosis and EtOH. #) Psychosocial: wife recently . Moved from [place] to be closer to his children.   Has good support from family (oldest son Ave Keating is HCPOA). Plan:     Radiation therapy with Dr. Josh Horvath reviewed, proceed with cisplatin 40 mg/m2  Continue PO magnesium 400 mg daily   RTC for infusion visit in 2 days for 1 L IVFs + IV magnesium  Antiemetic prophylaxis: Ondansetron, Dexamethasone, Fosaprepitant prior to each chemotherapy. Dexamethasone for 2 days after therapy at home. PRN antiemetics at home: Ondansetron, Prochlorperazine, Lorazepam  Status post PEG tube placement on 12/29; tube feeds/Boost per dietitian. Pain: dilaudid prn with bowel regimen; magic mouthwash prn  Palliative Care following   Follow up in 1 week with me for office visit, labs, and consideration of treatment      The patient requires intensive monitoring for toxicity from therapy with labs every ~1 week and physical exam every 1 week. Signed By: Che Kelsey MD    January 24, 2023        Interval History:     Presented June 2022 with enlarging next mass, failed to improve with antibiotics  Evaluated by ENT (Dr. Luis Carlos Salas) in [address] [place] [address] in 7/2022. uUtrasound that showed irrecgular enhancing mass involving left submandibuar gland, suspicious for cancer. Follow up CT neck demonstrated soft tissue mass, measuring 5 cm with small annular opening with drainage. Mass noted to be compressing left internal jugular vein. FNA 7/26/22 with atypical squamous cells, c/f malignancy  Core biopsy with skeletal muscle, fibrous tissue, and granulation with acute and chronic inflammation. Cytokeratin negative, p40 negative. Patient referred to medical and radiation oncology in [place], but did not follow up due to passing of his wife  Presented to [facility] 12/1/2022 with progressively worsening neck pain and swelling as well as purulent cellulitis/abscess. Treated with IV Antibiotics and discharged on PO.   Hospital course complicated by AV block with prolonged pause (likely due to reflex from compression of mass on right carotid artery) s/p ppm.    22 CT neck: large 4.8 x 3.1 x 4.2 cm rim-enhancing complex collection in left lateral neck, extending from carotid space to the skin surface with associated skin thickening, edema, and prominent left neck lymph nodes. Findings most likely represent an abscess rather than necrotic/superinfected neoplasm. 22 CT CAP: left neck mass partially visualized. 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 22 C1 Weekly cisplatin  22 concurrent radiation initiated  22 PEG tube placed   1/10/23 Week 5 Cisplatin held due to MOR  23 Week 6 Cisplatin held due to MOR    Patient presents to clinic for consideration of week 6 of therapy. He is tolerating treatment overall well. He received 1 L IVFs x 2 in infusion last week with improvement in his creatinine. He is having ongoing pain with swallowing but has been able to eat for pleasure. States that yesterday he ate half a can of oyster stew. Eats apple sauce by mouth. All other nutrition is via PET, 3-4 Boosts per day. Headaches have improved significantly. Occasionally requires flexeril for neck spasms. Uses dilaudid at night for pain control. No neuropathy, tinnitus, hearing changes, nausea, vomiting. Has regular BM. Medications reviewed in the EMR. No Known Allergies     Review of Systems: A 6-point review of systems was obtained, negative except as reviewed in the HPI. Physical Exam:   Visit Vitals  BP (!) 154/84   Pulse 95   Temp 97.7 °F (36.5 °C)   Resp 18   Wt 168 lb (76.2 kg)   SpO2 96%   BMI 27.53 kg/m²     ECO  General: No distress, chronically ill appearing  Eyes: Anicteric sclerae  HENT: Atraumatic, no oral ulcers, +erythema  Neck: Supple,  ~3-4 cm firm mass in left submandibular region, decreased in size, with ~1 cm area of serous discharge, overlying skin erythematous with a few areas of punctate excoriation. No malodorous smell or purulence. Respiratory: CTAB, normal respiratory effort  CV: Normal S1 and S2, no peripheral edema  GI: Soft, nontender, nondistended, no masses, normal bowel sounds  Skin: No rashes, ecchymoses, or petechiae. Dried skin overlying neck  Psych: Alert, oriented, appropriate affect, normal judgment/insight    Results:     Lab Results   Component Value Date/Time    WBC 2.5 (L) 01/24/2023 08:48 AM    HGB 8.2 (L) 01/24/2023 08:48 AM    HCT 23.6 (L) 01/24/2023 08:48 AM    PLATELET 94 (L) 78/96/1053 08:48 AM    MCV 99.6 (H) 01/24/2023 08:48 AM    ABS. NEUTROPHILS 1.3 (L) 01/24/2023 08:48 AM     Lab Results   Component Value Date/Time    Sodium 133 (L) 01/24/2023 08:48 AM    Potassium 4.6 01/24/2023 08:48 AM    Chloride 100 01/24/2023 08:48 AM    CO2 31 01/24/2023 08:48 AM    Glucose 184 (H) 01/24/2023 08:48 AM    BUN 26 (H) 01/24/2023 08:48 AM    Creatinine 1.22 01/24/2023 08:48 AM    Calcium 9.4 01/24/2023 08:48 AM    Glucose (POC) 313 (H) 12/11/2022 11:30 AM     Lab Results   Component Value Date/Time    Bilirubin, total 0.4 01/24/2023 08:48 AM    ALT (SGPT) 16 01/24/2023 08:48 AM    Alk.  phosphatase 129 (H) 01/24/2023 08:48 AM    Protein, total 7.5 01/24/2023 08:48 AM    Albumin 3.0 (L) 01/24/2023 08:48 AM    Globulin 4.5 (H) 01/24/2023 08:48 AM     No new images to review

## 2023-01-23 ENCOUNTER — TELEPHONE (OUTPATIENT)
Dept: ONCOLOGY | Age: 68
End: 2023-01-23

## 2023-01-23 ENCOUNTER — APPOINTMENT (OUTPATIENT)
Dept: RADIATION THERAPY | Age: 68
End: 2023-01-23
Payer: MEDICARE

## 2023-01-23 PROCEDURE — 77386 HC IMRT TRMT DLVR COMPL: CPT

## 2023-01-23 NOTE — TELEPHONE ENCOUNTER
Flora Rodríguez verified   Patient inquiring about remaining chemo appointments. Clarified with Dr Gabi Workman that patient will have chemo tomorrow and then lastly on 1/31. Patient and son had some concerns due to radiation and chemo and OV. Explained not to stress and do what they can and it will all work out as everyone understands the scheduling . Thanked us the call.

## 2023-01-23 NOTE — TELEPHONE ENCOUNTER
Ming rivera     Called patient to inform him of his ultrasound results that are normal with improved kidney function. Informed him that chemotherapy would resume next week and patient had question about skipping 2 treatments and will they be made up at all and be rescheduled ? Will call back with info.

## 2023-01-24 ENCOUNTER — HOSPITAL ENCOUNTER (OUTPATIENT)
Dept: INFUSION THERAPY | Age: 68
Discharge: HOME | End: 2023-01-24
Payer: MEDICARE

## 2023-01-24 ENCOUNTER — OFFICE VISIT (OUTPATIENT)
Dept: ONCOLOGY | Age: 68
End: 2023-01-24
Payer: MEDICARE

## 2023-01-24 ENCOUNTER — APPOINTMENT (OUTPATIENT)
Dept: RADIATION THERAPY | Age: 68
End: 2023-01-24
Payer: MEDICARE

## 2023-01-24 VITALS
HEIGHT: 66 IN | WEIGHT: 168.5 LBS | SYSTOLIC BLOOD PRESSURE: 154 MMHG | DIASTOLIC BLOOD PRESSURE: 84 MMHG | TEMPERATURE: 97.7 F | RESPIRATION RATE: 18 BRPM | HEART RATE: 95 BPM | BODY MASS INDEX: 27.08 KG/M2

## 2023-01-24 VITALS
OXYGEN SATURATION: 96 % | RESPIRATION RATE: 18 BRPM | SYSTOLIC BLOOD PRESSURE: 154 MMHG | HEART RATE: 95 BPM | TEMPERATURE: 97.7 F | BODY MASS INDEX: 27.53 KG/M2 | DIASTOLIC BLOOD PRESSURE: 84 MMHG | WEIGHT: 168 LBS

## 2023-01-24 DIAGNOSIS — G89.3 CANCER RELATED PAIN: ICD-10-CM

## 2023-01-24 DIAGNOSIS — Z79.899 HIGH RISK MEDICATION USE: ICD-10-CM

## 2023-01-24 DIAGNOSIS — C76.0 SQUAMOUS CELL CARCINOMA OF HEAD AND NECK (HCC): Primary | ICD-10-CM

## 2023-01-24 LAB
ALBUMIN SERPL-MCNC: 3 G/DL (ref 3.5–5)
ALBUMIN/GLOB SERPL: 0.7 (ref 1.1–2.2)
ALP SERPL-CCNC: 129 U/L (ref 45–117)
ALT SERPL-CCNC: 16 U/L (ref 12–78)
ANION GAP SERPL CALC-SCNC: 2 MMOL/L (ref 5–15)
AST SERPL-CCNC: 19 U/L (ref 15–37)
BASOPHILS # BLD: 0 K/UL (ref 0–0.1)
BASOPHILS NFR BLD: 0 % (ref 0–1)
BILIRUB SERPL-MCNC: 0.4 MG/DL (ref 0.2–1)
BUN SERPL-MCNC: 26 MG/DL (ref 6–20)
BUN/CREAT SERPL: 21 (ref 12–20)
CALCIUM SERPL-MCNC: 9.4 MG/DL (ref 8.5–10.1)
CHLORIDE SERPL-SCNC: 100 MMOL/L (ref 97–108)
CO2 SERPL-SCNC: 31 MMOL/L (ref 21–32)
CREAT SERPL-MCNC: 1.22 MG/DL (ref 0.7–1.3)
DIFFERENTIAL METHOD BLD: ABNORMAL
EOSINOPHIL # BLD: 0 K/UL (ref 0–0.4)
EOSINOPHIL NFR BLD: 1 % (ref 0–7)
ERYTHROCYTE [DISTWIDTH] IN BLOOD BY AUTOMATED COUNT: 14 % (ref 11.5–14.5)
GLOBULIN SER CALC-MCNC: 4.5 G/DL (ref 2–4)
GLUCOSE SERPL-MCNC: 184 MG/DL (ref 65–100)
HCT VFR BLD AUTO: 23.6 % (ref 36.6–50.3)
HGB BLD-MCNC: 8.2 G/DL (ref 12.1–17)
IMM GRANULOCYTES # BLD AUTO: 0 K/UL (ref 0–0.04)
IMM GRANULOCYTES NFR BLD AUTO: 0 % (ref 0–0.5)
LYMPHOCYTES # BLD: 0.9 K/UL (ref 0.8–3.5)
LYMPHOCYTES NFR BLD: 35 % (ref 12–49)
MAGNESIUM SERPL-MCNC: 1.1 MG/DL (ref 1.6–2.4)
MCH RBC QN AUTO: 34.6 PG (ref 26–34)
MCHC RBC AUTO-ENTMCNC: 34.7 G/DL (ref 30–36.5)
MCV RBC AUTO: 99.6 FL (ref 80–99)
MONOCYTES # BLD: 0.3 K/UL (ref 0–1)
MONOCYTES NFR BLD: 11 % (ref 5–13)
NEUTS SEG # BLD: 1.3 K/UL (ref 1.8–8)
NEUTS SEG NFR BLD: 53 % (ref 32–75)
NRBC # BLD: 0 K/UL (ref 0–0.01)
NRBC BLD-RTO: 0 PER 100 WBC
PLATELET # BLD AUTO: 94 K/UL (ref 150–400)
PMV BLD AUTO: 10.3 FL (ref 8.9–12.9)
POTASSIUM SERPL-SCNC: 4.6 MMOL/L (ref 3.5–5.1)
PROT SERPL-MCNC: 7.5 G/DL (ref 6.4–8.2)
RBC # BLD AUTO: 2.37 M/UL (ref 4.1–5.7)
RBC MORPH BLD: ABNORMAL
SODIUM SERPL-SCNC: 133 MMOL/L (ref 136–145)
WBC # BLD AUTO: 2.5 K/UL (ref 4.1–11.1)

## 2023-01-24 PROCEDURE — G8536 NO DOC ELDER MAL SCRN: HCPCS | Performed by: INTERNAL MEDICINE

## 2023-01-24 PROCEDURE — 1123F ACP DISCUSS/DSCN MKR DOCD: CPT | Performed by: INTERNAL MEDICINE

## 2023-01-24 PROCEDURE — G8427 DOCREV CUR MEDS BY ELIG CLIN: HCPCS | Performed by: INTERNAL MEDICINE

## 2023-01-24 PROCEDURE — 80053 COMPREHEN METABOLIC PANEL: CPT

## 2023-01-24 PROCEDURE — 74011250636 HC RX REV CODE- 250/636: Performed by: INTERNAL MEDICINE

## 2023-01-24 PROCEDURE — 77386 HC IMRT TRMT DLVR COMPL: CPT

## 2023-01-24 PROCEDURE — 1101F PT FALLS ASSESS-DOCD LE1/YR: CPT | Performed by: INTERNAL MEDICINE

## 2023-01-24 PROCEDURE — G8432 DEP SCR NOT DOC, RNG: HCPCS | Performed by: INTERNAL MEDICINE

## 2023-01-24 PROCEDURE — 99214 OFFICE O/P EST MOD 30 MIN: CPT | Performed by: INTERNAL MEDICINE

## 2023-01-24 PROCEDURE — 74011000258 HC RX REV CODE- 258: Performed by: INTERNAL MEDICINE

## 2023-01-24 PROCEDURE — 3017F COLORECTAL CA SCREEN DOC REV: CPT | Performed by: INTERNAL MEDICINE

## 2023-01-24 PROCEDURE — G8417 CALC BMI ABV UP PARAM F/U: HCPCS | Performed by: INTERNAL MEDICINE

## 2023-01-24 PROCEDURE — 96413 CHEMO IV INFUSION 1 HR: CPT

## 2023-01-24 PROCEDURE — 36415 COLL VENOUS BLD VENIPUNCTURE: CPT

## 2023-01-24 PROCEDURE — 96367 TX/PROPH/DG ADDL SEQ IV INF: CPT

## 2023-01-24 PROCEDURE — 83735 ASSAY OF MAGNESIUM: CPT

## 2023-01-24 PROCEDURE — 85025 COMPLETE CBC W/AUTO DIFF WBC: CPT

## 2023-01-24 PROCEDURE — 96375 TX/PRO/DX INJ NEW DRUG ADDON: CPT

## 2023-01-24 RX ORDER — SODIUM CHLORIDE 9 MG/ML
5-250 INJECTION, SOLUTION INTRAVENOUS AS NEEDED
Status: DISPENSED | OUTPATIENT
Start: 2023-01-24 | End: 2023-01-24

## 2023-01-24 RX ORDER — HYDROCORTISONE SODIUM SUCCINATE 100 MG/2ML
100 INJECTION, POWDER, FOR SOLUTION INTRAMUSCULAR; INTRAVENOUS AS NEEDED
Status: ACTIVE | OUTPATIENT
Start: 2023-01-24 | End: 2023-01-24

## 2023-01-24 RX ORDER — PALONOSETRON 0.05 MG/ML
0.25 INJECTION, SOLUTION INTRAVENOUS ONCE
Status: COMPLETED | OUTPATIENT
Start: 2023-01-24 | End: 2023-01-24

## 2023-01-24 RX ORDER — ALBUTEROL SULFATE 0.83 MG/ML
2.5 SOLUTION RESPIRATORY (INHALATION) AS NEEDED
Status: ACTIVE | OUTPATIENT
Start: 2023-01-24 | End: 2023-01-24

## 2023-01-24 RX ORDER — SODIUM CHLORIDE 9 MG/ML
5-40 INJECTION INTRAVENOUS AS NEEDED
Status: ACTIVE | OUTPATIENT
Start: 2023-01-24 | End: 2023-01-24

## 2023-01-24 RX ORDER — DIPHENHYDRAMINE HYDROCHLORIDE 50 MG/ML
25 INJECTION, SOLUTION INTRAMUSCULAR; INTRAVENOUS AS NEEDED
Status: ACTIVE | OUTPATIENT
Start: 2023-01-24 | End: 2023-01-24

## 2023-01-24 RX ORDER — DIPHENHYDRAMINE HYDROCHLORIDE 50 MG/ML
50 INJECTION, SOLUTION INTRAMUSCULAR; INTRAVENOUS AS NEEDED
Status: ACTIVE | OUTPATIENT
Start: 2023-01-24 | End: 2023-01-24

## 2023-01-24 RX ORDER — ACETAMINOPHEN 325 MG/1
650 TABLET ORAL AS NEEDED
Status: ACTIVE | OUTPATIENT
Start: 2023-01-24 | End: 2023-01-24

## 2023-01-24 RX ORDER — HEPARIN 100 UNIT/ML
500 SYRINGE INTRAVENOUS AS NEEDED
Status: ACTIVE | OUTPATIENT
Start: 2023-01-24 | End: 2023-01-24

## 2023-01-24 RX ORDER — SODIUM CHLORIDE 0.9 % (FLUSH) 0.9 %
5-40 SYRINGE (ML) INJECTION AS NEEDED
Status: DISPENSED | OUTPATIENT
Start: 2023-01-24 | End: 2023-01-24

## 2023-01-24 RX ORDER — EPINEPHRINE 1 MG/ML
0.3 INJECTION, SOLUTION, CONCENTRATE INTRAVENOUS AS NEEDED
Status: ACTIVE | OUTPATIENT
Start: 2023-01-24 | End: 2023-01-24

## 2023-01-24 RX ORDER — ONDANSETRON 2 MG/ML
8 INJECTION INTRAMUSCULAR; INTRAVENOUS AS NEEDED
Status: ACTIVE | OUTPATIENT
Start: 2023-01-24 | End: 2023-01-24

## 2023-01-24 RX ADMIN — SODIUM CHLORIDE 50 ML/HR: 9 INJECTION, SOLUTION INTRAVENOUS at 11:03

## 2023-01-24 RX ADMIN — CISPLATIN 75.2 MG: 1 INJECTION INTRAVENOUS at 12:00

## 2023-01-24 RX ADMIN — SODIUM CHLORIDE 150 MG: 9 INJECTION, SOLUTION INTRAVENOUS at 11:25

## 2023-01-24 RX ADMIN — DEXAMETHASONE SODIUM PHOSPHATE 12 MG: 4 INJECTION, SOLUTION INTRAMUSCULAR; INTRAVENOUS at 11:05

## 2023-01-24 RX ADMIN — PALONOSETRON 0.25 MG: 0.05 INJECTION, SOLUTION INTRAVENOUS at 11:03

## 2023-01-24 RX ADMIN — POTASSIUM CHLORIDE: 2 INJECTION, SOLUTION, CONCENTRATE INTRAVENOUS at 12:03

## 2023-01-24 NOTE — PROGRESS NOTES
Outpatient Infusion Center - Chemotherapy Progress Note    0845 Pt admit to [facility] for Cisplatin/C1D36 ambulatory in stable condition. Assessment completed by access RN. PIV access established in R arm by access RN with positive blood return. Labs drawn per order and sent. Line flushed, clamped, Curos Cap applied to end clave.     1100 PIV flushed w/ positive blood return; hydration infusing    Visit Vitals  BP (!) 154/84 (BP 1 Location: Left arm, BP Patient Position: Sitting)   Pulse 95   Temp 97.7 °F (36.5 °C)   Resp 18   Ht 5' 5.5\" (1.664 m)   Wt 76.4 kg (168 lb 8 oz)   BMI 27.61 kg/m²       Medications Administered       0.9% sodium chloride 1,000 mL with potassium chloride 10 mEq, magnesium sulfate 2 g infusion       Admin Date  01/24/2023 Action  New Bag Dose   Rate  250 mL/hr Route  IntraVENous Administered By  Be Holt RN               Admin Date  01/24/2023 Action  Transfusion Rate Change Dose   Rate  999 mL/hr Route  IntraVENous Administered By  Be Holt RN              0.9% sodium chloride infusion       Admin Date  01/24/2023 Action  New Bag Dose  50 mL/hr Rate  50 mL/hr Route  IntraVENous Administered By  Be Holt RN              CISplatin (PLATINOL) 75.2 mg in 0.9% sodium chloride 500 mL, overfill volume 50 mL chemo infusion       Admin Date  01/24/2023 Action  New Bag Dose  75.2 mg Rate  625.2 mL/hr Route  IntraVENous Administered By  Be Holt RN              dexamethasone (DECADRON) 12 mg in 0.9% sodium chloride 50 mL IVPB       Admin Date  01/24/2023 Action  New Bag Dose  12 mg Route  IntraVENous Administered By  Be Holt RN              fosaprepitant (EMEND) 150 mg in 0.9% sodium chloride 150 mL IVPB       Admin Date  01/24/2023 Action  New Bag Dose  150 mg Rate  450 mL/hr Route  IntraVENous Administered By  Be Holt RN              palonosetron HCl (ALOXI) injection 0.25 mg       Admin Date  01/24/2023 Action  Given Dose  0.25 mg Route  IntraVENous Administered By  Jason Villarreal RN                        Two nurses verified prior to administering:, Drug name, Drug dose, Infusion volume or drug volume when prepared in a syringe, Rate of administration, Route of administration, Expiration dates and/or times, Appearance and physical integrity of the drugs, Rate set on infusion pump, when used, Sequencing of drug administration         1420 Pt tolerated treatment well. PIV removed at discharge. D/c home ambulatory in no distress. Pt aware of next [facility] appointment scheduled for 01/31/2023. Recent Results (from the past 12 hour(s))   CBC WITH AUTOMATED DIFF    Collection Time: 01/24/23  8:48 AM   Result Value Ref Range    WBC 2.5 (L) 4.1 - 11.1 K/uL    RBC 2.37 (L) 4.10 - 5.70 M/uL    HGB 8.2 (L) 12.1 - 17.0 g/dL    HCT 23.6 (L) 36.6 - 50.3 %    MCV 99.6 (H) 80.0 - 99.0 FL    MCH 34.6 (H) 26.0 - 34.0 PG    MCHC 34.7 30.0 - 36.5 g/dL    RDW 14.0 11.5 - 14.5 %    PLATELET 94 (L) 430 - 400 K/uL    MPV 10.3 8.9 - 12.9 FL    NRBC 0.0 0  WBC    ABSOLUTE NRBC 0.00 0.00 - 0.01 K/uL    NEUTROPHILS 53 32 - 75 %    LYMPHOCYTES 35 12 - 49 %    MONOCYTES 11 5 - 13 %    EOSINOPHILS 1 0 - 7 %    BASOPHILS 0 0 - 1 %    IMMATURE GRANULOCYTES 0 0.0 - 0.5 %    ABS. NEUTROPHILS 1.3 (L) 1.8 - 8.0 K/UL    ABS. LYMPHOCYTES 0.9 0.8 - 3.5 K/UL    ABS. MONOCYTES 0.3 0.0 - 1.0 K/UL    ABS. EOSINOPHILS 0.0 0.0 - 0.4 K/UL    ABS. BASOPHILS 0.0 0.0 - 0.1 K/UL    ABS. IMM.  GRANS. 0.0 0.00 - 0.04 K/UL    DF SMEAR SCANNED      RBC COMMENTS MACROCYTOSIS  1+       METABOLIC PANEL, COMPREHENSIVE    Collection Time: 01/24/23  8:48 AM   Result Value Ref Range    Sodium 133 (L) 136 - 145 mmol/L    Potassium 4.6 3.5 - 5.1 mmol/L    Chloride 100 97 - 108 mmol/L    CO2 31 21 - 32 mmol/L    Anion gap 2 (L) 5 - 15 mmol/L    Glucose 184 (H) 65 - 100 mg/dL    BUN 26 (H) 6 - 20 MG/DL    Creatinine 1.22 0.70 - 1.30 MG/DL    BUN/Creatinine ratio 21 (H) 12 - 20      eGFR >60 >60 ml/min/1.73m2    Calcium 9.4 8.5 - 10.1 MG/DL    Bilirubin, total 0.4 0.2 - 1.0 MG/DL    ALT (SGPT) 16 12 - 78 U/L    AST (SGOT) 19 15 - 37 U/L    Alk.  phosphatase 129 (H) 45 - 117 U/L    Protein, total 7.5 6.4 - 8.2 g/dL    Albumin 3.0 (L) 3.5 - 5.0 g/dL    Globulin 4.5 (H) 2.0 - 4.0 g/dL    A-G Ratio 0.7 (L) 1.1 - 2.2     MAGNESIUM    Collection Time: 01/24/23  8:48 AM   Result Value Ref Range    Magnesium 1.1 (L) 1.6 - 2.4 mg/dL

## 2023-01-25 ENCOUNTER — APPOINTMENT (OUTPATIENT)
Dept: RADIATION THERAPY | Age: 68
End: 2023-01-25
Payer: MEDICARE

## 2023-01-25 ENCOUNTER — TELEPHONE (OUTPATIENT)
Dept: FAMILY MEDICINE CLINIC | Age: 68
End: 2023-01-25

## 2023-01-25 PROCEDURE — 77386 HC IMRT TRMT DLVR COMPL: CPT

## 2023-01-25 RX ORDER — CYCLOBENZAPRINE HCL 5 MG
TABLET ORAL
Qty: 30 TABLET | Refills: 0 | Status: SHIPPED | OUTPATIENT
Start: 2023-01-25

## 2023-01-25 NOTE — TELEPHONE ENCOUNTER
[phone]    Pt called and wanted to make sure that his records have been received from his PCP up in [place], have we gotten anything?

## 2023-01-26 ENCOUNTER — APPOINTMENT (OUTPATIENT)
Dept: RADIATION THERAPY | Age: 68
End: 2023-01-26
Payer: MEDICARE

## 2023-01-26 ENCOUNTER — HOSPITAL ENCOUNTER (OUTPATIENT)
Dept: INFUSION THERAPY | Age: 68
Discharge: HOME | End: 2023-01-26
Payer: MEDICARE

## 2023-01-26 ENCOUNTER — DOCUMENTATION ONLY (OUTPATIENT)
Dept: ONCOLOGY | Age: 68
End: 2023-01-26

## 2023-01-26 ENCOUNTER — APPOINTMENT (OUTPATIENT)
Dept: INFUSION THERAPY | Age: 68
End: 2023-01-26
Payer: MEDICARE

## 2023-01-26 VITALS
TEMPERATURE: 97.5 F | SYSTOLIC BLOOD PRESSURE: 153 MMHG | RESPIRATION RATE: 18 BRPM | DIASTOLIC BLOOD PRESSURE: 85 MMHG | HEART RATE: 98 BPM | OXYGEN SATURATION: 99 %

## 2023-01-26 DIAGNOSIS — N17.9 ACUTE KIDNEY INJURY (HCC): Primary | ICD-10-CM

## 2023-01-26 DIAGNOSIS — C76.0 SQUAMOUS CELL CARCINOMA OF HEAD AND NECK (HCC): ICD-10-CM

## 2023-01-26 LAB
ALBUMIN SERPL-MCNC: 3.2 G/DL (ref 3.5–5)
ALBUMIN/GLOB SERPL: 0.7 (ref 1.1–2.2)
ALP SERPL-CCNC: 138 U/L (ref 45–117)
ALT SERPL-CCNC: 23 U/L (ref 12–78)
ANION GAP SERPL CALC-SCNC: 5 MMOL/L (ref 5–15)
AST SERPL-CCNC: 19 U/L (ref 15–37)
BILIRUB SERPL-MCNC: 0.4 MG/DL (ref 0.2–1)
BUN SERPL-MCNC: 36 MG/DL (ref 6–20)
BUN/CREAT SERPL: 24 (ref 12–20)
CALCIUM SERPL-MCNC: 9.1 MG/DL (ref 8.5–10.1)
CHLORIDE SERPL-SCNC: 101 MMOL/L (ref 97–108)
CO2 SERPL-SCNC: 28 MMOL/L (ref 21–32)
CREAT SERPL-MCNC: 1.51 MG/DL (ref 0.7–1.3)
GLOBULIN SER CALC-MCNC: 4.3 G/DL (ref 2–4)
GLUCOSE SERPL-MCNC: 362 MG/DL (ref 65–100)
POTASSIUM SERPL-SCNC: 4.3 MMOL/L (ref 3.5–5.1)
PROT SERPL-MCNC: 7.5 G/DL (ref 6.4–8.2)
SODIUM SERPL-SCNC: 134 MMOL/L (ref 136–145)

## 2023-01-26 PROCEDURE — 96366 THER/PROPH/DIAG IV INF ADDON: CPT

## 2023-01-26 PROCEDURE — 96361 HYDRATE IV INFUSION ADD-ON: CPT

## 2023-01-26 PROCEDURE — 74011250636 HC RX REV CODE- 250/636: Performed by: INTERNAL MEDICINE

## 2023-01-26 PROCEDURE — 96365 THER/PROPH/DIAG IV INF INIT: CPT

## 2023-01-26 PROCEDURE — 77386 HC IMRT TRMT DLVR COMPL: CPT

## 2023-01-26 PROCEDURE — 96360 HYDRATION IV INFUSION INIT: CPT

## 2023-01-26 PROCEDURE — 36415 COLL VENOUS BLD VENIPUNCTURE: CPT

## 2023-01-26 PROCEDURE — 80053 COMPREHEN METABOLIC PANEL: CPT

## 2023-01-26 RX ORDER — MAGNESIUM SULFATE HEPTAHYDRATE 40 MG/ML
2 INJECTION, SOLUTION INTRAVENOUS ONCE
Status: COMPLETED | OUTPATIENT
Start: 2023-01-26 | End: 2023-01-26

## 2023-01-26 RX ADMIN — MAGNESIUM SULFATE IN WATER 2 G: 40 INJECTION, SOLUTION INTRAVENOUS at 09:37

## 2023-01-26 RX ADMIN — SODIUM CHLORIDE 1000 ML: 9 INJECTION, SOLUTION INTRAVENOUS at 09:25

## 2023-01-26 NOTE — TELEPHONE ENCOUNTER
Pt is calling because he said he doesn't have a modem or device for his pacemaker. Patient said he received a letter in the mail. Pt needs a device in order to do transmissions.     [phone]

## 2023-01-26 NOTE — TELEPHONE ENCOUNTER
Called patient back- ordered new monitor via carelink. Pt has a [employer] (leadless ppm). Serial #: [id number]. Cancelled next remote for February - need to follow up about HM when pt comes for 3 month follow up at [facility].

## 2023-01-26 NOTE — PROGRESS NOTES
[facility] Short Note                 [id number] Pt admit to [facility] for Hydration/Magnesium ambulatory in stable condition. PIV established in right forearm  with positive blood return, labs collected and sent for processing. Mr. Delma Viveros vitals were reviewed prior to and after treatment. Patient Vitals for the past 12 hrs:   Temp Pulse Resp BP SpO2   01/26/23 1144 -- 98 -- (!) 153/85 --   01/26/23 0912 97.5 °F (36.4 °C) 95 18 (!) 163/89 99 %           Lab results were obtained and reviewed. Recent Results (from the past 12 hour(s))   METABOLIC PANEL, COMPREHENSIVE    Collection Time: 01/26/23  9:13 AM   Result Value Ref Range    Sodium 134 (L) 136 - 145 mmol/L    Potassium 4.3 3.5 - 5.1 mmol/L    Chloride 101 97 - 108 mmol/L    CO2 28 21 - 32 mmol/L    Anion gap 5 5 - 15 mmol/L    Glucose 362 (H) 65 - 100 mg/dL    BUN 36 (H) 6 - 20 MG/DL    Creatinine 1.51 (H) 0.70 - 1.30 MG/DL    BUN/Creatinine ratio 24 (H) 12 - 20      eGFR 50 (L) >60 ml/min/1.73m2    Calcium 9.1 8.5 - 10.1 MG/DL    Bilirubin, total 0.4 0.2 - 1.0 MG/DL    ALT (SGPT) 23 12 - 78 U/L    AST (SGOT) 19 15 - 37 U/L    Alk. phosphatase 138 (H) 45 - 117 U/L    Protein, total 7.5 6.4 - 8.2 g/dL    Albumin 3.2 (L) 3.5 - 5.0 g/dL    Globulin 4.3 (H) 2.0 - 4.0 g/dL    A-G Ratio 0.7 (L) 1.1 - 2.2       Medications Administered       magnesium sulfate 2 g/50 ml IVPB (premix or compounded)       Admin Date  01/26/2023 Action  New Bag Dose  2 g Rate  25 mL/hr Route  IntraVENous Administered By  Livan Mcdaniels RN              sodium chloride 0.9 % bolus infusion 1,000 mL       Admin Date  01/26/2023 Action  New Bag Dose  1,000 mL Rate  500 mL/hr Route  IntraVENous Administered By  Livan Mcdaniels RN               Mr. Emily Garcia tolerated the infusion, and had no complaints. PIV removed without difficulty. Mr. Emily Garcia was discharged from [place] [address] in stable condition at 1145. Patient is aware of next appointment.      Future Appointments   Date Time Provider Tad Sibley   1/27/2023  8:15 AM RAD ONC THERAPY [address] [place] [address] H   1/27/2023  9:30 AM H1 MADELYN FASTRACK RCHICB [facility] H   1/30/2023  8:15 AM RAD ONC THERAPY [facility]   1/31/2023  8:15 AM RAD ONC THERAPY [address] [place] [address]. [facility] H   1/31/2023  9:00 AM G3 MADELYN LONG [address] [facility] H   1/31/2023  9:15 AM Abdiel Pantoja  [address] BS AMB   2/1/2023  8:15 AM RAD ONC THERAPY [facility] PSYCHIATRIC [facility]   2/23/2023  8:00 AM Cody Collier NP [facility] BS AMB   3/15/2023  9:00 AM PACEMAKER, STFRANCES [facility] BS AMB   3/15/2023  9:20 AM Caleb Thomas MD [facility] BS AMB   3/21/2023  1:00 PM Navya Acosta MD PMA BS AMB     Marianela Pedersen RN  January 26, 2023

## 2023-01-26 NOTE — PROGRESS NOTES
[facility] at [facility], [address] [place] [address]  Susan Reese 103: [phone]  F: [phone]    Medical Nutrition Therapy  Nutrition Encounter:    Met with patient. He reports he is trying to eat some. He had some soup last night and applesauce. Yesterday he did 3 cans of Boost VHC. He is still swallowing his pills and taking sips of water. He is struggling with thicker secretions and dry mouth. Pain with swallowing which is alleviated somewhat with magic mouthwash. He is using mucinex for the thick secretions and reports using it yesterday 3 times (liquid mucinex). He is utilizing the feeding tube and using gravity bags. Each bag he adds about 200ml of water and does this 3-4 times a day. Tube feeds (4) providinkcal, 88g protein 640ml free water. Water flushes providing additional 1L. Concurrent chemoradiation   Chemotherapy Flowsheet 2023   Cycle C1D36   Date 2023   Drug / Regimen Cisplatin   Pre Hydration -   Post Hydration -   Pre Meds -   Notes -       Wt Readings from Last 8 Encounters:   23 168 lb 8 oz (76.4 kg)   23 168 lb (76.2 kg)   23 167 lb 8.8 oz (76 kg)   23 165 lb (74.8 kg)   23 170 lb 3.2 oz (77.2 kg)   01/10/23 161 lb (73 kg)   23 163 lb 6.4 oz (74.1 kg)   22 168 lb (76.2 kg)      Estimated Nutrition Needs:   Calorie Range: 2184-2688kcal/day     Protein Range: 78-95g/day     Fluid Needs: 2200ml     Plan:   - Continue with 4-5 Boost VHC per day (provides 2650kcal, 110g protein and 800ml free water)  - Continue to add 200ml water to each Boost VHC gravity bag- to provide additional 1.2L   - Continue with mucinex to help thin secretions  - Additional gravity bags provided.       Signed By: Abril Coto, Viv[place]

## 2023-01-27 ENCOUNTER — HOSPITAL ENCOUNTER (OUTPATIENT)
Dept: INFUSION THERAPY | Age: 68
Discharge: HOME | End: 2023-01-27
Payer: MEDICARE

## 2023-01-27 ENCOUNTER — APPOINTMENT (OUTPATIENT)
Dept: INFUSION THERAPY | Age: 68
End: 2023-01-27
Payer: MEDICARE

## 2023-01-27 ENCOUNTER — APPOINTMENT (OUTPATIENT)
Dept: RADIATION THERAPY | Age: 68
End: 2023-01-27
Payer: MEDICARE

## 2023-01-27 VITALS
RESPIRATION RATE: 18 BRPM | HEART RATE: 99 BPM | TEMPERATURE: 97.3 F | DIASTOLIC BLOOD PRESSURE: 68 MMHG | SYSTOLIC BLOOD PRESSURE: 121 MMHG

## 2023-01-27 DIAGNOSIS — C76.0 SQUAMOUS CELL CARCINOMA OF HEAD AND NECK (HCC): ICD-10-CM

## 2023-01-27 DIAGNOSIS — N17.9 ACUTE KIDNEY INJURY (HCC): Primary | ICD-10-CM

## 2023-01-27 PROCEDURE — 74011250636 HC RX REV CODE- 250/636: Performed by: INTERNAL MEDICINE

## 2023-01-27 PROCEDURE — 96360 HYDRATION IV INFUSION INIT: CPT

## 2023-01-27 PROCEDURE — 77386 HC IMRT TRMT DLVR COMPL: CPT

## 2023-01-27 PROCEDURE — 96366 THER/PROPH/DIAG IV INF ADDON: CPT

## 2023-01-27 PROCEDURE — 77336 RADIATION PHYSICS CONSULT: CPT

## 2023-01-27 PROCEDURE — 96365 THER/PROPH/DIAG IV INF INIT: CPT

## 2023-01-27 PROCEDURE — 96367 TX/PROPH/DG ADDL SEQ IV INF: CPT

## 2023-01-27 RX ORDER — MAGNESIUM SULFATE HEPTAHYDRATE 40 MG/ML
2 INJECTION, SOLUTION INTRAVENOUS ONCE
Status: COMPLETED | OUTPATIENT
Start: 2023-01-27 | End: 2023-01-27

## 2023-01-27 RX ADMIN — SODIUM CHLORIDE 1000 ML: 9 INJECTION, SOLUTION INTRAVENOUS at 08:50

## 2023-01-27 RX ADMIN — MAGNESIUM SULFATE HEPTAHYDRATE 2 G: 40 INJECTION, SOLUTION INTRAVENOUS at 08:55

## 2023-01-27 NOTE — PROGRESS NOTES
hospitals Short Note                       Date: 2023    Name: Catherine Benitez. MRN: [id number]         : 1955      Pt admit to [facility] for hydration/magnesium ambulatory in stable condition. Assessment completed and documented in flowsheets. No acute concerns at this time. Please review pending lab results in [address] [place] [address]. Mr. Tammy Rivas vitals were reviewed prior to and after treatment. Patient Vitals for the past 12 hrs:   Temp Pulse Resp BP   23 1050 -- 99 -- 121/68   23 0839 97.3 °F (36.3 °C) (!) 114 18 104/64         Medications given: via PIV  Medications Administered       magnesium sulfate 2 g/50 ml IVPB (premix or compounded)       Admin Date  2023 Action  New Bag Dose  2 g Rate  25 mL/hr Route  IntraVENous Administered By  Avril Santana RN              sodium chloride 0.9 % bolus infusion 1,000 mL       Admin Date  2023 Action  New Bag Dose  1,000 mL Rate  1,000 mL/hr Route  IntraVENous Administered By  Avril Santana RN                    PIV positive blood return noted, flushed and removed prior to discharge. Mr. Derek Harley tolerated the infusion, and had no complaints. Mr. Derek Harley was discharged from [place] [address] in stable condition and is aware of future appointments.      Future Appointments   Date Time Provider Tad Sibley   2023  9:30 AM H1 MADELYN FASTRACK [facility]CHICB [facility] H   2023  8:15 AM RAD ONC THERAPY [facility] H   2023  8:15 AM RAD ONC THERAPY [facility] H   2023  9:00 AM G3 MADELYN LONG [address] [facility] H   2023  9:15 AM Deniz Lozoya  [address] BS AMB   2023  8:15 AM RAD ONC THERAPY [facility] H   2023  8:00 AM Jackie Baires NP [facility] BS AMB   3/15/2023  9:00 AM PACEMAKER, STGARCIA [facility] BS AMB   3/15/2023  9:20 AM Taylor Vo MD [facility]  AMB   3/21/2023  1:00 PM Shane Aaron MD [facility] BS AMB       Wes Lal RN  2023  9:09 AM

## 2023-01-30 ENCOUNTER — APPOINTMENT (OUTPATIENT)
Dept: RADIATION THERAPY | Age: 68
End: 2023-01-30
Payer: MEDICARE

## 2023-01-30 PROCEDURE — 77386 HC IMRT TRMT DLVR COMPL: CPT

## 2023-01-31 ENCOUNTER — HOSPITAL ENCOUNTER (OUTPATIENT)
Dept: INFUSION THERAPY | Age: 68
Discharge: HOME | End: 2023-01-31
Payer: MEDICARE

## 2023-01-31 ENCOUNTER — OFFICE VISIT (OUTPATIENT)
Dept: ONCOLOGY | Age: 68
End: 2023-01-31

## 2023-01-31 ENCOUNTER — APPOINTMENT (OUTPATIENT)
Dept: RADIATION THERAPY | Age: 68
End: 2023-01-31
Payer: MEDICARE

## 2023-01-31 VITALS
BODY MASS INDEX: 27.59 KG/M2 | SYSTOLIC BLOOD PRESSURE: 126 MMHG | RESPIRATION RATE: 18 BRPM | DIASTOLIC BLOOD PRESSURE: 64 MMHG | TEMPERATURE: 97.5 F | HEIGHT: 65 IN | WEIGHT: 165.6 LBS | HEART RATE: 104 BPM

## 2023-01-31 VITALS
HEIGHT: 65 IN | BODY MASS INDEX: 27.58 KG/M2 | OXYGEN SATURATION: 95 % | WEIGHT: 165.57 LBS | TEMPERATURE: 97.5 F | DIASTOLIC BLOOD PRESSURE: 64 MMHG | RESPIRATION RATE: 18 BRPM | HEART RATE: 104 BPM | SYSTOLIC BLOOD PRESSURE: 126 MMHG

## 2023-01-31 DIAGNOSIS — E44.0 MODERATE PROTEIN-CALORIE MALNUTRITION (HCC): Primary | ICD-10-CM

## 2023-01-31 DIAGNOSIS — G43.519 INTRACTABLE PERSISTENT MIGRAINE AURA WITHOUT CEREBRAL INFARCTION AND WITHOUT STATUS MIGRAINOSUS: ICD-10-CM

## 2023-01-31 DIAGNOSIS — C76.0 SQUAMOUS CELL CARCINOMA OF HEAD AND NECK (HCC): ICD-10-CM

## 2023-01-31 DIAGNOSIS — N17.9 ACUTE KIDNEY INJURY (HCC): ICD-10-CM

## 2023-01-31 DIAGNOSIS — Z79.899 HIGH RISK MEDICATION USE: ICD-10-CM

## 2023-01-31 DIAGNOSIS — E83.42 HYPOMAGNESEMIA: ICD-10-CM

## 2023-01-31 DIAGNOSIS — G89.3 CANCER RELATED PAIN: ICD-10-CM

## 2023-01-31 DIAGNOSIS — C76.0 SQUAMOUS CELL CARCINOMA OF HEAD AND NECK (HCC): Primary | ICD-10-CM

## 2023-01-31 PROBLEM — I95.89 HYPOTENSION DUE TO HYPOVOLEMIA: Status: RESOLVED | Noted: 2023-01-09 | Resolved: 2023-01-31

## 2023-01-31 PROBLEM — R00.1 BRADYCARDIA: Status: RESOLVED | Noted: 2022-12-07 | Resolved: 2023-01-31

## 2023-01-31 PROBLEM — R00.1 BRADYCARDIA: Status: RESOLVED | Noted: 2022-01-01 | Resolved: 2023-01-01

## 2023-01-31 PROBLEM — E86.1 HYPOTENSION DUE TO HYPOVOLEMIA: Status: RESOLVED | Noted: 2023-01-09 | Resolved: 2023-01-31

## 2023-01-31 PROBLEM — I95.89 HYPOTENSION DUE TO HYPOVOLEMIA: Status: RESOLVED | Noted: 2023-01-01 | Resolved: 2023-01-01

## 2023-01-31 PROBLEM — E86.1 HYPOTENSION DUE TO HYPOVOLEMIA: Status: RESOLVED | Noted: 2023-01-01 | Resolved: 2023-01-01

## 2023-01-31 LAB
ALBUMIN SERPL-MCNC: 3.2 G/DL (ref 3.5–5)
ALBUMIN/GLOB SERPL: 0.8 (ref 1.1–2.2)
ALP SERPL-CCNC: 139 U/L (ref 45–117)
ALT SERPL-CCNC: 21 U/L (ref 12–78)
ANION GAP SERPL CALC-SCNC: 7 MMOL/L (ref 5–15)
AST SERPL-CCNC: 27 U/L (ref 15–37)
BASOPHILS # BLD: 0.1 K/UL (ref 0–0.1)
BASOPHILS NFR BLD: 2 % (ref 0–1)
BILIRUB SERPL-MCNC: 0.4 MG/DL (ref 0.2–1)
BUN SERPL-MCNC: 24 MG/DL (ref 6–20)
BUN/CREAT SERPL: 19 (ref 12–20)
CALCIUM SERPL-MCNC: 9.3 MG/DL (ref 8.5–10.1)
CHLORIDE SERPL-SCNC: 97 MMOL/L (ref 97–108)
CO2 SERPL-SCNC: 29 MMOL/L (ref 21–32)
CREAT SERPL-MCNC: 1.24 MG/DL (ref 0.7–1.3)
DIFFERENTIAL METHOD BLD: ABNORMAL
EOSINOPHIL # BLD: 0 K/UL (ref 0–0.4)
EOSINOPHIL NFR BLD: 1 % (ref 0–7)
ERYTHROCYTE [DISTWIDTH] IN BLOOD BY AUTOMATED COUNT: 14.2 % (ref 11.5–14.5)
GLOBULIN SER CALC-MCNC: 4.2 G/DL (ref 2–4)
GLUCOSE SERPL-MCNC: 223 MG/DL (ref 65–100)
HCT VFR BLD AUTO: 25.3 % (ref 36.6–50.3)
HGB BLD-MCNC: 8.7 G/DL (ref 12.1–17)
IMM GRANULOCYTES # BLD AUTO: 0 K/UL
IMM GRANULOCYTES NFR BLD AUTO: 0 %
LYMPHOCYTES # BLD: 0.6 K/UL (ref 0.8–3.5)
LYMPHOCYTES NFR BLD: 24 % (ref 12–49)
MAGNESIUM SERPL-MCNC: 1.2 MG/DL (ref 1.6–2.4)
MCH RBC QN AUTO: 33.7 PG (ref 26–34)
MCHC RBC AUTO-ENTMCNC: 34.4 G/DL (ref 30–36.5)
MCV RBC AUTO: 98.1 FL (ref 80–99)
MONOCYTES # BLD: 0.3 K/UL (ref 0–1)
MONOCYTES NFR BLD: 11 % (ref 5–13)
MYELOCYTES NFR BLD MANUAL: 1 %
NEUTS SEG # BLD: 1.6 K/UL (ref 1.8–8)
NEUTS SEG NFR BLD: 61 % (ref 32–75)
NRBC # BLD: 0 K/UL (ref 0–0.01)
NRBC BLD-RTO: 0 PER 100 WBC
PLATELET # BLD AUTO: 177 K/UL (ref 150–400)
PMV BLD AUTO: 10.6 FL (ref 8.9–12.9)
POTASSIUM SERPL-SCNC: 4.2 MMOL/L (ref 3.5–5.1)
PROT SERPL-MCNC: 7.4 G/DL (ref 6.4–8.2)
RBC # BLD AUTO: 2.58 M/UL (ref 4.1–5.7)
RBC MORPH BLD: ABNORMAL
SODIUM SERPL-SCNC: 133 MMOL/L (ref 136–145)
WBC # BLD AUTO: 2.7 K/UL (ref 4.1–11.1)

## 2023-01-31 PROCEDURE — 80053 COMPREHEN METABOLIC PANEL: CPT

## 2023-01-31 PROCEDURE — 74011250636 HC RX REV CODE- 250/636: Performed by: INTERNAL MEDICINE

## 2023-01-31 PROCEDURE — 1123F ACP DISCUSS/DSCN MKR DOCD: CPT | Performed by: INTERNAL MEDICINE

## 2023-01-31 PROCEDURE — 3017F COLORECTAL CA SCREEN DOC REV: CPT | Performed by: INTERNAL MEDICINE

## 2023-01-31 PROCEDURE — 36415 COLL VENOUS BLD VENIPUNCTURE: CPT

## 2023-01-31 PROCEDURE — 99215 OFFICE O/P EST HI 40 MIN: CPT | Performed by: INTERNAL MEDICINE

## 2023-01-31 PROCEDURE — 96375 TX/PRO/DX INJ NEW DRUG ADDON: CPT

## 2023-01-31 PROCEDURE — 1101F PT FALLS ASSESS-DOCD LE1/YR: CPT | Performed by: INTERNAL MEDICINE

## 2023-01-31 PROCEDURE — 83735 ASSAY OF MAGNESIUM: CPT

## 2023-01-31 PROCEDURE — G8536 NO DOC ELDER MAL SCRN: HCPCS | Performed by: INTERNAL MEDICINE

## 2023-01-31 PROCEDURE — G8417 CALC BMI ABV UP PARAM F/U: HCPCS | Performed by: INTERNAL MEDICINE

## 2023-01-31 PROCEDURE — 85025 COMPLETE CBC W/AUTO DIFF WBC: CPT

## 2023-01-31 PROCEDURE — G8427 DOCREV CUR MEDS BY ELIG CLIN: HCPCS | Performed by: INTERNAL MEDICINE

## 2023-01-31 PROCEDURE — 77386 HC IMRT TRMT DLVR COMPL: CPT

## 2023-01-31 PROCEDURE — 96367 TX/PROPH/DG ADDL SEQ IV INF: CPT

## 2023-01-31 PROCEDURE — G8432 DEP SCR NOT DOC, RNG: HCPCS | Performed by: INTERNAL MEDICINE

## 2023-01-31 PROCEDURE — 74011000258 HC RX REV CODE- 258: Performed by: INTERNAL MEDICINE

## 2023-01-31 PROCEDURE — 96413 CHEMO IV INFUSION 1 HR: CPT

## 2023-01-31 RX ORDER — ONDANSETRON 2 MG/ML
8 INJECTION INTRAMUSCULAR; INTRAVENOUS AS NEEDED
Status: ACTIVE | OUTPATIENT
Start: 2023-01-31 | End: 2023-01-31

## 2023-01-31 RX ORDER — SODIUM CHLORIDE 9 MG/ML
5-250 INJECTION, SOLUTION INTRAVENOUS AS NEEDED
Status: DISPENSED | OUTPATIENT
Start: 2023-01-31 | End: 2023-01-31

## 2023-01-31 RX ORDER — SODIUM CHLORIDE 9 MG/ML
5-40 INJECTION INTRAVENOUS AS NEEDED
Status: ACTIVE | OUTPATIENT
Start: 2023-01-31 | End: 2023-01-31

## 2023-01-31 RX ORDER — ACETAMINOPHEN 325 MG/1
650 TABLET ORAL AS NEEDED
Status: ACTIVE | OUTPATIENT
Start: 2023-01-31 | End: 2023-01-31

## 2023-01-31 RX ORDER — HEPARIN 100 UNIT/ML
500 SYRINGE INTRAVENOUS AS NEEDED
Status: ACTIVE | OUTPATIENT
Start: 2023-01-31 | End: 2023-01-31

## 2023-01-31 RX ORDER — PALONOSETRON 0.05 MG/ML
0.25 INJECTION, SOLUTION INTRAVENOUS ONCE
Status: COMPLETED | OUTPATIENT
Start: 2023-01-31 | End: 2023-01-31

## 2023-01-31 RX ORDER — SODIUM CHLORIDE 0.9 % (FLUSH) 0.9 %
5-40 SYRINGE (ML) INJECTION AS NEEDED
Status: DISPENSED | OUTPATIENT
Start: 2023-01-31 | End: 2023-01-31

## 2023-01-31 RX ORDER — DIPHENHYDRAMINE HYDROCHLORIDE 50 MG/ML
25 INJECTION, SOLUTION INTRAMUSCULAR; INTRAVENOUS AS NEEDED
Status: ACTIVE | OUTPATIENT
Start: 2023-01-31 | End: 2023-01-31

## 2023-01-31 RX ADMIN — PALONOSETRON 0.25 MG: 0.05 INJECTION, SOLUTION INTRAVENOUS at 12:40

## 2023-01-31 RX ADMIN — POTASSIUM CHLORIDE: 2 INJECTION, SOLUTION, CONCENTRATE INTRAVENOUS at 11:35

## 2023-01-31 RX ADMIN — FOSAPREPITANT DIMEGLUMINE 150 MG: 150 INJECTION, POWDER, LYOPHILIZED, FOR SOLUTION INTRAVENOUS at 13:15

## 2023-01-31 RX ADMIN — CISPLATIN 75.2 MG: 1 INJECTION INTRAVENOUS at 13:41

## 2023-01-31 RX ADMIN — DEXAMETHASONE SODIUM PHOSPHATE 12 MG: 4 INJECTION, SOLUTION INTRAMUSCULAR; INTRAVENOUS at 12:45

## 2023-01-31 RX ADMIN — SODIUM CHLORIDE 50 ML/HR: 9 INJECTION, SOLUTION INTRAVENOUS at 11:33

## 2023-01-31 NOTE — PROGRESS NOTES
Mendel Suresh. is a 75 y.o. male here for follow up for HNSCC  Treatment today:  Cycle 1 Day 43 Cisplatin + radiation  Weight is stable. Pt states he had some trouble swallowing pills last night but will use his pill  when needed. No other concerns or anything new. 1. Have you been to the ER, urgent care clinic since your last visit? Hospitalized since your last visit? no    2. Have you seen or consulted any other health care providers outside of the [address] [place] [address] since your last visit? Include any pap smears or colon screening.   no

## 2023-01-31 NOTE — PROGRESS NOTES
[address] [facility] Oncology   [phone]    Hematology / Oncology Follow-up    Reason for Visit:   Sylvie Valverde is a 75 y.o. male PMHx of hypertension, diabetes, and hepatic steatosis/possible cirrhosis 2/2 EtOH who is seen for follow-up of locally advanced HNSCC. Hematology/Oncology Treatment History:      PRIMARY DIAGNOSIS: Locally advanced HNSCC     DATE OF DIAGNOSIS: 7/26/22  ORIGINAL STAGE: Stage CAN (cT0 cN3b cM0)  DIAGNOSTICS: FNA ([address] [place] [address]) with atypical squamous cells concerning for malignancy. Core biopsy with skeletal muscle, fibrous tissue, and granulation with acute and chronic inflammation. Cytokeratin negative, p40 negative. SITES OF DISEASE: Left neck mass with prominent left-sided lymph nodes. 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 Pathology records from [place] reviewed. Cytology from FNA 7/26/22 with \"atypical squamous cells. \"  Follow-up core needle biopsy with \"rare epitheliod cells are present; however, pancytokeratin immunostain is negative, and p40 immunostain negative. \"   Although core needle biopsy not definitive, clinical picture (i.e. atypical squamous cells on FNA, enlarging neck mass with failure to improve to antibiotics in a chronic smoker) consistent with HNSCC  Unclear primary.  Given ongoing complications from untreated disease (heart block, recurrent infection), elected not to pursue PET-CT or pan-endoscopy while inpatient to minimize delays in treatment   Staging CT CAP negative for distant metastatic disease   Initiated on concurrent chemoradiation (D1C1 weekly cisplatin 40 mg/m2 on 22)     Tolerating treatment reasonably well other than mucositis and increased oral secretions. He is receiving most of his nutrition via the PEG tube but tolerating apple sauce and yogurt PO. He still has firm area under his left mandible with serous drainage.         - Proceed with cisplatin 40 mg/m2  - Return to clinic in ~1-2 weeks for post-treatment check    #) Grade 2 acute kidney injury, resolved  hypomagnesemia: due to cisplatin and dehydration. Weeks 5 and 6 cisplatin held. Now resolved after aggressive IVF support. Renal ultrasound normal.  - Return for labs, 1 L normal saline, and magnesium on    - Replete with IV magnesium + PO magnesium 400 mg daily  - Encourage hydration via PEG tube    #) Grade 2 Neutropenia  Grade 2 Anemia, stable: treatment-related. Continue to monitor. No indication for blood transfusion at this time    #) Migraine headaches, resolved: CT head without any acute pathology or meningeal enhancement. Unable to obtain MRI brain due to claustrophobia. Management with flexeril for neck spasms. #) Left neck abscess and cellulitis, resolved: no evidence of purulence on exam.  Still with serous drainage. Continue to monitor. #) Moderate protein-calorie malnutrition: s/p PEG tube placement on 22. Weight stable. Wt Readings from Last 3 Encounters:   23 165 lb 9.1 oz (75.1 kg)   23 165 lb 9.6 oz (75.1 kg)   23 168 lb 8 oz (76.4 kg)   - Continue Boost 4-5x per day (currently via PEG tube)      #) Heart block: Likely due to reflex from mass sitting over his right carotid artery. Status post PPM on 22. #) Tobacco abuse: has cut back to 2 cigarettes per day; congratulated on success, revisited importance of complete cessation. #) Steatosis/Cirrhosis: incidentally noted on CT A/P. #) Macrocytosis: B12 level supra-therapeutic; folate wnl. Likely due to cirrhosis and EtOH. #) Psychosocial: wife recently .   Moved from [place] to be closer to his children. Has good support from family (oldest son Thao Limon is HCPOA). Presents to clinic with his daughter. Plan:     Radiation therapy with Dr. Cara Perry reviewed; proceed with cisplatin 40 mg/m2  Continue PO magnesium 400 mg daily   RTC for infusion visit in 2 days for 1 L IVFs + IV magnesium  Antiemetic prophylaxis: Ondansetron, Dexamethasone, Fosaprepitant prior to each chemotherapy. Dexamethasone for 2 days after therapy at home. PRN antiemetics at home: Ondansetron, Prochlorperazine, Lorazepam  Status post PEG tube placement on 12/29; tube feeds/Boost per dietitian. Pain: dilaudid prn with bowel regimen; magic mouthwash prn  Palliative Care following   Return to clinic in 1-2 weeks for post-treatment toxicity check; will order PET-CT for ~3 months after completion of CRT     Signed By: Katie Guadalupe MD    January 31, 2023        Interval History:     Presented June 2022 with enlarging next mass, failed to improve with antibiotics  Evaluated by ENT (Dr. Karlee Bowden) in [address] [place] [address] in 7/2022. uUtrasound that showed irrecgular enhancing mass involving left submandibuar gland, suspicious for cancer. Follow up CT neck demonstrated soft tissue mass, measuring 5 cm with small annular opening with drainage. Mass noted to be compressing left internal jugular vein. FNA 7/26/22 with atypical squamous cells, c/f malignancy  Core biopsy with skeletal muscle, fibrous tissue, and granulation with acute and chronic inflammation. Cytokeratin negative, p40 negative. Patient referred to medical and radiation oncology in [place], but did not follow up due to passing of his wife  Present[facility] to [facility] with progressively worsening neck pain and swelling as well as purulent cellulitis/abscess. Treated with IV Antibiotics and discharged on PO.   Hospital course complicated by AV block with prolonged pause (likely due to reflex from compression of mass on right carotid artery) s/p ppm.    22 CT neck: large 4.8 x 3.1 x 4.2 cm rim-enhancing complex collection in left lateral neck, extending from carotid space to the skin surface with associated skin thickening, edema, and prominent left neck lymph nodes. Findings most likely represent an abscess rather than necrotic/superinfected neoplasm. 22 CT CAP: left neck mass partially visualized. 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 22 C1 Weekly cisplatin  22 concurrent radiation initiated  22 PEG tube placed   1/10/23 Week 5 Cisplatin held due to MOR  23 Week 6 Cisplatin held due to MOR    Patient presents to clinic today for his final weekly cisplatin therapy. He is joined by his daughter who is visiting from out of town. He received IVFs last week x 2 days. Repeat creatinine elevated on 23. Today, he feels about the same. Still having some pain with swallowing and increased secretions. Has been able to enjoy apple sauce and yogurt. All other nutrition and hydration via the PEG. Headaches much better although still occurring every 2-3 days. Uses the flexeril as needed with good effect. Has been taking dilaudid nightly to help with pain. Denies nausea, vomiting, neuropathy, hearing loss. BM have been regular. Medications reviewed in the EMR. No Known Allergies     Review of Systems: A 6-point review of systems was obtained, negative except as reviewed in the HPI.     Physical Exam:   Visit Vitals  /64   Pulse (!) 104   Temp 97.5 °F (36.4 °C)   Resp 18   Ht 5' 5\" (1.651 m)   Wt 165 lb 9.1 oz (75.1 kg)   SpO2 95%   BMI 27.55 kg/m²       ECO  General: No distress, chronically ill appearing  Eyes: Anicteric sclerae  HENT: Atraumatic, no oral ulcers, +erythema  Neck: Supple, ~3-4 cm firm mass in left submandibular region, stable, with ~1 cm area of serous discharge, overlying skin mildly erythematous. No malodorous smell or purulence. Respiratory: CTAB, normal respiratory effort  CV: Normal S1 and S2, no peripheral edema  GI: Soft, nontender, nondistended, no masses, normal bowel sounds  Skin: No rashes, ecchymoses, or petechiae. Dried skin overlying neck  Psych: Alert, oriented, appropriate affect, normal judgment/insight    Results:     Lab Results   Component Value Date/Time    WBC 2.7 (L) 01/31/2023 08:53 AM    HGB 8.7 (L) 01/31/2023 08:53 AM    HCT 25.3 (L) 01/31/2023 08:53 AM    PLATELET 656 16/88/0121 08:53 AM    MCV 98.1 01/31/2023 08:53 AM    ABS. NEUTROPHILS 1.6 (L) 01/31/2023 08:53 AM     Lab Results   Component Value Date/Time    Sodium 133 (L) 01/31/2023 08:53 AM    Potassium 4.2 01/31/2023 08:53 AM    Chloride 97 01/31/2023 08:53 AM    CO2 29 01/31/2023 08:53 AM    Glucose 223 (H) 01/31/2023 08:53 AM    BUN 24 (H) 01/31/2023 08:53 AM    Creatinine 1.24 01/31/2023 08:53 AM    Calcium 9.3 01/31/2023 08:53 AM    Glucose (POC) 313 (H) 12/11/2022 11:30 AM     Lab Results   Component Value Date/Time    Bilirubin, total 0.4 01/31/2023 08:53 AM    ALT (SGPT) 21 01/31/2023 08:53 AM    Alk.  phosphatase 139 (H) 01/31/2023 08:53 AM    Protein, total 7.4 01/31/2023 08:53 AM    Albumin 3.2 (L) 01/31/2023 08:53 AM    Globulin 4.2 (H) 01/31/2023 08:53 AM     No new images to review

## 2023-01-31 NOTE — PROGRESS NOTES
Outpatient Infusion Center - Chemotherapy Progress Note    0845 Pt admit to [facility] for Cisplatin/C1D43 ambulatory in stable condition. Assessment completed by access RN. PIV access established by access RN in R arm with positive blood return. Labs drawn per order and sent. Line flushed, clamped, Curos Cap applied to end clave.     1130 PIV flushed w/ +  blood return; pre-hydration infusing    Visit Vitals  /64 (BP 1 Location: Left upper arm, BP Patient Position: Sitting)   Pulse (!) 104   Temp 97.5 °F (36.4 °C)   Resp 18   Ht 5' 5\" (1.651 m)   Wt 75.1 kg (165 lb 9.6 oz)   BMI 27.56 kg/m²       Medications Administered       0.9% sodium chloride 1,000 mL with potassium chloride 10 mEq, magnesium sulfate 2 g infusion       Admin Date  01/31/2023 Action  New Bag Dose   Rate  1,000 mL/hr Route  IntraVENous Administered By  Pat Sparks RN              0.9% sodium chloride infusion       Admin Date  01/31/2023 Action  New Bag Dose  50 mL/hr Rate  50 mL/hr Route  IntraVENous Administered By  Pat Sparks RN              CISplatin (PLATINOL) 75.2 mg in 0.9% sodium chloride 500 mL, overfill volume 50 mL chemo infusion       Admin Date  01/31/2023 Action  New Bag Dose  75.2 mg Rate  625.2 mL/hr Route  IntraVENous Administered By  Pta Sparks RN              dexamethasone (DECADRON) 12 mg in 0.9% sodium chloride 50 mL, overfill volume 5 mL IVPB       Admin Date  01/31/2023 Action  New Bag Dose  12 mg Rate  232 mL/hr Route  IntraVENous Administered By  Pat Sparks RN              fosaprepitant (EMEND) 150 mg in 0.9% sodium chloride 150 mL IVPB       Admin Date  01/31/2023 Action  New Bag Dose  150 mg Rate  450 mL/hr Route  IntraVENous Administered By  Pat Sparks RN              palonosetron HCl (ALOXI) injection 0.25 mg       Admin Date  01/31/2023 Action  Given Dose  0.25 mg Route  IntraVENous Administered By  Pat Sparks RN                        Two nurses verified prior to administering:, Drug name, Drug dose, Infusion volume or drug volume when prepared in a syringe, Rate of administration, Route of administration, Expiration dates and/or times, Appearance and physical integrity of the drugs, Rate set on infusion pump, when used, Sequencing of drug administration         1500 Pt tolerated treatment well. PIV removed at discharge. D/c home ambulatory in no distress. Pt aware to follow up with MD office regarding next [facility] appointment. Recent Results (from the past 12 hour(s))   CBC WITH AUTOMATED DIFF    Collection Time: 01/31/23  8:53 AM   Result Value Ref Range    WBC 2.7 (L) 4.1 - 11.1 K/uL    RBC 2.58 (L) 4.10 - 5.70 M/uL    HGB 8.7 (L) 12.1 - 17.0 g/dL    HCT 25.3 (L) 36.6 - 50.3 %    MCV 98.1 80.0 - 99.0 FL    MCH 33.7 26.0 - 34.0 PG    MCHC 34.4 30.0 - 36.5 g/dL    RDW 14.2 11.5 - 14.5 %    PLATELET 391 812 - 403 K/uL    MPV 10.6 8.9 - 12.9 FL    NRBC 0.0 0  WBC    ABSOLUTE NRBC 0.00 0.00 - 0.01 K/uL    NEUTROPHILS 61 32 - 75 %    LYMPHOCYTES 24 12 - 49 %    MONOCYTES 11 5 - 13 %    EOSINOPHILS 1 0 - 7 %    BASOPHILS 2 (H) 0 - 1 %    MYELOCYTES 1 (H) 0 %    IMMATURE GRANULOCYTES 0 %    ABS. NEUTROPHILS 1.6 (L) 1.8 - 8.0 K/UL    ABS. LYMPHOCYTES 0.6 (L) 0.8 - 3.5 K/UL    ABS. MONOCYTES 0.3 0.0 - 1.0 K/UL    ABS. EOSINOPHILS 0.0 0.0 - 0.4 K/UL    ABS. BASOPHILS 0.1 0.0 - 0.1 K/UL    ABS. IMM.  GRANS. 0.0 K/UL    DF MANUAL      RBC COMMENTS MACROCYTOSIS  1+       METABOLIC PANEL, COMPREHENSIVE    Collection Time: 01/31/23  8:53 AM   Result Value Ref Range    Sodium 133 (L) 136 - 145 mmol/L    Potassium 4.2 3.5 - 5.1 mmol/L    Chloride 97 97 - 108 mmol/L    CO2 29 21 - 32 mmol/L    Anion gap 7 5 - 15 mmol/L    Glucose 223 (H) 65 - 100 mg/dL    BUN 24 (H) 6 - 20 MG/DL    Creatinine 1.24 0.70 - 1.30 MG/DL    BUN/Creatinine ratio 19 12 - 20      eGFR >60 >60 ml/min/1.73m2    Calcium 9.3 8.5 - 10.1 MG/DL    Bilirubin, total 0.4 0.2 - 1.0 MG/DL    ALT (SGPT) 21 12 - 78 U/L AST (SGOT) 27 15 - 37 U/L    Alk.  phosphatase 139 (H) 45 - 117 U/L    Protein, total 7.4 6.4 - 8.2 g/dL    Albumin 3.2 (L) 3.5 - 5.0 g/dL    Globulin 4.2 (H) 2.0 - 4.0 g/dL    A-G Ratio 0.8 (L) 1.1 - 2.2     MAGNESIUM    Collection Time: 01/31/23  8:53 AM   Result Value Ref Range    Magnesium 1.2 (L) 1.6 - 2.4 mg/dL

## 2023-02-01 ENCOUNTER — PATIENT MESSAGE (OUTPATIENT)
Dept: ONCOLOGY | Age: 68
End: 2023-02-01

## 2023-02-01 ENCOUNTER — HOSPITAL ENCOUNTER (OUTPATIENT)
Dept: RADIATION THERAPY | Age: 68
Discharge: HOME | End: 2023-02-01
Payer: MEDICARE

## 2023-02-01 PROCEDURE — 77386 HC IMRT TRMT DLVR COMPL: CPT

## 2023-02-02 ENCOUNTER — HOSPITAL ENCOUNTER (OUTPATIENT)
Dept: RADIATION THERAPY | Age: 68
End: 2023-02-02
Payer: MEDICARE

## 2023-02-02 ENCOUNTER — HOSPITAL ENCOUNTER (OUTPATIENT)
Dept: INFUSION THERAPY | Age: 68
End: 2023-02-02

## 2023-02-02 PROCEDURE — 77386 HC IMRT TRMT DLVR COMPL: CPT

## 2023-02-03 ENCOUNTER — HOSPITAL ENCOUNTER (OUTPATIENT)
Dept: INFUSION THERAPY | Age: 68
Discharge: HOME | End: 2023-02-03
Payer: MEDICARE

## 2023-02-03 ENCOUNTER — HOSPITAL ENCOUNTER (OUTPATIENT)
Dept: RADIATION THERAPY | Age: 68
End: 2023-02-03
Payer: MEDICARE

## 2023-02-03 ENCOUNTER — DOCUMENTATION ONLY (OUTPATIENT)
Dept: ONCOLOGY | Age: 68
End: 2023-02-03

## 2023-02-03 VITALS
HEIGHT: 65 IN | SYSTOLIC BLOOD PRESSURE: 138 MMHG | TEMPERATURE: 98 F | DIASTOLIC BLOOD PRESSURE: 70 MMHG | WEIGHT: 165 LBS | BODY MASS INDEX: 27.49 KG/M2 | RESPIRATION RATE: 18 BRPM | OXYGEN SATURATION: 100 % | HEART RATE: 76 BPM

## 2023-02-03 DIAGNOSIS — N17.9 ACUTE KIDNEY INJURY (HCC): Primary | ICD-10-CM

## 2023-02-03 DIAGNOSIS — C76.0 SQUAMOUS CELL CARCINOMA OF HEAD AND NECK (HCC): ICD-10-CM

## 2023-02-03 LAB
ALBUMIN SERPL-MCNC: 3 G/DL (ref 3.5–5)
ALBUMIN/GLOB SERPL: 0.8 (ref 1.1–2.2)
ALP SERPL-CCNC: 128 U/L (ref 45–117)
ALT SERPL-CCNC: 22 U/L (ref 12–78)
ANION GAP SERPL CALC-SCNC: 5 MMOL/L (ref 5–15)
AST SERPL-CCNC: 28 U/L (ref 15–37)
BILIRUB SERPL-MCNC: 0.5 MG/DL (ref 0.2–1)
BUN SERPL-MCNC: 32 MG/DL (ref 6–20)
BUN/CREAT SERPL: 24 (ref 12–20)
CALCIUM SERPL-MCNC: 8.6 MG/DL (ref 8.5–10.1)
CHLORIDE SERPL-SCNC: 99 MMOL/L (ref 97–108)
CO2 SERPL-SCNC: 31 MMOL/L (ref 21–32)
CREAT SERPL-MCNC: 1.36 MG/DL (ref 0.7–1.3)
GLOBULIN SER CALC-MCNC: 3.8 G/DL (ref 2–4)
GLUCOSE SERPL-MCNC: 147 MG/DL (ref 65–100)
POTASSIUM SERPL-SCNC: 4.5 MMOL/L (ref 3.5–5.1)
PROT SERPL-MCNC: 6.8 G/DL (ref 6.4–8.2)
SODIUM SERPL-SCNC: 135 MMOL/L (ref 136–145)

## 2023-02-03 PROCEDURE — 96366 THER/PROPH/DIAG IV INF ADDON: CPT

## 2023-02-03 PROCEDURE — 77386 HC IMRT TRMT DLVR COMPL: CPT

## 2023-02-03 PROCEDURE — 77336 RADIATION PHYSICS CONSULT: CPT

## 2023-02-03 PROCEDURE — 74011000250 HC RX REV CODE- 250: Performed by: INTERNAL MEDICINE

## 2023-02-03 PROCEDURE — 80053 COMPREHEN METABOLIC PANEL: CPT

## 2023-02-03 PROCEDURE — 36415 COLL VENOUS BLD VENIPUNCTURE: CPT

## 2023-02-03 PROCEDURE — 96365 THER/PROPH/DIAG IV INF INIT: CPT

## 2023-02-03 PROCEDURE — 96360 HYDRATION IV INFUSION INIT: CPT

## 2023-02-03 PROCEDURE — 74011250636 HC RX REV CODE- 250/636: Performed by: INTERNAL MEDICINE

## 2023-02-03 PROCEDURE — 96368 THER/DIAG CONCURRENT INF: CPT

## 2023-02-03 RX ORDER — HEPARIN 100 UNIT/ML
500 SYRINGE INTRAVENOUS AS NEEDED
Status: DISCONTINUED | OUTPATIENT
Start: 2023-02-03 | End: 2023-02-04 | Stop reason: HOSPADM

## 2023-02-03 RX ORDER — SODIUM CHLORIDE 0.9 % (FLUSH) 0.9 %
5-10 SYRINGE (ML) INJECTION AS NEEDED
Status: DISCONTINUED | OUTPATIENT
Start: 2023-02-03 | End: 2023-02-04 | Stop reason: HOSPADM

## 2023-02-03 RX ORDER — MAGNESIUM SULFATE HEPTAHYDRATE 40 MG/ML
2 INJECTION, SOLUTION INTRAVENOUS ONCE
Status: COMPLETED | OUTPATIENT
Start: 2023-02-03 | End: 2023-02-03

## 2023-02-03 RX ADMIN — SODIUM CHLORIDE 1000 ML: 900 INJECTION, SOLUTION INTRAVENOUS at 09:20

## 2023-02-03 RX ADMIN — MAGNESIUM SULFATE IN WATER 2 G: 40 INJECTION, SOLUTION INTRAVENOUS at 09:25

## 2023-02-03 RX ADMIN — SODIUM CHLORIDE, PRESERVATIVE FREE 10 ML: 5 INJECTION INTRAVENOUS at 09:00

## 2023-02-03 RX ADMIN — SODIUM CHLORIDE, PRESERVATIVE FREE 10 ML: 5 INJECTION INTRAVENOUS at 11:30

## 2023-02-03 NOTE — PROGRESS NOTES
[facility] at [facility], [address]  Susan Reese 103: [phone]  F: [phone]    Medical Nutrition Therapy  Nutrition Encounter:    Met with patient in [address] [place] [address]. He has finished treatment. He is having pain with swallowing. Not taking much by mouth. He is taking small sips of water, soup. Still having thick secretions. Yesterday he did 3 cans of Boost VHC. This is average for him. He is adding about 200ml water to each feed. This is providing 1590kcal, 66g and total 1080ml free water. He is still swallowing his pills and taking sips of water. Using gravity bags. Concurrent chemoradiation   Chemotherapy Flowsheet 1/31/2023   Cycle CD43   Date 1/31/2023   Drug / Regimen Cisplatin   Pre Hydration -   Post Hydration -   Pre Meds -   Notes -       Wt Readings from Last 8 Encounters:   02/03/23 165 lb (74.8 kg)   01/31/23 165 lb 9.6 oz (75.1 kg)   01/31/23 165 lb 9.1 oz (75.1 kg)   01/24/23 168 lb 8 oz (76.4 kg)   01/24/23 168 lb (76.2 kg)   01/20/23 167 lb 8.8 oz (76 kg)   01/19/23 165 lb (74.8 kg)   01/17/23 170 lb 3.2 oz (77.2 kg)      Estimated Nutrition Needs:   Calorie Range: 2184-2688kcal/day     Protein Range: 78-95g/day     Fluid Needs: 2200ml     Plan:   - Continue with 4-5 Boost VHC per day (provides 2650kcal, 110g protein and 800ml free water)  - Continue to add 200ml water to each Boost VHC gravity bag- to provide additional 1.2L   - Continue with mucinex to help thin secretions  - Push fluids, encouraged more water even if he can't get more Boost in.       Signed By: Leda Koo, [address]

## 2023-02-03 NOTE — PROGRESS NOTES
[facility]IC Short Note                       Date: February 3, 2023  Name: Cranston Dandy. MRN: [id number]       : 1955    0845: Pt admit to [facility] for Hydration/Magnesium/Labs  Denies pain, fever, cough, N/V, recent falls. PIV [Right wrist, 24g]. Positive blood return. Labs ordered/ drawn. See [facility] for details. Tolerated infusion well without issue. Patient declined post infusion monitoring time. Education provided. PIV removed post infusion. Bandaid and Gauze Applied    Patient aware of upcoming appointment. Departed at [address] [address]     Mr. Pedro Aguilar vitals were reviewed prior to treatment.    Patient Vitals for the past 12 hrs:   Temp Pulse Resp BP SpO2   23 1130 98 °F (36.7 °C) 76 18 138/70 100 %   23 0845 98 °F (36.7 °C) 90 18 113/69 100 %       Medications Administered       magnesium sulfate 2 g/50 ml IVPB (premix or compounded)       Admin Date  2023 Action  New Bag Dose  2 g Rate  25 mL/hr Route  IntraVENous Administered By  Rona Bruno RN         sodium chloride (NS) flush 5-10 mL       Admin Date  2023 Action  Given Dose  10 mL Route  IntraVENous Administered By  Rona [place] RN      Admin Date  2023 Action  Given Dose  10 mL Route  IntraVENous Administered By  Rona [place] RN         sodium chloride 0.9 % bolus infusion 1,000 mL       Admin Date  2023 Action  New Bag Dose  1,000 mL Rate  1,000 mL/hr Route  IntraVENous Administered By  Rona Bruno RN        Future Appointments   Date Time Provider Tad Sibley   2023  8:30 AM Ambar Kay  [address] BS AMB   2023  9:00 AM Ying Chilel MD [facility] BS AMB   2023  8:00 AM Tika Luis NP [facility] BS AMB   3/15/2023  9:00 AM PACEMAKER, [facility]FRANCES [facility] BS AMB   3/15/2023  9:20 AM Ghanshyam Quick MD [facility] BS AMB   3/21/2023  1:00 PM Codi Rea MD [facility] BS AMB     Marlo Serna RN  February 3, 2023

## 2023-02-07 ENCOUNTER — TELEPHONE (OUTPATIENT)
Dept: ONCOLOGY | Age: 68
End: 2023-02-07

## 2023-02-07 NOTE — TELEPHONE ENCOUNTER
Addison Hernandez verified   Patient inquiring about the length of his next appointment on 2/9 and if he requires hydration. Patient trying to plan his transportation. Will reach back out. Thanked us for the call. Addison Hernandez verified  Informed patient that his visit on 2/9 @ 0830 is for an OV with Dr Hazel Martinez.

## 2023-02-09 ENCOUNTER — OFFICE VISIT (OUTPATIENT)
Dept: ONCOLOGY | Age: 68
End: 2023-02-09
Payer: MEDICARE

## 2023-02-09 ENCOUNTER — DOCUMENTATION ONLY (OUTPATIENT)
Dept: ONCOLOGY | Age: 68
End: 2023-02-09

## 2023-02-09 ENCOUNTER — HOSPITAL ENCOUNTER (OUTPATIENT)
Dept: INFUSION THERAPY | Age: 68
Discharge: HOME | End: 2023-02-09
Payer: MEDICARE

## 2023-02-09 VITALS
DIASTOLIC BLOOD PRESSURE: 74 MMHG | HEART RATE: 116 BPM | TEMPERATURE: 100.7 F | SYSTOLIC BLOOD PRESSURE: 131 MMHG | RESPIRATION RATE: 18 BRPM

## 2023-02-09 VITALS — HEART RATE: 110 BPM | OXYGEN SATURATION: 92 % | SYSTOLIC BLOOD PRESSURE: 73 MMHG | DIASTOLIC BLOOD PRESSURE: 49 MMHG

## 2023-02-09 DIAGNOSIS — M10.372 ACUTE GOUT DUE TO RENAL IMPAIRMENT INVOLVING LEFT ANKLE: ICD-10-CM

## 2023-02-09 DIAGNOSIS — E44.0 MODERATE PROTEIN-CALORIE MALNUTRITION (HCC): ICD-10-CM

## 2023-02-09 DIAGNOSIS — N17.9 ACUTE KIDNEY INJURY (HCC): ICD-10-CM

## 2023-02-09 DIAGNOSIS — E86.1 HYPOTENSION DUE TO HYPOVOLEMIA: ICD-10-CM

## 2023-02-09 DIAGNOSIS — E83.42 HYPOMAGNESEMIA: ICD-10-CM

## 2023-02-09 DIAGNOSIS — N17.9 ACUTE KIDNEY INJURY (HCC): Primary | ICD-10-CM

## 2023-02-09 DIAGNOSIS — C76.0 SQUAMOUS CELL CARCINOMA OF HEAD AND NECK (HCC): Primary | ICD-10-CM

## 2023-02-09 DIAGNOSIS — R50.81 FEVER IN OTHER DISEASES: ICD-10-CM

## 2023-02-09 DIAGNOSIS — I95.89 HYPOTENSION DUE TO HYPOVOLEMIA: ICD-10-CM

## 2023-02-09 DIAGNOSIS — C76.0 SQUAMOUS CELL CARCINOMA OF HEAD AND NECK (HCC): ICD-10-CM

## 2023-02-09 PROBLEM — G43.519 INTRACTABLE PERSISTENT MIGRAINE AURA WITHOUT CEREBRAL INFARCTION AND WITHOUT STATUS MIGRAINOSUS: Status: RESOLVED | Noted: 2022-01-01 | Resolved: 2023-01-01

## 2023-02-09 PROBLEM — Z79.899 HIGH RISK MEDICATION USE: Status: RESOLVED | Noted: 2022-01-01 | Resolved: 2023-01-01

## 2023-02-09 PROBLEM — G43.519 INTRACTABLE PERSISTENT MIGRAINE AURA WITHOUT CEREBRAL INFARCTION AND WITHOUT STATUS MIGRAINOSUS: Status: RESOLVED | Noted: 2022-12-13 | Resolved: 2023-02-09

## 2023-02-09 PROBLEM — Z79.899 HIGH RISK MEDICATION USE: Status: RESOLVED | Noted: 2022-12-13 | Resolved: 2023-02-09

## 2023-02-09 LAB
ALBUMIN SERPL-MCNC: 3.2 G/DL (ref 3.5–5)
ALBUMIN/GLOB SERPL: 0.7 (ref 1.1–2.2)
ALP SERPL-CCNC: 149 U/L (ref 45–117)
ALT SERPL-CCNC: 24 U/L (ref 12–78)
ANION GAP SERPL CALC-SCNC: 9 MMOL/L (ref 5–15)
AST SERPL-CCNC: 34 U/L (ref 15–37)
BASOPHILS # BLD: 0 K/UL (ref 0–0.1)
BASOPHILS NFR BLD: 1 % (ref 0–1)
BILIRUB SERPL-MCNC: 0.8 MG/DL (ref 0.2–1)
BUN SERPL-MCNC: 21 MG/DL (ref 6–20)
BUN/CREAT SERPL: 14 (ref 12–20)
CALCIUM SERPL-MCNC: 9.1 MG/DL (ref 8.5–10.1)
CHLORIDE SERPL-SCNC: 99 MMOL/L (ref 97–108)
CO2 SERPL-SCNC: 28 MMOL/L (ref 21–32)
CREAT SERPL-MCNC: 1.55 MG/DL (ref 0.7–1.3)
DIFFERENTIAL METHOD BLD: ABNORMAL
EOSINOPHIL # BLD: 0 K/UL (ref 0–0.4)
EOSINOPHIL NFR BLD: 0 % (ref 0–7)
ERYTHROCYTE [DISTWIDTH] IN BLOOD BY AUTOMATED COUNT: 15.1 % (ref 11.5–14.5)
GLOBULIN SER CALC-MCNC: 4.7 G/DL (ref 2–4)
GLUCOSE SERPL-MCNC: 161 MG/DL (ref 65–100)
HCT VFR BLD AUTO: 23.5 % (ref 36.6–50.3)
HGB BLD-MCNC: 7.9 G/DL (ref 12.1–17)
IMM GRANULOCYTES # BLD AUTO: 0.1 K/UL (ref 0–0.04)
IMM GRANULOCYTES NFR BLD AUTO: 2 % (ref 0–0.5)
LYMPHOCYTES # BLD: 0.6 K/UL (ref 0.8–3.5)
LYMPHOCYTES NFR BLD: 16 % (ref 12–49)
MAGNESIUM SERPL-MCNC: 1.2 MG/DL (ref 1.6–2.4)
MCH RBC QN AUTO: 33.8 PG (ref 26–34)
MCHC RBC AUTO-ENTMCNC: 33.6 G/DL (ref 30–36.5)
MCV RBC AUTO: 100.4 FL (ref 80–99)
MONOCYTES # BLD: 0.8 K/UL (ref 0–1)
MONOCYTES NFR BLD: 21 % (ref 5–13)
NEUTS SEG # BLD: 2.4 K/UL (ref 1.8–8)
NEUTS SEG NFR BLD: 60 % (ref 32–75)
NRBC # BLD: 0 K/UL (ref 0–0.01)
NRBC BLD-RTO: 0 PER 100 WBC
PLATELET # BLD AUTO: 253 K/UL (ref 150–400)
PMV BLD AUTO: 10.4 FL (ref 8.9–12.9)
POTASSIUM SERPL-SCNC: 4.1 MMOL/L (ref 3.5–5.1)
PROT SERPL-MCNC: 7.9 G/DL (ref 6.4–8.2)
RBC # BLD AUTO: 2.34 M/UL (ref 4.1–5.7)
RBC MORPH BLD: ABNORMAL
RBC MORPH BLD: ABNORMAL
SODIUM SERPL-SCNC: 136 MMOL/L (ref 136–145)
URATE SERPL-MCNC: 10.8 MG/DL (ref 3.5–7.2)
WBC # BLD AUTO: 3.9 K/UL (ref 4.1–11.1)

## 2023-02-09 PROCEDURE — G8432 DEP SCR NOT DOC, RNG: HCPCS | Performed by: INTERNAL MEDICINE

## 2023-02-09 PROCEDURE — G8417 CALC BMI ABV UP PARAM F/U: HCPCS | Performed by: INTERNAL MEDICINE

## 2023-02-09 PROCEDURE — 83735 ASSAY OF MAGNESIUM: CPT

## 2023-02-09 PROCEDURE — 99215 OFFICE O/P EST HI 40 MIN: CPT | Performed by: INTERNAL MEDICINE

## 2023-02-09 PROCEDURE — G8536 NO DOC ELDER MAL SCRN: HCPCS | Performed by: INTERNAL MEDICINE

## 2023-02-09 PROCEDURE — 87040 BLOOD CULTURE FOR BACTERIA: CPT

## 2023-02-09 PROCEDURE — 96366 THER/PROPH/DIAG IV INF ADDON: CPT

## 2023-02-09 PROCEDURE — 96361 HYDRATE IV INFUSION ADD-ON: CPT

## 2023-02-09 PROCEDURE — 96365 THER/PROPH/DIAG IV INF INIT: CPT

## 2023-02-09 PROCEDURE — 74011250636 HC RX REV CODE- 250/636: Performed by: INTERNAL MEDICINE

## 2023-02-09 PROCEDURE — 36415 COLL VENOUS BLD VENIPUNCTURE: CPT

## 2023-02-09 PROCEDURE — 3017F COLORECTAL CA SCREEN DOC REV: CPT | Performed by: INTERNAL MEDICINE

## 2023-02-09 PROCEDURE — 84550 ASSAY OF BLOOD/URIC ACID: CPT

## 2023-02-09 PROCEDURE — 1123F ACP DISCUSS/DSCN MKR DOCD: CPT | Performed by: INTERNAL MEDICINE

## 2023-02-09 PROCEDURE — 1101F PT FALLS ASSESS-DOCD LE1/YR: CPT | Performed by: INTERNAL MEDICINE

## 2023-02-09 PROCEDURE — G8427 DOCREV CUR MEDS BY ELIG CLIN: HCPCS | Performed by: INTERNAL MEDICINE

## 2023-02-09 PROCEDURE — 85025 COMPLETE CBC W/AUTO DIFF WBC: CPT

## 2023-02-09 PROCEDURE — 80053 COMPREHEN METABOLIC PANEL: CPT

## 2023-02-09 RX ORDER — COLCHICINE 0.6 MG/1
0.6 TABLET ORAL AS NEEDED
Qty: 30 TABLET | Refills: 0 | Status: SHIPPED | OUTPATIENT
Start: 2023-02-09

## 2023-02-09 RX ORDER — MAGNESIUM SULFATE HEPTAHYDRATE 40 MG/ML
2 INJECTION, SOLUTION INTRAVENOUS ONCE
Status: COMPLETED | OUTPATIENT
Start: 2023-02-09 | End: 2023-02-09

## 2023-02-09 RX ADMIN — SODIUM CHLORIDE 1000 ML: 900 INJECTION, SOLUTION INTRAVENOUS at 09:35

## 2023-02-09 RX ADMIN — MAGNESIUM SULFATE IN WATER 2 G: 40 INJECTION, SOLUTION INTRAVENOUS at 10:11

## 2023-02-09 NOTE — PROGRESS NOTES
[facility] [place] at [address], [address] [place] [address] [address]  Susan Reese [address]: [phone]  F: [phone]    Medical Nutrition Therapy  Nutrition Encounter:    Met with patient and oldest son Denis Atkins. He is feeling poorly today. He is having a gout flare which has made it difficult to move. He spent the day in bed yesterday. He has had no liquids or nutrition for at least 24 hours. He has been attempting to increase his fluids by adding 300ml to each gravity bag. Then 500ml water after he finishes with water. Using gravity bags. Concurrent chemoradiation - completed treatment. Chemotherapy Flowsheet 1/31/2023   Cycle CD43   Date 1/31/2023   Drug / Regimen Cisplatin   Pre Hydration -   Post Hydration -   Pre Meds -   Notes -       Wt Readings from Last 8 Encounters:   02/03/23 165 lb (74.8 kg)   01/31/23 165 lb 9.6 oz (75.1 kg)   01/31/23 165 lb 9.1 oz (75.1 kg)   01/24/23 168 lb 8 oz (76.4 kg)   01/24/23 168 lb (76.2 kg)   01/20/23 167 lb 8.8 oz (76 kg)   01/19/23 165 lb (74.8 kg)   01/17/23 170 lb 3.2 oz (77.2 kg)      Estimated Nutrition Needs:   Calorie Range: 2184-2688kcal/day     Protein Range: 78-95g/day     Fluid Needs: 2200ml     Plan:   - Continue with 4-5 Boost VHC per day (provides 2650kcal, 110g protein and 800ml free water)  - Continue to add 200ml water to each Boost VHC gravity bag- to provide additional 1.2L   - Continue with mucinex to help thin secretions  - Push fluids, encouraged more water even if he can't get more Boost in.       Signed By: Harry Pichardo, [employer]

## 2023-02-09 NOTE — PROGRESS NOTES
[id number] [facility]   [phone]    Hematology / Oncology Follow-up    Reason for Visit:   Oliver Hung is a 75 y.o. male PMHx of hypertension, diabetes, and hepatic steatosis/possible cirrhosis 2/2 EtOH who is seen for follow-up of locally advanced HNSCC. Hematology/Oncology Treatment History:      PRIMARY DIAGNOSIS: Locally advanced HNSCC     DATE OF DIAGNOSIS: 7/26/22  ORIGINAL STAGE: Stage CAN (cT0 cN3b cM0)  DIAGNOSTICS: FNA ([address] [place] [address]) with atypical squamous cells concerning for malignancy. Core biopsy with skeletal muscle, fibrous tissue, and granulation with acute and chronic inflammation. Cytokeratin negative, p40 negative. SITES OF DISEASE: Left neck mass with prominent left-sided lymph nodes. 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 Pathology records from [place] reviewed. Cytology from FNA 7/26/22 with \"atypical squamous cells. \"  Follow-up core needle biopsy with \"rare epitheliod cells are present; however, pancytokeratin immunostain is negative, and p40 immunostain negative. \"   Although core needle biopsy not definitive, clinical picture (i.e. atypical squamous cells on FNA, enlarging neck mass with failure to improve to antibiotics in a chronic smoker) consistent with HNSCC  Unclear primary.  Given ongoing complications from untreated disease (heart block, recurrent infection), elected not to pursue PET-CT or pan-endoscopy while inpatient to minimize delays in treatment   Staging CT CAP negative for distant metastatic disease     He is now status post concurrent chemoradiation with weekly cisplatin 40 mg/m2 (22 - 23). He is struggling a bit today. Hypotensive and tachycardic at time of our visit. Reports decreased hydration (minimal by mouth related to pain), ~800 cc through PEG tube. In setting of dehydration, he has also developed gout flare in his L foot. #) Hypotension: likely 2/2 tachycardia. No fevers or evidence of infection/purulence at draining neck lesion. Labs and fluids as above. #) Gout flare: uric acid 10.6. Start colchicine daily. #) Grade 2 acute kidney injury, improved  hypomagnesemia: due to cisplatin and dehydration. Weeks 5 and 6 cisplatin held. Renal ultrasound normal.    #) Grade 2 Neutropenia  Grade 2 Anemia, stable: treatment-related. Continue to monitor. No indication for blood transfusion at this time    #) Migraine headaches, resolved: CT head without any acute pathology or meningeal enhancement. Unable to obtain MRI brain due to claustrophobia. Management with flexeril for neck spasms. #) Left neck abscess and cellulitis, resolved: no evidence of purulence on exam.  Still with serous drainage. Continue to monitor. #) Moderate protein-calorie malnutrition: s/p PEG tube placement on 22. Weight stable. Wt Readings from Last 3 Encounters:   23 165 lb (74.8 kg)   23 165 lb 9.6 oz (75.1 kg)   23 165 lb 9.1 oz (75.1 kg)   - Continue Boost 4-5x per day; still with minimal PO intake     #) Heart block: Likely due to reflex from mass sitting over his right carotid artery. Status post PPM on 22. #) Tobacco abuse: has cut back to 2 cigarettes per day; congratulated on success, revisited importance of complete cessation. #) Steatosis/Cirrhosis: incidentally noted on CT A/P. #) Macrocytosis: B12 level supra-therapeutic; folate wnl. Likely due to cirrhosis and EtOH. #) Psychosocial: wife recently . Moved from [place] to be closer to his children.   Has good support from family (oldest son Nichole Ruelas is HCPOA). Presents to clinic with his daughter. Plan:     Send to [facility] now for add on labs, 1 L IVFs, and IV magnesium. Continue PO magnesium 400 mg daily   Status post PEG tube placement on 12/29; met with dietitian to discuss ongoing nutrition/hydration vai PEG  Pain: dilaudid prn with bowel regimen; magic mouthwash prn  Palliative Care following   Obtain PET-CT for ~3 months after completion of CRT - ordered; will discuss at next visit given other urgent priorities  Return to clinic in 1 week for labs, exam, IVFs    Signed By: Shannon Amaya MD    February 9, 2023      Addendum:   In infusion, patient had low-grade fever. Suspect this may be related to gout flare. Blood cultures drawn. Urinalysis ordered but patient unable to provide sample. No respiratory symptoms and clear lungs, although patient is at risk of aspiration. Advised patient to call if recurrent fever. Will have RN check in on him tomorrow. Interval History:     Presented June 2022 with enlarging next mass, failed to improve with antibiotics  Evaluated by ENT (Dr. Cece Landa) in [address] [place] [address] in 7/2022. uUtrasound that showed irrecgular enhancing mass involving left submandibuar gland, suspicious for cancer. Follow up CT neck demonstrated soft tissue mass, measuring 5 cm with small annular opening with drainage. Mass noted to be compressing left internal jugular vein. FNA 7/26/22 with atypical squamous cells, c/f malignancy  Core biopsy with skeletal muscle, fibrous tissue, and granulation with acute and chronic inflammation. Cytokeratin negative, p40 negative. Patient referred to medical and radiation oncology in [place], but did not follow up due to passing of his wife  Presented to [facility] 12/1/2022 with progressively worsening neck pain and swelling as well as purulent cellulitis/abscess. Treated with IV Antibiotics and discharged on PO.   Hospital course complicated by AV block with prolonged pause (likely due to reflex from compression of mass on right carotid artery) s/p ppm.    11/30/22 CT neck: large 4.8 x 3.1 x 4.2 cm rim-enhancing complex collection in left lateral neck, extending from carotid space to the skin surface with associated skin thickening, edema, and prominent left neck lymph nodes. Findings most likely represent an abscess rather than necrotic/superinfected neoplasm. 12/1/22 CT CAP: left neck mass partially visualized. 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 12/13/22 C1 Weekly cisplatin  12/14/22 concurrent radiation initiated  12/29/22 PEG tube placed   1/10/23 Week 5 Cisplatin held due to MOR  1/17/23 Week 6 Cisplatin held due to MOR  2/3/23 Completed concurrent chemoradiation with weekly cisplatin     Patient presents to clinic with his son Royal Kruse. He is feeling lightheaded. States he was feeling okay and only started to feel this way after walking in from the parking lot and through the clinic building. Two days ago, he developed a gout flare in his left ankle/foot. He reports these occur every 6 months and he normally takes colchicine. He has not yet started this because he wanted to discuss with me first.   He has been taking 4 Boost per day via his PEG with some free water. He has been eating minimally by mouth related to pain. Has been able to tolerate apple sauce. States his pain in throat is better today. He is using the magic mouthwash and the dilaudid ~4x per day   Headaches are much better. Reports regular bowel movements   Denies nausea, vomiting, neuropathy, hearing loss. Medications reviewed in the EMR. No Known Allergies     Review of Systems: A 6-point review of systems was obtained, negative except as reviewed in the HPI.     Physical Exam:   Visit Vitals  BP (!) 73/49 (BP 1 Location: Right upper arm, BP Patient Position: Sitting) Comment: Patient having fluids in peds OPIC   Pulse (!) 110   SpO2 92%       ECO  General: No distress, chronically ill appearing  Eyes: Anicteric sclerae  HENT: Atraumatic, no oral ulcers, +erythema, dry mucous membranes  Neck: Supple, ~3-4 cm firm mass in left submandibular region, stable, with ~1 cm area of serous discharge, overlying skin mildly erythematous. No malodorous smell or purulence. Stable. Respiratory: CTAB, normal respiratory effort  CV: Tachycardic, normal S1 and S2, no peripheral edema  GI: Soft, nontender, nondistended, no masses, normal bowel sounds  Skin: No rashes, ecchymoses, or petechiae  Psych: Alert, oriented, appropriate affect, normal judgment/insight    Results:     Lab Results   Component Value Date/Time    WBC 2.7 (L) 2023 08:53 AM    HGB 8.7 (L) 2023 08:53 AM    HCT 25.3 (L) 2023 08:53 AM    PLATELET 195  08:53 AM    MCV 98.1 2023 08:53 AM    ABS. NEUTROPHILS 1.6 (L) 2023 08:53 AM     Lab Results   Component Value Date/Time    Sodium 135 (L) 2023 08:47 AM    Potassium 4.5 2023 08:47 AM    Chloride 99 2023 08:47 AM    CO2 31 2023 08:47 AM    Glucose 147 (H) 2023 08:47 AM    BUN 32 (H) 2023 08:47 AM    Creatinine 1.36 (H) 2023 08:47 AM    Calcium 8.6 2023 08:47 AM    Glucose (POC) 313 (H) 2022 11:30 AM     Lab Results   Component Value Date/Time    Bilirubin, total 0.5 2023 08:47 AM    ALT (SGPT) 22 2023 08:47 AM    Alk.  phosphatase 128 (H) 2023 08:47 AM    Protein, total 6.8 2023 08:47 AM    Albumin 3.0 (L) 2023 08:47 AM    Globulin 3.8 2023 08:47 AM     No new images to review

## 2023-02-09 NOTE — PROGRESS NOTES
[address] [facility] @ [facility] VISIT NOTE    4007 Patient arrives for Hydration, Magnesium & Labs without acute problems. Please see connect care for complete assessment and education provided. Vital signs stable throughout and prior to discharge, Pt. Tolerated treatment well and discharged without incident. Patient is aware of no future [facility] appointments at this time. Medications Verified by Keith Hidalgo RN:  NS 1L over 1 hour  2. Magnesium Sulfate 2gm IV over 2 hours    VITAL SIGNS Patient Vitals for the past 12 hrs:   Temp Pulse Resp BP   02/09/23 1336 (!) 100.7 °F (38.2 °C) (!) 116 18 131/74   02/09/23 1221 (!) 100.7 °F (38.2 °C) (!) 112 18 119/76   02/09/23 0917 97.5 °F (36.4 °C) (!) 120 18 (!) 89/55      notified of pt's temperature after infusion. Blood cultures drawn. Pt unable to void & provide urine specimen. Pt & son verbalize an understanding of all instructions given. LAB WORK Lab results pending, please see Connect [facility] for results. Recent Results (from the past 12 hour(s))   MAGNESIUM    Collection Time: 02/09/23  9:15 AM   Result Value Ref Range    Magnesium 1.2 (L) 1.6 - 2.4 mg/dL   CBC WITH AUTOMATED DIFF    Collection Time: 02/09/23  9:15 AM   Result Value Ref Range    WBC 3.9 (L) 4.1 - 11.1 K/uL    RBC 2.34 (L) 4.10 - 5.70 M/uL    HGB 7.9 (L) 12.1 - 17.0 g/dL    HCT 23.5 (L) 36.6 - 50.3 %    .4 (H) 80.0 - 99.0 FL    MCH 33.8 26.0 - 34.0 PG    MCHC 33.6 30.0 - 36.5 g/dL    RDW 15.1 (H) 11.5 - 14.5 %    PLATELET 043 966 - 331 K/uL    MPV 10.4 8.9 - 12.9 FL    NRBC 0.0 0  WBC    ABSOLUTE NRBC 0.00 0.00 - 0.01 K/uL    NEUTROPHILS 60 32 - 75 %    LYMPHOCYTES 16 12 - 49 %    MONOCYTES 21 (H) 5 - 13 %    EOSINOPHILS 0 0 - 7 %    BASOPHILS 1 0 - 1 %    IMMATURE GRANULOCYTES 2 (H) 0.0 - 0.5 %    ABS. NEUTROPHILS 2.4 1.8 - 8.0 K/UL    ABS. LYMPHOCYTES 0.6 (L) 0.8 - 3.5 K/UL    ABS. MONOCYTES 0.8 0.0 - 1.0 K/UL    ABS.  EOSINOPHILS 0.0 0.0 - 0.4 K/UL    ABS. BASOPHILS 0.0 0.0 - 0.1 K/UL    ABS. IMM. GRANS. 0.1 (H) 0.00 - 0.04 K/UL    DF SMEAR SCANNED      RBC COMMENTS ANISOCYTOSIS  1+        RBC COMMENTS MACROCYTOSIS  1+       METABOLIC PANEL, COMPREHENSIVE    Collection Time: 02/09/23  9:15 AM   Result Value Ref Range    Sodium 136 136 - 145 mmol/L    Potassium 4.1 3.5 - 5.1 mmol/L    Chloride 99 97 - 108 mmol/L    CO2 28 21 - 32 mmol/L    Anion gap 9 5 - 15 mmol/L    Glucose 161 (H) 65 - 100 mg/dL    BUN 21 (H) 6 - 20 MG/DL    Creatinine 1.55 (H) 0.70 - 1.30 MG/DL    BUN/Creatinine ratio 14 12 - 20      eGFR 48 (L) >60 ml/min/1.73m2    Calcium 9.1 8.5 - 10.1 MG/DL    Bilirubin, total 0.8 0.2 - 1.0 MG/DL    ALT (SGPT) 24 12 - 78 U/L    AST (SGOT) 34 15 - 37 U/L    Alk.  phosphatase 149 (H) 45 - 117 U/L    Protein, total 7.9 6.4 - 8.2 g/dL    Albumin 3.2 (L) 3.5 - 5.0 g/dL    Globulin 4.7 (H) 2.0 - 4.0 g/dL    A-G Ratio 0.7 (L) 1.1 - 2.2     URIC ACID    Collection Time: 02/09/23  9:15 AM   Result Value Ref Range    Uric acid 10.8 (H) 3.5 - 7.2 MG/DL

## 2023-02-09 NOTE — PROGRESS NOTES
Aurora Wall. is a 75 y.o. male    locally advanced HNSCC. 1. Have you been to the ER, urgent care clinic since your last visit? Hospitalized since your last visit? No    2. Have you seen or consulted any other health care providers outside of the [address] [place] [address] since your last visit? Include any pap smears or colon screening.  No

## 2023-02-10 ENCOUNTER — TELEPHONE (OUTPATIENT)
Dept: ONCOLOGY | Age: 68
End: 2023-02-10

## 2023-02-12 LAB
BACTERIA SPEC CULT: NORMAL
SERVICE CMNT-IMP: NORMAL

## 2023-02-13 ENCOUNTER — TELEPHONE (OUTPATIENT)
Dept: ONCOLOGY | Age: 68
End: 2023-02-13

## 2023-02-13 ENCOUNTER — TRANSCRIBE ORDER (OUTPATIENT)
Dept: SCHEDULING | Age: 68
End: 2023-02-13

## 2023-02-13 ENCOUNTER — TELEPHONE (OUTPATIENT)
Dept: PALLATIVE CARE | Age: 68
End: 2023-02-13

## 2023-02-13 NOTE — TELEPHONE ENCOUNTER
Pawan Devine verified     Called patient to check on him. Patient stated \" you know, good days bad days, today not so bad, taking my BP and all and it is better than when I saw you last . Giving myself my fluids now. See you Friday I suppose. \"    Thanked us for checking in on him .

## 2023-02-13 NOTE — TELEPHONE ENCOUNTER
Called patient to advise/confirm upcoming appt with Dr. Lucretia Perdomo on 2/17/23 at 9:00 at [facility]. Spoke with Maricel Alfaro and confirmed appointment. Also advised to please bring in your 's License and Insurance Card and any pain medications in the original container with you to appointment.

## 2023-02-14 ENCOUNTER — DOCUMENTATION ONLY (OUTPATIENT)
Dept: ONCOLOGY | Age: 68
End: 2023-02-14

## 2023-02-14 LAB
BACTERIA SPEC CULT: NORMAL
SERVICE CMNT-IMP: NORMAL

## 2023-02-14 NOTE — PROGRESS NOTES
Patient left a voicemail stating only his name and not the nature of his call returned call and I couldn't leave a message his mail box full

## 2023-02-17 ENCOUNTER — OFFICE VISIT (OUTPATIENT)
Dept: PALLATIVE CARE | Age: 68
End: 2023-02-17
Payer: MEDICARE

## 2023-02-17 ENCOUNTER — HOSPITAL ENCOUNTER (INPATIENT)
Age: 68
LOS: 3 days | Discharge: HOME | DRG: 640 | End: 2023-02-20
Attending: STUDENT IN AN ORGANIZED HEALTH CARE EDUCATION/TRAINING PROGRAM | Admitting: HOSPITALIST
Payer: MEDICARE

## 2023-02-17 ENCOUNTER — HOSPITAL ENCOUNTER (OUTPATIENT)
Dept: INFUSION THERAPY | Age: 68
Discharge: HOME | End: 2023-02-17
Payer: MEDICARE

## 2023-02-17 VITALS
RESPIRATION RATE: 20 BRPM | TEMPERATURE: 97.4 F | OXYGEN SATURATION: 97 % | HEART RATE: 108 BPM | WEIGHT: 166 LBS | SYSTOLIC BLOOD PRESSURE: 127 MMHG | DIASTOLIC BLOOD PRESSURE: 71 MMHG | BODY MASS INDEX: 27.66 KG/M2 | HEIGHT: 65 IN

## 2023-02-17 VITALS
HEART RATE: 104 BPM | RESPIRATION RATE: 18 BRPM | SYSTOLIC BLOOD PRESSURE: 118 MMHG | DIASTOLIC BLOOD PRESSURE: 64 MMHG | TEMPERATURE: 98 F

## 2023-02-17 DIAGNOSIS — F32.1 CURRENT MODERATE EPISODE OF MAJOR DEPRESSIVE DISORDER, UNSPECIFIED WHETHER RECURRENT (HCC): Primary | ICD-10-CM

## 2023-02-17 DIAGNOSIS — R53.1 WEAKNESS: ICD-10-CM

## 2023-02-17 DIAGNOSIS — F40.240 CLAUSTROPHOBIA: ICD-10-CM

## 2023-02-17 DIAGNOSIS — N17.9 ACUTE KIDNEY INJURY (HCC): Primary | ICD-10-CM

## 2023-02-17 DIAGNOSIS — E11.21 CONTROLLED TYPE 2 DIABETES MELLITUS WITH DIABETIC NEPHROPATHY, WITHOUT LONG-TERM CURRENT USE OF INSULIN (HCC): ICD-10-CM

## 2023-02-17 DIAGNOSIS — E87.1 HYPONATREMIA: Primary | ICD-10-CM

## 2023-02-17 DIAGNOSIS — C34.90 MALIGNANT NEOPLASM OF LUNG, UNSPECIFIED LATERALITY, UNSPECIFIED PART OF LUNG (HCC): ICD-10-CM

## 2023-02-17 DIAGNOSIS — C76.0 SQUAMOUS CELL CARCINOMA OF HEAD AND NECK (HCC): ICD-10-CM

## 2023-02-17 PROBLEM — R22.1 MASS OF LEFT SIDE OF NECK: Status: RESOLVED | Noted: 2022-01-01 | Resolved: 2023-01-01

## 2023-02-17 PROBLEM — R22.1 MASS OF LEFT SIDE OF NECK: Status: RESOLVED | Noted: 2022-12-01 | Resolved: 2023-02-17

## 2023-02-17 LAB
ALBUMIN SERPL-MCNC: 3.4 G/DL (ref 3.5–5)
ALBUMIN SERPL-MCNC: 3.4 G/DL (ref 3.5–5)
ALBUMIN/GLOB SERPL: 0.8 (ref 1.1–2.2)
ALBUMIN/GLOB SERPL: 0.9 (ref 1.1–2.2)
ALP SERPL-CCNC: 145 U/L (ref 45–117)
ALP SERPL-CCNC: 145 U/L (ref 45–117)
ALT SERPL-CCNC: 24 U/L (ref 12–78)
ALT SERPL-CCNC: 25 U/L (ref 12–78)
ANION GAP SERPL CALC-SCNC: 10 MMOL/L (ref 5–15)
ANION GAP SERPL CALC-SCNC: 11 MMOL/L (ref 5–15)
AST SERPL-CCNC: 26 U/L (ref 15–37)
AST SERPL-CCNC: 26 U/L (ref 15–37)
BASOPHILS # BLD: 0 K/UL (ref 0–0.1)
BASOPHILS NFR BLD: 0 % (ref 0–1)
BILIRUB SERPL-MCNC: 0.5 MG/DL (ref 0.2–1)
BILIRUB SERPL-MCNC: 0.5 MG/DL (ref 0.2–1)
BUN SERPL-MCNC: 23 MG/DL (ref 6–20)
BUN SERPL-MCNC: 24 MG/DL (ref 6–20)
BUN/CREAT SERPL: 18 (ref 12–20)
BUN/CREAT SERPL: 21 (ref 12–20)
CALCIUM SERPL-MCNC: 8.6 MG/DL (ref 8.5–10.1)
CALCIUM SERPL-MCNC: 9.3 MG/DL (ref 8.5–10.1)
CHLORIDE SERPL-SCNC: 81 MMOL/L (ref 97–108)
CHLORIDE SERPL-SCNC: 82 MMOL/L (ref 97–108)
CO2 SERPL-SCNC: 29 MMOL/L (ref 21–32)
CO2 SERPL-SCNC: 29 MMOL/L (ref 21–32)
COMMENT, HOLDF: NORMAL
CREAT SERPL-MCNC: 1.13 MG/DL (ref 0.7–1.3)
CREAT SERPL-MCNC: 1.31 MG/DL (ref 0.7–1.3)
DIFFERENTIAL METHOD BLD: ABNORMAL
EOSINOPHIL # BLD: 0 K/UL (ref 0–0.4)
EOSINOPHIL NFR BLD: 1 % (ref 0–7)
ERYTHROCYTE [DISTWIDTH] IN BLOOD BY AUTOMATED COUNT: 17 % (ref 11.5–14.5)
GLOBULIN SER CALC-MCNC: 3.7 G/DL (ref 2–4)
GLOBULIN SER CALC-MCNC: 4.5 G/DL (ref 2–4)
GLUCOSE BLD STRIP.AUTO-MCNC: 165 MG/DL (ref 65–117)
GLUCOSE SERPL-MCNC: 175 MG/DL (ref 65–100)
GLUCOSE SERPL-MCNC: 177 MG/DL (ref 65–100)
HCT VFR BLD AUTO: 21.8 % (ref 36.6–50.3)
HGB BLD-MCNC: 7.7 G/DL (ref 12.1–17)
IMM GRANULOCYTES # BLD AUTO: 0 K/UL (ref 0–0.04)
IMM GRANULOCYTES NFR BLD AUTO: 1 % (ref 0–0.5)
LYMPHOCYTES # BLD: 0.6 K/UL (ref 0.8–3.5)
LYMPHOCYTES NFR BLD: 13 % (ref 12–49)
MAGNESIUM SERPL-MCNC: 1.3 MG/DL (ref 1.6–2.4)
MAGNESIUM SERPL-MCNC: 1.7 MG/DL (ref 1.6–2.4)
MCH RBC QN AUTO: 34.8 PG (ref 26–34)
MCHC RBC AUTO-ENTMCNC: 35.3 G/DL (ref 30–36.5)
MCV RBC AUTO: 98.6 FL (ref 80–99)
MONOCYTES # BLD: 0.5 K/UL (ref 0–1)
MONOCYTES NFR BLD: 12 % (ref 5–13)
NEUTS SEG # BLD: 3.2 K/UL (ref 1.8–8)
NEUTS SEG NFR BLD: 73 % (ref 32–75)
NRBC # BLD: 0 K/UL (ref 0–0.01)
NRBC BLD-RTO: 0 PER 100 WBC
PHOSPHATE SERPL-MCNC: 2.5 MG/DL (ref 2.6–4.7)
PLATELET # BLD AUTO: 167 K/UL (ref 150–400)
PMV BLD AUTO: 10.2 FL (ref 8.9–12.9)
POTASSIUM SERPL-SCNC: 4.5 MMOL/L (ref 3.5–5.1)
POTASSIUM SERPL-SCNC: 4.5 MMOL/L (ref 3.5–5.1)
PROT SERPL-MCNC: 7.1 G/DL (ref 6.4–8.2)
PROT SERPL-MCNC: 7.9 G/DL (ref 6.4–8.2)
RBC # BLD AUTO: 2.21 M/UL (ref 4.1–5.7)
RBC MORPH BLD: ABNORMAL
RBC MORPH BLD: ABNORMAL
SAMPLES BEING HELD,HOLD: NORMAL
SERVICE CMNT-IMP: ABNORMAL
SODIUM SERPL-SCNC: 121 MMOL/L (ref 136–145)
SODIUM SERPL-SCNC: 121 MMOL/L (ref 136–145)
TROPONIN I SERPL HS-MCNC: 8 NG/L (ref 0–57)
TSH SERPL DL<=0.05 MIU/L-ACNC: 0.69 UIU/ML (ref 0.36–3.74)
WBC # BLD AUTO: 4.3 K/UL (ref 4.1–11.1)

## 2023-02-17 PROCEDURE — 74011250636 HC RX REV CODE- 250/636: Performed by: HOSPITALIST

## 2023-02-17 PROCEDURE — 83735 ASSAY OF MAGNESIUM: CPT

## 2023-02-17 PROCEDURE — 82962 GLUCOSE BLOOD TEST: CPT

## 2023-02-17 PROCEDURE — 84443 ASSAY THYROID STIM HORMONE: CPT

## 2023-02-17 PROCEDURE — 84100 ASSAY OF PHOSPHORUS: CPT

## 2023-02-17 PROCEDURE — 65270000046 HC RM TELEMETRY

## 2023-02-17 PROCEDURE — 84484 ASSAY OF TROPONIN QUANT: CPT

## 2023-02-17 PROCEDURE — 96361 HYDRATE IV INFUSION ADD-ON: CPT

## 2023-02-17 PROCEDURE — 96368 THER/DIAG CONCURRENT INF: CPT

## 2023-02-17 PROCEDURE — 84550 ASSAY OF BLOOD/URIC ACID: CPT

## 2023-02-17 PROCEDURE — 36415 COLL VENOUS BLD VENIPUNCTURE: CPT

## 2023-02-17 PROCEDURE — 99285 EMERGENCY DEPT VISIT HI MDM: CPT

## 2023-02-17 PROCEDURE — 93005 ELECTROCARDIOGRAM TRACING: CPT

## 2023-02-17 PROCEDURE — 74011250636 HC RX REV CODE- 250/636: Performed by: INTERNAL MEDICINE

## 2023-02-17 PROCEDURE — 74011250637 HC RX REV CODE- 250/637: Performed by: HOSPITALIST

## 2023-02-17 PROCEDURE — 96365 THER/PROPH/DIAG IV INF INIT: CPT

## 2023-02-17 PROCEDURE — 85025 COMPLETE CBC W/AUTO DIFF WBC: CPT

## 2023-02-17 PROCEDURE — 74011000250 HC RX REV CODE- 250: Performed by: HOSPITALIST

## 2023-02-17 PROCEDURE — 80053 COMPREHEN METABOLIC PANEL: CPT

## 2023-02-17 RX ORDER — HYDROCODONE BITARTRATE AND ACETAMINOPHEN 5; 325 MG/1; MG/1
1 TABLET ORAL
Status: DISCONTINUED | OUTPATIENT
Start: 2023-02-17 | End: 2023-02-20 | Stop reason: HOSPADM

## 2023-02-17 RX ORDER — SODIUM CHLORIDE 0.9 % (FLUSH) 0.9 %
5-10 SYRINGE (ML) INJECTION AS NEEDED
Status: DISCONTINUED | OUTPATIENT
Start: 2023-02-17 | End: 2023-02-20 | Stop reason: HOSPADM

## 2023-02-17 RX ORDER — MORPHINE SULFATE 2 MG/ML
2 INJECTION, SOLUTION INTRAMUSCULAR; INTRAVENOUS
Status: DISCONTINUED | OUTPATIENT
Start: 2023-02-17 | End: 2023-02-20 | Stop reason: HOSPADM

## 2023-02-17 RX ORDER — PANTOPRAZOLE SODIUM 40 MG/1
40 TABLET, DELAYED RELEASE ORAL DAILY
Status: DISCONTINUED | OUTPATIENT
Start: 2023-02-18 | End: 2023-02-20 | Stop reason: HOSPADM

## 2023-02-17 RX ORDER — IBUPROFEN 200 MG
4 TABLET ORAL AS NEEDED
Status: DISCONTINUED | OUTPATIENT
Start: 2023-02-17 | End: 2023-02-20 | Stop reason: HOSPADM

## 2023-02-17 RX ORDER — SODIUM CHLORIDE 0.9 % (FLUSH) 0.9 %
5-40 SYRINGE (ML) INJECTION EVERY 8 HOURS
Status: DISCONTINUED | OUTPATIENT
Start: 2023-02-17 | End: 2023-02-20 | Stop reason: HOSPADM

## 2023-02-17 RX ORDER — COLCHICINE 0.6 MG/1
0.6 TABLET ORAL DAILY PRN
Status: DISCONTINUED | OUTPATIENT
Start: 2023-02-17 | End: 2023-02-20 | Stop reason: HOSPADM

## 2023-02-17 RX ORDER — ACETAMINOPHEN 325 MG/1
650 TABLET ORAL
Status: DISCONTINUED | OUTPATIENT
Start: 2023-02-17 | End: 2023-02-20 | Stop reason: HOSPADM

## 2023-02-17 RX ORDER — TAMSULOSIN HYDROCHLORIDE 0.4 MG/1
0.4 CAPSULE ORAL DAILY
Status: DISCONTINUED | OUTPATIENT
Start: 2023-02-18 | End: 2023-02-20 | Stop reason: HOSPADM

## 2023-02-17 RX ORDER — SODIUM CHLORIDE 9 MG/ML
100 INJECTION, SOLUTION INTRAVENOUS CONTINUOUS
Status: DISCONTINUED | OUTPATIENT
Start: 2023-02-17 | End: 2023-02-17

## 2023-02-17 RX ORDER — SODIUM CHLORIDE 0.9 % (FLUSH) 0.9 %
5-40 SYRINGE (ML) INJECTION AS NEEDED
Status: DISCONTINUED | OUTPATIENT
Start: 2023-02-17 | End: 2023-02-20 | Stop reason: HOSPADM

## 2023-02-17 RX ORDER — NALOXONE HYDROCHLORIDE 0.4 MG/ML
0.4 INJECTION, SOLUTION INTRAMUSCULAR; INTRAVENOUS; SUBCUTANEOUS AS NEEDED
Status: DISCONTINUED | OUTPATIENT
Start: 2023-02-17 | End: 2023-02-20 | Stop reason: HOSPADM

## 2023-02-17 RX ORDER — BUPROPION HYDROCHLORIDE 150 MG/1
300 TABLET ORAL
Status: DISCONTINUED | OUTPATIENT
Start: 2023-02-18 | End: 2023-02-20 | Stop reason: HOSPADM

## 2023-02-17 RX ORDER — MAGNESIUM SULFATE HEPTAHYDRATE 40 MG/ML
2 INJECTION, SOLUTION INTRAVENOUS ONCE
Status: COMPLETED | OUTPATIENT
Start: 2023-02-17 | End: 2023-02-17

## 2023-02-17 RX ORDER — SODIUM CHLORIDE 9 MG/ML
50 INJECTION, SOLUTION INTRAVENOUS CONTINUOUS
Status: DISCONTINUED | OUTPATIENT
Start: 2023-02-17 | End: 2023-02-20 | Stop reason: HOSPADM

## 2023-02-17 RX ORDER — BUPROPION HYDROCHLORIDE 150 MG/1
300 TABLET ORAL
Qty: 60 TABLET | Refills: 0 | Status: ON HOLD | OUTPATIENT
Start: 2023-02-17

## 2023-02-17 RX ORDER — TRAZODONE HYDROCHLORIDE 50 MG/1
100 TABLET ORAL
Status: DISCONTINUED | OUTPATIENT
Start: 2023-02-17 | End: 2023-02-20 | Stop reason: HOSPADM

## 2023-02-17 RX ORDER — INSULIN LISPRO 100 [IU]/ML
INJECTION, SOLUTION INTRAVENOUS; SUBCUTANEOUS
Status: DISCONTINUED | OUTPATIENT
Start: 2023-02-17 | End: 2023-02-20 | Stop reason: HOSPADM

## 2023-02-17 RX ORDER — HEPARIN SODIUM 5000 [USP'U]/ML
5000 INJECTION, SOLUTION INTRAVENOUS; SUBCUTANEOUS EVERY 8 HOURS
Status: DISCONTINUED | OUTPATIENT
Start: 2023-02-17 | End: 2023-02-18

## 2023-02-17 RX ORDER — SODIUM CHLORIDE 0.9 % (FLUSH) 0.9 %
5-10 SYRINGE (ML) INJECTION EVERY 8 HOURS
Status: DISCONTINUED | OUTPATIENT
Start: 2023-02-17 | End: 2023-02-20 | Stop reason: HOSPADM

## 2023-02-17 RX ORDER — CYCLOBENZAPRINE HCL 10 MG
5 TABLET ORAL
Status: DISCONTINUED | OUTPATIENT
Start: 2023-02-17 | End: 2023-02-20 | Stop reason: HOSPADM

## 2023-02-17 RX ADMIN — TRAZODONE HYDROCHLORIDE 100 MG: 50 TABLET ORAL at 22:37

## 2023-02-17 RX ADMIN — MAGNESIUM SULFATE IN WATER 2 G: 40 INJECTION, SOLUTION INTRAVENOUS at 11:31

## 2023-02-17 RX ADMIN — GABAPENTIN 400 MG: 300 CAPSULE ORAL at 22:41

## 2023-02-17 RX ADMIN — HEPARIN SODIUM 5000 UNITS: 5000 INJECTION INTRAVENOUS; SUBCUTANEOUS at 22:37

## 2023-02-17 RX ADMIN — SODIUM CHLORIDE, PRESERVATIVE FREE 10 ML: 5 INJECTION INTRAVENOUS at 22:38

## 2023-02-17 RX ADMIN — SODIUM CHLORIDE, PRESERVATIVE FREE 10 ML: 5 INJECTION INTRAVENOUS at 22:39

## 2023-02-17 RX ADMIN — SODIUM CHLORIDE 75 ML/HR: 9 INJECTION, SOLUTION INTRAVENOUS at 22:38

## 2023-02-17 RX ADMIN — SODIUM CHLORIDE 1000 ML: 900 INJECTION, SOLUTION INTRAVENOUS at 11:16

## 2023-02-17 NOTE — PATIENT INSTRUCTIONS
Dear Zonia Mcburney. ,    It was a pleasure seeing you today at our [facility] office. We will see you again in 4 weeks. If labs or imaging tests have been ordered for you today, please call the office  at [phone] 48 hours after completion to obtain the results. Your stated goal:  \"To have a few more years with my kids\"    Your described symptoms were: Fatigue: 5 Drowsiness: 0   Depression: 8 Pain: 0   Anxiety: 2 Nausea: 0   Anorexia:  (Feeding tube) Dyspnea: 3   Best Well-Being: 3 Constipation: No           This is the plan we talked about:  1. Grief and depression- uncontrolled  - I am starting you bupropion (Wellbutrin XL). - Week one start 150 mg every morning   - Week two increase to 150 mg twice a day (morning and evening)  - Stop mirtazapine, which you are currently only taking sporadically  - You may continue to take Trazodone 100 mg at bedtime  - You do not have your own transportation (you have to rely on your family) so you prefer support / therapy from online resources. You do have reliable internet access. Will discuss options with Rj Aguilar LCSW. 2.  Nutrition. Instructions from Radha Raphael on 2/9:  Plan:   - Continue with 4-5 Boost VHC per day (provides 2650kcal, 110g protein and 800ml free water)  - Continue to add 200ml water to each Boost VHC gravity bag- to provide additional 1.2L   - Continue with mucinex to help thin secretions  - Push fluids, encouraged more water even if he can't get more Boost in.      - You are doing great with your oral intake of fluids  - Still taking in little in the way of solids by mouth  - Weight is stable    3. Pain and headaches  - You have no active pain or headaches right now  - You previously used Dilaudid 2-mg tabs as needed  - You previously used Fioricet but it caused insomnia  - You may take Tylenol 1000 mg (2 x extra strength tabs) up to three times per day if a headache recurs.   Do not take more than 3,000 mg of this per day. 4.  Alcohol dependence  - Congratulations on remaining sober from alcohol since November!  - We talked about support for this. We decided online support groups or personal counseling were best for your needs. 5.  Tobacco dependence  - Congratulations on cutting back to just 1-2 cigarettes per day!  - Bupropion (Wellbutrin) is also approved for smoking cessation, let's see if this is helpful to get you off cigarettes entirely. - You did not find benefit in the past from nicotine replacement (the patches or gums)    6. Claustrophobia  - You took 10 mg of diazepam (Valium) while getting radiation, this was helpful but not enough to get you through a PET-CT  - You are very worried about your April PET-CT. I recommend a short acting medicine- will use lorazepam (Ativan) 2-mg 1 hour before your scan. I will send this in at your next visit. 7.  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 - you are also on Januvia and take routinely. - Creatinine is 1.55 on most recent metabolic panel. Will defer management to new PCP. 8.  Squamous cell cancer  - you completed chemo-radiation.    - You follow with Radha Coe and [address] will have a follow up PET-CT 3 months after completion of treatment. - We are here to support any symptoms that emerge or worsen including pain, sleep problems, headaches, anxiety or mood. - We will review your advance directive at a future visit.    We noted that the first page of the document that is uploaded is missing.   - Today you shared with us that you are hopeful to have more time with your children and seem quite open to continuing your current treatment plan. This is what you have shared with us about Advance Care Planning:      Primary Decision Maker: Morales Velasco Son - [phone]    WE STILL NEED PAGE ONE OF HIS AMD    Advance Care Planning 2/17/2023   Patient's Healthcare Decision Maker is: Named in scanned ACP document   Confirm Advance Directive Yes, on file   Patient Would Like to Complete Advance Directive -           The Palliative Medicine Team is here to support you and your family.        Sincerely,      Sintia Venegas MD and the Palliative Medicine Team

## 2023-02-17 NOTE — PROGRESS NOTES
Palliative Medicine Office Visit  Palliative Medicine Nurse Check In  [phone]    Patient Name: Tho Car. YOB: 1955      Date of Office Visit: 2/17/2023    Patient states: \"  \"    From Check In Sheet (scanned in Media):  Has a medical provider talked with you about cardiopulmonary resuscitation (CPR)? [x] Yes   [] No   [] Unable to obtain    Nurse reminder to complete or update ACP FlowSheet:    Is ACP on the Problem List?    [x] Yes    [] No  IF ACP Document is ON FILE; Nurse to place ACP on Problem List     Is there an ACP Note in Chart Review/Note? [] Yes    [x] No   If NO: ALERT PROVIDER       [facility] - [phone]  Advance Care Planning 2/17/2023   Patient's Healthcare Decision Maker is: Named in scanned ACP document   Confirm Advance Directive Yes, on file   Patient Would Like to Complete Advance Directive -        Is there anything that we should know about you as a person in order to provide you the best care possible? Have you been to the ER, urgent care clinic since your last visit? [] Yes   [x] No   [] Unable to obtain    Have you been hospitalized since your last visit? [] Yes   [x] No   [] Unable to obtain    Have you seen or consulted any other health care providers outside of the [address] [place] [address] since your last visit? [] Yes   [x] No   [] Unable to obtain    Functional status (describe):         Last BM: 2/17/2023     accessed (date):      Bottle review (for opioid pain medication):  Medication 1:   Date filled:   Directions:   # filled:   # left:   # pills taking per day:  Last dose taken:    Medication 2:   Date filled:   Directions:   # filled:   # left:   # pills taking per day:  Last dose taken:    Medication 3:   Date filled:   Directions:   # filled:   # left:   # pills taking per day:  Last dose taken:    Medication 4:   Date filled:   Directions:   # filled:   # left:   # pills taking per day:  Last dose taken:

## 2023-02-17 NOTE — ED PROVIDER NOTES
79-year-old male presents ED from palliative care office for evaluation of acute hyponatremia. Patient has a history of squamous cell carcinoma, completed chemoradiation, follows with Radha Mariano and [place]rec[place] seen by Dr Elsy Tovar office today and was sent to the ED for evaluation of hyponatremia. Patient reports for having some weakness in occasional shortness of breath. Denies any chest pain, nausea vomiting diarrhea. Denies any seizure activity denies any focal weakness or numbness. Abnormal Lab Results  Pertinent negatives include no chest pain and no abdominal pain. 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     Social History     Socioeconomic History    Marital status:      Spouse name: Not on file    Number of children: Not on file    Years of education: Not on file    Highest education level: Not on file   Occupational History    Not on file   Tobacco Use    Smoking status: Every Day     Packs/day: 0.50     Types: Cigarettes    Smokeless tobacco: Never   Substance and Sexual Activity    Alcohol use: Yes     Comment: 2 drinks/day    Drug use: Never    Sexual activity: Not on file   Other Topics Concern    Not on file   Social History Narrative    Not on file     Social Determinants of Health     Financial Resource Strain: Not on file   Food Insecurity: Not on file   Transportation Needs: Not on file   Physical Activity: Not on file   Stress: Not on file   Social Connections: Not on file Intimate Partner Violence: Not on file   Housing Stability: Not on file         ALLERGIES: Patient has no known allergies. Review of Systems   Constitutional:  Positive for fatigue. Cardiovascular:  Negative for chest pain. Gastrointestinal:  Negative for abdominal pain. Musculoskeletal:  Negative for back pain. Vitals:    02/17/23 1334   BP: 121/76   Pulse: 86   Resp: 16   Temp: 97.8 °F (36.6 °C)   SpO2: 100%            Physical Exam  Vitals and nursing note reviewed. Constitutional:       General: He is not in acute distress. Appearance: Normal appearance. He is not ill-appearing. HENT:      Head: Normocephalic and atraumatic. Right Ear: External ear normal.      Left Ear: External ear normal.      Nose: Nose normal.      Mouth/Throat:      Mouth: Mucous membranes are moist.      Pharynx: Oropharynx is clear. Eyes:      Extraocular Movements: Extraocular movements intact. Conjunctiva/sclera: Conjunctivae normal.   Neck:      Comments: Open wound on the lateral left neck. Cardiovascular:      Rate and Rhythm: Normal rate and regular rhythm. Pulses: Normal pulses. Heart sounds: Normal heart sounds. Pulmonary:      Effort: Pulmonary effort is normal. No respiratory distress. Breath sounds: Normal breath sounds. No wheezing. Abdominal:      General: Abdomen is flat. Bowel sounds are normal.      Palpations: Abdomen is soft. Tenderness: There is no abdominal tenderness. There is no guarding. Genitourinary:     Comments: Deferred  Musculoskeletal:         General: No swelling. Normal range of motion. Cervical back: Normal range of motion. Skin:     General: Skin is warm and dry. Capillary Refill: Capillary refill takes less than 2 seconds. Findings: No rash. Neurological:      General: No focal deficit present. Mental Status: He is alert and oriented to person, place, and time.       Comments: Moving all extremities   Psychiatric: Mood and Affect: Mood normal.         Behavior: Behavior normal.      Comments: Has decision making capacity        Medical Decision Making  Amount and/or Complexity of Data Reviewed  Labs: ordered. ECG/medicine tests: ordered. Risk  Decision regarding hospitalization. ED Course as of 23 1658      1406 ECG performed at 1332 shows normal sinus rhythm, rate of 94, normal intervals, no STEMI [WG]      ED Course User Index  [WG] Madalyn Santacruz,        Procedures    Perfect Serve Consult for Admission  4:59 PM    ED Room Number: Room/bed info not found  Patient Name and age:  Misty Smith. 75 y.o.  male  Working Diagnosis:   1. Hyponatremia    2. Weakness    3. Malignant neoplasm of lung, unspecified laterality, unspecified part of lung ([facility] [address]        COVID-19 Suspicion:  no  Sepsis present:  no  Reassessment needed: no  Code Status:  Full Code  Readmission: no  Isolation Requirements:  no  Recommended Level of Care:  telemetry  Department:[facility] Adult ED - 21   Other: 26-year-old male with history of squamous cell carcinomas presents to ED from outpatient office for evaluation of hyponatremia. Laboratory work-up shows patient has hyponatremia with a sodium 121, corrected for glucose of 171 equals 122 This is a significant drop over the last 2 weeks. Last sodium level was checked on 2023 where was 136. Patient has no seizure-like activity or focal weakness or numbness or neurologic deficits. Patient requires further evaluation management. Have ordered urinalysis, urine osmol's. Patient has a Chronic worsening anemia as well.

## 2023-02-17 NOTE — PROGRESS NOTES
Palliative Medicine Outpatient Services  [place]: [phone] ([phone])    Patient Name: Zonia Mcburney. YOB: 1955    Date of Current Visit: 02/17/23  Location of Current Visit:    [x] [facility] Office  [] [facility] Office  [] [address] [place] [address]  [] Home  []Synchronous real-time A/V virtual visit    Date of Initial Visit: 1/19/23   Referral from: Dr. Maco Andersen   Primary Care Physician: Last Forman NP      SUMMARY:   Zonia Mcburney. is a 75y.o. year old with a history of SCC of unknown primary. He had presented the summer of 2022 with a large left sided neck mass, CT showed 4.8 x 3.1 cm mass/collection in left lateral neck with prominent cervical lymph nodes. He had an FNA in [place] which showed atypical cells. He has not had a pan-endoscopy due to treatment delays with his transition to [place] and need to initiate a plan. Scans did not show distant mets. He was referred to Palliative Medicine by Dr. Phoenix Gibson for symptom management and supportive care. Palliative Medicine is addressing the following current patient/family concerns: history of alcohol use disorder, mood disorder and grief, cancer related pain and advance care planning. PMHx:  History of alcohol dependence, active tobacco dependence, hypertension, diabetes, and hepatic steatosis/possible cirrhosis. Psychosocial Hx:  Lost his significant other Brandon in early October. They had been  previously, then reconnected 10 years after their divorce. He moved from [address] [place] [address] to [place] the day after her passing. His oldest daughter Tevin Edge lives in [place], he then had three children with Brandon who all live in [place]- eldest son Tony Reed III ([place]), Suresh (. Jamiekiera [facility]) and Gabriel Garcia ([place]). He is mostly residing with Tony Reed III and his fiance right now, but some days stays with Gabriel Garcia. He has a long history of alcohol dependence with prior inpatient rehabilitation stays, history of incarceration.       Current treatment: completed concurrent chemoradiation (12/13/22 - 2/1/23). Care team:  -Dr. Mary Feng   -Dr. Brenda Childers  -PCP Lizz Kearns NP -  establish date changed from 1/30 to 2/23, also has appt in [facility] in late March  -Cards Dr. Cicero Dancer- to establish 3/15     PALLIATIVE DIAGNOSES:       ICD-10-CM ICD-9-CM    1. Current moderate episode of major depressive disorder, unspecified whether recurrent (HCC)  F32.1 296.22       2. Claustrophobia  F40.240 300.29       3. Controlled type 2 diabetes mellitus with diabetic nephropathy, without long-term current use of insulin (HCC)  E11.21 250.40 LIPID PANEL     583.81              PLAN:     Patient Instructions   Dear Be Chavez. ,    It was a pleasure seeing you today at our [facility] office. We will see you again in 4 weeks. If labs or imaging tests have been ordered for you today, please call the office  at [phone] 48 hours after completion to obtain the results. Your stated goal:  \"To have a few more years with my kids\"    Your described symptoms were: Fatigue: 5 Drowsiness: 0   Depression: 8 Pain: 0   Anxiety: 2 Nausea: 0   Anorexia:  (Feeding tube) Dyspnea: 3   Best Well-Being: 3 Constipation: No           This is the plan we talked about:  1. Grief and depression  - I am starting you bupropion (Wellbutrin XL). - Week one start 150 mg every morning   - Week two increase to 150 mg twice a day (morning and evening)  - Stop mirtazapine, which you are currently only taking sporadically  - You may continue to take Trazodone 100 mg at bedtime  - You do not have your own transportation (you have to rely on your family) so you prefer support / therapy from online resources. You do have reliable internet access. Will discuss options with Pete Madison LCSW. 2.  Nutrition.     Instructions from Zonia Armenta on 2/9:  Plan:   - Continue with 4-5 Boost VHC per day (provides 2650kcal, 110g protein and 800ml free water)  - Continue to add 200ml water to each Boost VHC gravity bag- to provide additional 1.2L   - Continue with mucinex to help thin secretions  - Push fluids, encouraged more water even if he can't get more Boost in.      - You are doing great with your oral intake of fluids  - Still taking in little in the way of solids by mouth  - Weight is stable    3. Pain and headaches  - You have no active pain or headaches right now  - You previously used Dilaudid 2-mg tabs as needed  - You previously used Fioricet but it caused insomnia  - You may take Tylenol 1000 mg (2 x extra strength tabs) up to three times per day if a headache recurs. Do not take more than 3,000 mg of this per day. 4.  Alcohol dependence  - Congratulations on remaining sober from alcohol since November!  - We talked about support for this. We decided online support groups or personal counseling were best for your needs. 5.  Tobacco dependence  - Congratulations on cutting back to just 1-2 cigarettes per day!  - Bupropion (Wellbutrin) is also approved for smoking cessation, let's see if this is helpful to get you off cigarettes entirely. - You did not find benefit in the past from nicotine replacement (the patches or gums)    6. Claustrophobia  - You took 10 mg of diazepam (Valium) while getting radiation, this was helpful but not enough to get you through a PET-CT  - You are very worried about your April PET-CT. I recommend a short acting medicine- will use lorazepam (Ativan) 2-mg 1 hour before your scan. 7.  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 - you are also on Januvia and take routinely. - Creatinine is 1.55 on most recent metabolic panel.   Will defer management to new PCP. 8.  Squamous cell cancer  - you completed chemo-radiation.    - You follow with Radha Simmons and 93Abiel Godinez will have a follow up PET-CT 3 months after completion of treatment. - We are here to support any symptoms that emerge or worsen including pain, sleep problems, headaches, anxiety or mood. - We will review your advance directive at a future visit. We noted that the first page of the document that is uploaded is missing.   - Today you shared with us that you are hopeful to have more time with your children and seem quite open to continuing your current treatment plan. This is what you have shared with us about Advance Care Planning:      Primary Decision Maker: Merlin Jaycee [facility] - [phone]    WE STILL NEED PAGE ONE OF HIS AMD    Advance Care Planning 2/17/2023   Patient's Healthcare Decision Maker is: Named in scanned ACP document   Confirm Advance Directive Yes, on file   Patient Would Like to Complete Advance Directive -           The Palliative Medicine Team is here to support you and your family.        Sincerely,      Lawrence Carroll MD and the Palliative Medicine Team     GOALS OF CARE / TREATMENT PREFERENCES:     GOALS OF CARE:  Patient / health care proxy stated goals: See Patient Instructions / Summary    TREATMENT PREFERENCES:   Code Status:  [x] Attempt Resuscitation       [] Do Not Attempt Resuscitation    Advance Care Planning:  [x] [facility] Interdisciplinary Team has updated the ACP Navigator with Decision Maker and Patient Capacity      Primary Decision MakerDanni Hatfield - [phone]    Advance Care Planning 2/17/2023   Patient's Healthcare Decision Maker is: Named in scanned ACP document   Confirm Advance Directive Yes, on file   Patient Would Like to Complete Advance Directive -        PRESCRIPTIONS GIVEN:     Medications Ordered Today   Medications    buPROPion XL (WELLBUTRIN XL) 150 mg tablet     Sig: Take 2 Tablets by mouth every morning. Dispense:  60 Tablet     Refill:  0           FOLLOW UP:     Future Appointments   Date Time Provider Tad Molinai   2/17/2023 11:30 AM G2 MADELYN GIBBS [facility]   2/23/2023  8:00 AM Criss Pichardo NP [facility] BS AMB   3/15/2023  9:00 AM PACEMAKER, STFRANCES [facility] BS AMB   3/15/2023  9:20 AM Daly Monae MD [facility] BS AMB   3/17/2023 10:00 AM Angel Simpson MD [facility] BS AMB   3/21/2023  1:00 PM Mae Arenas MD [facility] BS AMB   4/28/2023 12:00 PM [facility] RCR PET DOSE 1 SMLANDON SIERRA DHARA   4/28/2023  1:00 PM [facility] RCR PET 1 SMHRJESUS SIERRA DHARA           PHYSICIANS INVOLVED IN CARE:   Patient Care Team:  Criss Pichardo NP as PCP - General (Nurse Practitioner)  Criss Pichardo NP as PCP - [facility] Empaneled Provider       HISTORY:   Reviewed patient-completed ESAS and advance care planning form. Reviewed patient record in prescription monitoring program.    CHIEF COMPLAINT:   Chief Complaint   Patient presents with    Follow-up         HPI/SUBJECTIVE:    The patient is: [x] Verbal / [] Nonverbal     Mr. Yenifer Gamboa is here for follow up, alone in visit. Son in waiting room. Performed extensive review of oncology and [facility] notes. Reviewed last 2 months of lab work. He is doing generally ok. Completed chemo-RT and now in surveillance pending follow up PET-CT in 3 months. His chief complaint is ongoing low mood. Feels depressed. This is accompanied by poor sleep. Discussed again two major losses this past year, his mother and his significant other Franklin Mcdonald. Remains sober from alcohol since November. Also continues to cut back on cigarettes. Both were his two major coping mechanisms. Considering this, congratulated him on where he is right now. Has good support from his children. No outside therapy or support groups. Not driving right now so limited in transportation- doesn't want to overburden his kids and grandkids.    Has a lot on his mind about his family and their needs. He did not bring his medicines with his nor a list.  Does know some of his medicines when names are read- but incomplete list.   Some of his medicines are still scripts leftover from [facility] PCP appt was moved- has two appts scheduled right now (one on [facility], one in [facility]). Will be going to [facility] location long term. Today reports drinking fluids liberally. Low oral intake but tolerating PEG tube feedings. No N/V. Stools are normal.   No pain today. Discussed his diabetes. Taking his medications (from [facility]) regularly. A1c on  6.6. Taking statin regularly. Needs fasting lipid panel and kidney eval.      -----------------------------------------------------------------------  Mr. Yunior Maldonado is here today to establish care in our clinic. He is alone during the visit today. Medication history initially limited as his sons kareem is managing his medications at home. I did have a list that was in the EMR that I was able to go through with him. He was familiar with some of his medicines but this required a great deal of prompting. He shared with us some about the loss of his significant other Tarun who  at the beginning of October while they were living together in [place]. He shared that they both suffered from medical issues due to alcoholism, it sounds like she passed away in their home together. Considering the grief he was feeling he moved out the very next day to [place] to be close to his children. He continues to work through his state of grief, he is tearful and shared how much he loved her. This is the hardest thing for him right now over any physical issues he is having. I reviewed Dr. Steve Guadalupe notes and he reaffirmed that he was found to have this left neck mass back in 2022, he had had a biopsy which was not totally definitive but entire clinical picture certainly consistent with malignancy.   He did not start treatment until arrival in [place]. He is currently in the midst of a course of concurrent chemoradiation and tolerating fairly well. He had a PEG tube placed 12/29 and is tolerating tube feeds but has not reached max recommended enteral feeding. He is meeting with Jessica Altamirano periodically. I reviewed her notes today. He is still able to take in soft foods such as puddings and soups. He can also drink the boost that is intended for his PEG tube which she sometimes does. He was open about his alcoholism and share that he had previously been incarcerated as well as in rehabilitation for alcohol use disorder. His last rehabilitation program was approximately 10 years ago. He subsequently relapsed and been actively drinking for 2 weeks after moving to [place]. He has since made a commitment to his son to stop drinking. He also says for the first time he has been motivated to stop tobacco and has managed to cut back from 1/2 pack/day to 2 cigarettes/day. He was on Chantix up in [place] but not utilizing anything here. He reports sampling other substances in the past but nothing that he used on a regular basis or became \"addicted to\". He was a  and developed chronic low back pain. In [place] he was taking Flexeril 10 mg as needed. He is also experiencing pain in the left side of his neck as well as headaches which are likely referred from the mass. He did not go into great detail about pain or discomfort but states that taking Dilaudid at bedtime is helpful for him to sleep. He is no longer using Fioricet as it kept him awake. He did not request any changes to his medications or dose adjustments.       Clinical Pain Assessment (nonverbal scale for nonverbal patients):   [++++ Clinical Pain Assessment++++]  [++++Pain Severity++++]: Pain: 0  [++++Pain Character++++]:  [++++Pain Duration++++]:  [++++Pain Effect++++]:  [++++Pain Factors++++]:  [++++Pain Frequency++++]:  [++++Pain Location++++]:  [++++ Clinical Pain Assessment++++]       FUNCTIONAL ASSESSMENT:     Palliative Performance Scale (PPS):  PPS: 70       PSYCHOSOCIAL/SPIRITUAL SCREENING:     Any spiritual / Holiness concerns:  [] Yes /  [x] No    Caregiver Burnout:  [] Yes /  [] No /  [x] No Caregiver Present      Anticipatory grief assessment:   [x] Normal  / [] Maladaptive       ESAS Anxiety: Anxiety: 2    ESAS Depression: Depression: 8       REVIEW OF SYSTEMS:     The following systems were [x] reviewed / [] unable to be reviewed  Systems: constitutional, ears/nose/mouth/throat, respiratory, gastrointestinal, genitourinary, musculoskeletal, integumentary, neurologic, psychiatric, endocrine. Positive findings noted below. Modified ESAS Completed by: provider   Fatigue: 5 Drowsiness: 0   Depression: 8 Pain: 0   Anxiety: 2 Nausea: 0   Anorexia:  (Feeding tube) Dyspnea: 3   Best Well-Being: 3 Constipation: No              PHYSICAL EXAM:     Wt Readings from Last 3 Encounters:   02/17/23 166 lb (75.3 kg)   02/03/23 165 lb (74.8 kg)   01/31/23 165 lb 9.6 oz (75.1 kg)     Blood pressure 127/71, pulse (!) 108, temperature 97.4 °F (36.3 °C), temperature source Oral, resp. rate 20, height 5' 5\" (1.651 m), weight 166 lb (75.3 kg), SpO2 97 %.   Last bowel movement: See Nursing Note    Constitutional: Age appropriate white male, sitting up in chair, appears relaxed, better kempt today  Eyes: sclerae anicteric  ENMT: large left sided mid neck mass  Cardiovascular: regular rhythm, distal pulses intact  Respiratory: breathing not labored, symmetric, no oxygen needs  Gastrointestinal: soft non-tender, +bowel sounds  Musculoskeletal: no deformity, no tenderness to palpation  Skin: warm, dry  Neurologic: A&Ox4, full cognition, following commands, moving all extremities, no tremor or rigidity  Psychiatric: appropriate affect today, no hallucinations, agitation or restlessness       HISTORY:     Past Medical History:   Diagnosis Date    Bradycardia 12/7/2022    Cellulitis and abscess of neck 12/1/2022    Diabetes ([address]     High risk medication use 12/13/2022    HTN (hypertension)     Hypercholesteremia     Intractable persistent migraine aura without cerebral infarction and without status migrainosus 12/13/2022    Mass of left side of neck 12/1/2022    Skin cancer     L sided neck skin cancer cells      Past Surgical History:   Procedure Laterality Date    IR INSERT GASTROSTOMY TUBE [facility]  12/29/2022      History reviewed. No pertinent family history. History reviewed, no pertinent family history. Social History     Tobacco Use    Smoking status: Every Day     Packs/day: 0.50     Types: Cigarettes    Smokeless tobacco: Never   Substance Use Topics    Alcohol use: Yes     Comment: 2 drinks/day     No Known Allergies   Current Outpatient Medications   Medication Sig    buPROPion XL (WELLBUTRIN XL) 150 mg tablet Take 2 Tablets by mouth every morning. colchicine 0.6 mg tablet Take 1 Tablet by mouth as needed for Gout or Pain. Takes as needed for gout    OTHER Tylenol with Codeine    cyclobenzaprine (FLEXERIL) 5 mg tablet TAKE 1 TABLET BY MOUTH DAILY AS NEEDED FOR MUSCLE SPASM(S).    magic mouthwash solution Magic mouth wash Maalox Lidocaine 2% viscous Diphenhydramine oral solution Pharmacy to mix equal portions of ingredients to a total volume as indicated in the dispense amount.    magnesium oxide (MAG-OX) 400 mg tablet Take 1 Tablet by mouth daily. diazePAM (Valium) 5 mg tablet Take 2 tabs 45 minutes prior to radiation treatment for claustrophobia  Indications: inducing of a relaxed easy state    ondansetron (ZOFRAN ODT) 4 mg disintegrating tablet Take 1 Tablet by mouth every eight (8) hours as needed for Nausea or Vomiting. prochlorperazine (COMPAZINE) 10 mg tablet Take 1 Tablet by mouth every six (6) hours as needed for Nausea or Vomiting. dexAMETHasone (DECADRON) 4 mg tablet Take 8 mg by mouth daily (with breakfast).  On day 2 and 3 after chemotherapy. nut tx, lact-reduced, iron (Boost VHC) 0.09-2.25 gram-kcal/mL liqd 5 Bottles by Gastrostomy Tube route daily. gabapentin (NEURONTIN) 400 mg capsule Take 400 mg by mouth three (3) times daily. SITagliptin (JANUVIA) 100 mg tablet Take 100 mg by mouth daily. pantoprazole (PROTONIX) 40 mg tablet Take 40 mg by mouth daily. tamsulosin (FLOMAX) 0.4 mg capsule Take 0.4 mg by mouth daily. traZODone (DESYREL) 100 mg tablet Take 100 mg by mouth nightly as needed for Sleep. Patient occasionally alternates with mirtazapine for a few days when mirtazapine stops helping his sleep. No current facility-administered medications for this visit. Facility-Administered Medications Ordered in Other Visits   Medication Dose Route Frequency    magnesium sulfate 2 g/50 ml IVPB (premix or compounded)  2 g IntraVENous ONCE    sodium chloride 0.9 % bolus infusion 1,000 mL  1,000 mL IntraVENous ONCE          LAB and other DATA REVIEWED:     Lab Results   Component Value Date/Time    WBC 3.9 (L) 02/09/2023 09:15 AM    HGB 7.9 (L) 02/09/2023 09:15 AM    PLATELET 861 48/90/8028 09:15 AM     Lab Results   Component Value Date/Time    Sodium 136 02/09/2023 09:15 AM    Potassium 4.1 02/09/2023 09:15 AM    Chloride 99 02/09/2023 09:15 AM    CO2 28 02/09/2023 09:15 AM    BUN 21 (H) 02/09/2023 09:15 AM    Creatinine 1.55 (H) 02/09/2023 09:15 AM    Calcium 9.1 02/09/2023 09:15 AM    Magnesium 1.2 (L) 02/09/2023 09:15 AM    Phosphorus 3.4 12/09/2022 06:21 AM      Lab Results   Component Value Date/Time    Alk. phosphatase 149 (H) 02/09/2023 09:15 AM    Protein, total 7.9 02/09/2023 09:15 AM    Albumin 3.2 (L) 02/09/2023 09:15 AM    Globulin 4.7 (H) 02/09/2023 09:15 AM     No results found for: INR, PTMR, PTP, PT1, PT2, APTT, INREXT, INREXT   No results found for: IRON, FE, TIBC, IBCT, PSAT, FERR     Detail chart reviewed. Other specialists and referral provider notes reviewed.      CONTROLLED SUBSTANCES SAFETY ASSESSMENT (IF ON CONTROLLED SUBSTANCES):     Reviewed opioid safety handout:  [x] Yes   [] No  24 hour opioid dose >150mg morphine equivalent/day:  [] Yes   [x] No  Benzodiazepines:  [x] Yes   [] No - prn use prior to radiation   Sleep apnea:  [] Yes   [x] No  Urine Toxicology Testing within last 6 months:  [] Yes   [x] No  History of or new aberrant medication taking behaviors:  [] Yes   [x] No  Has Narcan been prescribed [x] Yes   [] No          Total time: 65m  Counseling / coordination time: 65m  > 50% counseling / coordination?: yes- review of medical records,

## 2023-02-17 NOTE — H&P
History and Physical    Date of Service:  2/17/2023  Primary Care Provider: Cathy Yeager NP  Source of information: The patient, son at the bedside and reviewing [facility] previous medical records    Chief Complaint: Referred for hyponatremia for palliative care team office    History of Presenting Illness:   Maude Clifford. is a 75 y.o.  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 He has generalized weakness. No left-sided chest pain, difficulty of breathing, diaphoresis, nausea, vomiting, abdominal pain, urinary complaint, cough, fever, chills or abnormal bowel movement. Last bowel movement was a couple of days ago. No melena or hematochezia    The patient denies any headache, blurry vision, sore throat, trouble swallowing, trouble with speech, recent travels, sick contacts, focal or generalized neurological symptoms, falls, injuries, rashes, contact with COVID-19 diagnosed patients, hematemesis, melena, hemoptysis, hematuria, rashes, denies starting any new medications and denies any other concerns or problems besides as mentioned above. In ER vital signs; /76, pulse 86, temperature 97.8, respiratory rate 16, saturation of oxygen 100% on room air. Sodium 121, phosphorus 2.5, magnesium 1.7  Referred to hospitalist service for further evaluation and admission       REVIEW OF SYSTEMS:  A comprehensive review of systems was negative except for that written in the History of Present Illness.      Past Medical History:   Diagnosis Date    Bradycardia 12/7/2022    Cellulitis and abscess of neck 12/1/2022    Diabetes ([facility] [address]     High risk medication use 12/13/2022    HTN (hypertension) Hypercholesteremia     Intractable persistent migraine aura without cerebral infarction and without status migrainosus 12/13/2022    Mass of left side of neck 12/1/2022    Skin cancer     L sided neck skin cancer cells      Past Surgical History:   Procedure Laterality Date    IR INSERT GASTROSTOMY TUBE PERC  12/29/2022     Prior to Admission medications    Medication Sig Start Date End Date Taking? Authorizing Provider   buPROPion XL (WELLBUTRIN XL) 150 mg tablet Take 2 Tablets by mouth every morning. 2/17/23   Brittany Zuniga MD   colchicine 0.6 mg tablet Take 1 Tablet by mouth as needed for Gout or Pain. Takes as needed for gout 2/9/23   Felipe Tijerina MD   OTHER Tylenol with Codeine    Provider, Historical   cyclobenzaprine (FLEXERIL) 5 mg tablet TAKE 1 TABLET BY MOUTH DAILY AS NEEDED FOR MUSCLE SPASM(S). 1/25/23   Brittany Zuniga MD   magic mouthwash solution Magic mouth wash Maalox Lidocaine 2% viscous Diphenhydramine oral solution Pharmacy to mix equal portions of ingredients to a total volume as indicated in the dispense amount. 1/24/23   Felipe Tijerina MD   magnesium oxide (MAG-OX) 400 mg tablet Take 1 Tablet by mouth daily. 1/10/23   Felipe Tijerina MD   diazePAM (Valium) 5 mg tablet Take 2 tabs 45 minutes prior to radiation treatment for claustrophobia  Indications: inducing of a relaxed easy state 1/5/23   Florian Jason MD   ondansetron (ZOFRAN ODT) 4 mg disintegrating tablet Take 1 Tablet by mouth every eight (8) hours as needed for Nausea or Vomiting. 12/13/22   Felipe Tijerina MD   prochlorperazine (COMPAZINE) 10 mg tablet Take 1 Tablet by mouth every six (6) hours as needed for Nausea or Vomiting. 12/13/22   Felipe Tijerina MD   dexAMETHasone (DECADRON) 4 mg tablet Take 8 mg by mouth daily (with breakfast). On day 2 and 3 after chemotherapy.  12/13/22   Felipe Tijerina MD   nut tx, lact-reduced, iron (Boost VHC) 0.09-2.25 gram-kcal/mL liqd 5 Bottles by Gastrostomy Tube route daily. 12/13/22   Eric Rodriguez MD   gabapentin (NEURONTIN) 400 mg capsule Take 400 mg by mouth three (3) times daily. Abbey Stein MD   SITagliptin (JANUVIA) 100 mg tablet Take 100 mg by mouth daily. Abbey Stein MD   pantoprazole (PROTONIX) 40 mg tablet Take 40 mg by mouth daily. Abbey Stein MD   tamsulosin (FLOMAX) 0.4 mg capsule Take 0.4 mg by mouth daily. Abbey Stein MD   traZODone (DESYREL) 100 mg tablet Take 100 mg by mouth nightly as needed for Sleep. Patient occasionally alternates with mirtazapine for a few days when mirtazapine stops helping his sleep. Abbey Stein MD     No Known Allergies   No family history on file. Social History:  reports that he has been smoking cigarettes. He has been smoking an average of .5 packs per day. He has never used smokeless tobacco. He reports current alcohol use. He reports that he does not use drugs. Social Determinants of Health     Tobacco Use: High Risk    Smoking Tobacco Use: Every Day    Smokeless Tobacco Use: Never    Passive Exposure: Not on file   Alcohol Use: Not on file   Financial Resource Strain: Not on file   Food Insecurity: Not on file   Transportation Needs: Not on file   Physical Activity: Not on file   Stress: Not on file   Social Connections: Not on file   Intimate Partner Violence: Not on file   Depression: At risk    PHQ-2 Score: 6   Housing Stability: Not on file        Medications were reconciled to the best of my ability given all available resources at the time of admission. Route is PO if not otherwise noted. Family and social history were personally reviewed, all pertinent and relevant details are outlined as above.     Objective:   Visit Vitals  /76   Pulse 86   Temp 97.8 °F (36.6 °C)   Resp 16   SpO2 100%      O2 Device: None (Room air)    PHYSICAL EXAM:   General: Alert x oriented x 3, awake, no acute distress,   HEENT: Left side swelling, wound oozing serous fluid, PEERL, EOMI, moist mucus membranes  Neck: Supple, no JVD, no meningeal signs  Chest: Clear to auscultation bilaterally   CVS: RRR, S1 S2 heard, no murmurs/rubs/gallops  Abd: Soft, non-tender, non-distended, +bowel sounds   Ext: No clubbing, no cyanosis, no edema  Neuro/Psych: Pleasant mood and affect, conscious and alert, well-oriented CN 2-12 grossly intact, sensory grossly within normal limit, Strength 5/5 in all extremities, DTR 1+ x 4  Cap refill: Brisk, less than 3 seconds  Pulses: 2+, symmetric in all extremities  Skin: Warm, dry, without rashes or lesions    Data Review:   I have independently reviewed and interpreted patient's lab and all other diagnostic data    Abnormal Labs Reviewed   CBC WITH AUTOMATED DIFF - Abnormal; Notable for the following components:       Result Value    RBC 2.21 (*)     HGB 7.7 (*)     HCT 21.8 (*)     MCH 34.8 (*)     RDW 17.0 (*)     IMMATURE GRANULOCYTES 1 (*)     ABS. LYMPHOCYTES 0.6 (*)     All other components within normal limits   METABOLIC PANEL, COMPREHENSIVE - Abnormal; Notable for the following components:    Sodium 121 (*)     Chloride 82 (*)     Glucose 177 (*)     BUN 24 (*)     BUN/Creatinine ratio 21 (*)     Alk.  phosphatase 145 (*)     Albumin 3.4 (*)     A-G Ratio 0.9 (*)     All other components within normal limits   PHOSPHORUS - Abnormal; Notable for the following components:    Phosphorus 2.5 (*)     All other components within normal limits       All Micro Results       None            IMAGING:   No orders to display        ECG/ECHO:    Results for orders placed or performed during the hospital encounter of 02/17/23   EKG, 12 LEAD, INITIAL   Result Value Ref Range    Ventricular Rate 94 BPM    Atrial Rate 94 BPM    P-R Interval 180 ms    QRS Duration 74 ms    Q-T Interval 348 ms    QTC Calculation (Bezet) 435 ms    Calculated P Axis 59 degrees    Calculated R Axis 53 degrees    Calculated T Axis 48 degrees    Diagnosis       Normal sinus rhythm  Normal ECG  When compared with ECG of 05-DEC-2022 18:15,  No significant change was found            Notes reviewed from all clinical/nonclinical/nursing services involved in patient's clinical care. Care coordination discussions were held with appropriate clinical/nonclinical/ nursing providers based on care coordination needs. Assessment:   Given the patient's current clinical presentation, there is a high level of concern for decompensation if discharged from the emergency department. Complex decision making was performed, which includes reviewing the patient's available past medical records, laboratory results, and imaging studies. Active Problems:  Hyponatremia  Hypophosphatemia  Head and neck stage IV squamous cell carcinoma  HTN  T2DM  Anemia of chronic disease  Hepatic steatosis/cirrhosis  Hx of gout  Hx of symptomatic AV block with pulse s/p PM  Brief/depression        Plan:     Hyponatremia multifactorial, possible intravascular volume depletion, rule out SIADH  -Admit patient to telemetry floor  -Normal saline at 75 mL/h, monitor BMP every 8 hours, goal is to raise sodium slowly  -Check serum osmolarity, TSH, random and a.m. cortisol level, uric acid level, urine osmolarity, random sodium  -Check UA  -If it gets worse, may involve nephrologist    Hypophosphatemia  -Replace with Neutra-Phos  -Repeat phosphorus level in a.m.     Head and neck stage IV squamous cell carcinoma  -S/p chemoradiation, completed 2 weeks ago  -Continue local wound care on the left side of the neck  -As needed Magic mouthwash  -As needed Norco for pain  -Outpatient follow-up with hematologist    HTN  -BP normal, resume home    T2DM  -Check A1c  -Hold home Januvia  -Monitor fingerstick glucose on sliding scale    Anemia of chronic disease  -Hgb 7.7 low  -No evidence of bleeding, monitor H/H, and transfuse for Hgb less than 7.0    Hepatic steatosis/cirrhosis  -Bilirubin and liver enzymes normal  -Monitor liver enzyme  -Stop alcohol 2-month ago  -No evidence of ascites    Hx of gout  -Stable  -Continue as needed colchicine    Hx of symptomatic AV block with pulse s/p PM  -Stable    Brief/depression  -Stable  -Continue home Wellbutrin      DIET: ADULT DIET Dysphagia - Soft & Bite Sized   ISOLATION PRECAUTIONS: There are currently no Active Isolations  CODE STATUS: Full Code   DVT PROPHYLAXIS: Heparin  FUNCTIONAL STATUS PRIOR TO HOSPITALIZATION: Fully active and ambulatory; able to carry on all self-care without restriction. Ambulatory status/function: By self   EARLY MOBILITY ASSESSMENT: Recommend routine ambulation while hospitalized with the assistance of nursing staff  ANTICIPATED DISCHARGE: 24-48 hours. ANTICIPATED DISPOSITION: Home  EMERGENCY CONTACT/SURROGATE DECISION MAKER: Vikki Thomas III, [phone]    CRITICAL CARE WAS PERFORMED FOR THIS ENCOUNTER: NO.      Signed By: Sadia Vaca MD     February 17, 2023         Please note that this dictation may have been completed with Dragon, the Del[employer] voice recognition software. Quite often unanticipated grammatical, syntax, homophones, and other interpretive errors are inadvertently transcribed by the computer software. Please disregard these errors. Please excuse any errors that have escaped final proofreading.

## 2023-02-17 NOTE — ACP (ADVANCE CARE PLANNING)
Advance Care Planning     Advance Care Planning (ACP) Physician/NP/PA Conversation      Date of Conversation: 2/17/2023  Conducted with: Patient with Decision Making Capacity    Healthcare Decision Maker:    Primary Decision Maker: Roni Thomas - [phone]    Care Preferences:    Hospitalization: \"If your health worsens and it becomes clear that your chance of recovery is unlikely, what would be your preference regarding hospitalization? \"  The patient would prefer hospitalization. Ventilation: \"If you were unable to breathe on your own and your chance of recovery was unlikely, what would be your preference about the use of a ventilator (breathing machine) if it was available to you? \"   The patient would desire the use of a ventilator. Resuscitation: \"In the event your heart stopped as a result of an underlying serious health condition, would you want attempts to be made to restart your heart, or would you prefer a natural death? \"   Yes, attempt to resuscitate.     Additional topics discussed: treatment goals, benefit/burden of treatment options, artificial nutrition, ventilation preferences, hospitalization preferences, and resuscitation preferences    Conversation Outcomes / Follow-Up Plan:   ACP complete - no further action today  Reviewed DNR/DNI and patient elects Full Code (Attempt Resuscitation)     Length of Voluntary ACP Conversation in minutes:  16 minutes    Myah Melchor MD   2/17/2023

## 2023-02-18 LAB
ANION GAP SERPL CALC-SCNC: 5 MMOL/L (ref 5–15)
ANION GAP SERPL CALC-SCNC: 6 MMOL/L (ref 5–15)
ANION GAP SERPL CALC-SCNC: 7 MMOL/L (ref 5–15)
ATRIAL RATE: 94 BPM
BUN SERPL-MCNC: 20 MG/DL (ref 6–20)
BUN SERPL-MCNC: 22 MG/DL (ref 6–20)
BUN SERPL-MCNC: 22 MG/DL (ref 6–20)
BUN/CREAT SERPL: 19 (ref 12–20)
BUN/CREAT SERPL: 19 (ref 12–20)
BUN/CREAT SERPL: 20 (ref 12–20)
CALCIUM SERPL-MCNC: 8.6 MG/DL (ref 8.5–10.1)
CALCIUM SERPL-MCNC: 8.9 MG/DL (ref 8.5–10.1)
CALCIUM SERPL-MCNC: 9.5 MG/DL (ref 8.5–10.1)
CALCULATED P AXIS, ECG09: 59 DEGREES
CALCULATED R AXIS, ECG10: 53 DEGREES
CALCULATED T AXIS, ECG11: 48 DEGREES
CHLORIDE SERPL-SCNC: 92 MMOL/L (ref 97–108)
CHLORIDE SERPL-SCNC: 96 MMOL/L (ref 97–108)
CHLORIDE SERPL-SCNC: 97 MMOL/L (ref 97–108)
CO2 SERPL-SCNC: 26 MMOL/L (ref 21–32)
CO2 SERPL-SCNC: 28 MMOL/L (ref 21–32)
CO2 SERPL-SCNC: 28 MMOL/L (ref 21–32)
CREAT SERPL-MCNC: 1.06 MG/DL (ref 0.7–1.3)
CREAT SERPL-MCNC: 1.12 MG/DL (ref 0.7–1.3)
CREAT SERPL-MCNC: 1.17 MG/DL (ref 0.7–1.3)
DIAGNOSIS, 93000: NORMAL
ERYTHROCYTE [DISTWIDTH] IN BLOOD BY AUTOMATED COUNT: 17.3 % (ref 11.5–14.5)
GLUCOSE BLD STRIP.AUTO-MCNC: 101 MG/DL (ref 65–117)
GLUCOSE BLD STRIP.AUTO-MCNC: 132 MG/DL (ref 65–117)
GLUCOSE BLD STRIP.AUTO-MCNC: 142 MG/DL (ref 65–117)
GLUCOSE BLD STRIP.AUTO-MCNC: 152 MG/DL (ref 65–117)
GLUCOSE BLD STRIP.AUTO-MCNC: 177 MG/DL (ref 65–117)
GLUCOSE SERPL-MCNC: 112 MG/DL (ref 65–100)
GLUCOSE SERPL-MCNC: 153 MG/DL (ref 65–100)
GLUCOSE SERPL-MCNC: 99 MG/DL (ref 65–100)
HCT VFR BLD AUTO: 19.5 % (ref 36.6–50.3)
HCT VFR BLD AUTO: 24.7 % (ref 36.6–50.3)
HGB BLD-MCNC: 6.8 G/DL (ref 12.1–17)
HGB BLD-MCNC: 8.5 G/DL (ref 12.1–17)
HISTORY CHECKED?,CKHIST: NORMAL
MCH RBC QN AUTO: 34.9 PG (ref 26–34)
MCHC RBC AUTO-ENTMCNC: 34.9 G/DL (ref 30–36.5)
MCV RBC AUTO: 100 FL (ref 80–99)
NRBC # BLD: 0 K/UL (ref 0–0.01)
NRBC BLD-RTO: 0 PER 100 WBC
OSMOLALITY SERPL: 271 MOSM/KG H2O
OSMOLALITY UR: 114 MOSM/KG H2O
P-R INTERVAL, ECG05: 180 MS
PLATELET # BLD AUTO: 139 K/UL (ref 150–400)
PMV BLD AUTO: 10.8 FL (ref 8.9–12.9)
POTASSIUM SERPL-SCNC: 4.4 MMOL/L (ref 3.5–5.1)
POTASSIUM SERPL-SCNC: 4.6 MMOL/L (ref 3.5–5.1)
POTASSIUM SERPL-SCNC: 5.2 MMOL/L (ref 3.5–5.1)
Q-T INTERVAL, ECG07: 348 MS
QRS DURATION, ECG06: 74 MS
QTC CALCULATION (BEZET), ECG08: 435 MS
RBC # BLD AUTO: 1.95 M/UL (ref 4.1–5.7)
SERVICE CMNT-IMP: ABNORMAL
SERVICE CMNT-IMP: NORMAL
SODIUM SERPL-SCNC: 127 MMOL/L (ref 136–145)
SODIUM SERPL-SCNC: 128 MMOL/L (ref 136–145)
SODIUM SERPL-SCNC: 130 MMOL/L (ref 136–145)
SODIUM UR-SCNC: 9 MMOL/L
TSH SERPL DL<=0.05 MIU/L-ACNC: 0.68 UIU/ML (ref 0.36–3.74)
URATE SERPL-MCNC: 8 MG/DL (ref 3.5–7.2)
VENTRICULAR RATE, ECG03: 94 BPM
WBC # BLD AUTO: 2.6 K/UL (ref 4.1–11.1)

## 2023-02-18 PROCEDURE — 36430 TRANSFUSION BLD/BLD COMPNT: CPT

## 2023-02-18 PROCEDURE — 86900 BLOOD TYPING SEROLOGIC ABO: CPT

## 2023-02-18 PROCEDURE — 80048 BASIC METABOLIC PNL TOTAL CA: CPT

## 2023-02-18 PROCEDURE — 36415 COLL VENOUS BLD VENIPUNCTURE: CPT

## 2023-02-18 PROCEDURE — 83930 ASSAY OF BLOOD OSMOLALITY: CPT

## 2023-02-18 PROCEDURE — 83935 ASSAY OF URINE OSMOLALITY: CPT

## 2023-02-18 PROCEDURE — 74011250637 HC RX REV CODE- 250/637: Performed by: HOSPITALIST

## 2023-02-18 PROCEDURE — 65270000046 HC RM TELEMETRY

## 2023-02-18 PROCEDURE — 85018 HEMOGLOBIN: CPT

## 2023-02-18 PROCEDURE — 74011636637 HC RX REV CODE- 636/637: Performed by: HOSPITALIST

## 2023-02-18 PROCEDURE — 84300 ASSAY OF URINE SODIUM: CPT

## 2023-02-18 PROCEDURE — P9016 RBC LEUKOCYTES REDUCED: HCPCS

## 2023-02-18 PROCEDURE — 86923 COMPATIBILITY TEST ELECTRIC: CPT

## 2023-02-18 PROCEDURE — 85027 COMPLETE CBC AUTOMATED: CPT

## 2023-02-18 PROCEDURE — 82962 GLUCOSE BLOOD TEST: CPT

## 2023-02-18 PROCEDURE — 74011000250 HC RX REV CODE- 250: Performed by: HOSPITALIST

## 2023-02-18 PROCEDURE — 74011250636 HC RX REV CODE- 250/636: Performed by: HOSPITALIST

## 2023-02-18 RX ORDER — SODIUM CHLORIDE 9 MG/ML
250 INJECTION, SOLUTION INTRAVENOUS AS NEEDED
Status: DISCONTINUED | OUTPATIENT
Start: 2023-02-18 | End: 2023-02-20 | Stop reason: HOSPADM

## 2023-02-18 RX ADMIN — SODIUM CHLORIDE, PRESERVATIVE FREE 20 ML: 5 INJECTION INTRAVENOUS at 21:16

## 2023-02-18 RX ADMIN — GABAPENTIN 400 MG: 300 CAPSULE ORAL at 08:37

## 2023-02-18 RX ADMIN — GABAPENTIN 400 MG: 300 CAPSULE ORAL at 17:27

## 2023-02-18 RX ADMIN — SODIUM CHLORIDE, PRESERVATIVE FREE 10 ML: 5 INJECTION INTRAVENOUS at 21:16

## 2023-02-18 RX ADMIN — GABAPENTIN 400 MG: 300 CAPSULE ORAL at 21:15

## 2023-02-18 RX ADMIN — SODIUM CHLORIDE, PRESERVATIVE FREE 10 ML: 5 INJECTION INTRAVENOUS at 17:28

## 2023-02-18 RX ADMIN — SODIUM CHLORIDE, PRESERVATIVE FREE 10 ML: 5 INJECTION INTRAVENOUS at 14:14

## 2023-02-18 RX ADMIN — TRAZODONE HYDROCHLORIDE 100 MG: 50 TABLET ORAL at 21:15

## 2023-02-18 RX ADMIN — Medication 2 UNITS: at 11:53

## 2023-02-18 RX ADMIN — PANTOPRAZOLE SODIUM 40 MG: 40 TABLET, DELAYED RELEASE ORAL at 08:37

## 2023-02-18 RX ADMIN — HEPARIN SODIUM 5000 UNITS: 5000 INJECTION INTRAVENOUS; SUBCUTANEOUS at 17:27

## 2023-02-18 RX ADMIN — TAMSULOSIN HYDROCHLORIDE 0.4 MG: 0.4 CAPSULE ORAL at 08:37

## 2023-02-18 NOTE — ED NOTES
Bedside, Verbal, and Written shift change report given to Maria Del Carmen Mayorga (oncoming nurse) by Miguelangel Sanchez (offgoing nurse). Report included the following information SBAR, Kardex, and ED Summary.

## 2023-02-18 NOTE — ROUTINE PROCESS
TRANSFER - OUT REPORT:    Verbal report given to Vanessa Casiano RN(name) on [employer].  being transferred to [facility]) for routine progression of care       Report consisted of patients Situation, Background, Assessment and   Recommendations(SBAR). Information from the following report(s) SBAR, ED Summary, MAR, Recent Results, and Cardiac Rhythm sinus tachy  was reviewed with the receiving nurse. Lines:   Peripheral IV 02/17/23 Left Antecubital (Active)   Site Assessment Clean, dry, & intact 02/17/23 1252   Phlebitis Assessment 0 02/17/23 1252   Infiltration Assessment 0 02/17/23 1252   Dressing Status Clean, dry, & intact 02/17/23 1252   Dressing Type Transparent 02/17/23 1252   Hub Color/Line Status Yellow 02/17/23 1252   Action Taken Blood drawn 02/17/23 1252   Alcohol Cap Used Yes 02/17/23 1252       Peripheral IV 02/18/23 Posterior;Right Hand (Active)        Opportunity for questions and clarification was provided.       Patient transported with:   Registered Nurse    Mariam Weaver RN

## 2023-02-18 NOTE — PROGRESS NOTES
[facility] [facility] Adult  Hospitalist Group                                                                                          Hospitalist Progress Note  Duy Wiggins MD  Answering service: [phone] from in house phone        Date of Service:  2023  NAME:  Mimi Nelson. :  1955  MRN:  [id number]       Admission Summary:   Mimi Del Valle is a 75 y.o.  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 He has generalized weakness. No left-sided chest pain, difficulty of breathing, diaphoresis, nausea, vomiting, abdominal pain, urinary complaint, cough, fever, chills or abnormal bowel movement. Last bowel movement was a couple of days ago. No melena or hematochezia    Interval history / Subjective:   Patient seen and examined earlier this morning by me for follow-up of hyponatremia. Patient sitting comfortably in bed. Patient eating. Reporting that he has a better sense of taste now that has been 2 weeks from his radiation therapy. Assessment & Plan:     Hyponatremia multifactorial, possible intravascular volume depletion  -Admit patient to telemetry floor  -Patient was kept on normal saline overnight  Corrected from 121-128 and about 23 hours. Now 130 at 5 PM  Fluids stopped after the first reading of 128 came back  With urine osmolality, urine sodium, this does seem to be more a case of intravascular volume depletion. Does not wind up with SIADH.   Continue BMP every 8 hours  Oral hydration     Hypophosphatemia  -Replace with Neutra-Phos  Recheck phosphate    PEG tube  Dietary consult placed for tube feeds  Patient does take p.o. and states that since getting his radiation therapy he has been able to eat more more  Continue p.o. nutrition     Head and neck stage IV squamous cell carcinoma  -S/p chemoradiation, completed 2 weeks ago  -Continue local wound care on the left side of the neck  Continue home meds for pain     HTN  -BP normal, resume home     T2DM  -Check A1c  -Monitor fingerstick glucose on sliding scale     Anemia of chronic disease  Hemoglobin 6.8 this morning  1 unit of blood ordered  Recheck H&H     Hepatic steatosis/cirrhosis  -Bilirubin and liver enzymes normal  -Monitor liver enzyme  -Stop alcohol 2-month ago  -No evidence of ascites     Hx of gout  -Stable  -Continue as needed colchicine     Hx of symptomatic AV block with pulse s/p PM  -Stable     Brief/depression  -Stable  -Continue home Wellbutrin     Code status: Full  Prophylaxis: SCDs  Care Plan discussed with: Patient, nurse  Anticipated Disposition:   Inpatient  Cardiac monitoring: Telemetry     Hospital Problems  Date Reviewed: 2/17/2023            Codes Class Noted POA    Hyponatremia ICD-10-CM: E87.1  ICD-9-CM: 276.1  2/17/2023 Unknown           Social Determinants of Health     Tobacco Use: High Risk    Smoking Tobacco Use: Every Day    Smokeless Tobacco Use: Never    Passive Exposure: Not on file   Alcohol Use: Not on file   Financial Resource Strain: Not on file   Food Insecurity: Not on file   Transportation Needs: Not on file   Physical Activity: Not on file   Stress: Not on file   Social Connections: Not on file   Intimate Partner Violence: Not on file   Depression: At risk    PHQ-2 Score: 6   Housing Stability: Not on file       Review of Systems:   A comprehensive review of systems was negative except for that written in the HPI. Vital Signs:    Last 24hrs VS reviewed since prior progress note.  Most recent are:  Visit Vitals  /68   Pulse 94   Temp 98.4 °F (36.9 °C)   Resp 24   SpO2 97%         Intake/Output Summary (Last 24 hours) at 2/18/2023 1809  Last data filed at 2/18/2023 1741  Gross per 24 hour   Intake 372.92 ml   Output 580 ml   Net -207.08 ml        Physical Examination:     I had a face to face encounter with this patient and independently examined them on 2/18/2023 as outlined below:          General : alert x 3, awake, no acute distress,   HEENT: PEERL, EOMI, moist mucus membrane  Neck: supple, no JVD, no meningeal signs  Chest: Clear to auscultation bilaterally   CVS: S1 S2 heard, Capillary refill less than 2 seconds  Abd: soft/ non tender, non distended, BS physiological,   Ext: no clubbing, no cyanosis, no edema, brisk 2+ DP pulses  Neuro/Psych: pleasant mood and affect, CN 2-12 grossly intact, sensory grossly within normal limit, Strength 5/5 in all extremities  Skin: warm     Data Review:    Review and/or order of clinical lab test  Review and/or order of tests in the radiology section of CPT  Review and/or order of tests in the medicine section of CPT      I have personally and independently reviewed all pertinent labs, diagnostic studies, imaging, and have provided independent interpretation of the same. Labs:     Recent Labs     02/18/23  0434 02/17/23  1345   WBC 2.6* 4.3   HGB 6.8* 7.7*   HCT 19.5* 21.8*   * 167     Recent Labs     02/18/23  1651 02/18/23  0920 02/18/23  0434 02/17/23  1659 02/17/23  1345 02/17/23  1257 02/17/23  1122   * 128* 127*  --    < >  --  121*   K 4.6 5.2* 4.4  --    < >  --  4.5   CL 97 96* 92*  --    < >  --  81*   CO2 28 26 28  --    < >  --  29   BUN 22* 20 22*  --    < >  --  23*   CREA 1.17 1.06 1.12  --    < >  --  1.31*   GLU 99 153* 112*  --    < >  --  175*   CA 9.5 8.6 8.9  --    < >  --  9.3   MG  --   --   --  1.7  --  1.8 1.3*   PHOS  --   --   --  2.5*  --   --   --    URICA  --   --   --  8.0*  --   --   --     < > = values in this interval not displayed.      Recent Labs     02/17/23  1345 02/17/23  1122   ALT 24 25   * 145*   TBILI 0.5 0.5   TP 7.1 7.9   ALB 3.4* 3.4*   GLOB 3.7 4.5*     No results for input(s): INR, PTP, APTT, INREXT in the last 72 hours. No results for input(s): FE, TIBC, PSAT, FERR in the last 72 hours. Lab Results   Component Value Date/Time    Folate 16.4 12/10/2022 01:32 AM      No results for input(s): PH, PCO2, PO2 in the last 72 hours. No results for input(s): CPK, CKNDX, TROIQ in the last 72 hours. No lab exists for component: CPKMB  No results found for: CHOL, CHOLX, CHLST, CHOLV, HDL, HDLP, LDL, LDLC, DLDLP, TGLX, TRIGL, TRIGP, CHHD, CHHDX  Lab Results   Component Value Date/Time    Glucose (POC) 101 02/18/2023 05:30 PM    Glucose (POC) 152 (H) 02/18/2023 01:50 PM    Glucose (POC) 177 (H) 02/18/2023 11:46 AM    Glucose (POC) 132 (H) 02/18/2023 08:28 AM    Glucose (POC) 165 (H) 02/17/2023 10:23 PM     Lab Results   Component Value Date/Time    Color YELLOW/STRAW 01/17/2023 03:21 PM    Appearance CLEAR 01/17/2023 03:21 PM    Specific gravity 1.007 01/17/2023 03:21 PM    Specific gravity 1.025 12/01/2022 05:08 PM    pH (UA) 5.0 01/17/2023 03:21 PM    Protein Negative 01/17/2023 03:21 PM    Glucose Negative 01/17/2023 03:21 PM    Ketone Negative 01/17/2023 03:21 PM    Bilirubin Negative 01/17/2023 03:21 PM    Urobilinogen 0.2 01/17/2023 03:21 PM    Nitrites Negative 01/17/2023 03:21 PM    Leukocyte Esterase Negative 01/17/2023 03:21 PM    Epithelial cells FEW 01/17/2023 03:21 PM    Bacteria Negative 01/17/2023 03:21 PM    WBC 0-4 01/17/2023 03:21 PM    RBC 0-5 01/17/2023 03:21 PM       Notes reviewed from all clinical/nonclinical/nursing services involved in patient's clinical care. Care coordination discussions were held with appropriate clinical/nonclinical/ nursing providers based on care coordination needs. Patients current active Medications were reviewed, considered, added and adjusted based on the clinical condition today. Home Medications were reconciled to the best of my ability given all available resources at the time of admission. Route is PO if not otherwise noted.       Admission Status:[id number]:::1}      Medications Reviewed:     Current Facility-Administered Medications   Medication Dose Route Frequency    0.9% sodium chloride infusion 250 mL  250 mL IntraVENous PRN    buPROPion XL (WELLBUTRIN XL) tablet 300 mg  300 mg Oral 7am    colchicine tablet 0.6 mg  0.6 mg Oral DAILY PRN    cyclobenzaprine (FLEXERIL) tablet 5 mg  5 mg Oral DAILY PRN    gabapentin (NEURONTIN) capsule 400 mg  400 mg Oral TID    pantoprazole (PROTONIX) tablet 40 mg  40 mg Oral DAILY    tamsulosin (FLOMAX) capsule 0.4 mg  0.4 mg Oral DAILY    traZODone (DESYREL) tablet 100 mg  100 mg Oral QHS PRN    insulin lispro (HUMALOG) injection   SubCUTAneous AC&HS    glucose chewable tablet 16 g  4 Tablet Oral PRN    glucagon (GLUCAGEN) injection 1 mg  1 mg IntraMUSCular PRN    dextrose 10 % infusion 0-250 mL  0-250 mL IntraVENous PRN    sodium chloride (NS) flush 5-40 mL  5-40 mL IntraVENous Q8H    sodium chloride (NS) flush 5-40 mL  5-40 mL IntraVENous PRN    acetaminophen (TYLENOL) tablet 650 mg  650 mg Oral Q4H PRN    HYDROcodone-acetaminophen (NORCO) 5-325 mg per tablet 1 Tablet  1 Tablet Oral Q4H PRN    morphine injection 2 mg  2 mg IntraVENous Q4H PRN    naloxone (NARCAN) injection 0.4 mg  0.4 mg IntraVENous PRN    sodium chloride (NS) flush 5-10 mL  5-10 mL IntraVENous Q8H    sodium chloride (NS) flush 5-10 mL  5-10 mL IntraVENous PRN    [Held by provider] 0.9% sodium chloride infusion  50 mL/hr IntraVENous CONTINUOUS    heparin (porcine) injection 5,000 Units  5,000 Units SubCUTAneous Q8H     ______________________________________________________________________  EXPECTED LENGTH OF STAY: - - -  ACTUAL LENGTH OF STAY:          [address] Roly Manuel MD

## 2023-02-19 LAB
ABO + RH BLD: NORMAL
ANION GAP SERPL CALC-SCNC: 7 MMOL/L (ref 5–15)
BASOPHILS # BLD: 0 K/UL (ref 0–0.1)
BASOPHILS NFR BLD: 1 % (ref 0–1)
BLD PROD TYP BPU: NORMAL
BLOOD GROUP ANTIBODIES SERPL: NORMAL
BPU ID: NORMAL
BUN SERPL-MCNC: 25 MG/DL (ref 6–20)
BUN/CREAT SERPL: 20 (ref 12–20)
CALCIUM SERPL-MCNC: 8.7 MG/DL (ref 8.5–10.1)
CHLORIDE SERPL-SCNC: 100 MMOL/L (ref 97–108)
CO2 SERPL-SCNC: 25 MMOL/L (ref 21–32)
CREAT SERPL-MCNC: 1.24 MG/DL (ref 0.7–1.3)
CROSSMATCH RESULT,%XM: NORMAL
DIFFERENTIAL METHOD BLD: ABNORMAL
EOSINOPHIL # BLD: 0 K/UL (ref 0–0.4)
EOSINOPHIL NFR BLD: 1 % (ref 0–7)
ERYTHROCYTE [DISTWIDTH] IN BLOOD BY AUTOMATED COUNT: 19.1 % (ref 11.5–14.5)
GLUCOSE BLD STRIP.AUTO-MCNC: 144 MG/DL (ref 65–117)
GLUCOSE BLD STRIP.AUTO-MCNC: 158 MG/DL (ref 65–117)
GLUCOSE BLD STRIP.AUTO-MCNC: 170 MG/DL (ref 65–117)
GLUCOSE BLD STRIP.AUTO-MCNC: 192 MG/DL (ref 65–117)
GLUCOSE SERPL-MCNC: 165 MG/DL (ref 65–100)
HCT VFR BLD AUTO: 23.9 % (ref 36.6–50.3)
HGB BLD-MCNC: 8.1 G/DL (ref 12.1–17)
IMM GRANULOCYTES # BLD AUTO: 0 K/UL (ref 0–0.04)
IMM GRANULOCYTES NFR BLD AUTO: 0 % (ref 0–0.5)
LYMPHOCYTES # BLD: 0.8 K/UL (ref 0.8–3.5)
LYMPHOCYTES NFR BLD: 25 % (ref 12–49)
MCH RBC QN AUTO: 33.9 PG (ref 26–34)
MCHC RBC AUTO-ENTMCNC: 33.9 G/DL (ref 30–36.5)
MCV RBC AUTO: 100 FL (ref 80–99)
MONOCYTES # BLD: 0.5 K/UL (ref 0–1)
MONOCYTES NFR BLD: 15 % (ref 5–13)
NEUTS SEG # BLD: 1.9 K/UL (ref 1.8–8)
NEUTS SEG NFR BLD: 58 % (ref 32–75)
NRBC # BLD: 0 K/UL (ref 0–0.01)
NRBC BLD-RTO: 0 PER 100 WBC
PLATELET # BLD AUTO: 127 K/UL (ref 150–400)
PMV BLD AUTO: 10.2 FL (ref 8.9–12.9)
POTASSIUM SERPL-SCNC: 4.6 MMOL/L (ref 3.5–5.1)
RBC # BLD AUTO: 2.39 M/UL (ref 4.1–5.7)
SERVICE CMNT-IMP: ABNORMAL
SODIUM SERPL-SCNC: 132 MMOL/L (ref 136–145)
SPECIMEN EXP DATE BLD: NORMAL
STATUS OF UNIT,%ST: NORMAL
UNIT DIVISION, %UDIV: 0
WBC # BLD AUTO: 3.3 K/UL (ref 4.1–11.1)

## 2023-02-19 PROCEDURE — 74011636637 HC RX REV CODE- 636/637: Performed by: HOSPITALIST

## 2023-02-19 PROCEDURE — 74011000250 HC RX REV CODE- 250: Performed by: HOSPITALIST

## 2023-02-19 PROCEDURE — 85025 COMPLETE CBC W/AUTO DIFF WBC: CPT

## 2023-02-19 PROCEDURE — 36415 COLL VENOUS BLD VENIPUNCTURE: CPT

## 2023-02-19 PROCEDURE — 82962 GLUCOSE BLOOD TEST: CPT

## 2023-02-19 PROCEDURE — 80048 BASIC METABOLIC PNL TOTAL CA: CPT

## 2023-02-19 PROCEDURE — 65270000046 HC RM TELEMETRY

## 2023-02-19 PROCEDURE — 74011250637 HC RX REV CODE- 250/637: Performed by: HOSPITALIST

## 2023-02-19 RX ORDER — HYDRALAZINE HYDROCHLORIDE 20 MG/ML
10 INJECTION INTRAMUSCULAR; INTRAVENOUS
Status: DISCONTINUED | OUTPATIENT
Start: 2023-02-19 | End: 2023-02-20 | Stop reason: HOSPADM

## 2023-02-19 RX ADMIN — TRAZODONE HYDROCHLORIDE 100 MG: 50 TABLET ORAL at 22:51

## 2023-02-19 RX ADMIN — Medication 2 UNITS: at 13:04

## 2023-02-19 RX ADMIN — SODIUM CHLORIDE, PRESERVATIVE FREE 10 ML: 5 INJECTION INTRAVENOUS at 20:59

## 2023-02-19 RX ADMIN — SODIUM CHLORIDE, PRESERVATIVE FREE 5 ML: 5 INJECTION INTRAVENOUS at 13:05

## 2023-02-19 RX ADMIN — PANTOPRAZOLE SODIUM 40 MG: 40 TABLET, DELAYED RELEASE ORAL at 10:20

## 2023-02-19 RX ADMIN — TAMSULOSIN HYDROCHLORIDE 0.4 MG: 0.4 CAPSULE ORAL at 10:20

## 2023-02-19 RX ADMIN — GABAPENTIN 400 MG: 300 CAPSULE ORAL at 21:00

## 2023-02-19 RX ADMIN — GABAPENTIN 400 MG: 300 CAPSULE ORAL at 10:20

## 2023-02-19 RX ADMIN — Medication 2 UNITS: at 18:56

## 2023-02-19 RX ADMIN — SODIUM CHLORIDE, PRESERVATIVE FREE 10 ML: 5 INJECTION INTRAVENOUS at 21:00

## 2023-02-19 RX ADMIN — GABAPENTIN 400 MG: 300 CAPSULE ORAL at 17:33

## 2023-02-19 RX ADMIN — Medication 2 UNITS: at 10:20

## 2023-02-19 RX ADMIN — BUPROPION HYDROCHLORIDE 300 MG: 150 TABLET ORAL at 13:04

## 2023-02-19 NOTE — PROGRESS NOTES
[address] [facility] Adult  Hospitalist Group                                                                                          Hospitalist Progress Note  Corina Guillory MD  Answering service: [phone] from in house phone        Date of Service:  2023  NAME:  John Buitrago :  1955  MRN:  [id number]       Admission Summary:   John Buitrago is a 75 y.o.  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 He has generalized weakness. No left-sided chest pain, difficulty of breathing, diaphoresis, nausea, vomiting, abdominal pain, urinary complaint, cough, fever, chills or abnormal bowel movement. Last bowel movement was a couple of days ago. No melena or hematochezia    Interval history / Subjective:   Patient seen and examined earlier this morning by me for follow-up of hyponatremia. Patient sitting comfortably in bed. Assessment & Plan:     Hyponatremia multifactorial, possible intravascular volume depletion  Improved to 132  Continue current management     Hypophosphatemia  -Replace with Neutra-Phos  Recheck phosphate in the morning    PEG tube  Dietary consult placed for tube feeds  Patient does take p.o. and states that since getting his radiation therapy he has been able to eat more more  Continue p.o. nutrition  Pending dietary consult. I want to make sure the patient is can follow-up of his tube feeds soon outpatient before he can discharge.      Head and neck stage IV squamous cell carcinoma  -S/p chemoradiation, completed 2 weeks ago  -Continue local wound care on the left side of the neck  Continue home meds for pain     HTN  -BP normal, resume home     T2DM  -Check A1c  -Monitor fingerstick glucose on sliding scale     Anemia of chronic disease  Status post 1 unit of blood  Check CBC in the morning     Hepatic steatosis/cirrhosis  -Bilirubin and liver enzymes normal  -Monitor liver enzyme  -Stop alcohol 2-month ago  -No evidence of ascites     Hx of gout  -Stable  -Continue as needed colchicine     Hx of symptomatic AV block with pulse s/p PM  -Stable     Brief/depression  -Stable  -Continue home Wellbutrin     Code status: Full  Prophylaxis: SCDs  Care Plan discussed with: Patient, nurse  Anticipated Disposition: Less than 24 hours to home  Inpatient  Cardiac monitoring: Telemetry     Hospital Problems  Date Reviewed: 2/17/2023            Codes Class Noted POA    Hyponatremia ICD-10-CM: E87.1  ICD-9-CM: 276.1  2/17/2023 Unknown         Social Determinants of Health     Tobacco Use: High Risk    Smoking Tobacco Use: Every Day    Smokeless Tobacco Use: Never    Passive Exposure: Not on file   Alcohol Use: Not on file   Financial Resource Strain: Not on file   Food Insecurity: Not on file   Transportation Needs: Not on file   Physical Activity: Not on file   Stress: Not on file   Social Connections: Not on file   Intimate Partner Violence: Not on file   Depression: At risk    PHQ-2 Score: 6   Housing Stability: Not on file       Review of Systems:   A comprehensive review of systems was negative except for that written in the HPI. Vital Signs:    Last 24hrs VS reviewed since prior progress note.  Most recent are:  Visit Vitals  BP (!) 158/73 (BP 1 Location: Right upper arm, BP Patient Position: At rest)   Pulse (!) 101   Temp 97.2 °F (36.2 °C)   Resp 15   SpO2 96%         Intake/Output Summary (Last 24 hours) at 2/19/2023 1505  Last data filed at 2/18/2023 2014  Gross per 24 hour   Intake --   Output 1030 ml   Net -1030 ml          Physical Examination:     I had a face to face encounter with this patient and independently examined them on 2/19/2023 as outlined below:          General : alert x 3, awake, no acute distress,   HEENT: PEERL, EOMI, moist mucus membrane  Neck: supple, no JVD, no meningeal signs  Chest: Clear to auscultation bilaterally   CVS: S1 S2 heard, Capillary refill less than 2 seconds  Abd: soft/ non tender, non distended, BS physiological,   Ext: no clubbing, no cyanosis, no edema, brisk 2+ DP pulses  Neuro/Psych: pleasant mood and affect, CN 2-12 grossly intact, sensory grossly within normal limit, Strength 5/5 in all extremities  Skin: warm     Data Review:    Review and/or order of clinical lab test  Review and/or order of tests in the radiology section of CPT  Review and/or order of tests in the medicine section of CPT      I have personally and independently reviewed all pertinent labs, diagnostic studies, imaging, and have provided independent interpretation of the same. Labs:     Recent Labs     02/19/23  0113 02/18/23  1922 02/18/23  0434   WBC 3.3*  --  2.6*   HGB 8.1* 8.5* 6.8*   HCT 23.9* 24.7* 19.5*   *  --  139*       Recent Labs     02/19/23  0113 02/18/23  1651 02/18/23  0920 02/18/23  0434 02/17/23  1659 02/17/23  1345 02/17/23  1257 02/17/23  1122   * 130* 128*   < >  --    < >  --  121*   K 4.6 4.6 5.2*   < >  --    < >  --  4.5    97 96*   < >  --    < >  --  81*   CO2 25 28 26   < >  --    < >  --  29   BUN 25* 22* 20   < >  --    < >  --  23*   CREA 1.24 1.17 1.06   < >  --    < >  --  1.31*   * 99 153*   < >  --    < >  --  175*   CA 8.7 9.5 8.6   < >  --    < >  --  9.3   MG  --   --   --   --  1.7  --  1.8 1.3*   PHOS  --   --   --   --  2.5*  --   --   --    URICA  --   --   --   --  8.0*  --   --   --     < > = values in this interval not displayed. Recent Labs     02/17/23  1345 02/17/23  1122   ALT 24 25   * 145*   TBILI 0.5 0.5   TP 7.1 7.9   ALB 3.4* 3.4*   GLOB 3.7 4.5*       No results for input(s): INR, PTP, APTT, INREXT, INREXT in the last 72 hours.    No results for input(s): FE, TIBC, PSAT, FERR in the last 72 hours. Lab Results   Component Value Date/Time    Folate 16.4 12/10/2022 01:32 AM        No results for input(s): PH, PCO2, PO2 in the last 72 hours. No results for input(s): CPK, CKNDX, TROIQ in the last 72 hours. No lab exists for component: CPKMB  No results found for: CHOL, CHOLX, CHLST, CHOLV, HDL, HDLP, LDL, LDLC, DLDLP, TGLX, TRIGL, TRIGP, CHHD, CHHDX  Lab Results   Component Value Date/Time    Glucose (POC) 192 (H) 02/19/2023 12:07 PM    Glucose (POC) 144 (H) 02/19/2023 08:32 AM    Glucose (POC) 142 (H) 02/18/2023 09:44 PM    Glucose (POC) 101 02/18/2023 05:30 PM    Glucose (POC) 152 (H) 02/18/2023 01:50 PM     Lab Results   Component Value Date/Time    Color YELLOW/STRAW 01/17/2023 03:21 PM    Appearance CLEAR 01/17/2023 03:21 PM    Specific gravity 1.007 01/17/2023 03:21 PM    Specific gravity 1.025 12/01/2022 05:08 PM    pH (UA) 5.0 01/17/2023 03:21 PM    Protein Negative 01/17/2023 03:21 PM    Glucose Negative 01/17/2023 03:21 PM    Ketone Negative 01/17/2023 03:21 PM    Bilirubin Negative 01/17/2023 03:21 PM    Urobilinogen 0.2 01/17/2023 03:21 PM    Nitrites Negative 01/17/2023 03:21 PM    Leukocyte Esterase Negative 01/17/2023 03:21 PM    Epithelial cells FEW 01/17/2023 03:21 PM    Bacteria Negative 01/17/2023 03:21 PM    WBC 0-4 01/17/2023 03:21 PM    RBC 0-5 01/17/2023 03:21 PM       Notes reviewed from all clinical/nonclinical/nursing services involved in patient's clinical care. Care coordination discussions were held with appropriate clinical/nonclinical/ nursing providers based on care coordination needs. Patients current active Medications were reviewed, considered, added and adjusted based on the clinical condition today. Home Medications were reconciled to the best of my ability given all available resources at the time of admission. Route is PO if not otherwise noted.       Admission Status:[id number]:::1}      Medications Reviewed:     Current Facility-Administered Medications   Medication Dose Route Frequency    0.9% sodium chloride infusion 250 mL  250 mL IntraVENous PRN    buPROPion XL (WELLBUTRIN XL) tablet 300 mg (Patient Supplied)  300 mg Oral 7am    colchicine tablet 0.6 mg  0.6 mg Oral DAILY PRN    cyclobenzaprine (FLEXERIL) tablet 5 mg  5 mg Oral DAILY PRN    gabapentin (NEURONTIN) capsule 400 mg  400 mg Oral TID    pantoprazole (PROTONIX) tablet 40 mg  40 mg Oral DAILY    tamsulosin (FLOMAX) capsule 0.4 mg  0.4 mg Oral DAILY    traZODone (DESYREL) tablet 100 mg  100 mg Oral QHS PRN    insulin lispro (HUMALOG) injection   SubCUTAneous AC&HS    glucose chewable tablet 16 g  4 Tablet Oral PRN    glucagon (GLUCAGEN) injection 1 mg  1 mg IntraMUSCular PRN    dextrose 10 % infusion 0-250 mL  0-250 mL IntraVENous PRN    sodium chloride (NS) flush 5-40 mL  5-40 mL IntraVENous Q8H    sodium chloride (NS) flush 5-40 mL  5-40 mL IntraVENous PRN    acetaminophen (TYLENOL) tablet 650 mg  650 mg Oral Q4H PRN    HYDROcodone-acetaminophen (NORCO) 5-325 mg per tablet 1 Tablet  1 Tablet Oral Q4H PRN    morphine injection 2 mg  2 mg IntraVENous Q4H PRN    naloxone (NARCAN) injection 0.4 mg  0.4 mg IntraVENous PRN    sodium chloride (NS) flush 5-10 mL  5-10 mL IntraVENous Q8H    sodium chloride (NS) flush 5-10 mL  5-10 mL IntraVENous PRN    [Held by provider] 0.9% sodium chloride infusion  50 mL/hr IntraVENous CONTINUOUS     ______________________________________________________________________  EXPECTED LENGTH OF STAY: - - -  ACTUAL LENGTH OF STAY:          2                 Ivan Good MD

## 2023-02-19 NOTE — PROGRESS NOTES
TRANSITION OF CARE    RUR:%    CM spoke with patient's son Malcolm Portillo (primary decision maker) over the phone. Patient uses [facility]. Patient does not drive but son does drive and will being transporting patient at time of d/c. Patient has a shower chair, cane,  and one transponders for patient pacemaker. Patient doesn't have a PCP at this time as they have just moved down here from [place]. Reason for Admission:   Hyponatremia               RUR Score:     24% - high      PCP: First and Last name:  Norma Love NP     Name of Practice:   Are you a current patient: Yes/No:   Approximate date of last visit:    Can you do a virtual visit with your PCP:              Resources/supports as identified by patient/family:   Son is supportive                Top Challenges facing patient (as identified by patient/family and CM): Medical complexity - hx head and neck stage IV squamous cell carcinoma; PEG - able to take PO                     Finances/Medication cost?      No concerns              Transportation? Son              Support system or lack thereof? Limited family support                     Living arrangements? Recently moved to address on file from [place]           Self-care/ADLs/Cognition? Ambulates w cane; owns shower chair          Current Advanced Directive/Advance Care Plan:  Full Code      Healthcare Decision Maker:   Click here to complete [address] including selection of the Healthcare Decision Maker Relationship (ie \"Primary\")      Primary Decision Maker (Active): Real Thomas - [phone]    Payor Source Payor: [address] [place] [address] / Plan: [address] / Product Type: Managed Care Medicare /                             Plan for utilizing home health:    No                 Transition of Care Plan:         Home w son when medically stable for discharge. [facility] for prescriptions.     Yadira Yu, MPH  Care Manager l [facility]  Available via [employer] or  [employer] [employer]    Care Management Interventions  PCP Verified by CM: No  Palliative Care Criteria Met (RRAT>21 & CHF Dx)?: No  Mode of Transport at Discharge:  (Patients pamela Robins will be driving patient home at time of d/c)  MyChart Signup: Yes  Discharge Durable Medical Equipment: No  Health Maintenance Reviewed: Yes  Physical Therapy Consult: No  Occupational Therapy Consult: No  Speech Therapy Consult: No  Support Systems: Other Family Member(s)  Confirm Follow Up Transport: Family  Discharge Location  Patient Expects to be Discharged to[de-identified] Home

## 2023-02-20 ENCOUNTER — TELEPHONE (OUTPATIENT)
Dept: ONCOLOGY | Age: 68
End: 2023-02-20

## 2023-02-20 VITALS
DIASTOLIC BLOOD PRESSURE: 88 MMHG | HEART RATE: 110 BPM | RESPIRATION RATE: 18 BRPM | OXYGEN SATURATION: 98 % | BODY MASS INDEX: 27.55 KG/M2 | SYSTOLIC BLOOD PRESSURE: 155 MMHG | WEIGHT: 165.57 LBS | TEMPERATURE: 98 F

## 2023-02-20 LAB
ANION GAP SERPL CALC-SCNC: 4 MMOL/L (ref 5–15)
BUN SERPL-MCNC: 24 MG/DL (ref 6–20)
BUN/CREAT SERPL: 20 (ref 12–20)
CALCIUM SERPL-MCNC: 8.9 MG/DL (ref 8.5–10.1)
CHLORIDE SERPL-SCNC: 101 MMOL/L (ref 97–108)
CO2 SERPL-SCNC: 28 MMOL/L (ref 21–32)
CREAT SERPL-MCNC: 1.22 MG/DL (ref 0.7–1.3)
GLUCOSE BLD STRIP.AUTO-MCNC: 132 MG/DL (ref 65–117)
GLUCOSE BLD STRIP.AUTO-MCNC: 238 MG/DL (ref 65–117)
GLUCOSE SERPL-MCNC: 128 MG/DL (ref 65–100)
POTASSIUM SERPL-SCNC: 4.7 MMOL/L (ref 3.5–5.1)
SERVICE CMNT-IMP: ABNORMAL
SERVICE CMNT-IMP: ABNORMAL
SODIUM SERPL-SCNC: 133 MMOL/L (ref 136–145)

## 2023-02-20 PROCEDURE — 74011000250 HC RX REV CODE- 250: Performed by: HOSPITALIST

## 2023-02-20 PROCEDURE — 74011250637 HC RX REV CODE- 250/637: Performed by: HOSPITALIST

## 2023-02-20 PROCEDURE — 74011250636 HC RX REV CODE- 250/636: Performed by: STUDENT IN AN ORGANIZED HEALTH CARE EDUCATION/TRAINING PROGRAM

## 2023-02-20 PROCEDURE — 36415 COLL VENOUS BLD VENIPUNCTURE: CPT

## 2023-02-20 PROCEDURE — 80048 BASIC METABOLIC PNL TOTAL CA: CPT

## 2023-02-20 PROCEDURE — 82962 GLUCOSE BLOOD TEST: CPT

## 2023-02-20 RX ADMIN — GABAPENTIN 400 MG: 300 CAPSULE ORAL at 09:03

## 2023-02-20 RX ADMIN — PANTOPRAZOLE SODIUM 40 MG: 40 TABLET, DELAYED RELEASE ORAL at 09:03

## 2023-02-20 RX ADMIN — HYDRALAZINE HYDROCHLORIDE 10 MG: 20 INJECTION INTRAMUSCULAR; INTRAVENOUS at 09:03

## 2023-02-20 RX ADMIN — SODIUM CHLORIDE, PRESERVATIVE FREE 10 ML: 5 INJECTION INTRAVENOUS at 07:12

## 2023-02-20 RX ADMIN — TAMSULOSIN HYDROCHLORIDE 0.4 MG: 0.4 CAPSULE ORAL at 09:03

## 2023-02-20 RX ADMIN — BUPROPION HYDROCHLORIDE 300 MG: 150 TABLET ORAL at 07:11

## 2023-02-20 NOTE — PROGRESS NOTES
Physician Progress Note      PATIENT:               Parvin Hedrick  CSN #:                  [id number]  :                       1955  ADMIT DATE:       2023 5:29 PM  [address] [place] DATE:  RESPONDING  PROVIDER #:        Amrik Devine MD          QUERY TEXT:    Patient admitted with hyponatremia , noted to have low RBCs, WBC count  and low platelet count with recent history of chemotherapy . If possible, please document in progress notes and discharge summary if you are evaluating and/or treating any of the following: The medical record reflects the following:  Risk Factors: recent chemo  Clinical Indicators:   PN  S/p chemoradiation, completed 2 weeks ago    23 04:34  WBC: 2.6 (L)  HGB: 6.8 (L)  HCT: 19.5 (L)  PLATELET: 750 (L)      23 01:13  PLATELET: 067 (L)    Treatment: lab monitoring    Thank you,  Lawrence Tovar RN, CCDS, [facility]  Certified Clinical Documentation   [phone]  you can also reach me by perfect serve. Options provided:  -- Antineoplastic (chemotherapy) induced pancytopenia  -- Pancytopenia with aplastic anemia  -- Pancytopenia with unknown etiology  -- Other - I will add my own diagnosis  -- Disagree - Not applicable / Not valid  -- Disagree - Clinically unable to determine / Unknown  -- Refer to Clinical Documentation Reviewer    PROVIDER RESPONSE TEXT:    Patient has antineoplastic (chemotherapy) induced pancytopenia.     Query created by: Jean-Paul Shaffer on 2023 8:43 AM      Electronically signed by:  Amrik Devine MD 2023 9:59 AM

## 2023-02-20 NOTE — TELEPHONE ENCOUNTER
[facility] [place] [facility]   [address]: [phone]  F: [phone]    Medical Nutrition Therapy  Nutrition Encounter:    Patient called again. He was discharged home. We discussed trying to increase his protein intake by increasing the boost shakes and soft protein foods by mouth. We talked about the water flushes and he wishes to try to drink the Boost instead of using the feeding tube. 5 cans by mouth will provide him with 800ml free water from formula. Suggested that he drink at gatorade; he wants to try pedialyte. Suggested that he drink at least a 16oz bottle and then at least 2 water bottles (16oz). This would give him 2L. 5 cans of Boost will give him 110g protein. Reviewed protein sources.       Signed By: Saskia Reyes, [email or web address]

## 2023-02-20 NOTE — DISCHARGE SUMMARY
Physician Discharge Summary     Patient ID:    Akshat Thorne  [id number]  76 yo  1955    Admit date: 2/17/2023    Admission H&P and diagnosis    Akshat Patel is a 75 y.o.  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 He has generalized weakness. No left-sided chest pain, difficulty of breathing, diaphoresis, nausea, vomiting, abdominal pain, urinary complaint, cough, fever, chills or abnormal bowel movement. Last bowel movement was a couple of days ago. No melena or hematochezia    Acute hyponatremia, likely multifactorial with possible intravascular volume depletion  Hypophosphatemia  Head and neck stage IV squamous cell carcinoma  Hypertension  Type 2 diabetes  Anemia of chronic disease  Hepatic steatosis/cirrhosis  History of gout  History of symptomatic AV block status post pacemaker  Depression    Discharge date and time: 2/20/2023      DISCHARGE CONDITION: Stable        Hospital Diagnoses and Treatment Rendered:       Patient was admitted and started on fluids. Urine studies look mostly like hyponatremia due to hypovolemia. On IV fluids patient corrected at a good rate. Patient also was able to eat more than usual, now being about 2 weeks since his last chemoradiation and sodium continues to rise. I discussed the patient with his dietitian at [address] [place] [address] and they will follow-up with PEG tube adjustments and an oncology appointment this week. Patient discharged stable. Hyponatremia multifactorial.  Seems to be mostly hypovolemic hyponatremia due to volume depletion.   Improved with fluids and diet  Hold gabapentin until further labs with oncology    Hypophosphatemia  Replaced    PEG tube  Continue management with outpatient dietitian and oncology    Head and neck stage IV squamous cell carcinoma  Continue outpatient follow-up with oncology    Hypertension  Resume home meds    Type 2 diabetes  Home meds    Anemia of chronic disease  Status post 1 unit of blood    Hepatic steatosis/cirrhosis  Stable    History of alcohol use disorder    History of gout    History of symptomatic AV block status post pacemaker    History of depression  Home meds      Chronic Diagnoses:    Problem List as of 2/20/2023 Date Reviewed: 2/17/2023            Codes Class Noted - Resolved    Hyponatremia ICD-10-CM: E87.1  ICD-9-CM: 276.1  2/17/2023 - Present        Acute gout due to renal impairment involving left ankle ICD-10-CM: M10.372  ICD-9-CM: 274.10  2/9/2023 - Present        Acute kidney injury ([facility] ICD-10-CM: N17.9  ICD-9-CM: 584.9  1/9/2023 - Present        Hypomagnesemia ICD-10-CM: E83.42  ICD-9-CM: 275.2  1/9/2023 - Present        Cancer related pain ICD-10-CM: G89.3  ICD-9-CM: 338.3  12/20/2022 - Present        Moderate protein-calorie malnutrition ([facility] ICD-10-CM: E44.0  ICD-9-CM: 263.0  12/13/2022 - Present        Tobacco abuse ICD-10-CM: Z72.0  ICD-9-CM: 305.1  12/13/2022 - Present        Squamous cell carcinoma of head and neck ([facility] ICD-10-CM: C76.0  ICD-9-CM: 195.0  12/9/2022 - Present        Pacemaker ICD-10-CM: Z95.0  ICD-9-CM: V45.01  12/7/2022 - Present    Overview Signed 12/7/2022 12:30 PM by Eduarda Angeles MD     Leadless pacemaker implant 12/7/2022             AV block ICD-10-CM: I44.30  ICD-9-CM: 426.10  12/7/2022 - Present        RESOLVED: Hypotension due to hypovolemia ICD-10-CM: I95.89, E86.1  ICD-9-CM: 458.8, 276.52  1/9/2023 - 1/31/2023        RESOLVED: Intractable persistent migraine aura without cerebral infarction and without status migrainosus ICD-10-CM: G43.519  ICD-9-CM: 346.51  12/13/2022 - 2/9/2023        RESOLVED: High risk medication use ICD-10-CM: Z79.899  ICD-9-CM: V58.69  12/13/2022 - 2/9/2023        RESOLVED: Bradycardia ICD-10-CM: R00.1  ICD-9-CM: 427.89  12/7/2022 - 1/31/2023        RESOLVED: Mass of left side of neck ICD-10-CM: R22.1  ICD-9-CM: 784.2  12/1/2022 - 2/17/2023        RESOLVED: Cellulitis and abscess of neck ICD-10-CM: L03.221, L02.11  ICD-9-CM: 682.1  12/1/2022 - 12/20/2022           Discharge Medications:       Stable  Current Discharge Medication List        CONTINUE these medications which have NOT CHANGED    Details   metoprolol succinate 100 mg CSpX Take 100 mg by mouth. buPROPion XL (WELLBUTRIN XL) 150 mg tablet Take 2 Tablets by mouth every morning. Qty: 60 Tablet, Refills: 0  Start date: 2/17/2023      colchicine 0.6 mg tablet Take 1 Tablet by mouth as needed for Gout or Pain. Takes as needed for gout  Qty: 30 Tablet, Refills: 0      OTHER Tylenol with Codeine      cyclobenzaprine (FLEXERIL) 5 mg tablet TAKE 1 TABLET BY MOUTH DAILY AS NEEDED FOR MUSCLE SPASM(S). Qty: 30 Tablet, Refills: 0      magic mouthwash solution Magic mouth wash Maalox Lidocaine 2% viscous Diphenhydramine oral solution Pharmacy to mix equal portions of ingredients to a total volume as indicated in the dispense amount. Qty: 150 mL, Refills: 1      magnesium oxide (MAG-OX) 400 mg tablet Take 1 Tablet by mouth daily. Qty: 20 Tablet, Refills: 0      diazePAM (Valium) 5 mg tablet Take 2 tabs 45 minutes prior to radiation treatment for claustrophobia  Indications: inducing of a relaxed easy state  Qty: 40 Tablet, Refills: 0    Associated Diagnoses: Anxiety in cancer patient      ondansetron (ZOFRAN ODT) 4 mg disintegrating tablet Take 1 Tablet by mouth every eight (8) hours as needed for Nausea or Vomiting. Qty: 90 Tablet, Refills: 1      prochlorperazine (COMPAZINE) 10 mg tablet Take 1 Tablet by mouth every six (6) hours as needed for Nausea or Vomiting.   Qty: 90 Tablet, Refills: 5      dexAMETHasone (DECADRON) 4 mg tablet Take 8 mg by mouth daily (with breakfast). On day 2 and 3 after chemotherapy. Qty: 24 Tablet, Refills: 0      nut tx, lact-reduced, iron (Boost VHC) 0.09-2.25 gram-kcal/mL liqd 5 Bottles by Gastrostomy Tube route daily. Qty: 150 Each, Refills: 3    Comments: Bolus feeds: 1 can Boost VHC 5 times a day. Flush 120ml before and after each carton. Needs education and supplies. SITagliptin (JANUVIA) 100 mg tablet Take 100 mg by mouth daily. pantoprazole (PROTONIX) 40 mg tablet Take 40 mg by mouth daily. tamsulosin (FLOMAX) 0.4 mg capsule Take 0.4 mg by mouth daily. traZODone (DESYREL) 100 mg tablet Take 100 mg by mouth nightly as needed for Sleep. Patient occasionally alternates with mirtazapine for a few days when mirtazapine stops helping his sleep. STOP taking these medications       gabapentin (NEURONTIN) 400 mg capsule Comments:   Reason for Stopping:         mirtazapine (REMERON) 30 mg tablet Comments:   Reason for Stopping: Follow up Care:    1. Zach Perez NP in 1-2 weeks. Please call to set up an appointment shortly after discharge. Diet:  Regular Diet    Disposition:  Home. Advanced Directive:   FULL X   DNR      Discharge Exam:  See today's note. CONSULTATIONS: None    Significant Diagnostic Studies:   2/17/2023: BUN 23 MG/DL (H; Ref range: 6 - 20 MG/DL); BUN 24 MG/DL (H; Ref range: 6 - 20 MG/DL); Calcium 9.3 MG/DL (Ref range: 8.5 - 10.1 MG/DL); Calcium 8.6 MG/DL (Ref range: 8.5 - 10.1 MG/DL); CO2 29 mmol/L (Ref range: 21 - 32 mmol/L); CO2 29 mmol/L (Ref range: 21 - 32 mmol/L); Creatinine 1.31 MG/DL (H; Ref range: 0.70 - 1.30 MG/DL); Creatinine 1.13 MG/DL (Ref range: 0.70 - 1.30 MG/DL); Glucose 175 mg/dL (H; Ref range: 65 - 100 mg/dL); Glucose 177 mg/dL (H; Ref range: 65 - 100 mg/dL); HCT 21.8 % (L; Ref range: 36.6 - 50.3 %); HGB 7.7 g/dL (L; Ref range: 12.1 - 17.0 g/dL); Potassium 4.5 mmol/L (Ref range: 3.5 - 5.1 mmol/L);  Potassium 4.5 mmol/L (Ref range: 3.5 - 5.1 mmol/L); Sodium 121 mmol/L (L; Ref range: 136 - 145 mmol/L); Sodium 121 mmol/L (L; Ref range: 136 - 145 mmol/L)  2/18/2023: BUN 22 MG/DL (H; Ref range: 6 - 20 MG/DL); BUN 20 MG/DL (Ref range: 6 - 20 MG/DL); BUN 22 MG/DL (H; Ref range: 6 - 20 MG/DL); Calcium 8.9 MG/DL (Ref range: 8.5 - 10.1 MG/DL); Calcium 8.6 MG/DL (Ref range: 8.5 - 10.1 MG/DL); Calcium 9.5 MG/DL (Ref range: 8.5 - 10.1 MG/DL); CO2 28 mmol/L (Ref range: 21 - 32 mmol/L); CO2 26 mmol/L (Ref range: 21 - 32 mmol/L); CO2 28 mmol/L (Ref range: 21 - 32 mmol/L); Creatinine 1.12 MG/DL (Ref range: 0.70 - 1.30 MG/DL); Creatinine 1.06 MG/DL (Ref range: 0.70 - 1.30 MG/DL); Creatinine 1.17 MG/DL (Ref range: 0.70 - 1.30 MG/DL); Glucose 112 mg/dL (H; Ref range: 65 - 100 mg/dL); Glucose 153 mg/dL (H; Ref range: 65 - 100 mg/dL); Glucose 99 mg/dL (Ref range: 65 - 100 mg/dL); HCT 19.5 % (L; Ref range: 36.6 - 50.3 %); HCT 24.7 % (L; Ref range: 36.6 - 50.3 %); HGB 6.8 g/dL (L; Ref range: 12.1 - 17.0 g/dL); HGB 8.5 g/dL (L; Ref range: 12.1 - 17.0 g/dL); Potassium 4.4 mmol/L (Ref range: 3.5 - 5.1 mmol/L); Potassium 5.2 mmol/L (H; Ref range: 3.5 - 5.1 mmol/L); Potassium 4.6 mmol/L (Ref range: 3.5 - 5.1 mmol/L); Sodium 127 mmol/L (L; Ref range: 136 - 145 mmol/L); Sodium 128 mmol/L (L; Ref range: 136 - 145 mmol/L);  Sodium 130 mmol/L (L; Ref range: 136 - 145 mmol/L)  Recent Labs     02/19/23  0113 02/18/23  1922 02/18/23  0434   WBC 3.3*  --  2.6*   HGB 8.1* 8.5* 6.8*   HCT 23.9* 24.7* 19.5*   *  --  139*     Recent Labs     02/20/23  0555 02/19/23  0113 02/18/23  1651 02/18/23  0434 02/17/23  1659 02/17/23  1345 02/17/23  1257   * 132* 130*   < >  --    < >  --    K 4.7 4.6 4.6   < >  --    < >  --     100 97   < >  --    < >  --    CO2 28 25 28   < >  --    < >  --    BUN 24* 25* 22*   < >  --    < >  --    CREA 1.22 1.24 1.17   < >  --    < >  --    * 165* 99   < >  --    < >  --    CA 8.9 8.7 9.5   < >  --    < >  --    MG  --   --   --   -- 1.7  --  1.8   PHOS  --   --   --   --  2.5*  --   --    URICA  --   --   --   --  8.0*  --   --     < > = values in this interval not displayed. Recent Labs     02/17/23  1345   *   TP 7.1   ALB 3.4*   GLOB 3.7     No results for input(s): INR, PTP, APTT, INREXT in the last 72 hours. No results for input(s): FE, TIBC, PSAT, FERR in the last 72 hours. No results for input(s): PH, PCO2, PO2 in the last 72 hours. No results for input(s): CPK, CKMB in the last 72 hours.     No lab exists for component: TROPONINI  No components found for: GLPOC          Signed:  Eduarda Serrato MD  2/20/2023  12:56 PM

## 2023-02-20 NOTE — TELEPHONE ENCOUNTER
[facility] [place] at [address], [address]: [phone]  F: [phone]    Medical Nutrition Therapy  Nutrition Encounter:    Patient called this morning to make RD aware he was admitted to the hospital because of low sodium. He reports since being admitted, he has been trying to eat more and able to tolerate more by mouth. He wonders when he can have the feeding tube removed. Tabled that discussion for another time. He felt like he was adequately hydrated from the gravity bags tube feeds as he was adding 300ml into 1 can and then doing another 500ml after each feed. Perhaps he was getting too much fluid. If he was doing 3 cans that would be additional 2400ml and w/ 4 cans daily that would be 3200ml. Using gravity bags. Concurrent chemoradiation - completed treatment. Chemotherapy Flowsheet 1/31/2023   Cycle CD43   Date 1/31/2023   Drug / Regimen Cisplatin   Pre Hydration -   Post Hydration -   Pre Meds -   Notes -       Wt Readings from Last 8 Encounters:   02/20/23 165 lb 9.1 oz (75.1 kg)   02/17/23 166 lb (75.3 kg)   02/03/23 165 lb (74.8 kg)   01/31/23 165 lb 9.6 oz (75.1 kg)   01/31/23 165 lb 9.1 oz (75.1 kg)   01/24/23 168 lb 8 oz (76.4 kg)   01/24/23 168 lb (76.2 kg)   01/20/23 167 lb 8.8 oz (76 kg)      Estimated Nutrition Needs:   Calorie Range: 2184-2688kcal/day     Protein Range: 78-95g/day     Fluid Needs: 2200ml     Plan:   - Continue with 4-5 Boost VHC per day (provides 2650kcal, 110g protein and 800ml free water)  - Continue to add 300ml water to each Boost VHC gravity bag  - PO and fluid by mouth.      Signed By: Luiza Jarrett, [employer]

## 2023-02-21 ENCOUNTER — TELEPHONE (OUTPATIENT)
Dept: PALLATIVE CARE | Age: 68
End: 2023-02-21

## 2023-02-21 NOTE — TELEPHONE ENCOUNTER
Call placed to patient, no answer. LM indicating that patient prescription was electronically sent to the pharmacy so it is already on file.

## 2023-02-22 ENCOUNTER — TELEPHONE (OUTPATIENT)
Dept: ONCOLOGY | Age: 68
End: 2023-02-22

## 2023-02-22 NOTE — TELEPHONE ENCOUNTER
[facility] [place] at [address]  [place]: [phone]  F: [phone]    Medical Nutrition Therapy  Nutrition Encounter:    Patient called, spoke with his son. He reports he did have a fall yesterday because he stood up too quickly. He is drinking the Boost by mouth and eating some. He has backed down on some of the water and drinking pedialyte. Plan:   5 cans Boost VHC by mouth will provide him with 800ml free water from formula. Suggested that he drink at gatorade; he wants to try pedialyte. Suggested that he drink at least a 16oz bottle and then at least 2 water bottles (16oz). This would give him 2L. 5 cans of Boost will give him 110g protein. Reviewed protein sources.       Signed By: Julian Thomas, [employer]

## 2023-02-24 ENCOUNTER — DOCUMENTATION ONLY (OUTPATIENT)
Dept: ONCOLOGY | Age: 68
End: 2023-02-24

## 2023-02-24 ENCOUNTER — HOSPITAL ENCOUNTER (OUTPATIENT)
Dept: INFUSION THERAPY | Age: 68
Discharge: HOME | End: 2023-02-24
Payer: MEDICARE

## 2023-02-24 ENCOUNTER — OFFICE VISIT (OUTPATIENT)
Dept: ONCOLOGY | Age: 68
End: 2023-02-24
Payer: MEDICARE

## 2023-02-24 VITALS
RESPIRATION RATE: 18 BRPM | SYSTOLIC BLOOD PRESSURE: 125 MMHG | DIASTOLIC BLOOD PRESSURE: 68 MMHG | HEIGHT: 65 IN | TEMPERATURE: 98.1 F | BODY MASS INDEX: 27.55 KG/M2

## 2023-02-24 VITALS
HEART RATE: 88 BPM | SYSTOLIC BLOOD PRESSURE: 125 MMHG | RESPIRATION RATE: 18 BRPM | DIASTOLIC BLOOD PRESSURE: 68 MMHG | TEMPERATURE: 98.1 F

## 2023-02-24 DIAGNOSIS — N17.9 ACUTE KIDNEY INJURY (HCC): ICD-10-CM

## 2023-02-24 DIAGNOSIS — C76.0 SQUAMOUS CELL CARCINOMA OF HEAD AND NECK (HCC): Primary | ICD-10-CM

## 2023-02-24 DIAGNOSIS — Z72.0 TOBACCO ABUSE: ICD-10-CM

## 2023-02-24 DIAGNOSIS — I95.89 HYPOTENSION DUE TO HYPOVOLEMIA: ICD-10-CM

## 2023-02-24 DIAGNOSIS — E86.1 HYPOTENSION DUE TO HYPOVOLEMIA: ICD-10-CM

## 2023-02-24 PROBLEM — M10.372 ACUTE GOUT DUE TO RENAL IMPAIRMENT INVOLVING LEFT ANKLE: Status: RESOLVED | Noted: 2023-02-09 | Resolved: 2023-02-24

## 2023-02-24 PROBLEM — M10.372 ACUTE GOUT DUE TO RENAL IMPAIRMENT INVOLVING LEFT ANKLE: Status: RESOLVED | Noted: 2023-01-01 | Resolved: 2023-01-01

## 2023-02-24 PROBLEM — E87.1 HYPONATREMIA: Status: RESOLVED | Noted: 2023-02-17 | Resolved: 2023-02-24

## 2023-02-24 PROBLEM — E87.1 HYPONATREMIA: Status: RESOLVED | Noted: 2023-01-01 | Resolved: 2023-01-01

## 2023-02-24 LAB
ALBUMIN SERPL-MCNC: 3.2 G/DL (ref 3.5–5)
ALBUMIN/GLOB SERPL: 0.7 (ref 1.1–2.2)
ALP SERPL-CCNC: 165 U/L (ref 45–117)
ALT SERPL-CCNC: 27 U/L (ref 12–78)
ANION GAP SERPL CALC-SCNC: 7 MMOL/L (ref 5–15)
AST SERPL-CCNC: 30 U/L (ref 15–37)
BILIRUB SERPL-MCNC: 0.4 MG/DL (ref 0.2–1)
BUN SERPL-MCNC: 42 MG/DL (ref 6–20)
BUN/CREAT SERPL: 27 (ref 12–20)
CALCIUM SERPL-MCNC: 9.7 MG/DL (ref 8.5–10.1)
CHLORIDE SERPL-SCNC: 100 MMOL/L (ref 97–108)
CO2 SERPL-SCNC: 28 MMOL/L (ref 21–32)
CREAT SERPL-MCNC: 1.54 MG/DL (ref 0.7–1.3)
GLOBULIN SER CALC-MCNC: 4.6 G/DL (ref 2–4)
GLUCOSE SERPL-MCNC: 142 MG/DL (ref 65–100)
POTASSIUM SERPL-SCNC: 4.6 MMOL/L (ref 3.5–5.1)
PROT SERPL-MCNC: 7.8 G/DL (ref 6.4–8.2)
SODIUM SERPL-SCNC: 135 MMOL/L (ref 136–145)

## 2023-02-24 PROCEDURE — 80053 COMPREHEN METABOLIC PANEL: CPT

## 2023-02-24 PROCEDURE — 36415 COLL VENOUS BLD VENIPUNCTURE: CPT

## 2023-02-24 PROCEDURE — 96360 HYDRATION IV INFUSION INIT: CPT

## 2023-02-24 PROCEDURE — 74011250636 HC RX REV CODE- 250/636: Performed by: INTERNAL MEDICINE

## 2023-02-24 RX ORDER — CYCLOBENZAPRINE HCL 10 MG
TABLET ORAL
COMMUNITY
Start: 2023-01-26

## 2023-02-24 RX ADMIN — SODIUM CHLORIDE 1000 ML: 900 INJECTION, SOLUTION INTRAVENOUS at 10:11

## 2023-02-24 NOTE — LETTER
2/24/2023    Patient: Arnel Gutierrez. YOB: 1955   Date of Visit: 2/24/2023     Eliane Velasquez NP  [address]  [address] [address]  [address] [facility] [address]    Dear Eliane Velasquez NP,      Thank you for referring Mr. Bai Prior to [address] [place] [address] for evaluation. My notes for this consultation are attached. If you have questions, please do not hesitate to call me. I look forward to following your patient along with you.       Sincerely,    Samantha Han MD

## 2023-02-24 NOTE — PROGRESS NOTES
[id number] [facility]   [phone]    Hematology / Oncology Follow-up    Reason for Visit:   Haris [place] is a 75 y.o. male PMHx of hypertension, diabetes, and hepatic steatosis/possible cirrhosis 2/2 EtOH who is seen for follow-up of locally advanced HNSCC. Hematology/Oncology Treatment History:      PRIMARY DIAGNOSIS: Locally advanced HNSCC     DATE OF DIAGNOSIS: 7/26/22  ORIGINAL STAGE: Stage CAN (cT0 cN3b cM0)  DIAGNOSTICS: FNA ([address] [place] [address]) with atypical squamous cells concerning for malignancy. Core biopsy with skeletal muscle, fibrous tissue, and granulation with acute and chronic inflammation. Cytokeratin negative, p40 negative. SITES OF DISEASE: Left neck mass with prominent left-sided lymph nodes. 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 Pathology records from [place] reviewed. Cytology from FNA 7/26/22 with \"atypical squamous cells. \"  Follow-up core needle biopsy with \"rare epitheliod cells are present; however, pancytokeratin immunostain is negative, and p40 immunostain negative. \" Although core needle biopsy not definitive, clinical picture (i.e. atypical squamous cells on FNA, enlarging neck mass with failure to improve to antibiotics in a chronic smoker) consistent with HNSCC. Unclear primary. Given ongoing complications from untreated disease (heart block, recurrent infection), elected not to pursue PET-CT or pan-endoscopy while inpatient to minimize delays in treatment.   Staging CT CAP negative for distant metastatic disease     He is now status post concurrent chemoradiation with weekly cisplatin 40 mg/m2 (12/13/22 - 2/1/23). He was hospitalized 2/17 - 2/20/23 for severe hyponatremia and hypotension. Since discharge home, he has still had episodes of hypotension and lightheadedness. His oral intake has improved (4 Boosts per day). We will hold anti-hypertensives and continue with weekly IV fluids. He is scheduled for his end-of-treatment PET-CT at ~12 weeks post-treatment. Reviewed that this will determine response to treatment. #) Hypotension: 2/2 hypovolemia. No fevers or evidence of infection/purulence at draining neck lesion. Improved with IVFs today. - Hold metoprolol and lisinopril    #) Acute kidney injury: 2/2 hypovolemia. IVFs as above. Hold lisinopril. #) Gout flare, resolved: colchicine prn    #) Grade 2 acute kidney injury, improved  hypomagnesemia: due to cisplatin and dehydration. Weeks 5 and 6 cisplatin held. Renal ultrasound normal.    #) Grade 2 Neutropenia  Grade 2 Anemia, stable: treatment-related. Continue to monitor. No indication for blood transfusion at this time    #) Migraine headaches, resolved: CT head without any acute pathology or meningeal enhancement. Unable to obtain MRI brain due to claustrophobia. Management with flexeril for neck spasms. #) Left neck abscess and cellulitis, resolved: no evidence of purulence on exam.  Still with serous drainage. Continue to monitor. #) Moderate protein-calorie malnutrition: s/p PEG tube placement on 12/29/22. Weight stable. Wt Readings from Last 3 Encounters:   02/20/23 165 lb 9.1 oz (75.1 kg)   02/17/23 166 lb (75.3 kg)   02/03/23 165 lb (74.8 kg)   - Continue Boost 4-5x per day; PO intake improving  - Will consider PEG removal as may be nidus of infection      #) Heart block: Likely due to reflex from mass sitting over his right carotid artery. Status post PPM on 12/7/22.      #) Tobacco abuse: has cut back to 2 cigarettes per day; congratulated on success, revisited importance of complete cessation. #) Psychosocial: wife recently . Moved from [place] to be closer to his children. Has good support from family (oldest son Marla Barahona is HCPOA). Plan:     Hold lisinopril and metoprolol given hypotension  [facility]   Return to clinic in 1 week for labs, IVFs, and office visit  Status post PEG tube placement on ; will consider removal as patient no longer requiring and may be nidus of infection  Obtain PET-CT for ~3 months after completion of CRT - scheduled for 23. Patient will require prn anxiolytic prior to scan    Signed By: Tiffanie Lafleur MD    2023      Interval History:     Presented 2022 with enlarging next mass, failed to improve with antibiotics  Evaluated by ENT (Dr. René Bustamante) in [facility] [address] in 2022. uUtrasound that showed irrecgular enhancing mass involving left submandibuar gland, suspicious for cancer. Follow up CT neck demonstrated soft tissue mass, measuring 5 cm with small annular opening with drainage. Mass noted to be compressing left internal jugular vein. FNA 22 with atypical squamous cells, c/f malignancy  Core biopsy with skeletal muscle, fibrous tissue, and granulation with acute and chronic inflammation. Cytokeratin negative, p40 negative. Patient referred to medical and radiation oncology in [place], but did not follow up due to passing of his wife  Presented to [facility] 2022 with progressively worsening neck pain and swelling as well as purulent cellulitis/abscess. Treated with IV Antibiotics and discharged on PO. Hospital course complicated by AV block with prolonged pause (likely due to reflex from compression of mass on right carotid artery) s/p ppm.    22 CT neck: large 4.8 x 3.1 x 4.2 cm rim-enhancing complex collection in left lateral neck, extending from carotid space to the skin surface with associated skin thickening, edema, and prominent left neck lymph nodes. Findings most likely represent an abscess rather than necrotic/superinfected neoplasm. 22 CT CAP: left neck mass partially visualized. 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 22 C1 Weekly cisplatin  22 concurrent radiation initiated  22 PEG tube placed   1/10/23 Week 5 Cisplatin held due to MOR  23 Week 6 Cisplatin held due to MOR  2/3/23 Completed concurrent chemoradiation with weekly cisplatin     Patient presents to clinic alone following infusion. He was hospitalized after our last visit from  - 23 for severe hypovolemic hyponatremia and hypotension. Since discharge, he has been feeling lightheaded and has had low blood pressures at home. He \"fell out\" 2 days ago when he went from his room to the stairs. He has been taking 4-5 Boost per day via his PEG. He constantly has a bottled water and drinks to thirst.  He is drinking 1/2 bottle of propel pedia-lyte every day. He is able to eat small amounts by mouth. Denies any odynophagia, which is an improvement. Has noticed more dry mouth since radiation. Still has some drainage from his tract on his left neck. He is not needing anything for pain. Denies nausea, vomiting, neuropathy. Medications reviewed in the EMR. No Known Allergies     Review of Systems: A 6-point review of systems was obtained, negative except as reviewed in the HPI. Physical Exam:   Visit Vitals  /68   Temp 98.1 °F (36.7 °C)   Resp 18   Ht 5' 5\" (1.651 m)   BMI 27.55 kg/m²       ECO  General: No distress, chronically ill appearing  Eyes: Anicteric sclerae  HENT: Atraumatic, no oral ulcers, +erythema, dry mucous membranes  Neck: Supple, ~firm mass in left submandibular region, stable, with ~1 cm area of serous discharge, overlying skin mildly erythematous. No malodorous smell or purulence. Stable.     Respiratory: CTAB, normal respiratory effort  CV: RRR, normal S1 and S2, no peripheral edema  GI: Soft, nontender, nondistended PEG tube with leakage around insertion site  Skin: No rashes, ecchymoses, or petechiae  Psych: Alert, oriented, appropriate affect, normal judgment/insight    Results:     Lab Results   Component Value Date/Time    WBC 3.3 (L) 02/19/2023 01:13 AM    HGB 8.1 (L) 02/19/2023 01:13 AM    HCT 23.9 (L) 02/19/2023 01:13 AM    PLATELET 004 (L) 85/05/0911 01:13 AM    .0 (H) 02/19/2023 01:13 AM    ABS. NEUTROPHILS 1.9 02/19/2023 01:13 AM     Lab Results   Component Value Date/Time    Sodium 135 (L) 02/24/2023 10:14 AM    Potassium 4.6 02/24/2023 10:14 AM    Chloride 100 02/24/2023 10:14 AM    CO2 28 02/24/2023 10:14 AM    Glucose 142 (H) 02/24/2023 10:14 AM    BUN 42 (H) 02/24/2023 10:14 AM    Creatinine 1.54 (H) 02/24/2023 10:14 AM    Calcium 9.7 02/24/2023 10:14 AM    Glucose (POC) 238 (H) 02/20/2023 12:02 PM     Lab Results   Component Value Date/Time    Bilirubin, total 0.4 02/24/2023 10:14 AM    ALT (SGPT) 27 02/24/2023 10:14 AM    Alk.  phosphatase 165 (H) 02/24/2023 10:14 AM    Protein, total 7.8 02/24/2023 10:14 AM    Albumin 3.2 (L) 02/24/2023 10:14 AM    Globulin 4.6 (H) 02/24/2023 10:14 AM     No new images to review

## 2023-02-24 NOTE — PROGRESS NOTES
[facility] Short Note                       Date: 2023    Name: Anayeli Vera. MRN: [id number]         : 1955      Pt admit to [facility] for hydration ambulatory in stable condition. Assessment completed and documented in flowsheets. Pt complains of dizziness and states he fell this past Tuesday. Notified Dr. Lonnie Cardenas and she states to notify pt to stop taking his metoprolol and that she would like to see pt post infusion. Pt notified and he verbalized understanding. Mr. Madelin Mason vitals were reviewed prior to and after treatment. Patient Vitals for the past 12 hrs:   Temp Pulse Resp BP   23 1113 -- 88 -- 125/68   23 1002 98.1 °F (36.7 °C) 98 18 (!) 95/54       Medications given: via PIV in left AC. Medications Administered       sodium chloride 0.9 % bolus infusion 1,000 mL       Admin Date  2023 Action  New Bag Dose  1,000 mL Rate  1,000 mL/hr Route  IntraVENous Administered By  Sonia Yusuf RN                  PIV positive blood return noted, flushed and removed prior to discharge. Mr. Judy Shultz tolerated the infusion, and had no complaints. Mr. Judy Shultz was discharged from [place] [address] in stable condition and is aware of future appointments. Pt aware to proceed to MD office.     Future Appointments   Date Time Provider Tad Sibley   3/10/2023 11:15 AM Caleb Mcconnell  [address] BS AMB   3/10/2023 11:30 AM H1 MADELYN FASTRACK [facility]ICB [facility] H   3/15/2023  9:00 AM PACEMAKER, STFRANCES CAVSF BS AMB   3/15/2023  9:20 AM Richard Rao MD CAVSF BS AMB   3/17/2023 10:00 AM Caleb Nichols MD [address] [facility] BS AMB   3/21/2023  1:00 PM Jenna Nunes MD [facility] BS AMB   2023 12:00 PM [facility] RCR PET DOSE 1 SMHRCHPET MARIELA DHARA   2023  1:00 PM [facility] RCR PET 1 SMHRSTEVENT MARIELA Kang RN  2023  9:09 AM

## 2023-02-24 NOTE — PROGRESS NOTES
Chief Complaint   Patient presents with    Chemotherapy     Follow up    Dizziness        Visit Vitals  /68   Temp 98.1 °F (36.7 °C)   Resp 18   Ht 5' 5\" (1.651 m)   BMI 27.55 kg/m²        1. Have you been to the ER, urgent care clinic since your last visit? Hospitalized since your last visit? No    2. Have you seen or consulted any other health care providers outside of the [address] [place] [address] since your last visit? Include any pap smears or colon screening. No     Health Maintenance Due   Topic Date Due    Hepatitis C Screening  Never done    Pneumococcal 65+ years (1 - PCV) Never done    DTaP/Tdap/Td series (1 - Tdap) Never done    Lipid Screen  Never done    Colorectal Cancer Screening Combo  Never done    Shingles Vaccine (1 of 2) Never done    COVID-19 Vaccine (4 - Booster for Moderna series) 01/31/2022    Flu Vaccine (1) Never done    Medicare Yearly Exam  Never done        3 most recent PHQ Screens 2/24/2023   PHQ Not Done -   Little interest or pleasure in doing things Not at all   Feeling down, depressed, irritable, or hopeless Not at all   Total Score PHQ 2 0        Fall Risk Assessment, last 12 mths 2/24/2023   Able to walk? Yes   Fall in past 12 months? 1   Do you feel unsteady? 1   Are you worried about falling 1   Is the gait abnormal? 1   Number of falls in past 12 months 1   Fall with injury? 1       No flowsheet data found.

## 2023-02-24 NOTE — PROGRESS NOTES
[facility]   [address]  Susan Reese 103: [phone]  F: [phone]    Medical Nutrition Therapy  Nutrition Encounter:    Patient reports a fall earlier this week. Having episodes of low BP- said it was 85/55. He reports the feeding tube is leaking some. Took a shower last night and has cream/brownish discharge around the gauze. Eating wise- he has had some chicken and some mixed vegetables. Yesterday he had some fish patties. Drinking 4 Boost VHC per mouth. Thinks he may of had 5 yesterday. He had an episode of vomiting x 2 after laying down. Thinks 1st happened on Tuesday/Wednesday and last night (just a small amount). Drinking 8oz of propel. Drinking about 2 a day. Having some pain with swallowing, pain lower in the esophagus and has to do multiple sips to get food down. We discussed sitting upright in the evening, he is going to look into that. Having more loose stools, had a bowel movement this morning. He monitors his weight at home. He is running 160#. When he got home from hospital on Tuesday he was 162#. Concurrent chemoradiation - completed treatment. Chemotherapy Flowsheet 1/31/2023   Cycle CD43   Date 1/31/2023   Drug / Regimen Cisplatin   Pre Hydration -   Post Hydration -   Pre Meds -   Notes -       Wt Readings from Last 8 Encounters:   02/20/23 165 lb 9.1 oz (75.1 kg)   02/17/23 166 lb (75.3 kg)   02/03/23 165 lb (74.8 kg)   01/31/23 165 lb 9.6 oz (75.1 kg)   01/31/23 165 lb 9.1 oz (75.1 kg)   01/24/23 168 lb 8 oz (76.4 kg)   01/24/23 168 lb (76.2 kg)   01/20/23 167 lb 8.8 oz (76 kg)      Estimated Nutrition Needs:   Calorie Range: 2184-2688kcal/day     Protein Range: 78-95g/day     Fluid Needs: 2200ml     Plan:   - Continue with 4-5 Boost VHC per day (provides 2650kcal, 110g protein and 800ml free water)  - PO and fluid by mouth.      Signed By: Mike Godinez, [address]

## 2023-03-03 ENCOUNTER — OFFICE VISIT (OUTPATIENT)
Dept: ONCOLOGY | Age: 68
End: 2023-03-03

## 2023-03-03 ENCOUNTER — HOSPITAL ENCOUNTER (OUTPATIENT)
Dept: INFUSION THERAPY | Age: 68
Discharge: HOME | End: 2023-03-03
Payer: MEDICARE

## 2023-03-03 VITALS
BODY MASS INDEX: 25.96 KG/M2 | TEMPERATURE: 98.6 F | WEIGHT: 156 LBS | RESPIRATION RATE: 18 BRPM | OXYGEN SATURATION: 100 % | DIASTOLIC BLOOD PRESSURE: 59 MMHG | SYSTOLIC BLOOD PRESSURE: 111 MMHG | HEART RATE: 109 BPM

## 2023-03-03 VITALS
HEART RATE: 109 BPM | OXYGEN SATURATION: 100 % | RESPIRATION RATE: 18 BRPM | TEMPERATURE: 98.6 F | SYSTOLIC BLOOD PRESSURE: 111 MMHG | DIASTOLIC BLOOD PRESSURE: 59 MMHG

## 2023-03-03 DIAGNOSIS — C76.0 SQUAMOUS CELL CARCINOMA OF HEAD AND NECK (HCC): Primary | ICD-10-CM

## 2023-03-03 DIAGNOSIS — N17.9 ACUTE KIDNEY INJURY (HCC): ICD-10-CM

## 2023-03-03 DIAGNOSIS — Z72.0 TOBACCO ABUSE: ICD-10-CM

## 2023-03-03 DIAGNOSIS — E44.0 MODERATE PROTEIN-CALORIE MALNUTRITION (HCC): ICD-10-CM

## 2023-03-03 DIAGNOSIS — E83.42 HYPOMAGNESEMIA: ICD-10-CM

## 2023-03-03 PROBLEM — I95.89 HYPOTENSION DUE TO HYPOVOLEMIA: Status: RESOLVED | Noted: 2023-01-09 | Resolved: 2023-03-03

## 2023-03-03 PROBLEM — I95.89 HYPOTENSION DUE TO HYPOVOLEMIA: Status: RESOLVED | Noted: 2023-01-01 | Resolved: 2023-01-01

## 2023-03-03 PROBLEM — E86.1 HYPOTENSION DUE TO HYPOVOLEMIA: Status: RESOLVED | Noted: 2023-01-01 | Resolved: 2023-01-01

## 2023-03-03 PROBLEM — E86.1 HYPOTENSION DUE TO HYPOVOLEMIA: Status: RESOLVED | Noted: 2023-01-09 | Resolved: 2023-03-03

## 2023-03-03 LAB
ALBUMIN SERPL-MCNC: 3.5 G/DL (ref 3.5–5)
ALBUMIN/GLOB SERPL: 0.9 (ref 1.1–2.2)
ALP SERPL-CCNC: 144 U/L (ref 45–117)
ALT SERPL-CCNC: 24 U/L (ref 12–78)
ANION GAP SERPL CALC-SCNC: 5 MMOL/L (ref 5–15)
AST SERPL-CCNC: 24 U/L (ref 15–37)
BILIRUB SERPL-MCNC: 0.5 MG/DL (ref 0.2–1)
BUN SERPL-MCNC: 26 MG/DL (ref 6–20)
BUN/CREAT SERPL: 16 (ref 12–20)
CALCIUM SERPL-MCNC: 9.1 MG/DL (ref 8.5–10.1)
CHLORIDE SERPL-SCNC: 109 MMOL/L (ref 97–108)
CO2 SERPL-SCNC: 26 MMOL/L (ref 21–32)
CREAT SERPL-MCNC: 1.63 MG/DL (ref 0.7–1.3)
FERRITIN SERPL-MCNC: 703 NG/ML (ref 26–388)
GLOBULIN SER CALC-MCNC: 4 G/DL (ref 2–4)
GLUCOSE SERPL-MCNC: 126 MG/DL (ref 65–100)
IRON SATN MFR SERPL: 41 % (ref 20–50)
IRON SERPL-MCNC: 104 UG/DL (ref 35–150)
MAGNESIUM SERPL-MCNC: 1.1 MG/DL (ref 1.6–2.4)
POTASSIUM SERPL-SCNC: 5.2 MMOL/L (ref 3.5–5.1)
PROT SERPL-MCNC: 7.5 G/DL (ref 6.4–8.2)
SODIUM SERPL-SCNC: 140 MMOL/L (ref 136–145)
TIBC SERPL-MCNC: 255 UG/DL (ref 250–450)

## 2023-03-03 PROCEDURE — 96360 HYDRATION IV INFUSION INIT: CPT

## 2023-03-03 PROCEDURE — 83540 ASSAY OF IRON: CPT

## 2023-03-03 PROCEDURE — 74011250636 HC RX REV CODE- 250/636: Performed by: INTERNAL MEDICINE

## 2023-03-03 PROCEDURE — 82728 ASSAY OF FERRITIN: CPT

## 2023-03-03 PROCEDURE — 36415 COLL VENOUS BLD VENIPUNCTURE: CPT

## 2023-03-03 PROCEDURE — 83735 ASSAY OF MAGNESIUM: CPT

## 2023-03-03 PROCEDURE — 80053 COMPREHEN METABOLIC PANEL: CPT

## 2023-03-03 RX ORDER — DOXYCYCLINE 100 MG/1
100 TABLET ORAL 2 TIMES DAILY
Qty: 10 TABLET | Refills: 0 | Status: SHIPPED | OUTPATIENT
Start: 2023-03-03 | End: 2023-03-08

## 2023-03-03 RX ORDER — LANOLIN ALCOHOL/MO/W.PET/CERES
400 CREAM (GRAM) TOPICAL 2 TIMES DAILY
Qty: 60 TABLET | Refills: 0 | Status: SHIPPED | OUTPATIENT
Start: 2023-03-03

## 2023-03-03 RX ADMIN — SODIUM CHLORIDE 1000 ML: 900 INJECTION, SOLUTION INTRAVENOUS at 16:02

## 2023-03-03 NOTE — PROGRESS NOTES
[address] [facility] Oncology   [phone]    Hematology / Oncology Follow-up    Reason for Visit:   Dimitri Douglass is a 75 y.o. male PMHx of hypertension, diabetes, and hepatic steatosis/possible cirrhosis 2/2 EtOH who is seen for follow-up of locally advanced HNSCC. Hematology/Oncology Treatment History:      PRIMARY DIAGNOSIS: Locally advanced HNSCC     DATE OF DIAGNOSIS: 7/26/22  ORIGINAL STAGE: Stage CAN (cT0 cN3b cM0)  DIAGNOSTICS: FNA ([address] [place] [address]) with atypical squamous cells concerning for malignancy. Core biopsy with skeletal muscle, fibrous tissue, and granulation with acute and chronic inflammation. Cytokeratin negative, p40 negative. SITES OF DISEASE: Left neck mass with prominent left-sided lymph nodes. 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 Pathology records from [facility] reviewed. Cytology from FNA 7/26/22 with \"atypical squamous cells. \"  Follow-up core needle biopsy with \"rare epitheliod cells are present; however, pancytokeratin immunostain is negative, and p40 immunostain negative. \" Although core needle biopsy not definitive, clinical picture (i.e. atypical squamous cells on FNA, enlarging neck mass with failure to improve to antibiotics in a chronic smoker) consistent with HNSCC. Unclear primary. Given ongoing complications from untreated disease (heart block, recurrent infection), elected not to pursue PET-CT or pan-endoscopy while inpatient to minimize delays in treatment.   Staging CT CAP negative for distant metastatic disease     He is now status post concurrent chemoradiation with weekly cisplatin 40 mg/m2 (22 - 23). Patient clinically looks much better than in prior weeks. He brings a blood pressure and weight log that shows no episodes of hypotension and his weight has been stable. He is drinking ~2 Boosts per day but has begun eating solid foods (fish, yogurt). His labs show ongoing MOR and hypomagnesemia. On exam, there is possible abdominal wall cellulitis near his PEG tube. Will treat with doxycycline and proceed with PEG removal.  He is scheduled for his end-of-treatment PET-CT at ~12 weeks post-treatment. Reviewed that this will determine response to treatment. #) Acute kidney injury  hypomagnesemia: ongoing. #) Grade 2 Neutropenia  Grade 2 Anemia, stable: treatment-related. Will monitor on repeat labs in 1 week. #) Left neck abscess and cellulitis, resolved: no evidence of purulence on exam.  Still with serous drainage. Continue to monitor. #) Moderate protein-calorie malnutrition: s/p PEG tube placement on 22. Dietitian following. 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 Status post PPM on 22. #) Tobacco abuse: has almost completely cut back on smoking (1 cigarette this week). Congratulated on success  #) Psychosocial: wife recently . Moved from [place] to be closer to his children. Has good support from family (oldest son Chanda Rodriguez is HCPOA). Plan:     - 1 L IVFs today  - Return in 1 week for IVFs and IV magnesium   - Resume PO mag 400 mg BID   - Metoprolol and lisinopril on hold  - Remove PEG tube given patient no longer requiring and possible nidus of infection  - Doxycycline 100 mg BID x 5 days for possible abdominal cellulitis  - Dietitian following   - Return to clinic in 4 weeks for labs and exam  - Post-treatment PET-CT scheduled for 23. Patient will require prn anxiolytic prior to scan    Signed By: Bertha Adamson MD    March 3, 2023      Interval History:     Presented June 2022 with enlarging next mass, failed to improve with antibiotics  Evaluated by ENT (Dr. Mario Medina) in [facility] in 7/2022. uUtrasound that showed irrecgular enhancing mass involving left submandibuar gland, suspicious for cancer. Follow up CT neck demonstrated soft tissue mass, measuring 5 cm with small annular opening with drainage. Mass noted to be compressing left internal jugular vein. FNA 7/26/22 with atypical squamous cells, c/f malignancy  Core biopsy with skeletal muscle, fibrous tissue, and granulation with acute and chronic inflammation. Cytokeratin negative, p40 negative. Patient referred to medical and radiation oncology in [place], but did not follow up due to passing of his wife  Presented to [facility] 12/1/2022 with progressively worsening neck pain and swelling as well as purulent cellulitis/abscess. Treated with IV Antibiotics and discharged on PO. Hospital course complicated by AV block with prolonged pause (likely due to reflex from compression of mass on right carotid artery) s/p ppm.    11/30/22 CT neck: large 4.8 x 3.1 x 4.2 cm rim-enhancing complex collection in left lateral neck, extending from carotid space to the skin surface with associated skin thickening, edema, and prominent left neck lymph nodes. Findings most likely represent an abscess rather than necrotic/superinfected neoplasm. 12/1/22 CT CAP: left neck mass partially visualized. 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     12/13/22 C1 Weekly cisplatin  12/14/22 concurrent radiation initiated  12/29/22 PEG tube placed   1/10/23 Week 5 Cisplatin held due to MOR  1/17/23 Week 6 Cisplatin held due to MOR  2/3/23 Completed concurrent chemoradiation with weekly cisplatin Presents to clinic alone. He feels the best he has in awhile. He has been keeping track of his blood pressures and weights at home and brings his log. Weight has been stable throughout the week. Blood pressures well controlled, no episodes of hypotension. No additional dizziness  Oral intake is improving. He has cut back to 2 Boost per day. Has some loose stool with the Boost.  Working on his hydration, drinking water, pedialyte, propel. The PEG tube is causing him discomfort. He wants to have it removed. Still has some drainage from his tract on his left neck. He is not needing anything for pain. Denies nausea, vomiting, neuropathy. Medications reviewed in the EMR. No Known Allergies     Review of Systems: A 6-point review of systems was obtained, negative except as reviewed in the HPI. Physical Exam:   Visit Vitals  BP (!) 111/59 (BP 1 Location: Left upper arm, BP Patient Position: Sitting)   Pulse (!) 109   Temp 98.6 °F (37 °C)   Resp 18   Wt 156 lb (70.8 kg)   SpO2 100%   BMI 25.96 kg/m²       ECO  General: No distress, chronically ill appearing, appears put together today  Eyes: Anicteric sclerae  HENT: Atraumatic, no oral ulcers, MMM  Neck: Supple, ~firm mass in left submandibular region, stable, with ~1 cm area of serous discharge. No malodorous smell or purulence. Stable. Respiratory: CTAB, normal respiratory effort  CV: RRR, normal S1 and S2, no peripheral edema  GI: Soft, nontender, nondistended PEG tube with leakage around insertion site, tender near insertion site with mild erythema  Skin: No rashes, ecchymoses, or petechiae  Psych: Alert, oriented, appropriate affect, normal judgment/insight    Results:     Lab Results   Component Value Date/Time    WBC 3.3 (L) 2023 01:13 AM    HGB 8.1 (L) 2023 01:13 AM    HCT 23.9 (L) 2023 01:13 AM    PLATELET 371 (L)  01:13 AM    .0 (H) 2023 01:13 AM    ABS.  NEUTROPHILS 1.9 2023 01:13 AM     Lab Results   Component Value Date/Time    Sodium 140 03/03/2023 03:06 PM    Potassium 5.2 (H) 03/03/2023 03:06 PM    Chloride 109 (H) 03/03/2023 03:06 PM    CO2 26 03/03/2023 03:06 PM    Glucose 126 (H) 03/03/2023 03:06 PM    BUN 26 (H) 03/03/2023 03:06 PM    Creatinine 1.63 (H) 03/03/2023 03:06 PM    Calcium 9.1 03/03/2023 03:06 PM    Glucose (POC) 238 (H) 02/20/2023 12:02 PM     Lab Results   Component Value Date/Time    Bilirubin, total 0.5 03/03/2023 03:06 PM    ALT (SGPT) 24 03/03/2023 03:06 PM    Alk.  phosphatase 144 (H) 03/03/2023 03:06 PM    Protein, total 7.5 03/03/2023 03:06 PM    Albumin 3.5 03/03/2023 03:06 PM    Globulin 4.0 03/03/2023 03:06 PM     No new images to review

## 2023-03-03 NOTE — PROGRESS NOTES
[facility]                       Date: March 3, 2023    Name: Brandt Santo MRN: [id number]         : 1955      Pt admit to [facility] for Hydration ambulatory in stable condition. Assessment completed and documented in flowsheets. No acute concerns at this time. Please review pending lab results in [facility]. Mr. Colleen Montelongo vitals were reviewed prior to and after treatment. Patient Vitals for the past 12 hrs:   Temp Pulse Resp BP SpO2   23 1508 98.6 °F (37 °C) (!) 109 18 (!) 111/59 100 %         Lab results were obtained and reviewed. Labs within parameter for treatment. No results found for this or any previous visit (from the past 12 hour(s)). Medications given: via PIV   Medications Administered       sodium chloride 0.9 % bolus infusion 1,000 mL       Admin Date  2023 Action  New Bag Dose  1,000 mL Rate  1,000 mL/hr Route  IntraVENous Administered By  Terence Riggs RN                    PIV positive blood return noted, flushed and removed prior to discharge. Mr. Moises Marina tolerated the infusion, and had no complaints. Mr. Moises Marina was discharged from [place] [address] in stable condition and is aware of future appointments.      Future Appointments   Date Time Provider Tad Sibley   3/10/2023 11:15 AM Denis Negron  [address] BS AMB   3/10/2023 11:30 AM H1 MADELYN FASTRACK [facility]CHICB [facility] H   3/15/2023  9:00 AM PACEMAKER, STFRANCES CAVSF BS AMB   3/15/2023  9:20 AM Austin Cavazos MD CAVSF BS AMB   3/17/2023 10:00 AM Santosh Montilla MD [facility] BS AMB   3/21/2023  1:00 PM Sarika Gaxiola MD PMA BS AMB   2023 12:00 PM [facility] RCR PET DOSE 1 SMHRCHPET MARIELA DHARA   2023  1:00 PM [facility] RCR PET 1 SMHRCHPET MARIELA DHARA       Fernando Dailey RN  March 3, 2023  4:16 PM

## 2023-03-03 NOTE — LETTER
3/3/2023    Patient: Butch Martínez. YOB: 1955   Date of Visit: 3/3/2023     Iliana Soto NP  [address]  [place] [address]  [facility] [address]    Dear Iliana Soto NP,      Thank you for referring Mr. Chante Gonzalez to [address] [place] [address] for evaluation. My notes for this consultation are attached. If you have questions, please do not hesitate to call me. I look forward to following your patient along with you.       Sincerely,    Wellington Powell MD

## 2023-03-03 NOTE — PROGRESS NOTES
Blu Helton. is a 75 y.o. male    Chief Complaint   Patient presents with    Follow-up      locally advanced HNSCC       1. Have you been to the ER, urgent care clinic since your last visit? Hospitalized since your last visit? No    2. Have you seen or consulted any other health care providers outside of the [address] [place] [address] since your last visit? Include any pap smears or colon screening.  No

## 2023-03-04 RX ORDER — COLCHICINE 0.6 MG/1
0.6 TABLET ORAL AS NEEDED
Qty: 30 TABLET | Refills: 0 | Status: SHIPPED | OUTPATIENT
Start: 2023-03-04

## 2023-03-04 RX ORDER — BUPROPION HYDROCHLORIDE 150 MG/1
300 TABLET ORAL
Qty: 60 TABLET | Refills: 0 | Status: SHIPPED | OUTPATIENT
Start: 2023-03-04

## 2023-03-04 RX ORDER — CYCLOBENZAPRINE HCL 5 MG
TABLET ORAL
Qty: 30 TABLET | Refills: 0 | Status: SHIPPED | OUTPATIENT
Start: 2023-03-04

## 2023-03-08 ENCOUNTER — TELEPHONE (OUTPATIENT)
Dept: ONCOLOGY | Age: 68
End: 2023-03-08

## 2023-03-08 NOTE — TELEPHONE ENCOUNTER
[facility] at [address], [address] [place] [address] [address]  Susan Reese 103: [phone]  F: [phone]    Medical Nutrition Therapy  Nutrition Encounter:    Called patient, no answer. Left a message. Called son Rika Tucker. He reports he is eating solid food, drinking plenty of water. Maintaining his weight and even gained a few pounds. Explained that Mr. Mcadams can go by IR on Friday before hydration appointment and get the feeding tube removed. Called IR this morning and spoke with [facility] and confirmed he can have it removed on Friday. Patient called back. He is eating and drinking. Tolerating solids. Reviewed how to get to angio intervention radiology. Reports gained 3#. Concurrent chemoradiation - completed treatment. Chemotherapy Flowsheet 1/31/2023   Cycle CD43   Date 1/31/2023   Drug / Regimen Cisplatin   Pre Hydration -   Post Hydration -   Pre Meds -   Notes -       Wt Readings from Last 8 Encounters:   03/03/23 156 lb (70.8 kg)   02/20/23 165 lb 9.1 oz (75.1 kg)   02/17/23 166 lb (75.3 kg)   02/03/23 165 lb (74.8 kg)   01/31/23 165 lb 9.6 oz (75.1 kg)   01/31/23 165 lb 9.1 oz (75.1 kg)   01/24/23 168 lb 8 oz (76.4 kg)   01/24/23 168 lb (76.2 kg)      Estimated Nutrition Needs:   Calorie Range: 2184-2688kcal/day     Protein Range: 78-95g/day     Fluid Needs: 2200ml     Plan:   - Continue with Boost VHC by mouth.   - PO and fluid by mouth.      Signed By: Jordis Goodell, PPLCONNECT

## 2023-03-10 ENCOUNTER — HOSPITAL ENCOUNTER (OUTPATIENT)
Dept: INFUSION THERAPY | Age: 68
Discharge: HOME | End: 2023-03-10
Payer: MEDICARE

## 2023-03-10 ENCOUNTER — HOSPITAL ENCOUNTER (OUTPATIENT)
Dept: INFUSION THERAPY | Age: 68
End: 2023-03-10

## 2023-03-10 VITALS
TEMPERATURE: 98 F | HEART RATE: 90 BPM | DIASTOLIC BLOOD PRESSURE: 79 MMHG | RESPIRATION RATE: 18 BRPM | SYSTOLIC BLOOD PRESSURE: 160 MMHG

## 2023-03-10 DIAGNOSIS — N17.9 ACUTE KIDNEY INJURY (HCC): Primary | ICD-10-CM

## 2023-03-10 DIAGNOSIS — C76.0 SQUAMOUS CELL CARCINOMA OF HEAD AND NECK (HCC): ICD-10-CM

## 2023-03-10 LAB
ALBUMIN SERPL-MCNC: 3.4 G/DL (ref 3.5–5)
ALBUMIN/GLOB SERPL: 0.9 (ref 1.1–2.2)
ALP SERPL-CCNC: 132 U/L (ref 45–117)
ALT SERPL-CCNC: 18 U/L (ref 12–78)
ANION GAP SERPL CALC-SCNC: 5 MMOL/L (ref 5–15)
AST SERPL-CCNC: 16 U/L (ref 15–37)
BASOPHILS # BLD: 0 K/UL (ref 0–0.1)
BASOPHILS NFR BLD: 1 % (ref 0–1)
BILIRUB SERPL-MCNC: 0.4 MG/DL (ref 0.2–1)
BUN SERPL-MCNC: 24 MG/DL (ref 6–20)
BUN/CREAT SERPL: 17 (ref 12–20)
CALCIUM SERPL-MCNC: 9 MG/DL (ref 8.5–10.1)
CHLORIDE SERPL-SCNC: 102 MMOL/L (ref 97–108)
CO2 SERPL-SCNC: 31 MMOL/L (ref 21–32)
CREAT SERPL-MCNC: 1.43 MG/DL (ref 0.7–1.3)
DIFFERENTIAL METHOD BLD: ABNORMAL
EOSINOPHIL # BLD: 0.1 K/UL (ref 0–0.4)
EOSINOPHIL NFR BLD: 2 % (ref 0–7)
ERYTHROCYTE [DISTWIDTH] IN BLOOD BY AUTOMATED COUNT: 17.5 % (ref 11.5–14.5)
GLOBULIN SER CALC-MCNC: 3.6 G/DL (ref 2–4)
GLUCOSE SERPL-MCNC: 118 MG/DL (ref 65–100)
HCT VFR BLD AUTO: 25.2 % (ref 36.6–50.3)
HGB BLD-MCNC: 8.1 G/DL (ref 12.1–17)
IMM GRANULOCYTES # BLD AUTO: 0 K/UL (ref 0–0.04)
IMM GRANULOCYTES NFR BLD AUTO: 1 % (ref 0–0.5)
LYMPHOCYTES # BLD: 0.7 K/UL (ref 0.8–3.5)
LYMPHOCYTES NFR BLD: 19 % (ref 12–49)
MAGNESIUM SERPL-MCNC: 1.2 MG/DL (ref 1.6–2.4)
MCH RBC QN AUTO: 34.6 PG (ref 26–34)
MCHC RBC AUTO-ENTMCNC: 32.1 G/DL (ref 30–36.5)
MCV RBC AUTO: 107.7 FL (ref 80–99)
MONOCYTES # BLD: 0.4 K/UL (ref 0–1)
MONOCYTES NFR BLD: 10 % (ref 5–13)
NEUTS SEG # BLD: 2.4 K/UL (ref 1.8–8)
NEUTS SEG NFR BLD: 67 % (ref 32–75)
NRBC # BLD: 0 K/UL (ref 0–0.01)
NRBC BLD-RTO: 0 PER 100 WBC
PHOSPHATE SERPL-MCNC: 3.3 MG/DL (ref 2.6–4.7)
PLATELET # BLD AUTO: 132 K/UL (ref 150–400)
PMV BLD AUTO: 10.3 FL (ref 8.9–12.9)
POTASSIUM SERPL-SCNC: 4.6 MMOL/L (ref 3.5–5.1)
PROT SERPL-MCNC: 7 G/DL (ref 6.4–8.2)
RBC # BLD AUTO: 2.34 M/UL (ref 4.1–5.7)
RBC MORPH BLD: ABNORMAL
SODIUM SERPL-SCNC: 138 MMOL/L (ref 136–145)
WBC # BLD AUTO: 3.6 K/UL (ref 4.1–11.1)

## 2023-03-10 PROCEDURE — 96361 HYDRATE IV INFUSION ADD-ON: CPT

## 2023-03-10 PROCEDURE — 80053 COMPREHEN METABOLIC PANEL: CPT

## 2023-03-10 PROCEDURE — 74011250636 HC RX REV CODE- 250/636: Performed by: INTERNAL MEDICINE

## 2023-03-10 PROCEDURE — 83735 ASSAY OF MAGNESIUM: CPT

## 2023-03-10 PROCEDURE — 85025 COMPLETE CBC W/AUTO DIFF WBC: CPT

## 2023-03-10 PROCEDURE — 84100 ASSAY OF PHOSPHORUS: CPT

## 2023-03-10 PROCEDURE — 96365 THER/PROPH/DIAG IV INF INIT: CPT

## 2023-03-10 PROCEDURE — 96366 THER/PROPH/DIAG IV INF ADDON: CPT

## 2023-03-10 PROCEDURE — 36415 COLL VENOUS BLD VENIPUNCTURE: CPT

## 2023-03-10 RX ORDER — MAGNESIUM SULFATE HEPTAHYDRATE 40 MG/ML
2 INJECTION, SOLUTION INTRAVENOUS ONCE
Status: COMPLETED | OUTPATIENT
Start: 2023-03-10 | End: 2023-03-10

## 2023-03-10 RX ADMIN — MAGNESIUM SULFATE HEPTAHYDRATE 2 G: 40 INJECTION, SOLUTION INTRAVENOUS at 11:35

## 2023-03-10 RX ADMIN — SODIUM CHLORIDE 1000 ML: 900 INJECTION, SOLUTION INTRAVENOUS at 11:35

## 2023-03-10 NOTE — PROGRESS NOTES
[facility] Progress Note    Date: March 10, 2023    Name: Damaris Naidu MRN: [id number]         : 1955    Mr. Mcadams Arrived ambulatory and in no distress for Hydration + Magnesium Repletion. Assessment was completed and documented in flowsheets. No acute concerns at this time. 22G IV placed without difficulty, labs drawn and sent for processing. Mr. Maricarmen Hamilton vital signs for this visit. Visit Vitals  BP (!) 160/79 (BP 1 Location: Right arm, BP Patient Position: Sitting)   Pulse 90   Temp 98 °F (36.7 °C)   Resp 18          Lab results were obtained and reviewed. Recent Results (from the past 12 hour(s))   CBC WITH AUTOMATED DIFF    Collection Time: 03/10/23 11:25 AM   Result Value Ref Range    WBC 3.6 (L) 4.1 - 11.1 K/uL    RBC 2.34 (L) 4.10 - 5.70 M/uL    HGB 8.1 (L) 12.1 - 17.0 g/dL    HCT 25.2 (L) 36.6 - 50.3 %    .7 (H) 80.0 - 99.0 FL    MCH 34.6 (H) 26.0 - 34.0 PG    MCHC 32.1 30.0 - 36.5 g/dL    RDW 17.5 (H) 11.5 - 14.5 %    PLATELET 716 (L) 887 - 400 K/uL    MPV 10.3 8.9 - 12.9 FL    NRBC 0.0 0  WBC    ABSOLUTE NRBC 0.00 0.00 - 0.01 K/uL    NEUTROPHILS 67 32 - 75 %    LYMPHOCYTES 19 12 - 49 %    MONOCYTES 10 5 - 13 %    EOSINOPHILS 2 0 - 7 %    BASOPHILS 1 0 - 1 %    IMMATURE GRANULOCYTES 1 (H) 0.0 - 0.5 %    ABS. NEUTROPHILS 2.4 1.8 - 8.0 K/UL    ABS. LYMPHOCYTES 0.7 (L) 0.8 - 3.5 K/UL    ABS. MONOCYTES 0.4 0.0 - 1.0 K/UL    ABS. EOSINOPHILS 0.1 0.0 - 0.4 K/UL    ABS. BASOPHILS 0.0 0.0 - 0.1 K/UL    ABS. IMM.  GRANS. 0.0 0.00 - 0.04 K/UL    DF SMEAR SCANNED      RBC COMMENTS ANISOCYTOSIS  1+       PHOSPHORUS    Collection Time: 03/10/23 11:25 AM   Result Value Ref Range    Phosphorus 3.3 2.6 - 4.7 MG/DL   MAGNESIUM    Collection Time: 03/10/23 11:25 AM   Result Value Ref Range    Magnesium 1.2 (L) 1.6 - 2.4 mg/dL   METABOLIC PANEL, COMPREHENSIVE    Collection Time: 03/10/23 11:25 AM   Result Value Ref Range    Sodium 138 136 - 145 mmol/L    Potassium 4.6 3.5 - 5.1 mmol/L Chloride 102 97 - 108 mmol/L    CO2 31 21 - 32 mmol/L    Anion gap 5 5 - 15 mmol/L    Glucose 118 (H) 65 - 100 mg/dL    BUN 24 (H) 6 - 20 MG/DL    Creatinine 1.43 (H) 0.70 - 1.30 MG/DL    BUN/Creatinine ratio 17 12 - 20      eGFR 53 (L) >60 ml/min/1.73m2    Calcium 9.0 8.5 - 10.1 MG/DL    Bilirubin, total 0.4 0.2 - 1.0 MG/DL    ALT (SGPT) 18 12 - 78 U/L    AST (SGOT) 16 15 - 37 U/L    Alk. phosphatase 132 (H) 45 - 117 U/L    Protein, total 7.0 6.4 - 8.2 g/dL    Albumin 3.4 (L) 3.5 - 5.0 g/dL    Globulin 3.6 2.0 - 4.0 g/dL    A-G Ratio 0.9 (L) 1.1 - 2.2         Medications given:   Medications Administered       magnesium sulfate 2 g/50 ml IVPB (premix or compounded)       Admin Date  03/10/2023 Action  New Bag Dose  2 g Rate  25 mL/hr Route  IntraVENous Administered By  Eda Ceron RN              sodium chloride 0.9 % bolus infusion 1,000 mL       Admin Date  03/10/2023 Action  New Bag Dose  1,000 mL Rate  1,000 mL/hr Route  IntraVENous Administered By  Eda Ceron, [place]                      Mr. Julien Fabry tolerated the infusion, and had no complaints. PIV flushed and removed after completion of infusion. 2x2 and coban placed. Mr. Julien Fabry was discharged from [place] [address] in stable condition.      Future Appointments   Date Time Provider Tad Molinai   3/15/2023  9:00 AM PACEMAKER, STFRANCES CAVSF BS AMB   3/15/2023  9:20 AM Jason Jeffrey MD CAVSF BS AMB   3/16/2023  1:00 PM RAD ONC THERAPY [facility]    3/17/2023 10:00 AM Ana Garza MD [facility] BS AMB   3/21/2023  1:00 PM Ruth Whitmore MD PMA BS AMB   3/31/2023  2:30 [facility]   3/31/2023  2:45 PM Christine Barrera  [address] BS AMB   4/28/2023 12:00 PM [facility] [place] RCR PET DOSE [address] DHARA   4/28/2023  1:00 PM [facility] RCR PET 1 MIKEY SIERRA [facility]       Eddie Pompa RN  March 10, 2023  1:35 PM

## 2023-03-11 NOTE — PROGRESS NOTES
Please call Mr. Mcadams and let him know that his magnesium remains low. This is likely a lingering effect of his chemotherapy. I would like him to return to clinic in 1 week for IV fluids and IV magnesium. He should continue to take the PO magnesium BID for now. Orders placed. Please message schedulers for [facility]. He prefers Friday infusions.

## 2023-03-13 ENCOUNTER — TELEPHONE (OUTPATIENT)
Dept: ONCOLOGY | Age: 68
End: 2023-03-13

## 2023-03-13 ENCOUNTER — TELEPHONE (OUTPATIENT)
Dept: PALLATIVE CARE | Age: 68
End: 2023-03-13

## 2023-03-13 NOTE — PROGRESS NOTES
Call to patient, Left VM on self identifying phone line with Provider message to RTC in 1 week for IV fluids/IV Magnesium, continue taking PO Mag BID. Supplied RN contact info if questions. Update to Provider/Scheduling.

## 2023-03-13 NOTE — TELEPHONE ENCOUNTER
0825:     Called patient per MD Didi Loomis request to notify of the following:   His magnesium remains low. This is likely a lingering effect of his chemotherapy. recommendation for him to return to clinic in 1 week for IV fluids and IV magnesium. He should continue to take the PO magnesium BID for now.      Orders placed    No answer   LVM to call office back

## 2023-03-16 ENCOUNTER — HOSPITAL ENCOUNTER (OUTPATIENT)
Dept: RADIATION THERAPY | Age: 68
Discharge: HOME | End: 2023-03-16

## 2023-03-17 ENCOUNTER — HOSPITAL ENCOUNTER (OUTPATIENT)
Dept: INFUSION THERAPY | Age: 68
Discharge: HOME | End: 2023-03-17
Payer: MEDICARE

## 2023-03-17 ENCOUNTER — OFFICE VISIT (OUTPATIENT)
Dept: PALLATIVE CARE | Age: 68
End: 2023-03-17

## 2023-03-17 VITALS
HEART RATE: 92 BPM | DIASTOLIC BLOOD PRESSURE: 80 MMHG | RESPIRATION RATE: 18 BRPM | TEMPERATURE: 97.7 F | SYSTOLIC BLOOD PRESSURE: 131 MMHG

## 2023-03-17 VITALS
HEART RATE: 100 BPM | WEIGHT: 157 LBS | SYSTOLIC BLOOD PRESSURE: 152 MMHG | DIASTOLIC BLOOD PRESSURE: 85 MMHG | TEMPERATURE: 95.8 F | HEIGHT: 65 IN | BODY MASS INDEX: 26.16 KG/M2 | OXYGEN SATURATION: 100 % | RESPIRATION RATE: 20 BRPM

## 2023-03-17 DIAGNOSIS — F17.200 TOBACCO DEPENDENCE: ICD-10-CM

## 2023-03-17 DIAGNOSIS — F10.21 HISTORY OF ALCOHOL DEPENDENCE (HCC): ICD-10-CM

## 2023-03-17 DIAGNOSIS — C76.0 SQUAMOUS CELL CARCINOMA OF HEAD AND NECK (HCC): ICD-10-CM

## 2023-03-17 DIAGNOSIS — N17.9 ACUTE KIDNEY INJURY (HCC): Primary | ICD-10-CM

## 2023-03-17 DIAGNOSIS — F33.40 RECURRENT MAJOR DEPRESSIVE DISORDER, IN REMISSION (HCC): Primary | ICD-10-CM

## 2023-03-17 DIAGNOSIS — Z71.89 ADVANCED CARE PLANNING/COUNSELING DISCUSSION: ICD-10-CM

## 2023-03-17 PROBLEM — F33.9 MAJOR DEPRESSIVE DISORDER, RECURRENT, UNSPECIFIED (HCC): Status: ACTIVE | Noted: 2023-01-01

## 2023-03-17 PROBLEM — F33.9 MAJOR DEPRESSIVE DISORDER, RECURRENT, UNSPECIFIED (HCC): Status: ACTIVE | Noted: 2023-03-17

## 2023-03-17 LAB
ALBUMIN SERPL-MCNC: 3.1 G/DL (ref 3.5–5)
ALBUMIN SERPL-MCNC: 3.4 G/DL (ref 3.5–5)
ALBUMIN/GLOB SERPL: 0.7 (ref 1.1–2.2)
ALBUMIN/GLOB SERPL: 0.7 (ref 1.1–2.2)
ALP SERPL-CCNC: 113 U/L (ref 45–117)
ALP SERPL-CCNC: 131 U/L (ref 45–117)
ALT SERPL-CCNC: 20 U/L (ref 12–78)
ALT SERPL-CCNC: ABNORMAL U/L (ref 12–78)
ANION GAP SERPL CALC-SCNC: 4 MMOL/L (ref 5–15)
ANION GAP SERPL CALC-SCNC: 4 MMOL/L (ref 5–15)
AST SERPL-CCNC: 20 U/L (ref 15–37)
AST SERPL-CCNC: ABNORMAL U/L (ref 15–37)
BILIRUB SERPL-MCNC: 0.3 MG/DL (ref 0.2–1)
BILIRUB SERPL-MCNC: 0.3 MG/DL (ref 0.2–1)
BUN SERPL-MCNC: 24 MG/DL (ref 6–20)
BUN SERPL-MCNC: 27 MG/DL (ref 6–20)
BUN/CREAT SERPL: 19 (ref 12–20)
BUN/CREAT SERPL: 20 (ref 12–20)
CALCIUM SERPL-MCNC: 9.2 MG/DL (ref 8.5–10.1)
CALCIUM SERPL-MCNC: 9.3 MG/DL (ref 8.5–10.1)
CHLORIDE SERPL-SCNC: 101 MMOL/L (ref 97–108)
CHLORIDE SERPL-SCNC: 103 MMOL/L (ref 97–108)
CO2 SERPL-SCNC: 29 MMOL/L (ref 21–32)
CO2 SERPL-SCNC: 30 MMOL/L (ref 21–32)
CREAT SERPL-MCNC: 1.27 MG/DL (ref 0.7–1.3)
CREAT SERPL-MCNC: 1.35 MG/DL (ref 0.7–1.3)
GLOBULIN SER CALC-MCNC: 4.6 G/DL (ref 2–4)
GLOBULIN SER CALC-MCNC: 4.6 G/DL (ref 2–4)
GLUCOSE SERPL-MCNC: 107 MG/DL (ref 65–100)
GLUCOSE SERPL-MCNC: 110 MG/DL (ref 65–100)
MAGNESIUM SERPL-MCNC: 2.8 MG/DL (ref 1.6–2.4)
MAGNESIUM SERPL-MCNC: NORMAL MG/DL (ref 1.6–2.4)
POTASSIUM SERPL-SCNC: 4.2 MMOL/L (ref 3.5–5.1)
POTASSIUM SERPL-SCNC: ABNORMAL MMOL/L (ref 3.5–5.1)
PROT SERPL-MCNC: 7.7 G/DL (ref 6.4–8.2)
PROT SERPL-MCNC: 8 G/DL (ref 6.4–8.2)
SODIUM SERPL-SCNC: 135 MMOL/L (ref 136–145)
SODIUM SERPL-SCNC: 136 MMOL/L (ref 136–145)

## 2023-03-17 PROCEDURE — 96365 THER/PROPH/DIAG IV INF INIT: CPT

## 2023-03-17 PROCEDURE — 83735 ASSAY OF MAGNESIUM: CPT

## 2023-03-17 PROCEDURE — 80053 COMPREHEN METABOLIC PANEL: CPT

## 2023-03-17 PROCEDURE — 74011250636 HC RX REV CODE- 250/636: Performed by: INTERNAL MEDICINE

## 2023-03-17 PROCEDURE — 36415 COLL VENOUS BLD VENIPUNCTURE: CPT

## 2023-03-17 PROCEDURE — 96361 HYDRATE IV INFUSION ADD-ON: CPT

## 2023-03-17 RX ORDER — CYCLOBENZAPRINE HCL 5 MG
TABLET ORAL
Qty: 30 TABLET | Refills: 1 | Status: SHIPPED | OUTPATIENT
Start: 2023-03-17

## 2023-03-17 RX ORDER — BUPROPION HYDROCHLORIDE 150 MG/1
150 TABLET ORAL
Qty: 90 TABLET | Refills: 1 | Status: SHIPPED | OUTPATIENT
Start: 2023-03-17

## 2023-03-17 RX ORDER — MAGNESIUM SULFATE HEPTAHYDRATE 40 MG/ML
2 INJECTION, SOLUTION INTRAVENOUS ONCE
Start: 2023-03-31 | End: 2023-03-31

## 2023-03-17 RX ORDER — MAGNESIUM SULFATE HEPTAHYDRATE 40 MG/ML
2 INJECTION, SOLUTION INTRAVENOUS ONCE
Status: COMPLETED | OUTPATIENT
Start: 2023-03-17 | End: 2023-03-17

## 2023-03-17 RX ORDER — TRAZODONE HYDROCHLORIDE 100 MG/1
100 TABLET ORAL
Qty: 90 TABLET | Refills: 1 | Status: SHIPPED | OUTPATIENT
Start: 2023-03-17

## 2023-03-17 RX ADMIN — MAGNESIUM SULFATE HEPTAHYDRATE 2 G: 40 INJECTION, SOLUTION INTRAVENOUS at 12:10

## 2023-03-17 RX ADMIN — SODIUM CHLORIDE 1000 ML: 900 INJECTION, SOLUTION INTRAVENOUS at 11:50

## 2023-03-17 NOTE — PROGRESS NOTES
Sophie Allison, please add labs and [facility]IC for IV fluids and IV magnesium to next week, 3/24    Mariama/Edmond, will you please add 1 L IVFs and 2 g IV magnesium for 3/24.

## 2023-03-17 NOTE — PROGRESS NOTES
[facility] Peds/Adult Note                       Date: 2023    Name: Javier Fierro MRN: [id number]         : 1955    1130 Patient arrives for Hydration/labs/magnesium without acute problems. Please see [facility] for complete assessment and education provided. Vital signs stable throughout and prior to discharge. Patient tolerated procedure well and was discharged without incident. Patient is aware of next [facility] appointment on  3/31/2023  Appointment card give to the Patient      Mr. Mcadams's vitals were reviewed prior to and after treatment. Patient Vitals for the past 12 hrs:   Temp Pulse Resp BP   23 1434 -- 92 18 131/80   23 1129 97.7 °F (36.5 °C) (!) 106 20 120/74       Lab results were obtained before infusion, but lab did not notify the infusion center that they were hemolyzed. Labs were redrawn after completion of infusion. Recent Results (from the past 12 hour(s))   MAGNESIUM    Collection Time: 23 11:58 AM   Result Value Ref Range    Magnesium HEMOLYZED,RECOLLECT REQUESTED 1.6 - 2.4 mg/dL   METABOLIC PANEL, COMPREHENSIVE    Collection Time: 23 11:58 AM   Result Value Ref Range    Sodium 136 136 - 145 mmol/L    Potassium HEMOLYZED,RECOLLECT REQUESTED 3.5 - 5.1 mmol/L    Chloride 103 97 - 108 mmol/L    CO2 29 21 - 32 mmol/L    Anion gap 4 (L) 5 - 15 mmol/L    Glucose 107 (H) 65 - 100 mg/dL    BUN 27 (H) 6 - 20 MG/DL    Creatinine 1.35 (H) 0.70 - 1.30 MG/DL    BUN/Creatinine ratio 20 12 - 20      eGFR 57 (L) >60 ml/min/1.73m2    Calcium 9.2 8.5 - 10.1 MG/DL    Bilirubin, total 0.3 0.2 - 1.0 MG/DL    ALT (SGPT) HEMOLYZED,RECOLLECT REQUESTED 12 - 78 U/L    AST (SGOT) HEMOLYZED,RECOLLECT REQUESTED 15 - 37 U/L    Alk.  phosphatase 113 45 - 117 U/L    Protein, total 7.7 6.4 - 8.2 g/dL    Albumin 3.1 (L) 3.5 - 5.0 g/dL    Globulin 4.6 (H) 2.0 - 4.0 g/dL    A-G Ratio 0.7 (L) 1.1 - 2.2     METABOLIC PANEL, COMPREHENSIVE    Collection Time: 23  2:32 PM   Result Value Ref Range    Sodium 135 (L) 136 - 145 mmol/L    Potassium 4.2 3.5 - 5.1 mmol/L    Chloride 101 97 - 108 mmol/L    CO2 30 21 - 32 mmol/L    Anion gap 4 (L) 5 - 15 mmol/L    Glucose 110 (H) 65 - 100 mg/dL    BUN 24 (H) 6 - 20 MG/DL    Creatinine 1.27 0.70 - 1.30 MG/DL    BUN/Creatinine ratio 19 12 - 20      eGFR >60 >60 ml/min/1.73m2    Calcium 9.3 8.5 - 10.1 MG/DL    Bilirubin, total 0.3 0.2 - 1.0 MG/DL    ALT (SGPT) 20 12 - 78 U/L    AST (SGOT) 20 15 - 37 U/L    Alk. phosphatase 131 (H) 45 - 117 U/L    Protein, total 8.0 6.4 - 8.2 g/dL    Albumin 3.4 (L) 3.5 - 5.0 g/dL    Globulin 4.6 (H) 2.0 - 4.0 g/dL    A-G Ratio 0.7 (L) 1.1 - 2.2     MAGNESIUM    Collection Time: 03/17/23  2:32 PM   Result Value Ref Range    Magnesium 2.8 (H) 1.6 - 2.4 mg/dL         Medications given:   Medications Administered       magnesium sulfate 2 g/50 ml IVPB (premix or compounded)       Admin Date  03/17/2023 Action  New Bag Dose  2 g Rate  25 mL/hr Route  IntraVENous Administered By  Lise Simmonds, RN              sodium chloride 0.9 % bolus infusion 1,000 mL       Admin Date  03/17/2023 Action  New Bag Dose  1,000 mL Rate  1,000 mL/hr Route  IntraVENous Administered By  Lise Simmonds, RN               Admin Date  03/17/2023 Action  Rate Change Dose   Rate  500 mL/hr Route  IntraVENous Administered By  Lise Simmonds, RN               Mr. Judy Shultz tolerated the infusion, and had no complaints. Mr. Judy Shultz was discharged from [place] [address] in stable condition. Discharge Instructions provided to patient, patient verbalized understanding but denied the request for a copy of d/c instructions.      Future Appointments   Date Time Provider [facility]   3/21/2023  1:00 PM Jenna Nunes MD P[facility] BS AMB   3/31/2023  2:30 PM DULCE MARIA GIBBS [facility]   3/31/2023  2:45 PM Caleb Mcconnell  [address] BS AMB   4/28/2023 12:00 PM [address] [place] [address] RCR PET DOSE [address] DHARA   4/28/2023  1:00 PM [address] [place] [address] RCR PET 1 MIKEY SIERRA DHARA   5/12/2023 11:00 AM Eriberto Mar MD [address] [facility] BS AMB   6/15/2023 10:45 AM [place] [address] [facility] BS AMB   3/27/2024  8:40 AM Claudy Jiménez MD [facility] BS AMB       Nehal Ortiz RN  March 17, 2023  1:09 PM

## 2023-03-17 NOTE — PROGRESS NOTES
Palliative Medicine Office Visit  Palliative Medicine Nurse Check In  [phone]    Patient Name: Alton Rubio. YOB: 1955      Date of Office Visit: 3/17/2023    Patient states: \" Patient has an appt with new PCP on 3/21/2023 \"    From Check In Sheet (scanned in Media):  Has a medical provider talked with you about cardiopulmonary resuscitation (CPR)? [x] Yes   [] No   [] Unable to obtain    Nurse reminder to complete or update ACP FlowSheet:    Is ACP on the Problem List?    [x] Yes    [] No  IF ACP Document is ON FILE; Nurse to place ACP on Problem List     Is there an ACP Note in Chart Review/Note? [x] Yes    [] No   If NO: ALERT PROVIDER     Primary Decision Maker (Active): Simone Brito - [phone]  Advance Care Planning 3/17/2023   Patient's Healthcare Decision Maker is: Named in scanned ACP document   Confirm Advance Directive Yes, on file   Patient Would Like to Complete Advance Directive -          Is there anything that we should know about you as a person in order to provide you the best care possible? Have you been to the ER, urgent care clinic since your last visit? [] Yes   [x] No   [] Unable to obtain    Have you been hospitalized since your last visit? [] Yes   [x] No   [] Unable to obtain    Have you seen or consulted any other health care providers outside of the [address] [place] [address] since your last visit? [] Yes   [x] No   [] Unable to obtain    Functional status (describe):         Last BM: 3/16/2023     accessed (date):      Bottle review (for opioid pain medication):  Medication 1:   Date filled:   Directions:   # filled:   # left:   # pills taking per day:  Last dose taken:    Medication 2:   Date filled:   Directions:   # filled:   # left:   # pills taking per day:  Last dose taken:    Medication 3:   Date filled:   Directions:   # filled:   # left:   # pills taking per day:  Last dose taken:    Medication 4:   Date filled: Directions:   # filled:   # left:   # pills taking per day:  Last dose taken:

## 2023-03-17 NOTE — ACP (ADVANCE CARE PLANNING)
Advance Care Planning     Advance Care Planning (ACP) Physician/NP/PA Conversation      Date of Conversation: 3/17/2023  Conducted with: Patient with Decision Making Capacity    Healthcare Decision Maker:     Primary Decision Maker: Wellington Hernández - William - [phone]  Click here to complete [id number] including selection of the Healthcare Decision Maker Relationship (ie \"Primary\")    Today we pt has an AMD done- as we have pages 2-4, but we are missing page 1. He states he named his son Jeny Kaminski on as his mPOA, but actually doesn't remember doing the document  itself and itsn't sure where it is. Instead of completing another, asked him to talk to Jeny Kaminski who may have it somewhere at home. Bring to next visit for scanning. Care Preferences:    Ventilation: \"If you were unable to breathe on your own and your chance of recovery was unlikely, what would be your preference about the use of a ventilator (breathing machine) if it was available to you? \"   The patient would desire the use of a ventilator. Resuscitation: \"In the event your heart stopped as a result of an underlying serious health condition, would you want attempts to be made to restart your heart, or would you prefer a natural death? \"   Yes, attempt to resuscitate. Additional topics discussed: resuscitation preferences    Conversation Outcomes / Follow-Up Plan:   Further ACP conversation to take place as disease changes / progresses on an as needed basis.    Reviewed DNR/DNI and patient elects Full Code (Attempt Resuscitation)     Length of Voluntary ACP Conversation in minutes:  16 minutes    Randi Gamboa MD

## 2023-03-17 NOTE — PROGRESS NOTES
Palliative Medicine Outpatient Services  [place]: [phone] ([phone])    Patient Name: Misty Smith. YOB: 1955    Date of Current Visit: 23  Location of Current Visit:    [x] [facility] Office  [] [facility] Office  [] [address] [place] [address]  [] Home  []Synchronous real-time A/V virtual visit    Date of Initial Visit: 23   Referral from: Dr. Hoda Ford   Primary Care Physician: Tika Ortiz NP      SUMMARY:   Misty Yun is a 75y.o. year old with a history of SCC of unknown primary. He had presented the summer of  with a large left sided neck mass, CT showed 4.8 x 3.1 cm mass/collection in left lateral neck with prominent cervical lymph nodes. He had an FNA in [place] which showed atypical cells. He has not had a pan-endoscopy due to treatment delays with his transition to [place] and need to initiate a plan. Scans did not show distant mets. He was referred to Palliative Medicine by Dr. Antonio Graham for symptom management and supportive care. Palliative Medicine is addressing the following current patient/family concerns: history of alcohol use disorder, mood disorder and grief, cancer related pain and advance care planning. PMHx:  History of alcohol dependence, active tobacco dependence, hypertension, diabetes, and hepatic steatosis/possible cirrhosis. Psychosocial Hx:  Lost his significant other Elena Sheffield in early October. They had been  previously, then reconnected 10 years after their divorce. He moved from [address] [place] [address] to [place] the day after her passing. His oldest daughter Bora Reddy lives in [place], he then had three children with Elena Sheffield who all live in [place]- eldest son Pj Harris III ([place]), [place] [facility] [facility] and Windy Plata ([place]). He is mostly residing with Pj Harris III and his fiance right now, but some days stays with Windy Plata. He has a long history of alcohol dependence with prior inpatient rehabilitation stays, history of incarceration.       Current treatment: completed concurrent chemoradiation (12/13/22 - 2/1/23). Care team:  -Dr. Sherri Butt   -Dr. Zonia Valles  -PCP Oralia Randall NP -  establish date changed from 1/30 to 2/23, also has appt in [facility] in late March  -Cards Dr. Jorden Zuniga- to establish 3/15     PALLIATIVE DIAGNOSES:       ICD-10-CM ICD-9-CM    1. Recurrent major depressive disorder, in [facility] ([facility]  F33.40 296.35       2. Tobacco dependence  F17.200 305.1       3. History of alcohol dependence (HCC)  F10.21 303.93       4. Squamous cell carcinoma of head and neck (HCC)  C76.0 195.0       5. Advanced care planning/counseling discussion  Z71.89 V65.49              PLAN:     Patient Instructions   Dear Alexa Jain. ,    It was a pleasure seeing you today at our [facility] office. We will see you again in approximately 2 months. If labs or imaging tests have been ordered for you today, please call the office  at [phone] 48 hours after completion to obtain the results. Your stated goal:  \"To have a few more years with my kids\"    Your described symptoms were: Fatigue: 6 Drowsiness: 0   Depression: 0 Pain: 3   Anxiety: 1 Nausea: 0   Anorexia: 5 Dyspnea: 5     Constipation: No   Other Problem (Comment): 0       This is the plan we talked about:  1. Grief and depression- uncontrolled  - We started Wellbutrin XL in Feb 2023. Original plan was to increase to 150 mg twice daily, but you kept it once a day, concerned the evening dose may keep you awake. - You do report an increase in motivation and overall mood stability today. - I recommend we keep the dose at 150 mg once a day in the morning  - We can consider a future increase to 300 mg once a day if necessary  - You may continue to take Trazodone 100 mg at bedtime  - You do not have your own transportation (you have to rely on your family) so you prefer support / therapy from online resources. You do have reliable internet access.   Will discuss options with [facility] 2.  Nutrition. Instructions from Andrea Miles on 2/9:  Plan:   - Continue with 4-5 Boost VHC per day (provides 2650kcal, 110g protein and 800ml free water)  - Continue to add 200ml water to each Boost VHC gravity bag- to provide additional 1.2L   - Continue with mucinex to help thin secretions  - Push fluids, encouraged more water even if he can't get more Boost in.      - You are doing great with your oral intake of fluids  - Still taking in little in the way of solids by mouth  - Weight is stable    3. Pain and headaches  - You have no active pain or headaches right now  - You previously used Dilaudid 2-mg tabs as needed but not currently  - You previously used Fioricet but it caused insomnia  - You may take Tylenol 1000 mg (2 x extra strength tabs) up to three times per day if a headache recurs. Do not take more than 3,000 mg of this per day. 4.  Alcohol dependence  - Congratulations on remaining sober from alcohol since November!  - We talked about support for this. We decided online support groups or personal counseling were best for your needs. 5.  Tobacco dependence  - Congratulations on cutting back to just 1-2 cigarettes per day!  - Bupropion (Wellbutrin) is also approved for smoking cessation, let's see if this is helpful to get you off cigarettes entirely. - You did not find benefit in the past from nicotine replacement (the patches or gums)    6. Claustrophobia  - You took 10 mg of diazepam (Valium) while getting radiation, this was helpful but not enough to get you through a PET-CT  - You are very worried about your April PET-CT. I recommend a short acting medicine- will use lorazepam (Ativan) 2-mg 1 hour before your scan. I will send this in at your next visit.      7.  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 - you are also on Januvia and take routinely. - Creatinine is 1.55 on most recent metabolic panel. Will defer management to new PCP. 8.  Squamous cell cancer of left neck  - you completed chemo-radiation.    - You follow with Radha Mariano and [address] will have a follow up PET-CT 2 months after completion of treatment. - We are here to support any symptoms that emerge or worsen including pain, sleep problems, headaches, anxiety or mood. 9  Advance directives  - Reviewed today the document we have on file, which is missing the first page  - You aren't sure if you have the full document at home, but are certain you would want your son Edwin Rice to act as your medical POA. - Please check with Edwin Rice, if he has the original document, we can make a copy to scan in next visit  - We discussed resuscitation preferences today, or what we call code status. You want to remain FULL code at this time, meaning you would accept CPR and life support measures to be performed. This is what you have shared with us about Advance Care Planning:      Primary Decision Maker (Active): Solis Mcadams III - Son - [phone]    WE STILL NEED PAGE ONE OF HIS AMD    Advance Care Planning 3/17/2023   Patient's Healthcare Decision Maker is: Named in scanned ACP document   Confirm Advance Directive Yes, on file   Patient Would Like to Complete Advance Directive -           The Palliative Medicine Team is here to support you and your family.        Sincerely,      Lawson Steen MD and the Palliative Medicine Team     GOALS OF CARE / TREATMENT PREFERENCES:     GOALS OF CARE:  Patient / health care proxy stated goals: See Patient Instructions / Summary    TREATMENT PREFERENCES:   Code Status:  [x] Attempt Resuscitation       [] Do Not Attempt Resuscitation    Advance Care Planning:  [x] The Pall Med Interdisciplinary Team has updated the ACP Navigator with Decision Maker and Patient Capacity      Primary Decision Maker: Bill Alonso - [phone]    Advance Care Planning 3/17/2023   Patient's Healthcare Decision Maker is: Named in scanned ACP document   Confirm Advance Directive Yes, on file   Patient Would Like to Complete Advance Directive -        PRESCRIPTIONS GIVEN:     Medications Ordered Today   Medications    cyclobenzaprine (FLEXERIL) 5 mg tablet     Sig: TAKE 1 TABLET BY MOUTH DAILY AS NEEDED FOR MUSCLE SPASM(S). Dispense:  30 Tablet     Refill:  1    magic mouthwash solution     Sig: Magic mouth wash Maalox Lidocaine 2% viscous Diphenhydramine oral solution Pharmacy to mix equal portions of ingredients to a total volume as indicated in the dispense amount. Dispense:  150 mL     Refill:  1    traZODone (DESYREL) 100 mg tablet     Sig: Take 1 Tablet by mouth nightly as needed for Sleep. Patient occasionally alternates with mirtazapine for a few days when mirtazapine stops helping his sleep. Dispense:  90 Tablet     Refill:  1    buPROPion XL (WELLBUTRIN XL) 150 mg tablet     Sig: Take 1 Tablet by mouth every morning.      Dispense:  90 Tablet     Refill:  1           FOLLOW UP:     Future Appointments   Date Time Provider Tad Maryjo   3/17/2023 11:30 AM B4 PEDS [facility]   3/21/2023  1:00 PM Fanny Fontana MD [facility] BS AMB   3/31/2023  2:30 PM G1 MADELYN [facility]   3/31/2023  2:45 PM Rosibel Carter  [facility] BS AMB   4/28/2023 12:00 PM [facility] RCR PET DOSE 1 [facility] DHARA   4/28/2023  1:00 PM [facility] RCR PET 1 [facility]PET MARIELA DHARA   5/12/2023 11:00 AM Berna Mar MD [facility] BS AMB   6/15/2023 10:45 AM [address] BS AMB   3/27/2024  8:40 AM Jax Boyd MD [facility] BS AMB           PHYSICIANS INVOLVED IN CARE:   Patient Care Team:  Jyothi Cortes NP as PCP - General (Nurse Practitioner)  Jyothi Cortes NP as PCP - [facility] Empaneled Provider       HISTORY:   Reviewed patient-completed ESAS and advance care planning form. Reviewed patient record in prescription monitoring program.    CHIEF COMPLAINT:   Chief Complaint   Patient presents with    Follow-up         HPI/SUBJECTIVE:    The patient is: [x] Verbal / [] Nonverbal     3/17- Mr. Simba Tello is here for routine follow up. He is doing generally well. No changes with his malignancy. Will have PET in 2 mo. Saw Dr. Eladia Palomares yesterday who was pleased with his progress. He has no pain at the tumor site but does worry about the ongoing drainage- clear / yellow. The drainage makes him worried about infection. No erythema, inc in swelling, pain or fevers. Explained these are what to be on the lookout for infectious process. No evidence of this today. His mood has improved since last visit. He feels slightly more motivated. Denies feeling depressed. DIL told him not to take  the Wellbutrin at night, so he has only been taking the one tab in the morning, did not inc.   She was worried it may keep him awake and he already has a lot of trouble sleeping, even with Trazodone 100 mg hs. Establishes with PCP next week. Eating well. Smoking still at 1 cigarette per day. Remains sober from alcohol.     -----------------------  Mr. Simba Tello is here for follow up, alone in visit. Son in waiting room. Performed extensive review of oncology and [facility] notes. Reviewed last 2 months of lab work. He is doing generally ok. Completed chemo-RT and now in surveillance pending follow up PET-CT in 3 months. His chief complaint is ongoing low mood. Feels depressed. This is accompanied by poor sleep. Discussed again two major losses this past year, his mother and his significant other Toby Uriarte. Remains sober from alcohol since November. Also continues to cut back on cigarettes. Both were his two major coping mechanisms.   Considering this, congratulated him on where he is right now. Has good support from his children. No outside therapy or support groups. Not driving right now so limited in transportation- doesn't want to overburden his kids and grandkids. Has a lot on his mind about his family and their needs. He did not bring his medicines with his nor a list.  Does know some of his medicines when names are read- but incomplete list.   Some of his medicines are still scripts leftover from [facility].. PCP appt was moved- has two appts scheduled right now (one on [facility], one in [facility]). Will be going to [facility] term. Today reports drinking fluids liberally. Low oral intake but tolerating PEG tube feedings. No N/V. Stools are normal.   No pain today. Discussed his diabetes. Taking his medications (from [facility]) regularly. A1c on  6.6. Taking statin regularly. Needs fasting lipid panel and kidney eval.      -----------------------------------------------------------------------  Mr. Toya Irving is here today to establish care in our clinic. He is alone during the visit today. Medication history initially limited as his sons kareem is managing his medications at home. I did have a list that was in the EMR that I was able to go through with him. He was familiar with some of his medicines but this required a great deal of prompting. He shared with us some about the loss of his significant other Tarun who  at the beginning of October while they were living together in [place]. He shared that they both suffered from medical issues due to alcoholism, it sounds like she passed away in their home together. Considering the grief he was feeling he moved out the very next day to [place] to be close to his children. He continues to work through his state of grief, he is tearful and shared how much he loved her. This is the hardest thing for him right now over any physical issues he is having. I reviewed Dr. Vega Sosa notes and he reaffirmed that he was found to have this left neck mass back in July 2022, he had had a biopsy which was not totally definitive but entire clinical picture certainly consistent with malignancy. He did not start treatment until arrival in [facility]. He is currently in the midst of a course of concurrent chemoradiation and tolerating fairly well. He had a PEG tube placed 12/29 and is tolerating tube feeds but has not reached max recommended enteral feeding. He is meeting with Florian Bettencourt periodically. I reviewed her notes today. He is still able to take in soft foods such as puddings and soups. He can also drink the boost that is intended for his PEG tube which she sometimes does. He was open about his alcoholism and share that he had previously been incarcerated as well as in rehabilitation for alcohol use disorder. His last rehabilitation program was approximately 10 years ago. He subsequently relapsed and been actively drinking for 2 weeks after moving to [place]. He has since made a commitment to his son to stop drinking. He also says for the first time he has been motivated to stop tobacco and has managed to cut back from 1/2 pack/day to 2 cigarettes/day. He was on Chantix up in [place] but not utilizing anything here. He reports sampling other substances in the past but nothing that he used on a regular basis or became \"addicted to\". He was a  and developed chronic low back pain. In [place] he was taking Flexeril 10 mg as needed. He is also experiencing pain in the left side of his neck as well as headaches which are likely referred from the mass. He did not go into great detail about pain or discomfort but states that taking Dilaudid at bedtime is helpful for him to sleep. He is no longer using Fioricet as it kept him awake. He did not request any changes to his medications or dose adjustments.       Clinical Pain Assessment (nonverbal scale for nonverbal patients):   [++++ Clinical Pain Assessment++++]  [++++Pain Severity++++]: Pain: 3  [++++Pain Character++++]:  [++++Pain Duration++++]:  [++++Pain Effect++++]:  [++++Pain Factors++++]:  [++++Pain Frequency++++]:  [++++Pain Location++++]:  [++++ Clinical Pain Assessment++++]       FUNCTIONAL ASSESSMENT:     Palliative Performance Scale (PPS):  PPS: 70       PSYCHOSOCIAL/SPIRITUAL SCREENING:     Any spiritual / Christian concerns:  [] Yes /  [x] No    Caregiver Burnout:  [] Yes /  [] No /  [x] No Caregiver Present      Anticipatory grief assessment:   [x] Normal  / [] Maladaptive       ESAS Anxiety: Anxiety: 1    ESAS Depression: Depression: 0       REVIEW OF SYSTEMS:     The following systems were [x] reviewed / [] unable to be reviewed  Systems: constitutional, ears/nose/mouth/throat, respiratory, gastrointestinal, genitourinary, musculoskeletal, integumentary, neurologic, psychiatric, endocrine. Positive findings noted below. Modified ESAS Completed by: provider   Fatigue: 6 Drowsiness: 0   Depression: 0 Pain: 3   Anxiety: 1 Nausea: 0   Anorexia: 5 Dyspnea: 5     Constipation: No   Other Problem (Comment): 0          PHYSICAL EXAM:     Wt Readings from Last 3 Encounters:   03/17/23 157 lb (71.2 kg)   03/03/23 156 lb (70.8 kg)   02/20/23 165 lb 9.1 oz (75.1 kg)     Blood pressure (!) 152/85, pulse 100, temperature (!) 95.8 °F (35.4 °C), temperature source Oral, resp. rate 20, height 5' 5\" (1.651 m), weight 157 lb (71.2 kg), SpO2 100 %.   Last bowel movement: See Nursing Note    Constitutional: Age appropriate white male, sitting up in chair, appears relaxed, well kempt  Eyes: sclerae anicteric  ENMT: large left sided mid neck mass  Cardiovascular: regular rhythm, distal pulses intact  Respiratory: breathing not labored, symmetric, no oxygen needs  Gastrointestinal: soft non-tender, +bowel sounds  Musculoskeletal: no deformity, no tenderness to palpation  Skin: warm, dry  Neurologic: A&Ox4, full cognition, following commands, moving all extremities, no tremor or rigidity  Psychiatric: appropriate affect today, no hallucinations, agitation or restlessness       HISTORY:     Past Medical History:   Diagnosis Date    Acute gout due to renal impairment involving left ankle 2/9/2023    Bradycardia 12/7/2022    Cellulitis and abscess of neck 12/1/2022    Diabetes ([facility]     High risk medication use 12/13/2022    HTN (hypertension)     Hypercholesteremia     Hyponatremia 2/17/2023    Hypotension due to hypovolemia 1/9/2023    Intractable persistent migraine aura without cerebral infarction and without status migrainosus 12/13/2022    Mass of left side of neck 12/1/2022    Skin cancer     L sided neck skin cancer cells      Past Surgical History:   Procedure Laterality Date    IR INSERT GASTROSTOMY TUBE [facility]  12/29/2022      History reviewed. No pertinent family history. History reviewed, no pertinent family history. Social History     Tobacco Use    Smoking status: Every Day     Packs/day: 0.50     Types: Cigarettes    Smokeless tobacco: Never   Substance Use Topics    Alcohol use: Yes     Comment: 2 drinks/day     No Known Allergies   Current Outpatient Medications   Medication Sig    cyclobenzaprine (FLEXERIL) 5 mg tablet TAKE 1 TABLET BY MOUTH DAILY AS NEEDED FOR MUSCLE SPASM(S).    magic mouthwash solution Magic mouth wash Maalox Lidocaine 2% viscous Diphenhydramine oral solution Pharmacy to mix equal portions of ingredients to a total volume as indicated in the dispense amount. traZODone (DESYREL) 100 mg tablet Take 1 Tablet by mouth nightly as needed for Sleep. Patient occasionally alternates with mirtazapine for a few days when mirtazapine stops helping his sleep. buPROPion XL (WELLBUTRIN XL) 150 mg tablet Take 1 Tablet by mouth every morning. colchicine 0.6 mg tablet TAKE 1 TABLET BY MOUTH AS NEEDED FOR GOUT OR PAIN.  TAKES AS NEEDED FOR GOUT    magnesium oxide (MAG-OX) 400 mg tablet Take 1 Tablet by mouth two (2) times a day. OTHER Tylenol with Codeine    nut tx, lact-reduced, iron (Boost VHC) 0.09-2.25 gram-kcal/mL liqd 5 Bottles by Gastrostomy Tube route daily. SITagliptin (JANUVIA) 100 mg tablet Take 100 mg by mouth daily. pantoprazole (PROTONIX) 40 mg tablet Take 40 mg by mouth daily. tamsulosin (FLOMAX) 0.4 mg capsule Take 0.4 mg by mouth daily. cyclobenzaprine (FLEXERIL) 10 mg tablet TAKE 1 TABLET BY MOUTH THREE TIMES A DAY AS NEEDED FOR MUSCLE SPASMS (Patient not taking: Reported on 3/17/2023)    ondansetron (ZOFRAN ODT) 4 mg disintegrating tablet Take 1 Tablet by mouth every eight (8) hours as needed for Nausea or Vomiting. (Patient not taking: Reported on 3/17/2023)    prochlorperazine (COMPAZINE) 10 mg tablet Take 1 Tablet by mouth every six (6) hours as needed for Nausea or Vomiting. (Patient not taking: Reported on 3/17/2023)     No current facility-administered medications for this visit. LAB and other DATA REVIEWED:     Lab Results   Component Value Date/Time    WBC 3.6 (L) 03/10/2023 11:25 AM    HGB 8.1 (L) 03/10/2023 11:25 AM    PLATELET 266 (L) 19/28/9467 11:25 AM     Lab Results   Component Value Date/Time    Sodium 138 03/10/2023 11:25 AM    Potassium 4.6 03/10/2023 11:25 AM    Chloride 102 03/10/2023 11:25 AM    CO2 31 03/10/2023 11:25 AM    BUN 24 (H) 03/10/2023 11:25 AM    Creatinine 1.43 (H) 03/10/2023 11:25 AM    Calcium 9.0 03/10/2023 11:25 AM    Magnesium 1.2 (L) 03/10/2023 11:25 AM    Phosphorus 3.3 03/10/2023 11:25 AM      Lab Results   Component Value Date/Time    Alk.  phosphatase 132 (H) 03/10/2023 11:25 AM    Protein, total 7.0 03/10/2023 11:25 AM    Albumin 3.4 (L) 03/10/2023 11:25 AM    Globulin 3.6 03/10/2023 11:25 AM     No results found for: INR, PTMR, PTP, PT1, PT2, APTT, INREXT, INREXT   Lab Results   Component Value Date/Time    Iron 104 03/03/2023 03:06 PM    TIBC 255 03/03/2023 03:06 PM    Iron % saturation 41 03/03/2023 03:06 PM Jason 703 (H) 03/03/2023 03:06 PM        Detail chart reviewed. Other specialists and referral provider notes reviewed.      CONTROLLED SUBSTANCES SAFETY ASSESSMENT (IF ON CONTROLLED SUBSTANCES):     Reviewed opioid safety handout:  [x] Yes   [] No  24 hour opioid dose >150mg morphine equivalent/day:  [] Yes   [x] No  Benzodiazepines:  [] Yes   [x] No - prn use prior to radiation   Sleep apnea:  [] Yes   [x] No  Urine Toxicology Testing within last 6 months:  [] Yes   [x] No  History of or new aberrant medication taking behaviors:  [] Yes   [x] No  Has Narcan been prescribed [x] Yes   [] No          Total time:  Counseling / coordination time:   > 50% counseling / coordination?:

## 2023-03-17 NOTE — PATIENT INSTRUCTIONS
Dear Jose Stubbs. ,    It was a pleasure seeing you today at our [facility] office. We will see you again in approximately 2 months. If labs or imaging tests have been ordered for you today, please call the office  at [phone] 48 hours after completion to obtain the results. Your stated goal:  \"To have a few more years with my kids\"    Your described symptoms were: Fatigue: 6 Drowsiness: 0   Depression: 0 Pain: 3   Anxiety: 1 Nausea: 0   Anorexia: 5 Dyspnea: 5     Constipation: No   Other Problem (Comment): 0       This is the plan we talked about:  1. Grief and depression- uncontrolled  - We started Wellbutrin XL in Feb 2023. Original plan was to increase to 150 mg twice daily, but you kept it once a day, concerned the evening dose may keep you awake. - You do report an increase in motivation and overall mood stability today. - I recommend we keep the dose at 150 mg once a day in the morning  - We can consider a future increase to 300 mg once a day if necessary  - You may continue to take Trazodone 100 mg at bedtime  - You do not have your own transportation (you have to rely on your family) so you prefer support / therapy from online resources. You do have reliable internet access. Will discuss options with Leata Hamman, LCSW. 2.  Nutrition. Instructions from Dylan Ely on 2/9:  Plan:   - Continue with 4-5 Boost VHC per day (provides 2650kcal, 110g protein and 800ml free water)  - Continue to add 200ml water to each Boost VHC gravity bag- to provide additional 1.2L   - Continue with mucinex to help thin secretions  - Push fluids, encouraged more water even if he can't get more Boost in.      - You are doing great with your oral intake of fluids  - Still taking in little in the way of solids by mouth  - Weight is stable    3.   Pain and headaches  - You have no active pain or headaches right now  - You previously used Dilaudid 2-mg tabs as needed but not currently  - You previously used Fioricet but it caused insomnia  - You may take Tylenol 1000 mg (2 x extra strength tabs) up to three times per day if a headache recurs. Do not take more than 3,000 mg of this per day. 4.  Alcohol dependence  - Congratulations on remaining sober from alcohol since November!  - We talked about support for this. We decided online support groups or personal counseling were best for your needs. 5.  Tobacco dependence  - Congratulations on cutting back to just 1-2 cigarettes per day!  - Bupropion (Wellbutrin) is also approved for smoking cessation, let's see if this is helpful to get you off cigarettes entirely. - You did not find benefit in the past from nicotine replacement (the patches or gums)    6. Claustrophobia  - You took 10 mg of diazepam (Valium) while getting radiation, this was helpful but not enough to get you through a PET-CT  - You are very worried about your April PET-CT. I recommend a short acting medicine- will use lorazepam (Ativan) 2-mg 1 hour before your scan. I will send this in at your next visit. 7.  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 - you are also on Januvia and take routinely. - Creatinine is 1.55 on most recent metabolic panel. Will defer management to new PCP. 8.  Squamous cell cancer of left neck  - you completed chemo-radiation.    - You follow with Radha Martin and [phone] will have a follow up PET-CT 2 months after completion of treatment. - We are here to support any symptoms that emerge or worsen including pain, sleep problems, headaches, anxiety or mood.       9  Advance directives  - Reviewed today the document we have on file, which is missing the first page  - You aren't sure if you have the full document at home, but are certain you would want your son Elaina Weeks to act as your medical POA. - Please check with Elaina Weeks, if he has the original document, we can make a copy to scan in next visit  - We discussed resuscitation preferences today, or what we call code status. You want to remain FULL code at this time, meaning you would accept CPR and life support measures to be performed. This is what you have shared with us about Advance Care Planning:      Primary Decision Maker (Active): Solis Mcadams III - Son - [phone]    WE STILL NEED PAGE ONE OF HIS AMD    Advance Care Planning 3/17/2023   Patient's Healthcare Decision Maker is: Named in scanned ACP document   Confirm Advance Directive Yes, on file   Patient Would Like to Complete Advance Directive -           The Palliative Medicine Team is here to support you and your family.        Sincerely,      Susanna Boyd MD and the Palliative Medicine Team

## 2023-03-21 ENCOUNTER — OFFICE VISIT (OUTPATIENT)
Dept: FAMILY MEDICINE CLINIC | Age: 68
End: 2023-03-21
Payer: MEDICARE

## 2023-03-21 ENCOUNTER — APPOINTMENT (OUTPATIENT)
Dept: FAMILY MEDICINE CLINIC | Age: 68
End: 2023-03-21

## 2023-03-21 VITALS
RESPIRATION RATE: 18 BRPM | HEART RATE: 117 BPM | TEMPERATURE: 98.2 F | HEIGHT: 66 IN | DIASTOLIC BLOOD PRESSURE: 72 MMHG | BODY MASS INDEX: 24.78 KG/M2 | SYSTOLIC BLOOD PRESSURE: 124 MMHG | OXYGEN SATURATION: 98 % | WEIGHT: 154.2 LBS

## 2023-03-21 DIAGNOSIS — R68.2 DRY MOUTH: ICD-10-CM

## 2023-03-21 DIAGNOSIS — C32.9 LARYNGEAL CANCER (HCC): Primary | ICD-10-CM

## 2023-03-21 DIAGNOSIS — I42.9 CARDIOMYOPATHY, UNSPECIFIED TYPE (HCC): ICD-10-CM

## 2023-03-21 DIAGNOSIS — M10.9 ACUTE GOUT OF KNEE, UNSPECIFIED CAUSE, UNSPECIFIED LATERALITY: ICD-10-CM

## 2023-03-21 PROCEDURE — 1123F ACP DISCUSS/DSCN MKR DOCD: CPT | Performed by: FAMILY MEDICINE

## 2023-03-21 PROCEDURE — 1111F DSCHRG MED/CURRENT MED MERGE: CPT | Performed by: FAMILY MEDICINE

## 2023-03-21 PROCEDURE — 1101F PT FALLS ASSESS-DOCD LE1/YR: CPT | Performed by: FAMILY MEDICINE

## 2023-03-21 PROCEDURE — 99204 OFFICE O/P NEW MOD 45 MIN: CPT | Performed by: FAMILY MEDICINE

## 2023-03-21 PROCEDURE — G8427 DOCREV CUR MEDS BY ELIG CLIN: HCPCS | Performed by: FAMILY MEDICINE

## 2023-03-21 PROCEDURE — 3017F COLORECTAL CA SCREEN DOC REV: CPT | Performed by: FAMILY MEDICINE

## 2023-03-21 PROCEDURE — G9717 DOC PT DX DEP/BP F/U NT REQ: HCPCS | Performed by: FAMILY MEDICINE

## 2023-03-21 PROCEDURE — G8417 CALC BMI ABV UP PARAM F/U: HCPCS | Performed by: FAMILY MEDICINE

## 2023-03-21 PROCEDURE — G8536 NO DOC ELDER MAL SCRN: HCPCS | Performed by: FAMILY MEDICINE

## 2023-03-21 RX ORDER — ALLOPURINOL 100 MG/1
100 TABLET ORAL DAILY
Qty: 60 TABLET | Refills: 0 | Status: SHIPPED | OUTPATIENT
Start: 2023-03-21

## 2023-03-21 RX ORDER — LANOLIN ALCOHOL/MO/W.PET/CERES
CREAM (GRAM) TOPICAL
COMMUNITY

## 2023-03-21 RX ORDER — FLUORIDE TOOTHPASTE
15 TOOTHPASTE DENTAL AS NEEDED
Qty: 1000 ML | Refills: 1 | Status: SHIPPED | OUTPATIENT
Start: 2023-03-21

## 2023-03-21 RX ORDER — LANOLIN ALCOHOL/MO/W.PET/CERES
1000 CREAM (GRAM) TOPICAL DAILY
COMMUNITY

## 2023-03-21 RX ORDER — GABAPENTIN 400 MG/1
400 CAPSULE ORAL 3 TIMES DAILY
COMMUNITY

## 2023-03-21 RX ORDER — BUPROPION HYDROCHLORIDE 100 MG/1
100 TABLET, EXTENDED RELEASE ORAL DAILY
Qty: 30 TABLET | Refills: 0 | Status: SHIPPED | OUTPATIENT
Start: 2023-03-21

## 2023-03-21 NOTE — PROGRESS NOTES
Identified pt with two pt identifiers(name and ). Chief Complaint   Patient presents with    Establish Care     Patient is here to establish care prior pcp was peter witt in [facility] Due   Topic    Hepatitis C Screening     Pneumococcal 65+ years (1 - PCV)    DTaP/Tdap/Td series (1 - Tdap)    Lipid Screen     Colorectal Cancer Screening Combo     Shingles Vaccine (1 of 2)    COVID-19 Vaccine (4 - Booster for Moderna series)    Flu Vaccine (1)    Medicare Yearly Exam        Wt Readings from Last 3 Encounters:   23 157 lb (71.2 kg)   23 156 lb (70.8 kg)   23 165 lb 9.1 oz (75.1 kg)     Temp Readings from Last 3 Encounters:   23 97.7 °F (36.5 °C)   23 (!) 95.8 °F (35.4 °C) (Oral)   03/10/23 98 °F (36.7 °C)     BP Readings from Last 3 Encounters:   23 131/80   23 (!) 152/85   03/10/23 (!) 160/79     Pulse Readings from Last 3 Encounters:   23 92   23 100   03/10/23 90         Learning Assessment:  :     No flowsheet data found.     Depression Screening:  :     3 most recent PHQ Screens 3/21/2023   PHQ Not Done -   Little interest or pleasure in doing things Not at all   Feeling down, depressed, irritable, or hopeless Not at all   Total Score PHQ 2 0   Trouble falling or staying asleep, or sleeping too much Not at all   Feeling tired or having little energy Not at all   Poor appetite, weight loss, or overeating Not at all   Feeling bad about yourself - or that you are a failure or have let yourself or your family down Not at all   Trouble concentrating on things such as school, work, reading, or watching TV Not at all   Moving or speaking so slowly that other people could have noticed; or the opposite being so fidgety that others notice Not at all   Thoughts of being better off dead, or hurting yourself in some way Not at all   PHQ 9 Score 0   How difficult have these problems made it for you to do your work, take care of your home and get along with others Not difficult at all       Fall Risk Assessment:  :     Fall Risk Assessment, last 12 mths 3/17/2023   Able to walk? Yes   Fall in past 12 months? 1   Do you feel unsteady? 1   Are you worried about falling -   Is the gait abnormal? -   Number of falls in past 12 months -   Fall with injury? 1       Abuse Screening:  :     Abuse Screening Questionnaire 3/17/2023 2/17/2023 1/19/2023   Do you ever feel afraid of your partner? N N N   Are you in a relationship with someone who physically or mentally threatens you? N N N   Is it safe for you to go home? Y Y Y       Coordination of Care Questionnaire:  :     1) Have you been to an emergency room, urgent care clinic since your last visit? no   Hospitalized since your last visit? no             2) Have you seen or consulted any other health care providers outside of [address] [place] [address] since your last visit? no  (Include any pap smears or colon screenings in this section.)    3) Do you have an Advance Directive on file? yes  Are you interested in receiving information about Advance Directives? no    Patient is accompanied by self I have received verbal consent from [place]. to discuss any/all medical information while they are present in the room. 4.  For patients aged 39-70: Has the patient had a colonoscopy / FIT/ Cologuard? Yes - no Care Gap present      If the patient is female:    5. For patients aged 41-77: Has the patient had a mammogram within the past 2 years? NA - based on age or sex      10. For patients aged 21-65: Has the patient had a pap smear?  NA - based on age or sex

## 2023-03-21 NOTE — PROGRESS NOTES
Tracey Moyer (: 1955) is a 75 y.o. male, new patient, here for evaluation of the following chief complaint(s):  Establish Care (Patient is here to establish care prior pcp was peter witt in [place] ) and Squamous Cell Carcinoma       ASSESSMENT/PLAN:  Below is the assessment and plan developed based on review of pertinent history, physical exam, labs, studies, and medications. 1. Laryngeal cancer (HCC)  -     buPROPion SR (WELLBUTRIN SR) 100 mg SR tablet; Take 1 Tablet by mouth daily. Indications: anxiousness associated with depression, Normal, Disp-30 Tablet, R-0  -     HEMOGLOBIN A1C WITH EAG; Future  2. Cardiomyopathy, unspecified type ([address]  -     HEMOGLOBIN A1C WITH EAG; Future  3. Acute gout of knee, unspecified cause, unspecified laterality  -     allopurinoL (ZYLOPRIM) 100 mg tablet; Take 1 Tablet by mouth daily. Indications: an increase in uric acid due to cancer therapy, Normal, Disp-60 Tablet, R-0  -     HEMOGLOBIN A1C WITH EAG; Future  4. Dry mouth  -     saliva substitution (Biotene Dry Mouth Oral Rinse) mwsh mouthwash; 15 mL by Mucous Membrane route as needed for PRN Reason (Other) (dry mouth). , Normal, Disp-1000 mL, R-1  -     saliva stimulant (BIOTENE) spry; Take 1 Spray by mouth as needed for PRN Reason (Other) (dry mouth). , Normal, Disp-44.3 mL, R-0  -     saliva stimulant comb. no.7 (BIOTENE) gel mucosal gel; 1 g by Mucous Membrane route three (3) times daily. , Normal, Disp-42 g, R-0  -     saliva substitution (BIOTENE DRY MOUTH RINSE) mwsh mouthwash; 15 mL by Mucous Membrane route as needed for PRN Reason (Other) (dry mouth). , Normal, Disp-1000 mL, R-0  -     HEMOGLOBIN A1C WITH EAG; Future    No follow-ups on file. SUBJECTIVE/OBJECTIVE:  Tracey Moyer is a 75 y.o. male presents to the clinic to establish care with a history of being diagnosed with laryngeal cancer, and has undergone treatment through chemo-radiation.  Has hx of high uric acid, would like to understand why the uric acid is going up when he is controlling his diet. Additionally, the patient thinks that he has poorly controlled diabetes, we discuss that his diabetes isn't bad, and that while he is undergoing treatment for diabetes, he should try to eat as much as he can. Especially foods that are palatable, hydrating and high in protein, like boost or ensure. Additionally, the patient should eat popsicles and ice cream to help him reach a good daily calorie count. Review of Systems   Constitutional:  Positive for activity change, appetite change, fatigue and unexpected weight change. Negative for fever. 180 lbs to 153 lbs, hard to swallow trying to eat soups soft foods, avoiding dense foods   HENT:  Positive for congestion, drooling, rhinorrhea, sinus pressure and sinus pain. Negative for dental problem, ear discharge and sore throat. Eyes: Negative. Negative for visual disturbance. Respiratory:  Positive for choking, shortness of breath and wheezing. Negative for apnea, cough and chest tightness. Cardiovascular:  Negative for chest pain, palpitations and leg swelling. Gastrointestinal:  Positive for constipation. Negative for abdominal pain, anal bleeding, diarrhea, nausea and vomiting. Endocrine: Negative. Negative for cold intolerance, heat intolerance and polydipsia. Genitourinary:  Positive for frequency and urgency. Negative for dysuria. Musculoskeletal:  Positive for arthralgias and joint swelling. Skin: Negative. Allergic/Immunologic: Positive for environmental allergies. Negative for food allergies and immunocompromised state. Neurological:  Positive for facial asymmetry, speech difficulty and light-headedness. Negative for dizziness, syncope, weakness, numbness and headaches. Physical Exam  Constitutional:       Appearance: Normal appearance. He is normal weight. HENT:      Head: Normocephalic and atraumatic.       Salivary Glands: Right salivary gland is not diffusely enlarged or tender. Left salivary gland is not diffusely enlarged or tender. Right Ear: Tympanic membrane, ear canal and external ear normal.      Left Ear: Tympanic membrane and ear canal normal.      Nose: Nose normal.      Mouth/Throat:      Mouth: Mucous membranes are dry. Dentition: Abnormal dentition. Dental tenderness and gingival swelling present. No dental caries or gum lesions. Tongue: Lesions present. Pharynx: No pharyngeal swelling, oropharyngeal exudate, posterior oropharyngeal erythema or uvula swelling. Tonsils: No tonsillar exudate or tonsillar abscesses. Eyes:      Extraocular Movements: Extraocular movements intact. Conjunctiva/sclera: Conjunctivae normal.      Pupils: Pupils are equal, round, and reactive to light. Neck:     Cardiovascular:      Rate and Rhythm: Normal rate and regular rhythm. Pulses: Normal pulses. Heart sounds: Normal heart sounds. Pulmonary:      Effort: Pulmonary effort is normal.   Abdominal:      General: Abdomen is flat. Bowel sounds are normal.   Skin:     Capillary Refill: Capillary refill takes less than 2 seconds. Neurological:      General: No focal deficit present. Mental Status: He is alert and oriented to person, place, and time. Mental status is at baseline. An electronic signature was used to authenticate this note.   -- Sujata Metz MD

## 2023-03-22 LAB
EST. AVERAGE GLUCOSE BLD GHB EST-MCNC: 111 MG/DL
HBA1C MFR BLD: 5.5 % (ref 4–5.6)

## 2023-03-23 PROBLEM — R68.2 DRY MOUTH: Status: ACTIVE | Noted: 2023-01-01

## 2023-03-23 PROBLEM — M10.9 ACUTE GOUT OF KNEE: Status: ACTIVE | Noted: 2023-01-01

## 2023-03-23 PROBLEM — M10.9 ACUTE GOUT OF KNEE: Status: ACTIVE | Noted: 2023-03-23

## 2023-03-23 PROBLEM — C32.9 LARYNGEAL CANCER (HCC): Status: ACTIVE | Noted: 2023-01-01

## 2023-03-23 PROBLEM — R68.2 DRY MOUTH: Status: ACTIVE | Noted: 2023-03-23

## 2023-03-23 PROBLEM — C32.9 LARYNGEAL CANCER (HCC): Status: ACTIVE | Noted: 2023-03-23

## 2023-03-24 ENCOUNTER — DOCUMENTATION ONLY (OUTPATIENT)
Dept: ONCOLOGY | Age: 68
End: 2023-03-24

## 2023-03-24 ENCOUNTER — HOSPITAL ENCOUNTER (OUTPATIENT)
Dept: INFUSION THERAPY | Age: 68
Discharge: HOME | End: 2023-03-24
Payer: MEDICARE

## 2023-03-24 VITALS
DIASTOLIC BLOOD PRESSURE: 74 MMHG | RESPIRATION RATE: 18 BRPM | TEMPERATURE: 97.8 F | HEART RATE: 91 BPM | SYSTOLIC BLOOD PRESSURE: 163 MMHG

## 2023-03-24 DIAGNOSIS — C76.0 SQUAMOUS CELL CARCINOMA OF HEAD AND NECK (HCC): ICD-10-CM

## 2023-03-24 DIAGNOSIS — N17.9 ACUTE KIDNEY INJURY (HCC): Primary | ICD-10-CM

## 2023-03-24 LAB
ALBUMIN SERPL-MCNC: 3.6 G/DL (ref 3.5–5)
ALBUMIN/GLOB SERPL: 0.8 (ref 1.1–2.2)
ALP SERPL-CCNC: 145 U/L (ref 45–117)
ALT SERPL-CCNC: 22 U/L (ref 12–78)
ANION GAP SERPL CALC-SCNC: 4 MMOL/L (ref 5–15)
AST SERPL-CCNC: 21 U/L (ref 15–37)
BILIRUB SERPL-MCNC: 0.3 MG/DL (ref 0.2–1)
BUN SERPL-MCNC: 33 MG/DL (ref 6–20)
BUN/CREAT SERPL: 21 (ref 12–20)
CALCIUM SERPL-MCNC: 10 MG/DL (ref 8.5–10.1)
CHLORIDE SERPL-SCNC: 103 MMOL/L (ref 97–108)
CO2 SERPL-SCNC: 30 MMOL/L (ref 21–32)
CREAT SERPL-MCNC: 1.55 MG/DL (ref 0.7–1.3)
GLOBULIN SER CALC-MCNC: 4.8 G/DL (ref 2–4)
GLUCOSE SERPL-MCNC: 192 MG/DL (ref 65–100)
MAGNESIUM SERPL-MCNC: 1.8 MG/DL (ref 1.6–2.4)
POTASSIUM SERPL-SCNC: 4.3 MMOL/L (ref 3.5–5.1)
PROT SERPL-MCNC: 8.4 G/DL (ref 6.4–8.2)
SODIUM SERPL-SCNC: 137 MMOL/L (ref 136–145)

## 2023-03-24 PROCEDURE — 36415 COLL VENOUS BLD VENIPUNCTURE: CPT

## 2023-03-24 PROCEDURE — 80053 COMPREHEN METABOLIC PANEL: CPT

## 2023-03-24 PROCEDURE — 74011250636 HC RX REV CODE- 250/636: Performed by: NURSE PRACTITIONER

## 2023-03-24 PROCEDURE — 96360 HYDRATION IV INFUSION INIT: CPT

## 2023-03-24 PROCEDURE — 83735 ASSAY OF MAGNESIUM: CPT

## 2023-03-24 RX ORDER — MAGNESIUM SULFATE HEPTAHYDRATE 40 MG/ML
2 INJECTION, SOLUTION INTRAVENOUS ONCE
Status: ACTIVE | OUTPATIENT
Start: 2023-03-24 | End: 2023-03-24

## 2023-03-24 RX ADMIN — SODIUM CHLORIDE 1000 ML: 9 INJECTION, SOLUTION INTRAVENOUS at 09:00

## 2023-03-24 NOTE — PROGRESS NOTES
[facility] [id number]  Susan Reese 103: [phone]  F: [phone]    Medical Nutrition Therapy  Nutrition Encounter:    Met with patient during [facility] appt. He is doing well. Feeding tube has been removed. He reports some dry mouth. He often has to wash food down with water after each bite. He reports weight stable at home. Concurrent chemoradiation - completed treatment. Chemotherapy Flowsheet 1/31/2023   Cycle CD43   Date 1/31/2023   Drug / Regimen Cisplatin   Pre Hydration -   Post Hydration -   Pre Meds -   Notes -       Wt Readings from Last 8 Encounters:   03/21/23 154 lb 3.2 oz (69.9 kg)   03/17/23 157 lb (71.2 kg)   03/03/23 156 lb (70.8 kg)   02/20/23 165 lb 9.1 oz (75.1 kg)   02/17/23 166 lb (75.3 kg)   02/03/23 165 lb (74.8 kg)   01/31/23 165 lb 9.6 oz (75.1 kg)   01/31/23 165 lb 9.1 oz (75.1 kg)      Estimated Nutrition Needs:   Calorie Range: 2184-2688kcal/day     Protein Range: 78-95g/day     Fluid Needs: 2200ml     Plan:   - Continue with Boost VHC by mouth.   - PO and fluid by mouth.      Signed By: Neisha Carter, [place]

## 2023-03-24 NOTE — PROGRESS NOTES
[facility] [place] Short Note                       Date: 2023    Name: Be Castellanos. MRN: [id number]         : 1955      Pt admit to [facility] for hydration/labs/IV mag ambulatory in stable condition. Assessment completed and documented in flowsheets. No acute concerns at this time. Please review pending lab results in [address] [place] [address]. Mr. Rea Tirado vitals were reviewed prior to and after treatment. Patient Vitals for the past 12 hrs:   Temp Pulse Resp BP   23 1004 -- 91 18 (!) 163/74   23 0850 97.8 °F (36.6 °C) 93 18 (!) 156/75     Lab results were obtained and reviewed. Labs within parameter for treatment--MAGNESIUM infusion held, Dr. Elicia Larry notified. Mag 1.8. Recent Results (from the past 12 hour(s))   MAGNESIUM    Collection Time: 23  8:52 AM   Result Value Ref Range    Magnesium 1.8 1.6 - 2.4 mg/dL   METABOLIC PANEL, COMPREHENSIVE    Collection Time: 23  8:52 AM   Result Value Ref Range    Sodium 137 136 - 145 mmol/L    Potassium 4.3 3.5 - 5.1 mmol/L    Chloride 103 97 - 108 mmol/L    CO2 30 21 - 32 mmol/L    Anion gap 4 (L) 5 - 15 mmol/L    Glucose 192 (H) 65 - 100 mg/dL    BUN 33 (H) 6 - 20 MG/DL    Creatinine 1.55 (H) 0.70 - 1.30 MG/DL    BUN/Creatinine ratio 21 (H) 12 - 20      eGFR 48 (L) >60 ml/min/1.73m2    Calcium 10.0 8.5 - 10.1 MG/DL    Bilirubin, total 0.3 0.2 - 1.0 MG/DL    ALT (SGPT) 22 12 - 78 U/L    AST (SGOT) 21 15 - 37 U/L    Alk. phosphatase 145 (H) 45 - 117 U/L    Protein, total 8.4 (H) 6.4 - 8.2 g/dL    Albumin 3.6 3.5 - 5.0 g/dL    Globulin 4.8 (H) 2.0 - 4.0 g/dL    A-G Ratio 0.8 (L) 1.1 - 2.2       Medications given: via PIV  Medications Administered       sodium chloride 0.9 % bolus infusion 1,000 mL       Admin Date  2023 Action  New Bag Dose  1,000 mL Rate  1,000 mL/hr Route  IntraVENous Administered By  Arti Romero               PIV positive blood return noted, flushed and removed prior to discharge.     Mr. Buddy Youngblood tolerated the infusion, and had no complaints. Mr. Maye Grant was discharged from [place] [address] in stable condition and is aware of future appointments.      Future Appointments   Date Time Provider Tad Sibley   3/31/2023  2:30 PM G1 MADELYN FASTCINDI [facility]    3/31/2023  2:45 PM Evi Lim  N [facility] BS AMB   4/28/2023 12:00 PM [facility] [place] RCR PET DOSE 1 [facility]CHPET [facility]   4/28/2023  1:00 PM [facility] RCR PET 1 SMHRCHPET [facility] [facility]   5/12/2023 11:00 AM Kaci Mar MD [facility] BS AMB   6/15/2023 10:45 AM [place] [address] [facility]AV[facility] BS AMB   3/27/2024  8:40 AM Mulugeta White MD [facility] BS AMB       Tasneem Harrell RN  March 24, 2023  10:05 AM

## 2023-03-26 RX ORDER — COLCHICINE 0.6 MG/1
0.6 TABLET ORAL AS NEEDED
Qty: 30 TABLET | Refills: 0 | Status: SHIPPED | OUTPATIENT
Start: 2023-03-26

## 2023-03-29 NOTE — PROGRESS NOTES
[address] [facility]   [phone]    Hematology / Oncology Follow-up    Reason for Visit:   Keyla Galvin is a 75 y.o. male PMHx of hypertension, diabetes, and hepatic steatosis/possible cirrhosis 2/2 EtOH who is seen for follow-up of locally advanced HNSCC. Hematology/Oncology Treatment History:      PRIMARY DIAGNOSIS: Locally advanced HNSCC     DATE OF DIAGNOSIS: 7/26/22  ORIGINAL STAGE: Stage CAN (cT0 cN3b cM0)  DIAGNOSTICS: FNA ([address] [place] [address]) with atypical squamous cells concerning for malignancy. Core biopsy with skeletal muscle, fibrous tissue, and granulation with acute and chronic inflammation. Cytokeratin negative, p40 negative. SITES OF DISEASE: Left neck mass with prominent left-sided lymph nodes. 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 Cytology from FNA 7/26/22 in [place] with \"atypical squamous cells. \"  Follow-up core needle biopsy with \"rare epitheliod cells are present; however, pancytokeratin immunostain is negative, and p40 immunostain negative. \" Although core needle biopsy not definitive, clinical picture (i.e. atypical squamous cells on FNA, enlarging neck mass with failure to improve to antibiotics in a chronic smoker) consistent with HNSCC. Unclear primary. Given ongoing complications from untreated disease (heart block, recurrent infection), elected not to pursue PET-CT or pan-endoscopy while inpatient to minimize delays in treatment.   Staging CT CAP negative for distant metastatic disease     He has completed concurrent chemoradiation with weekly cisplatin 40 mg/m2 (12/13/22 - 23). Patient clinically looks well but he has developed a possible Candidal infection in his prior draining fistulous tract in L neck. He has some mild tenderness but no erythema or warmth. He continues to struggle with xerostomia and we discussed strategies to improve this. We reviewed the schedule for his end-of-treatment PET-CT to assess response to therapy. Given new discharge, will obtain CT neck now to rule out underlying abscess. #) Acute kidney injury  hypomagnesemia: Treatment-related. #) Grade 2 Neutropenia  Grade 2 Anemia: treatment-related. #) Moderate protein-calorie malnutrition: s/p PEG tube placement on 22. Dietitian following. 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 Status post PPM on 22. #) Tobacco abuse: has almost completely cut back on smoking (1 cigarette this week). Congratulated on success  #) Psychosocial: wife recently . Moved from [place] to be closer to his children. Has good support from family (oldest son Tony Reed is HCPOA). Plan:     - CT neck now to rule out underlying abscess  - Fluconazole x 7 days  - Referral to wound care    - PET-CT scheduled 23   - Metoprolol and lisinopril on hold   - Dietitian following   - Post-treatment PET-CT scheduled for 23. Patient will require prn anxiolytic prior to scan. Ativan 1 mg #1 tab sent. - Return to clinic in 1 month for labs and scan review    Signed By: Maco Andersen MD    2023      Interval History:     Presented 2022 with enlarging next mass, failed to improve with antibiotics  Evaluated by ENT (Dr. Ghulam Franco) [facility]  in 2022. uUtrasound that showed irrecgular enhancing mass involving left submandibuar gland, suspicious for cancer.  Follow up CT neck demonstrated soft tissue mass, measuring 5 cm with small annular opening with drainage. Mass noted to be compressing left internal jugular vein. FNA 7/26/22 with atypical squamous cells, c/f malignancy  Core biopsy with skeletal muscle, fibrous tissue, and granulation with acute and chronic inflammation. Cytokeratin negative, p40 negative. Patient referred to medical and radiation oncology in [place], but did not follow up due to passing of his wife  Presented to [facility]2022 with progressively worsening neck pain and swelling as well as purulent cellulitis/abscess. Treated with IV Antibiotics and discharged on PO. Hospital course complicated by AV block with prolonged pause (likely due to reflex from compression of mass on right carotid artery) s/p ppm.    11/30/22 CT neck: large 4.8 x 3.1 x 4.2 cm rim-enhancing complex collection in left lateral neck, extending from carotid space to the skin surface with associated skin thickening, edema, and prominent left neck lymph nodes. Findings most likely represent an abscess rather than necrotic/superinfected neoplasm. 12/1/22 CT CAP: left neck mass partially visualized. 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 12/13/22 C1 Weekly cisplatin  12/14/22 concurrent radiation initiated  12/29/22 PEG tube placed   1/10/23 Week 5 Cisplatin held due to MOR  1/17/23 Week 6 Cisplatin held due to MOR  2/3/23 Completed concurrent chemoradiation with weekly cisplatin     Presents to clinic alone. He is doing okay. He states that his appetite dropped off a bit this week. He was pounding the Boost earlier this week and had an episode of emesis. Weight down slightly. PEG tube removed. Denies fevers. Reports tenderness at left neck mass/stricture. No malodorous discharge. No purulence. Has ongoing dry mouth. Has not noticed any improvement with sugar-free candies. No further episodes of dizziness   Reports recurrence in headaches. States that he has brief sharp headache pain 2-3x per day. Relieved with tylenol. Medications reviewed in the EMR. No Known Allergies     Review of Systems: A 6-point review of systems was obtained, negative except as reviewed in the HPI. Physical Exam:   Visit Vitals  /71 (BP 1 Location: Right upper arm, BP Patient Position: Sitting)   Pulse (!) 122   Temp 98 °F (36.7 °C)   Resp 18   Wt 150 lb (68 kg)   SpO2 98%   BMI 24.58 kg/m²     ECO  General: No distress, chronically ill appearing  HENT: Atraumatic, no oral ulcers, MMM  Neck: Supple, ~firm mass in left submandibular region, stable, with ~1 cm area of thick white discharge. Mildly tender. No erythema or warmth  Respiratory: CTAB, normal respiratory effort  CV: tachycardic no peripheral edema  Skin: No rashes, ecchymoses, or petechiae  Psych: Alert, oriented, appropriate affect, normal judgment/insight    Results:     Lab Results   Component Value Date/Time    WBC 3.6 (L) 03/10/2023 11:25 AM    HGB 8.1 (L) 03/10/2023 11:25 AM    HCT 25.2 (L) 03/10/2023 11:25 AM    PLATELET 551 (L)  11:25 AM    .7 (H) 03/10/2023 11:25 AM    ABS. NEUTROPHILS 2.4 03/10/2023 11:25 AM     Lab Results   Component Value Date/Time    Sodium 137 2023 08:52 AM    Potassium 4.3 2023 08:52 AM    Chloride 103 2023 08:52 AM    CO2 30 2023 08:52 AM    Glucose 192 (H) 2023 08:52 AM    BUN 33 (H) 2023 08:52 AM    Creatinine 1.55 (H) 2023 08:52 AM    Calcium 10.0 2023 08:52 AM    Glucose (POC) 238 (H) 2023 12:02 PM     Lab Results   Component Value Date/Time    Bilirubin, total 0.3 2023 08:52 AM    ALT (SGPT) 22 2023 08:52 AM    Alk.  phosphatase 145 (H) 2023 08:52 AM    Protein, total 8.4 (H) 2023 08:52 AM    Albumin 3.6 2023 08:52 AM    Globulin 4.8 (H) 2023 08:52 AM     No new images to review

## 2023-03-31 ENCOUNTER — OFFICE VISIT (OUTPATIENT)
Dept: ONCOLOGY | Age: 68
End: 2023-03-31

## 2023-03-31 ENCOUNTER — HOSPITAL ENCOUNTER (OUTPATIENT)
Dept: INFUSION THERAPY | Age: 68
Discharge: HOME | End: 2023-03-31
Payer: MEDICARE

## 2023-03-31 VITALS
TEMPERATURE: 98 F | SYSTOLIC BLOOD PRESSURE: 124 MMHG | RESPIRATION RATE: 18 BRPM | DIASTOLIC BLOOD PRESSURE: 71 MMHG | WEIGHT: 150 LBS | BODY MASS INDEX: 24.58 KG/M2 | HEART RATE: 122 BPM | OXYGEN SATURATION: 98 %

## 2023-03-31 VITALS
TEMPERATURE: 98 F | RESPIRATION RATE: 18 BRPM | SYSTOLIC BLOOD PRESSURE: 119 MMHG | DIASTOLIC BLOOD PRESSURE: 68 MMHG | HEART RATE: 115 BPM

## 2023-03-31 DIAGNOSIS — N17.9 ACUTE KIDNEY INJURY (HCC): Primary | ICD-10-CM

## 2023-03-31 DIAGNOSIS — N17.9 ACUTE KIDNEY INJURY (HCC): ICD-10-CM

## 2023-03-31 DIAGNOSIS — C76.0 SQUAMOUS CELL CARCINOMA OF HEAD AND NECK (HCC): ICD-10-CM

## 2023-03-31 DIAGNOSIS — K11.7 XEROSTOMIA: ICD-10-CM

## 2023-03-31 DIAGNOSIS — C32.9 LARYNGEAL CANCER (HCC): Primary | ICD-10-CM

## 2023-03-31 DIAGNOSIS — B37.9 CANDIDA INFECTION: ICD-10-CM

## 2023-03-31 LAB
ALBUMIN SERPL-MCNC: 3.7 G/DL (ref 3.5–5)
ALBUMIN/GLOB SERPL: 0.7 (ref 1.1–2.2)
ALP SERPL-CCNC: 177 U/L (ref 45–117)
ALT SERPL-CCNC: 32 U/L (ref 12–78)
ANION GAP SERPL CALC-SCNC: 4 MMOL/L (ref 5–15)
AST SERPL-CCNC: 46 U/L (ref 15–37)
BASOPHILS # BLD: 0 K/UL (ref 0–0.1)
BASOPHILS NFR BLD: 1 % (ref 0–1)
BILIRUB SERPL-MCNC: 0.4 MG/DL (ref 0.2–1)
BUN SERPL-MCNC: 27 MG/DL (ref 6–20)
BUN/CREAT SERPL: 18 (ref 12–20)
CALCIUM SERPL-MCNC: 9.6 MG/DL (ref 8.5–10.1)
CHLORIDE SERPL-SCNC: 100 MMOL/L (ref 97–108)
CO2 SERPL-SCNC: 29 MMOL/L (ref 21–32)
CREAT SERPL-MCNC: 1.53 MG/DL (ref 0.7–1.3)
DIFFERENTIAL METHOD BLD: ABNORMAL
EOSINOPHIL # BLD: 0 K/UL (ref 0–0.4)
EOSINOPHIL NFR BLD: 1 % (ref 0–7)
ERYTHROCYTE [DISTWIDTH] IN BLOOD BY AUTOMATED COUNT: 16.2 % (ref 11.5–14.5)
FERRITIN SERPL-MCNC: 655 NG/ML (ref 26–388)
GLOBULIN SER CALC-MCNC: 5.2 G/DL (ref 2–4)
GLUCOSE SERPL-MCNC: 192 MG/DL (ref 65–100)
HCT VFR BLD AUTO: 30.4 % (ref 36.6–50.3)
HGB BLD-MCNC: 10.1 G/DL (ref 12.1–17)
IMM GRANULOCYTES # BLD AUTO: 0 K/UL (ref 0–0.04)
IMM GRANULOCYTES NFR BLD AUTO: 1 % (ref 0–0.5)
IRON SATN MFR SERPL: 26 % (ref 20–50)
IRON SERPL-MCNC: 84 UG/DL (ref 35–150)
LYMPHOCYTES # BLD: 0.9 K/UL (ref 0.8–3.5)
LYMPHOCYTES NFR BLD: 16 % (ref 12–49)
MAGNESIUM SERPL-MCNC: 1.5 MG/DL (ref 1.6–2.4)
MCH RBC QN AUTO: 35.2 PG (ref 26–34)
MCHC RBC AUTO-ENTMCNC: 33.2 G/DL (ref 30–36.5)
MCV RBC AUTO: 105.9 FL (ref 80–99)
MONOCYTES # BLD: 0.4 K/UL (ref 0–1)
MONOCYTES NFR BLD: 7 % (ref 5–13)
NEUTS SEG # BLD: 4.5 K/UL (ref 1.8–8)
NEUTS SEG NFR BLD: 74 % (ref 32–75)
NRBC # BLD: 0 K/UL (ref 0–0.01)
NRBC BLD-RTO: 0 PER 100 WBC
PLATELET # BLD AUTO: 298 K/UL (ref 150–400)
PMV BLD AUTO: 11.3 FL (ref 8.9–12.9)
POTASSIUM SERPL-SCNC: 4.6 MMOL/L (ref 3.5–5.1)
PROT SERPL-MCNC: 8.9 G/DL (ref 6.4–8.2)
RBC # BLD AUTO: 2.87 M/UL (ref 4.1–5.7)
SODIUM SERPL-SCNC: 133 MMOL/L (ref 136–145)
TIBC SERPL-MCNC: 323 UG/DL (ref 250–450)
TSH SERPL DL<=0.05 MIU/L-ACNC: 0.97 UIU/ML (ref 0.36–3.74)
WBC # BLD AUTO: 5.9 K/UL (ref 4.1–11.1)

## 2023-03-31 PROCEDURE — 84443 ASSAY THYROID STIM HORMONE: CPT

## 2023-03-31 PROCEDURE — 83735 ASSAY OF MAGNESIUM: CPT

## 2023-03-31 PROCEDURE — 96366 THER/PROPH/DIAG IV INF ADDON: CPT

## 2023-03-31 PROCEDURE — 36415 COLL VENOUS BLD VENIPUNCTURE: CPT

## 2023-03-31 PROCEDURE — 80053 COMPREHEN METABOLIC PANEL: CPT

## 2023-03-31 PROCEDURE — 96365 THER/PROPH/DIAG IV INF INIT: CPT

## 2023-03-31 PROCEDURE — 74011250636 HC RX REV CODE- 250/636: Performed by: INTERNAL MEDICINE

## 2023-03-31 PROCEDURE — 82728 ASSAY OF FERRITIN: CPT

## 2023-03-31 PROCEDURE — 83540 ASSAY OF IRON: CPT

## 2023-03-31 PROCEDURE — 85025 COMPLETE CBC W/AUTO DIFF WBC: CPT

## 2023-03-31 PROCEDURE — 96361 HYDRATE IV INFUSION ADD-ON: CPT

## 2023-03-31 RX ORDER — FLUCONAZOLE 200 MG/1
200 TABLET ORAL DAILY
Qty: 7 TABLET | Refills: 0 | Status: SHIPPED | OUTPATIENT
Start: 2023-03-31 | End: 2023-04-07

## 2023-03-31 RX ORDER — LORAZEPAM 1 MG/1
1 TABLET ORAL
Qty: 1 TABLET | Refills: 0 | Status: SHIPPED | OUTPATIENT
Start: 2023-03-31 | End: 2023-03-31

## 2023-03-31 RX ORDER — MAGNESIUM SULFATE HEPTAHYDRATE 40 MG/ML
2 INJECTION, SOLUTION INTRAVENOUS ONCE
Status: COMPLETED | OUTPATIENT
Start: 2023-03-31 | End: 2023-03-31

## 2023-03-31 RX ADMIN — SODIUM CHLORIDE 1000 ML: 900 INJECTION, SOLUTION INTRAVENOUS at 15:38

## 2023-03-31 RX ADMIN — MAGNESIUM SULFATE HEPTAHYDRATE 2 G: 40 INJECTION, SOLUTION INTRAVENOUS at 15:39

## 2023-03-31 NOTE — PROGRESS NOTES
\[facility] [place] [facility]\"" Short Note                       Date: 2023    Name: Kalyani Esparza. MRN: [id number]         : 1955    1520  Pt admit to [address] [place] [address] for Hydration/Magnesium ambulatory in stable condition. Assessment completed and documented in flowsheets. No acute concerns at this time. Please review pending lab results in [address] [address]. Mr. Kaushik Haines vitals were reviewed prior to and after treatment. Patient Vitals for the past 12 hrs:   Temp Pulse Resp BP   23 1523 98 °F (36.7 °C) (!) 115 18 119/68       Lab results were obtained and reviewed. Labs within parameter for treatment. Recent Results (from the past 12 hour(s))   CBC WITH AUTOMATED DIFF    Collection Time: 23  3:22 PM   Result Value Ref Range    WBC 5.9 4.1 - 11.1 K/uL    RBC 2.87 (L) 4.10 - 5.70 M/uL    HGB 10.1 (L) 12.1 - 17.0 g/dL    HCT 30.4 (L) 36.6 - 50.3 %    .9 (H) 80.0 - 99.0 FL    MCH 35.2 (H) 26.0 - 34.0 PG    MCHC 33.2 30.0 - 36.5 g/dL    RDW 16.2 (H) 11.5 - 14.5 %    PLATELET 312 919 - 603 K/uL    MPV 11.3 8.9 - 12.9 FL    NRBC 0.0 0  WBC    ABSOLUTE NRBC 0.00 0.00 - 0.01 K/uL    NEUTROPHILS 74 32 - 75 %    LYMPHOCYTES 16 12 - 49 %    MONOCYTES 7 5 - 13 %    EOSINOPHILS 1 0 - 7 %    BASOPHILS 1 0 - 1 %    IMMATURE GRANULOCYTES 1 (H) 0.0 - 0.5 %    ABS. NEUTROPHILS 4.5 1.8 - 8.0 K/UL    ABS. LYMPHOCYTES 0.9 0.8 - 3.5 K/UL    ABS. MONOCYTES 0.4 0.0 - 1.0 K/UL    ABS. EOSINOPHILS 0.0 0.0 - 0.4 K/UL    ABS. BASOPHILS 0.0 0.0 - 0.1 K/UL    ABS. IMM.  GRANS. 0.0 0.00 - 0.04 K/UL    DF AUTOMATED     MAGNESIUM    Collection Time: 23  3:22 PM   Result Value Ref Range    Magnesium 1.5 (L) 1.6 - 2.4 mg/dL   METABOLIC PANEL, COMPREHENSIVE    Collection Time: 23  3:22 PM   Result Value Ref Range    Sodium 133 (L) 136 - 145 mmol/L    Potassium 4.6 3.5 - 5.1 mmol/L    Chloride 100 97 - 108 mmol/L    CO2 29 21 - 32 mmol/L    Anion gap 4 (L) 5 - 15 mmol/L    Glucose 192 (H) 65 - 100 mg/dL    BUN 27 (H) 6 - 20 MG/DL Creatinine 1.53 (H) 0.70 - 1.30 MG/DL    BUN/Creatinine ratio 18 12 - 20      eGFR 49 (L) >60 ml/min/1.73m2    Calcium 9.6 8.5 - 10.1 MG/DL    Bilirubin, total 0.4 0.2 - 1.0 MG/DL    ALT (SGPT) 32 12 - 78 U/L    AST (SGOT) 46 (H) 15 - 37 U/L    Alk. phosphatase 177 (H) 45 - 117 U/L    Protein, total 8.9 (H) 6.4 - 8.2 g/dL    Albumin 3.7 3.5 - 5.0 g/dL    Globulin 5.2 (H) 2.0 - 4.0 g/dL    A-G Ratio 0.7 (L) 1.1 - 2.2     TSH 3RD GENERATION    Collection Time: 03/31/23  3:27 PM   Result Value Ref Range    TSH 0.97 0.36 - 3.74 uIU/mL   FERRITIN    Collection Time: 03/31/23  3:27 PM   Result Value Ref Range    Ferritin 655 (H) 26 - 388 NG/ML         Medications given: via PIV   Medications Administered       magnesium sulfate 2 g/50 ml IVPB (premix or compounded)       Admin Date  03/31/2023 Action  New Bag Dose  2 g Rate  25 mL/hr Route  IntraVENous Administered By  Mirian Quinones RN              sodium chloride 0.9 % bolus infusion 1,000 mL       Admin Date  03/31/2023 Action  New Bag Dose  1,000 mL Rate  1,000 mL/hr Route  IntraVENous Administered By  Mirian Quinones RN                  1630-PIV stopped flushing. New IV started in right hand with no difficulty. Positive blood return noted. 1715-Verbal SBAR given to LA Khalil.     Future Appointments   Date Time Provider Tad Sibley   4/28/2023 12:00 PM [facility] RCR PET DOSE 1 SMHRCHPET SIERRA DHARA   4/28/2023  1:00 PM [facility] RCR PET 1 SMHRJOHNPET SI[facility] DHARA   5/12/2023 11:00 AM Howard Mar MD [facility] BS AMB   6/15/2023 10:45 AM [facility] CAVSF BS AMB   3/27/2024  8:40 AM Daly Monae MD CAVSF BS AMB       Phoebe Orellana RN  March 31, 2023  4:16 PM

## 2023-03-31 NOTE — LETTER
3/31/2023    Patient: Dale Ricks. YOB: 1955   Date of Visit: 3/31/2023     Danny Hart MD  [facility]  [address] [facility]    Dear Danny Hart MD,      Thank you for referring Mr. Marquita Martinez to [address] [place] [address] for evaluation. My notes for this consultation are attached. If you have questions, please do not hesitate to call me. I look forward to following your patient along with you.       Sincerely,    Ej Armenta MD

## 2023-03-31 NOTE — PROGRESS NOTES
[facility]fusion [facility] - Chemotherapy Progress Note    3627 Transfer of care received from [facility]. Patient infusing without difficulty. Visit Vitals  /68   Pulse (!) 115   Temp 98 °F (36.7 °C)   Resp 18       Medications Administered       magnesium sulfate 2 g/50 ml IVPB (premix or compounded)       Admin Date  03/31/2023 Action  New Bag Dose  2 g Rate  25 mL/hr Route  IntraVENous Administered By  Greyson Lange, LA              sodium chloride 0.9 % bolus infusion 1,000 mL       Admin Date  03/31/2023 Action  New Bag Dose  1,000 mL Rate  1,000 mL/hr Route  IntraVENous Administered By  Greyson Lange, RN                        8131 Pt tolerated treatment well. PIV removed at discharge. D/c home ambulatory in no distress.  Pt aware to follow up with MD.

## 2023-03-31 NOTE — PROGRESS NOTES
Sanna Griffith. is a 75 y.o. male    locally advanced HNSCC. 1. Have you been to the ER, urgent care clinic since your last visit? Hospitalized since your last visit? No    2. Have you seen or consulted any other health care providers outside of the [address] [place] [address] since your last visit? Include any pap smears or colon screening.  No

## 2023-04-07 ENCOUNTER — TELEPHONE (OUTPATIENT)
Dept: ONCOLOGY | Age: 68
End: 2023-04-07

## 2023-04-14 ENCOUNTER — HOSPITAL ENCOUNTER (OUTPATIENT)
Dept: CT IMAGING | Age: 68
Discharge: HOME | End: 2023-04-14
Attending: INTERNAL MEDICINE
Payer: MEDICARE

## 2023-04-14 DIAGNOSIS — C76.0 SQUAMOUS CELL CARCINOMA OF HEAD AND NECK (HCC): ICD-10-CM

## 2023-04-14 PROCEDURE — 70491 CT SOFT TISSUE NECK W/DYE: CPT

## 2023-04-14 PROCEDURE — 74011000636 HC RX REV CODE- 636: Performed by: INTERNAL MEDICINE

## 2023-04-14 RX ADMIN — IOPAMIDOL 100 ML: 612 INJECTION, SOLUTION INTRAVENOUS at 12:06

## 2023-04-19 ENCOUNTER — TELEPHONE (OUTPATIENT)
Dept: PALLATIVE CARE | Age: 68
End: 2023-04-19

## 2023-04-19 DIAGNOSIS — C32.9 LARYNGEAL CANCER (HCC): ICD-10-CM

## 2023-04-19 DIAGNOSIS — C76.0 SQUAMOUS CELL CARCINOMA OF HEAD AND NECK (HCC): Primary | ICD-10-CM

## 2023-04-19 DIAGNOSIS — G89.3 CANCER ASSOCIATED PAIN: ICD-10-CM

## 2023-04-19 RX ORDER — METHYLPREDNISOLONE 4 MG/1
4 TABLET ORAL
Qty: 1 DOSE PACK | Refills: 0 | Status: SHIPPED | OUTPATIENT
Start: 2023-04-19

## 2023-04-19 RX ORDER — BUPROPION HYDROCHLORIDE 100 MG/1
100 TABLET, EXTENDED RELEASE ORAL DAILY
Qty: 90 TABLET | Refills: 0 | Status: SHIPPED | OUTPATIENT
Start: 2023-04-19

## 2023-04-19 RX ORDER — CYCLOBENZAPRINE HCL 5 MG
5 TABLET ORAL
Qty: 30 TABLET | Refills: 0 | Status: SHIPPED | OUTPATIENT
Start: 2023-04-19

## 2023-04-19 NOTE — TELEPHONE ENCOUNTER
Palliative Medicine  Nursing Note  [phone]  Fax [phone]      Telephone Call  Patient Name: Haroldo Lewis. YOB: [birth year]        Primary Decision Maker: Vandana Grijalva Son - [phone]   Advance Care Planning 3/17/2023   Patient's Healthcare Decision Maker is: Named in scanned ACP document   Confirm Advance Directive Yes, on file   Patient Would Like to Complete Advance Directive -     Incoming call from Mr. Mcadams. He states that he has had an ongoing headache that originates from his neck x 5 days that brings tears to his eyes. He describes it as a constant pulsating pain throughout that creates blurry vision. He rates the pain at 8-9/10 from the moment he wakes up to the time he finally gets to sleep unless he has some sort of pain medication that can help relieve the discomfort. He said he he ran out of Flexeril 5mg on Saturday and that had been helping some. Past 3 days he has been taking about 2 Aleve, 2 Tylenol, 2 Aspirin daily to help manage his pain. He states that the medications helped a little but the intensity came back within 3-4 hours. He found a prescription of Fioricet from December 2022 and took that 30-45 min prior to calling Palliative. He feels this is reducing the pain finally. He has 5 tabs left. He has a Tramadol 50mg script sent in by Onc on 4/10/23 to [facility] in [facility]. He states he has not picked that up. Discussed with Dr. Nolia Soulier who ordered Medrol Dose Pack and refill on Flexeril 5mg once daily prn #30. Call placed to Mr. Mcadams and informed of the above. Instructed not to take Tramadol while taking flexeril. Palliative can reassess his headaches and pain after Medrol dose pack complete. He clarified that his headaches are worse on the left side of his neck, ear, and head, but does feel it on the right side too.   He said that he can't look up towards the ceiling because his vision changes and says, \" I start to see spots like I might lose my vision. \"  Moving his head side to side exacerbates the pain, so being still is better. He does use an ice pack for comfort. He shared that his son can hear him groaning downstairs in pain last few days. The drainage to the small wound on left side of neck is unchanged, looks clear on his skin, but on bandage is a \"light yellow or brown\" Drains less now. Reiterated to call Palliative for any changes at all. He verbalized he would do so.      Sonya Lara, LA  Palliative Medicine

## 2023-04-20 ENCOUNTER — TELEPHONE (OUTPATIENT)
Dept: ONCOLOGY | Age: 68
End: 2023-04-20

## 2023-04-20 ENCOUNTER — VIRTUAL VISIT (OUTPATIENT)
Dept: PALLATIVE CARE | Age: 68
End: 2023-04-20

## 2023-04-20 ENCOUNTER — TELEPHONE (OUTPATIENT)
Dept: PALLATIVE CARE | Age: 68
End: 2023-04-20

## 2023-04-20 NOTE — TELEPHONE ENCOUNTER
Palliative Medicine  Nursing Note  [phone]  Fax [phone]      Telephone Call  Patient Name: Bertin Vaca. YOB: [birth year]        Primary Decision Maker: Vinny Thomas - [phone]   Advance Care Planning 3/17/2023   Patient's Healthcare Decision Maker is: Named in scanned ACP document   Confirm Advance Directive Yes, on file   Patient Would Like to Complete Advance Directive -     Call placed to Mr. Mcadams who states his medication was not ready for  last night at the pharmacy so has not started the Medrol dose pack. He states that he woke up at 0830 with a headache today rated 8-9/10. He took a Fioricet at 0930 and applied ice to the left side of his neck around 10:00am.  He finds the ice seems to help keep the pain level down, but noticed that the headache increases to 5-6/10 within minutes after the ice is removed. The pain will shoot back up to 8-9/10 within 45 min of removing the ice. He is using the ice all day long. He currently rates his headache at 4/10 while sitting still with the ice in place. He is available for a VV at 3:30pm with Dr. Golda Osgood. Call placed to pharmacy [facility] in [facility] and verified they received the prescription for Medrol dose pack and Flexeril and can fill today.      Manny Barillas RN  Palliative Medicine

## 2023-04-20 NOTE — PROGRESS NOTES
Palliative Medicine Office Visit  Palliative Medicine Nurse Check In  [phone]    Patient Name: Alex Lutz. YOB: 1955      Date of Office Visit: 4/20/23    Patient states: \" virtual visit \"    From Check In Sheet (scanned in Media):  Has a medical provider talked with you about cardiopulmonary resuscitation (CPR)? [] Yes   [x] No   [] Unable to obtain    Nurse reminder to complete or update ACP FlowSheet:    Is ACP on the Problem List?    [x] Yes    [] No  IF ACP Document is ON FILE; Nurse to place ACP on Problem List     Is there an ACP Note in Chart Review/Note? [x] Yes    [] No   If NO: [address] 3/17/2023   Patient's Healthcare Decision Maker is: Named in scanned ACP document   Confirm Advance Directive Yes, on file   Patient Would Like to Complete Advance Directive -       Is there anything that we should know about you as a person in order to provide you the best care possible? Have you been to the ER, urgent care clinic since your last visit? [] Yes   [x] No   [] Unable to obtain    Have you been hospitalized since your last visit? [] Yes   [x] No   [] Unable to obtain    Have you seen or consulted any other health care providers outside of the [address] [place] [address] since your last visit?    [] Yes   [x] No   [] Unable to obtain    Functional status (describe):   independent      Last BM: Every 2 days - Constipated - yesterday     accessed (date): 4/20/23    Bottle review (for opioid pain medication): None  Medication 1:   Date filled:   Directions:   # filled:   # left:   # pills taking per day:  Last dose taken:

## 2023-04-20 NOTE — TELEPHONE ENCOUNTER
Called to let patient know he is set up for   [address] Lisset Bello    [address]  [address] [address]  Lori Gamboa 56    Tel: [phone]    [facility]

## 2023-04-21 NOTE — PROGRESS NOTES
Patient scheduled for virtual visit this afternoon but he was unable to utilize the technology. Visit unable to be completed. Poor

## 2023-04-22 DIAGNOSIS — C76.0 SQUAMOUS CELL CARCINOMA OF HEAD AND NECK (HCC): Primary | ICD-10-CM

## 2023-04-24 ENCOUNTER — APPOINTMENT (OUTPATIENT)
Dept: WOUND CARE | Age: 68
End: 2023-04-24

## 2023-04-28 ENCOUNTER — HOSPITAL ENCOUNTER (OUTPATIENT)
Dept: PET IMAGING | Age: 68
End: 2023-04-28
Attending: INTERNAL MEDICINE
Payer: MEDICARE

## 2023-04-28 ENCOUNTER — HOSPITAL ENCOUNTER (OUTPATIENT)
Dept: PET IMAGING | Age: 68
Discharge: HOME | End: 2023-04-28
Attending: INTERNAL MEDICINE
Payer: MEDICARE

## 2023-04-28 VITALS — HEIGHT: 65 IN | WEIGHT: 152 LBS | BODY MASS INDEX: 25.33 KG/M2

## 2023-04-28 DIAGNOSIS — C76.0 SQUAMOUS CELL CARCINOMA OF HEAD AND NECK (HCC): ICD-10-CM

## 2023-04-28 LAB
GLUCOSE BLD STRIP.AUTO-MCNC: 191 MG/DL (ref 65–117)
SERVICE CMNT-IMP: ABNORMAL

## 2023-04-28 PROCEDURE — A9552 F18 FDG: HCPCS

## 2023-04-28 RX ORDER — FLUDEOXYGLUCOSE F-18 200 MCI/ML
10 INJECTION INTRAVENOUS ONCE
Status: COMPLETED | OUTPATIENT
Start: 2023-04-28 | End: 2023-04-28

## 2023-04-28 RX ADMIN — FLUDEOXYGLUCOSE F-18 9.99 MILLICURIE: 200 INJECTION INTRAVENOUS at 12:14

## 2023-04-30 RX ORDER — COLCHICINE 0.6 MG/1
0.6 TABLET ORAL AS NEEDED
Qty: 30 TABLET | Refills: 0 | Status: SHIPPED | OUTPATIENT
Start: 2023-04-30

## 2023-05-01 ENCOUNTER — HOSPITAL ENCOUNTER (OUTPATIENT)
Dept: RADIATION THERAPY | Age: 68
Discharge: HOME | End: 2023-05-01

## 2023-05-01 ENCOUNTER — OFFICE VISIT (OUTPATIENT)
Dept: ONCOLOGY | Age: 68
End: 2023-05-01
Payer: MEDICARE

## 2023-05-01 VITALS
RESPIRATION RATE: 18 BRPM | WEIGHT: 151 LBS | TEMPERATURE: 98.2 F | DIASTOLIC BLOOD PRESSURE: 68 MMHG | OXYGEN SATURATION: 94 % | SYSTOLIC BLOOD PRESSURE: 177 MMHG | BODY MASS INDEX: 25.13 KG/M2 | HEART RATE: 108 BPM

## 2023-05-01 DIAGNOSIS — C32.9 LARYNGEAL CANCER (HCC): Primary | ICD-10-CM

## 2023-05-01 DIAGNOSIS — R29.818 NEUROLOGIC ABNORMALITY: ICD-10-CM

## 2023-05-01 DIAGNOSIS — C13.8 MALIGNANT NEOPLASM OF OVERLAPPING SITES OF HYPOPHARYNX (HCC): ICD-10-CM

## 2023-05-01 DIAGNOSIS — G89.29 CHRONIC INTRACTABLE HEADACHE, UNSPECIFIED HEADACHE TYPE: ICD-10-CM

## 2023-05-01 DIAGNOSIS — E83.42 HYPOMAGNESEMIA: ICD-10-CM

## 2023-05-01 DIAGNOSIS — R51.9 CHRONIC INTRACTABLE HEADACHE, UNSPECIFIED HEADACHE TYPE: ICD-10-CM

## 2023-05-01 DIAGNOSIS — C76.0 SQUAMOUS CELL CARCINOMA OF HEAD AND NECK (HCC): Primary | ICD-10-CM

## 2023-05-01 PROCEDURE — 99215 OFFICE O/P EST HI 40 MIN: CPT | Performed by: INTERNAL MEDICINE

## 2023-05-01 PROCEDURE — G8427 DOCREV CUR MEDS BY ELIG CLIN: HCPCS | Performed by: INTERNAL MEDICINE

## 2023-05-01 PROCEDURE — 3017F COLORECTAL CA SCREEN DOC REV: CPT | Performed by: INTERNAL MEDICINE

## 2023-05-01 PROCEDURE — G8536 NO DOC ELDER MAL SCRN: HCPCS | Performed by: INTERNAL MEDICINE

## 2023-05-01 PROCEDURE — 1101F PT FALLS ASSESS-DOCD LE1/YR: CPT | Performed by: INTERNAL MEDICINE

## 2023-05-01 PROCEDURE — G8417 CALC BMI ABV UP PARAM F/U: HCPCS | Performed by: INTERNAL MEDICINE

## 2023-05-01 PROCEDURE — G9717 DOC PT DX DEP/BP F/U NT REQ: HCPCS | Performed by: INTERNAL MEDICINE

## 2023-05-01 PROCEDURE — 1123F ACP DISCUSS/DSCN MKR DOCD: CPT | Performed by: INTERNAL MEDICINE

## 2023-05-01 RX ORDER — SODIUM CHLORIDE 9 MG/ML
25 INJECTION, SOLUTION INTRAVENOUS PRN
OUTPATIENT
Start: 2023-06-05

## 2023-05-01 RX ORDER — TRAMADOL HYDROCHLORIDE 50 MG/1
50 TABLET ORAL
Qty: 30 TABLET | Refills: 0 | Status: SHIPPED | OUTPATIENT
Start: 2023-05-01 | End: 2023-05-31

## 2023-05-01 RX ORDER — HEPARIN SODIUM (PORCINE) LOCK FLUSH IV SOLN 100 UNIT/ML 100 UNIT/ML
500 SOLUTION INTRAVENOUS PRN
OUTPATIENT
Start: 2023-06-05

## 2023-05-01 RX ORDER — SODIUM CHLORIDE 0.9 % (FLUSH) 0.9 %
5-40 SYRINGE (ML) INJECTION PRN
OUTPATIENT
Start: 2023-06-05

## 2023-05-01 NOTE — PROGRESS NOTES
John Ibarra. is a 75 y.o. male    Chief Complaint   Patient presents with    Follow-up      locally advanced HNSCC       1. Have you been to the ER, urgent care clinic since your last visit? Hospitalized since your last visit? No    2. Have you seen or consulted any other health care providers outside of the [address] [place] [address] since your last visit? Include any pap smears or colon screening.  No

## 2023-05-01 NOTE — PROGRESS NOTES
[id number] [facility]   [phone]    Hematology / Oncology Follow-up    Reason for Visit:   Romy Moraes is a 75 y.o. male PMHx of hypertension, diabetes, and hepatic steatosis/possible cirrhosis 2/2 EtOH who is seen for follow-up of locally advanced HNSCC. Hematology/Oncology Treatment History:      PRIMARY DIAGNOSIS: Locally advanced HNSCC     DATE OF DIAGNOSIS: 7/26/22  ORIGINAL STAGE: Stage CAN (cT0 cN3b cM0)  DIAGNOSTICS: FNA ([address] [place] [address]) with atypical squamous cells concerning for malignancy. Core biopsy with skeletal muscle, fibrous tissue, and granulation with acute and chronic inflammation. Cytokeratin negative, p40 negative. SITES OF DISEASE: Left neck mass with prominent left-sided lymph nodes. 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 Cytology from FNA 7/26/22 in [place] with \"atypical squamous cells. \"  Follow-up core needle biopsy with \"rare epitheliod cells are present; however, pancytokeratin immunostain is negative, and p40 immunostain negative. \" Although core needle biopsy not definitive, clinical picture (i.e. atypical squamous cells on FNA, enlarging neck mass with failure to improve to antibiotics in a chronic smoker) consistent with HNSCC. Unclear primary. Given ongoing complications from untreated disease (heart block, recurrent infection), elected not to pursue PET-CT or pan-endoscopy while inpatient to minimize delays in treatment. Staging CT CAP negative for distant metastatic disease     He completed concurrent chemoradiation with weekly cisplatin 40 mg/m2 (12/13/22 - 2/1/23).   He presents today with post-treatment PET-CT. Post-treatment PET-CT demonstrates mildly increased avidity in left neck which corresponds to fluid collection/area of necrosis communicating to skin surface via sinus tract seen on CT neck. The collection directly abuts the left carotid artery. There is also a new nonspecific ground glass nodule in RUL that is mildly hypermetabolic. Patient clinically looks well but he has developed a possible Candidal infection in his prior draining fistulous tract in L neck. He has some mild tenderness but no erythema or warmth. He continues to struggle with xerostomia and we discussed strategies to improve this. We reviewed the schedule for his end-of-treatment PET-CT to assess response to therapy. Given new discharge, will obtain CT neck now to rule out underlying abscess. #) Acute kidney injury  hypomagnesemia: Treatment-related. #) Grade 2 Neutropenia  Grade 2 Anemia: treatment-related. #) Moderate protein-calorie malnutrition: s/p PEG tube placement on 22, now removed. Dietitian following. Wt Readings from Last 3 Encounters:   23 152 lb (68.9 kg)   23 150 lb (68 kg)   23 154 lb 3.2 oz (69.9 kg)      #) Heart block: Likely due to reflex from mass sitting over his right carotid artery. Status post PPM on 22. #) Tobacco abuse: has almost completely cut back on smoking (1 cigarette this week). Congratulated on success  #) Psychosocial: wife recently . Moved from [place] to be closer to his children. Has good support from family (oldest son Yuliet Alan is HCPOA).       Plan:     - Case discussed with Dr. Judge Parent   - Refer to ENT ***  - Metoprolol and lisinopril on hold   - Dietitian following   - Return to clinic in 1 month for labs and scan review    Signed By: Boogie Davis MD    2023      Interval History:     Presented 2022 with enlarging next mass, failed to improve with antibiotics  Evaluated by ENT (Dr. Kaley Martinez Roosevelt) in [place], [place] in 7/2022. uUtrasound that showed irrecgular enhancing mass involving left submandibuar gland, suspicious for cancer. Follow up CT neck demonstrated soft tissue mass, measuring 5 cm with small annular opening with drainage. Mass noted to be compressing left internal jugular vein. FNA 7/26/22 with atypical squamous cells, c/f malignancy  Core biopsy with skeletal muscle, fibrous tissue, and granulation with acute and chronic inflammation. Cytokeratin negative, p40 negative. Patient referred to medical and radiation oncology in [place], but did not follow up due to passing of his wife  Presented to [facility]/2022 with progressively worsening neck pain and swelling as well as purulent cellulitis/abscess. Treated with IV Antibiotics and discharged on PO. Hospital course complicated by AV block with prolonged pause (likely due to reflex from compression of mass on right carotid artery) s/p ppm.    11/30/22 CT neck: large 4.8 x 3.1 x 4.2 cm rim-enhancing complex collection in left lateral neck, extending from carotid space to the skin surface with associated skin thickening, edema, and prominent left neck lymph nodes. Findings most likely represent an abscess rather than necrotic/superinfected neoplasm. 12/1/22 CT CAP: left neck mass partially visualized. 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 12/13/22 C1 Weekly cisplatin  12/14/22 concurrent radiation initiated  12/29/22 PEG tube placed   1/10/23 Week 5 Cisplatin held due to MOR  1/17/23 Week 6 Cisplatin held due to MOR  2/3/23 Completed concurrent chemoradiation with weekly cisplatin     Presents to clinic alone. He is doing okay. He states that his appetite dropped off a bit this week. He was pounding the Boost earlier this week and had an episode of emesis. Weight down slightly. PEG tube removed.     Denies fevers. Reports tenderness at left neck mass/stricture. No malodorous discharge. No purulence. Has ongoing dry mouth. Has not noticed any improvement with sugar-free candies. No further episodes of dizziness   Reports recurrence in headaches. States that he has brief sharp headache pain 2-3x per day. Relieved with tylenol. Medications reviewed in the EMR. No Known Allergies     Review of Systems: A 6-point review of systems was obtained, negative except as reviewed in the HPI. Physical Exam:   There were no vitals taken for this visit. ECO  General: No distress, chronically ill appearing  HENT: Atraumatic, no oral ulcers, MMM  Neck: Supple, ~firm mass in left submandibular region, stable, with ~1 cm area of thick white discharge. Mildly tender. No erythema or warmth  Respiratory: CTAB, normal respiratory effort  CV: tachycardic no peripheral edema  Skin: No rashes, ecchymoses, or petechiae  Psych: Alert, oriented, appropriate affect, normal judgment/insight    Results:     Lab Results   Component Value Date/Time    WBC 5.9 2023 03:22 PM    HGB 10.1 (L) 2023 03:22 PM    HCT 30.4 (L) 2023 03:22 PM    PLATELET 886  03:22 PM    .9 (H) 2023 03:22 PM    ABS. NEUTROPHILS 4.5 2023 03:22 PM     Lab Results   Component Value Date/Time    Sodium 133 (L) 2023 03:22 PM    Potassium 4.6 2023 03:22 PM    Chloride 100 2023 03:22 PM    CO2 29 2023 03:22 PM    Glucose 192 (H) 2023 03:22 PM    BUN 27 (H) 2023 03:22 PM    Creatinine 1.53 (H) 2023 03:22 PM    Calcium 9.6 2023 03:22 PM    Glucose (POC) 191 (H) 2023 12:12 PM     Lab Results   Component Value Date/Time    Bilirubin, total 0.4 2023 03:22 PM    ALT (SGPT) 32 2023 03:22 PM    Alk.  phosphatase 177 (H) 2023 03:22 PM    Protein, total 8.9 (H) 2023 03:22 PM    Albumin 3.7 2023 03:22 PM    Globulin 5.2 (H) 2023 03:22 PM     Imaging personally reviewed by me  PET Results (most recent):  Results from Hospital Encounter encounter on 04/28/23    PET/CT TUMOR IMAGE SKULL THIGH (SUB)    Narrative  PET/CT SCAN    PROCEDURE: Following IV injection of 9.99 mCi 18 Fluoro 2 deoxyglucose (FDG) and  a standard uptake delay, PET imaging is performed from the skull vertex to mid  thigh and axial, sagittal and coronal images were acquired. Additional  high-resolution images of the neck were obtained. Unenhanced CT is obtained for  anatomic localization, and attenuation correction of the PET scan. Patient  preprocedure blood glucose level: 191 mg/dL. CORRELATIVE IMAGING STUDIES: Neck CT 4/14/2023. COMPARISON PET: None    HISTORY: The study is requested for restaging head and neck cancer. FINDINGS:    HEAD/NECK: There is mild increased tracer activity in the left neck  corresponding to the hypodensity seen on CT, maximum SUV is 3.9. CHEST: Groundglass nodule right upper lobe is mildly hypermetabolic, maximum SUV  is 0.9. ABDOMEN/PELVIS: No foci of abnormal hypermetabolism. SKELETON: No foci of abnormal hypermetabolism in the axial and visualized  appendicular skeleton. Impression  Mild increased tracer activity is seen in the left neck  corresponding to the abnormality seen on CT. This remains of indeterminate  etiology, potentially representing either necrotic neoplasm or complex fluid  collection given the extension to the skin surface. Mildly hypermetabolic  nonspecific ground glass nodule right upper lobe. Follow-up CT is recommended to  evaluate for any interval change. Otherwise normal tracer distribution. CT Results (most recent):  Results from Hospital Encounter encounter on 04/14/23    CT NECK SOFT TISSUE W CONT    Narrative  INDICATION: Draining fistulous tract at treated [address] site.  Malignant neoplasm  of head, face and neck    COMPARISON: CT neck November 30, 2022    TECHNIQUE:  Axial neck CT was performed after the uneventful administration of  IV contrast. Sagittal and coronal reformats were generated. CT dose reduction was achieved through use of a standardized protocol tailored  for this examination and automatic exposure control for dose modulation. CONTRAST: 100 mL Isovue 300    FINDINGS:    NASOPHARYNX: Normal.  SUPRAHYOID NECK: In the left lateral neck there is a 3.1 x 2.2 cm fluid  collection or area of necrosis which appears to directly communicate with the  skin surface. There are a few separate smaller locules arising from the dominant  collection. The collection directly abuts the left common carotid artery. The  left internal jugular vein is completely occluded at the level of the  collection, although there is some flow in the more central part of the vein  just below the collection. There is local mass effect, with rightward shift of  the larynx, although no narrowing. INFRAHYOID NECK: Normal larynx, hypopharynx and supraglottis. PAROTID GLANDS: Normal.  SUBMANDIBULAR GLANDS: Normal.  THYROID GLAND: Normal. No nodule. LYMPH NODES: None enlarged by CT size criteria . LUNG APICES: Clear. OSSEOUS STRUCTURES: No destructive bone lesion. ADDITIONAL COMMENTS: N/A. Impression  3.1 cm hypodensity, complex fluid collection versus necrotic mass, in the left  lateral neck structure communicating with the skin surface via a sinus tract. The collection directly abuts the left common carotid artery. Niels Shipley

## 2023-05-01 NOTE — PROGRESS NOTES
[id number] [facility]   [phone]    Hematology / Oncology Follow-up    Reason for Visit:   Margoth Olivarez is a 75 y.o. male PMHx of hypertension, diabetes, s/p pacemaker and hepatic steatosis/possible cirrhosis 2/2 EtOH who is seen for follow-up of locally advanced HNSCC. Hematology/Oncology Treatment History:      PRIMARY DIAGNOSIS: Locally advanced HNSCC, unknown primary site  DATE OF DIAGNOSIS: 7/26/22  ORIGINAL STAGE: Stage CAN (cT0 cN3b cM0)  DIAGNOSTICS: FNA ([address] [place] [address]) with atypical squamous cells concerning for malignancy. Core biopsy with skeletal muscle, fibrous tissue, and granulation with acute and chronic inflammation. Cytokeratin negative, p40 negative. SITES OF DISEASE: Left neck mass with prominent left-sided lymph nodes. 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 Cytology from FNA 7/26/22 in [place] with \"atypical squamous cells. \"  Follow-up core needle biopsy with \"rare epitheliod cells are present; however, pancytokeratin immunostain is negative, and p40 immunostain negative. \" Although core needle biopsy not definitive, clinical picture (i.e. atypical squamous cells on FNA, enlarging neck mass with failure to improve to antibiotics in a chronic smoker) consistent with HNSCC. Unclear primary. Given ongoing complications from untreated disease (heart block, recurrent infection), elected not to pursue PET-CT or pan-endoscopy while inpatient to minimize delays in treatment.   Staging CT CAP negative for distant metastatic disease     He completed concurrent chemoradiation with weekly cisplatin 40 mg/m2 (12/13/22 - 2/1/23). He presents today with post-treatment PET-CT. Post-treatment PET-CT personally reviewed demonstrates mildly increased avidity in left neck which corresponds to fluid collection/area of necrosis communicating to skin surface via sinus tract seen on CT neck. The collection directly abuts the left carotid artery. There is also a new nonspecific ground glass nodule in RUL that is mildly hypermetabolic. Case discussed with Dr. Niesha Veloz. Overall, the PET-CT is equivocal with no definitive evidence of recurrence. However, clinically, he has had a few ongoing symptoms that are worrisome. First, he has had headaches, which initially improved with therapy, but have since recurred. Second, he notes tongue mobility issues, especially when swallowing. He has mild speech changes but no focal deficits or visible abnormalities within the oropharynx. We will work this up further with MRI brain and MRI face/neck/orbits as well as Neurology evaluation. Dr. Niesha Veloz to discuss case further with Dr. Mckeon. Last, he has hygiene issues and I am concerned about risk of infection of his draining fistulous tract. There is no purulence/fluctuance or erythema on exam today. Plan to refer to home health for wound management. - Case discussed with Dr. Niesha Veloz. Will review case with ENT (Dr. Mckeon)  - Obtain MRI brain and MRI face/neck/orbits now  - If no evidence of recurrence, then will plan on repeat PET-CT in 3 months  - Labs and fluids in 1 week  - Port flushes monthly  - Return to clinic in 1 month for exam and scan review    #) Headaches, intractable  Tongue mobility issues: headaches not relieved by NSAIDs or tylenol. Tongue mobility new since prior exam.   - Imaging as above  - Referral to Neurology  - Tylenol 1000 mg TID + aleve prn severe headache or tramadol prn severe headache    #) Acute kidney injury  hypomagnesemia: Treatment-related.  Repeat labs in 1 week    #) Heart block: Likely due to reflex from mass sitting over his right carotid artery. Status post permanent pacemaker on 22. #) Tobacco abuse: has almost completely cut back on smoking (1 cigarette this week). Congratulated on success    #) Psychosocial: wife recently . Moved from [place] to be closer to his children. Has good support from family (oldest son Yuliet Alan is HCPOA). Signed By: Boogie Davis MD    May 1, 2023      Interval History:     Presented 2022 with enlarging next mass, failed to improve with antibiotics  Evaluated by ENT (Dr. Leisa Dinh) in [facility] [address] in 2022. uUtrasound that showed irrecgular enhancing mass involving left submandibuar gland, suspicious for cancer. Follow up CT neck demonstrated soft tissue mass, measuring 5 cm with small annular opening with drainage. Mass noted to be compressing left internal jugular vein. FNA 22 with atypical squamous cells, c/f malignancy  Core biopsy with skeletal muscle, fibrous tissue, and granulation with acute and chronic inflammation. Cytokeratin negative, p40 negative. Patient referred to medical and radiation oncology in [place], but did not follow up due to passing of his wife  Presented to [facility] 2022 with progressively worsening neck pain and swelling as well as purulent cellulitis/abscess. Treated with IV Antibiotics and discharged on PO. Hospital course complicated by AV block with prolonged pause (likely due to reflex from compression of mass on right carotid artery) s/p ppm.    22 CT neck: large 4.8 x 3.1 x 4.2 cm rim-enhancing complex collection in left lateral neck, extending from carotid space to the skin surface with associated skin thickening, edema, and prominent left neck lymph nodes. Findings most likely represent an abscess rather than necrotic/superinfected neoplasm. 22 CT CAP: left neck mass partially visualized.   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 22 C1 Weekly cisplatin  22 concurrent radiation initiated  22 PEG tube placed   1/10/23 Week 5 Cisplatin held due to MOR  23 Week 6 Cisplatin held due to MOR  2/3/23 Completed concurrent chemoradiation with weekly cisplatin     He presents to clinic with his son. He has been declining a bit since last visit. Son stays he is self medicating (smoking weed) and his hygiene has declined. Patient reports a main complaint of ongoing daily headaches. States he wakes up with a headache and goes to bed with a headache. He will take either 2 tylenol or 2 aleve with some temporary relief. He has not been keeping himself well hydrated. His dry mouth is okay. Denies any recent fevers or chills. He did not see the wound care clinic. He has been self dressing but does not change every day. No purulence. Medications reviewed in the EMR. No Known Allergies     Review of Systems: A 6-point review of systems was obtained, negative except as reviewed in the HPI. Physical Exam:   Visit Vitals  BP (!) 177/68 (BP 1 Location: Right arm, BP Patient Position: Sitting)   Pulse (!) 108   Temp 98.2 °F (36.8 °C)   Resp 18   Wt 151 lb (68.5 kg)   SpO2 94%   BMI 25.13 kg/m²     ECO  General: No distress, chronically ill appearing  HENT: Atraumatic, no oral ulcers, dry mucous membranes  Neck: Supple, ~firm mass in left submandibular region, stable, with ~1 cm area of thin clear discharge.  Somewhat malodorous but no purulence, tenderness, erythema or warmth  Respiratory: CTAB, normal respiratory effort  CV: tachycardic, no peripheral edema  Skin: No rashes, ecchymoses, or petechiae  Psych: Alert, oriented, appropriate affect, normal judgment/insight  Neuro:    Results:     Lab Results   Component Value Date/Time    WBC 5.9 2023 03:22 PM    HGB 10.1 (L) 2023 03:22 PM    HCT 30.4 (L) 2023 03:22 PM    PLATELET 201  03:22 PM .9 (H) 03/31/2023 03:22 PM    ABS. NEUTROPHILS 4.5 03/31/2023 03:22 PM     Lab Results   Component Value Date/Time    Sodium 133 (L) 03/31/2023 03:22 PM    Potassium 4.6 03/31/2023 03:22 PM    Chloride 100 03/31/2023 03:22 PM    CO2 29 03/31/2023 03:22 PM    Glucose 192 (H) 03/31/2023 03:22 PM    BUN 27 (H) 03/31/2023 03:22 PM    Creatinine 1.53 (H) 03/31/2023 03:22 PM    Calcium 9.6 03/31/2023 03:22 PM    Glucose (POC) 191 (H) 04/28/2023 12:12 PM     Lab Results   Component Value Date/Time    Bilirubin, total 0.4 03/31/2023 03:22 PM    ALT (SGPT) 32 03/31/2023 03:22 PM    Alk. phosphatase 177 (H) 03/31/2023 03:22 PM    Protein, total 8.9 (H) 03/31/2023 03:22 PM    Albumin 3.7 03/31/2023 03:22 PM    Globulin 5.2 (H) 03/31/2023 03:22 PM     Imaging personally reviewed by me  PET Results (most recent):  Results from [place] encounter on 04/28/23    PET/CT TUMOR IMAGE SKULL THIGH (SUB)    Narrative  PET/CT SCAN    PROCEDURE: Following IV injection of 9.99 mCi 18 Fluoro 2 deoxyglucose (FDG) and  a standard uptake delay, PET imaging is performed from the skull vertex to mid  thigh and axial, sagittal and coronal images were acquired. Additional  high-resolution images of the neck were obtained. Unenhanced CT is obtained for  anatomic localization, and attenuation correction of the PET scan. Patient  preprocedure blood glucose level: 191 mg/dL. CORRELATIVE IMAGING STUDIES: Neck CT 4/14/2023. COMPARISON PET: None    HISTORY: The study is requested for restaging head and neck cancer. FINDINGS:    HEAD/NECK: There is mild increased tracer activity in the left neck  corresponding to the hypodensity seen on CT, maximum SUV is 3.9. CHEST: Groundglass nodule right upper lobe is mildly hypermetabolic, maximum SUV  is 0.9. ABDOMEN/PELVIS: No foci of abnormal hypermetabolism. SKELETON: No foci of abnormal hypermetabolism in the axial and visualized  appendicular skeleton.     Impression  Mild increased tracer activity is seen in the left neck  corresponding to the abnormality seen on CT. This remains of indeterminate  etiology, potentially representing either necrotic neoplasm or complex fluid  collection given the extension to the skin surface. Mildly hypermetabolic  nonspecific ground glass nodule right upper lobe. Follow-up CT is recommended to  evaluate for any interval change. Otherwise normal tracer distribution. CT Results (most recent):  Results from Hospital Encounter encounter on 04/14/23    CT NECK SOFT TISSUE W CONT    Narrative  INDICATION: Draining fistulous tract at treated [address] site. Malignant neoplasm  of head, face and neck    COMPARISON: CT neck November 30, 2022    TECHNIQUE:  Axial neck CT was performed after the uneventful administration of  IV contrast. Sagittal and coronal reformats were generated. CT dose reduction was achieved through use of a standardized protocol tailored  for this examination and automatic exposure control for dose modulation. CONTRAST: 100 mL Isovue 300    FINDINGS:    NASOPHARYNX: Normal.  SUPRAHYOID NECK: In the left lateral neck there is a 3.1 x 2.2 cm fluid  collection or area of necrosis which appears to directly communicate with the  skin surface. There are a few separate smaller locules arising from the dominant  collection. The collection directly abuts the left common carotid artery. The  left internal jugular vein is completely occluded at the level of the  collection, although there is some flow in the more central part of the vein  just below the collection. There is local mass effect, with rightward shift of  the larynx, although no narrowing. INFRAHYOID NECK: Normal larynx, hypopharynx and supraglottis. PAROTID GLANDS: Normal.  SUBMANDIBULAR GLANDS: Normal.  THYROID GLAND: Normal. No nodule. LYMPH NODES: None enlarged by CT size criteria . LUNG APICES: Clear. OSSEOUS STRUCTURES: No destructive bone lesion.   ADDITIONAL COMMENTS: N/A.    Impression  3.1 cm hypodensity, complex fluid collection versus necrotic mass, in the left  lateral neck structure communicating with the skin surface via a sinus tract. The collection directly abuts the left common carotid artery. Nathalie Amaya

## 2023-05-03 ENCOUNTER — TELEPHONE (OUTPATIENT)
Dept: ONCOLOGY | Age: 68
End: 2023-05-03

## 2023-05-09 NOTE — TELEPHONE ENCOUNTER
Called patient to advise/confirm upcoming appt with Dr. Gustavo Harmon on 5/12/23 at 11:00  at [facility]. Spoke with Brianda Fisher and confirmed appointment. Also advised to please bring in your 's License and Insurance Card and any pain medications in the original container with you to appointment.

## 2023-05-11 ENCOUNTER — TRANSCRIBE ORDERS (OUTPATIENT)
Facility: HOSPITAL | Age: 68
End: 2023-05-11

## 2023-05-11 DIAGNOSIS — G89.29 CHRONIC INTRACTABLE HEADACHE, UNSPECIFIED HEADACHE TYPE: Primary | ICD-10-CM

## 2023-05-11 DIAGNOSIS — R51.9 CHRONIC INTRACTABLE HEADACHE, UNSPECIFIED HEADACHE TYPE: Primary | ICD-10-CM

## 2023-05-12 ENCOUNTER — TELEPHONE (OUTPATIENT)
Age: 68
End: 2023-05-12

## 2023-05-12 NOTE — TELEPHONE ENCOUNTER
Palliative Medicine  Nursing Note  [phone]  Fax [phone]      Telephone Call  Patient Name: Jhon Torres. YOB: [birth year]    Call placed to Mr. Coughlin home phone, but no answer and left vm. Call placed to his mobile phone and his son answered who shared that his father passed away Tuesday at home. His oldest child found him in the morning on the floor. They feel it was related to a tear in his Carotid artery, as this had been explained was a possibility due to the location of his tumor on his neck. He shared that it's been rather traumatic for everyone, but they knew his dad was really sick. They said they are coping well and have support within the family. He declined the need for SW at present. Offered to inform Mr. Cordova Boards other providers of his fathers passing and he was appreciative. Offered our condolences.          Duncan Jimenez RN  Palliative Medicine

## 2023-05-17 ENCOUNTER — TELEPHONE (OUTPATIENT)
Age: 68
End: 2023-05-17

## 2023-05-25 RX ORDER — COLCHICINE 0.6 MG/1
TABLET ORAL
Qty: 30 TABLET | OUTPATIENT
Start: 2023-05-25

## 2023-06-10 DIAGNOSIS — M10.9 GOUT, UNSPECIFIED: ICD-10-CM

## 2023-06-10 RX ORDER — ALLOPURINOL 100 MG/1
TABLET ORAL
Qty: 60 TABLET | OUTPATIENT
Start: 2023-06-10

## 2023-07-24 DIAGNOSIS — C32.9 MALIGNANT NEOPLASM OF LARYNX, UNSPECIFIED (HCC): ICD-10-CM

## 2023-07-24 RX ORDER — BUPROPION HYDROCHLORIDE 100 MG/1
TABLET, EXTENDED RELEASE ORAL
Qty: 90 TABLET | OUTPATIENT
Start: 2023-07-24

## 2023-11-06 ENCOUNTER — TELEPHONE (OUTPATIENT)
Age: 68
End: 2023-11-06

## (undated) DEVICE — SUTURE PROL SZ 0 L30IN NONABSORBABLE BLU L36MM CT-1 1/2 CIR 8424H

## (undated) DEVICE — SYRINGE MED 50ML LUERLOCK TIP

## (undated) DEVICE — TOWEL SURG W17XL27IN STD BLU COT NONFENESTRATED PREWASHED

## (undated) DEVICE — ROYAL SILK SURGICAL GOWN, XXL: Brand: CONVERTORS

## (undated) DEVICE — FASCIAL DILATOR SET: Brand: COOK

## (undated) DEVICE — PINNACLE INTRODUCER SHEATH: Brand: PINNACLE

## (undated) DEVICE — WASTE KIT - ST MARY: Brand: MEDLINE INDUSTRIES, INC.

## (undated) DEVICE — 3M™ TEGADERM™ TRANSPARENT FILM DRESSING FRAME STYLE, 1626W, 4 IN X 4-3/4 IN (10 CM X 12 CM), 50/CT 4CT/CASE: Brand: 3M™ TEGADERM™

## (undated) DEVICE — HEART CATH-MRMC: Brand: MEDLINE INDUSTRIES, INC.

## (undated) DEVICE — PRESSURE MONITORING SET: Brand: TRUWAVE

## (undated) DEVICE — AMPLATZ EXTRA STIFF WIRE GUIDE: Brand: AMPLATZ

## (undated) DEVICE — INTRO SHTH CATH 23F 55.7CM --

## (undated) DEVICE — MEDI-TRACE CADENCE ADULT, DEFIBRILLATION ELECTRODE -RTS  (10 PR/PK) - PHYSIO-CONTROL: Brand: MEDI-TRACE CADENCE